# Patient Record
Sex: FEMALE | URBAN - METROPOLITAN AREA
[De-identification: names, ages, dates, MRNs, and addresses within clinical notes are randomized per-mention and may not be internally consistent; named-entity substitution may affect disease eponyms.]

---

## 2022-09-06 ENCOUNTER — APPOINTMENT (OUTPATIENT)
Dept: URBAN - METROPOLITAN AREA CLINIC 198 | Age: 27
Setting detail: DERMATOLOGY
End: 2022-09-06

## 2022-09-06 DIAGNOSIS — D22 MELANOCYTIC NEVI: ICD-10-CM

## 2022-09-06 DIAGNOSIS — L21.8 OTHER SEBORRHEIC DERMATITIS: ICD-10-CM

## 2022-09-06 DIAGNOSIS — Z11.52 ENCOUNTER FOR SCREENING FOR COVID-19: ICD-10-CM

## 2022-09-06 PROBLEM — D22.4 MELANOCYTIC NEVI OF SCALP AND NECK: Status: ACTIVE | Noted: 2022-09-06

## 2022-09-06 PROBLEM — D23.72 OTHER BENIGN NEOPLASM OF SKIN OF LEFT LOWER LIMB, INCLUDING HIP: Status: ACTIVE | Noted: 2022-09-06

## 2022-09-06 PROBLEM — D22.5 MELANOCYTIC NEVI OF TRUNK: Status: ACTIVE | Noted: 2022-09-06

## 2022-09-06 PROBLEM — D23.71 OTHER BENIGN NEOPLASM OF SKIN OF RIGHT LOWER LIMB, INCLUDING HIP: Status: ACTIVE | Noted: 2022-09-06

## 2022-09-06 PROBLEM — D22.71 MELANOCYTIC NEVI OF RIGHT LOWER LIMB, INCLUDING HIP: Status: ACTIVE | Noted: 2022-09-06

## 2022-09-06 PROBLEM — D22.39 MELANOCYTIC NEVI OF OTHER PARTS OF FACE: Status: ACTIVE | Noted: 2022-09-06

## 2022-09-06 PROBLEM — D22.62 MELANOCYTIC NEVI OF LEFT UPPER LIMB, INCLUDING SHOULDER: Status: ACTIVE | Noted: 2022-09-06

## 2022-09-06 PROBLEM — D22.61 MELANOCYTIC NEVI OF RIGHT UPPER LIMB, INCLUDING SHOULDER: Status: ACTIVE | Noted: 2022-09-06

## 2022-09-06 PROBLEM — D22.72 MELANOCYTIC NEVI OF LEFT LOWER LIMB, INCLUDING HIP: Status: ACTIVE | Noted: 2022-09-06

## 2022-09-06 PROCEDURE — OTHER COUNSELING: OTHER

## 2022-09-06 PROCEDURE — OTHER PRESCRIPTION MEDICATION MANAGEMENT: OTHER

## 2022-09-06 PROCEDURE — OTHER SCREENING FOR COVID-19: OTHER

## 2022-09-06 PROCEDURE — OTHER PRESCRIPTION: OTHER

## 2022-09-06 PROCEDURE — 99204 OFFICE O/P NEW MOD 45 MIN: CPT

## 2022-09-06 RX ORDER — KETOCONAZOLE 20 MG/G
CREAM TOPICAL BID
Qty: 30 | Refills: 2 | Status: ERX | COMMUNITY
Start: 2022-09-06

## 2022-09-06 ASSESSMENT — LOCATION ZONE DERM
LOCATION ZONE: NECK
LOCATION ZONE: ARM
LOCATION ZONE: LEG
LOCATION ZONE: FACE
LOCATION ZONE: SCALP
LOCATION ZONE: TRUNK

## 2022-09-06 ASSESSMENT — LOCATION SIMPLE DESCRIPTION DERM
LOCATION SIMPLE: RIGHT PRETIBIAL REGION
LOCATION SIMPLE: RIGHT CHEEK
LOCATION SIMPLE: LEFT POSTERIOR THIGH
LOCATION SIMPLE: RIGHT UPPER BACK
LOCATION SIMPLE: RIGHT POSTERIOR THIGH
LOCATION SIMPLE: LEFT CHEEK
LOCATION SIMPLE: RIGHT BUTTOCK
LOCATION SIMPLE: RIGHT FOREARM
LOCATION SIMPLE: LEFT BUTTOCK
LOCATION SIMPLE: LEFT FOREARM
LOCATION SIMPLE: FRONTAL SCALP
LOCATION SIMPLE: ABDOMEN
LOCATION SIMPLE: POSTERIOR NECK

## 2022-09-06 ASSESSMENT — LOCATION DETAILED DESCRIPTION DERM
LOCATION DETAILED: MEDIAL FRONTAL SCALP
LOCATION DETAILED: RIGHT INFERIOR POSTERIOR NECK
LOCATION DETAILED: LEFT DISTAL POSTERIOR THIGH
LOCATION DETAILED: LEFT PROXIMAL DORSAL FOREARM
LOCATION DETAILED: LEFT CENTRAL MALAR CHEEK
LOCATION DETAILED: RIGHT DISTAL PRETIBIAL REGION
LOCATION DETAILED: RIGHT DISTAL POSTERIOR THIGH
LOCATION DETAILED: RIGHT MEDIAL UPPER BACK
LOCATION DETAILED: RIGHT PROXIMAL RADIAL DORSAL FOREARM
LOCATION DETAILED: LEFT BUTTOCK
LOCATION DETAILED: RIGHT BUTTOCK
LOCATION DETAILED: RIGHT SUPERIOR CENTRAL MALAR CHEEK
LOCATION DETAILED: EPIGASTRIC SKIN

## 2022-09-06 NOTE — PROCEDURE: PRESCRIPTION MEDICATION MANAGEMENT
Initiate Treatment: Ketoconazole 2% cream qhs
Detail Level: Zone
Render In Strict Bullet Format?: No
91

## 2024-06-07 ENCOUNTER — TRANSCRIBE ORDERS (OUTPATIENT)
Dept: SCHEDULING | Facility: REHABILITATION | Age: 29
End: 2024-06-07

## 2024-06-07 DIAGNOSIS — Z86.69 HISTORY OF TETHERED SPINAL CORD: Primary | ICD-10-CM

## 2024-06-07 DIAGNOSIS — Z86.69 PERSONAL HISTORY OF OTHER DISEASES OF THE NERVOUS SYSTEM AND SENSE ORGANS: ICD-10-CM

## 2024-06-18 ENCOUNTER — HOSPITAL ENCOUNTER (OUTPATIENT)
Dept: PHYSICAL THERAPY | Facility: REHABILITATION | Age: 29
Setting detail: THERAPIES SERIES
Discharge: HOME | End: 2024-06-18
Attending: LEGAL MEDICINE
Payer: COMMERCIAL

## 2024-06-18 DIAGNOSIS — Z86.69 HISTORY OF TETHERED SPINAL CORD: Primary | ICD-10-CM

## 2024-06-18 PROCEDURE — 97163 PT EVAL HIGH COMPLEX 45 MIN: CPT | Mod: GP

## 2024-06-18 NOTE — LETTER
Dear DR. Whitfield,    Thank you for this referral. Please review the attached notes and plan of care for your approval.  Please contact our department with any questions.     Sincerely,     Dilan Machuca, PT  [address]  [place] [address]  Phone [phone]  Fax  [phone]    By co-signing this Plan of Care (POC) you agree to the following:  I have reviewed the the Plan of Care established by the therapist within this document and certify that the services are skilled and medically necessary. I have reviewed the plan and recommend that these services continue to meet the goals stated in this document.    PHYSICIAN SIGNATURE: __________________________________     DATE: ___________________  TIME: _____________           Physical Therapy Plan of Care 24   Effective from: 2024  Effective to: 2024    Plan ID: [id number]            Participants as of Finalize on 2024    Name Type Comments Contact Info    Remedios Whitfield MD PCP - General  [phone]    Dilan Machuca PT Physical Therapist         Last Progress Notes Note     Author: Dilan Machuca PT Status: Signed Last edited: 2024 10:00 AM       Physical Therapy Evaluation    [facility] Fax: [phone]    PT EVALUATION FOR OUTPATIENT THERAPY    Patient: Melanie Litten    MRN: [id number]  : 1995 28 y.o.     Referring Physician: Remedios Whitfield MD  Date of Visit: 2024      Certification Dates:  24 through 24         Recommended Frequency & Duration:  2 times/week for up to 3 months     Diagnosis:   1. History of tethered spinal cord        Chief Complaints:   Chief Complaint   Patient presents with   • Difficulty Walking   • Balance Deficits   • Dec ROM   • Dec Strength   • Dec Coordination   • Pain   • Decreased Endurance   • Abnormality Of Gait   •  Decreased Community Integration   • Decreased recreational/play activity   • Decreased Mobility   •  Difficulty Performing Work/school Tasks   •   Imbalance       Precautions:    Precautions additional comments: hypermobile - EDS, postural hypotension    Past Medical History: No past medical history on file.    Past Surgical History: No past surgical history on file.      LEARNING ASSESSMENT    Assessment completed:  Yes    Learner name:  Saranya    Learner: Patient    Learning Barriers:  Learning barriers: No Barriers    Preferred Language: English     Needed: No    Education Provided:   Method: Discussion  Readiness: acceptance  Response: Verbalizes understanding      Welcome letter discussed: Yes Patient provided with Welcome Letter, which includes attendance policy. Provided education regarding cancellation and no-show policy. Education regarding the importance of participation and regular attendance to maximize goal attainment.       OBJECTIVE MEASUREMENTS/DATA:    Time In Session:  Start Time: 1001  Stop Time: 1100  Time Calculation (min): 59 min   Assessment and Plan - 06/24/24 1516          Assessment    Plan of Care reviewed and patient/family in agreement Yes     System Pathology/Pathophysiology Noted musculoskeletal;neuromuscular;cardiovascular     Functional Limitations in Following Categories (PT Eval) self-care;work;community/leisure     Rehab Potential/Prognosis good, to achieve stated therapy goals     Problem List abnormal muscle tone;decreased strength;impaired balance;impaired sensation;decreased endurance;edema;hemiparesis/hemiplegia;impaired motor control;impaired coordination     Clinical Assessment Saranya is a 28-year-old female who presents to OPPT with history of tethered cord syndrome and s/p surgical intervention for laminectomy for occult tethered cord release on 9/25/23. Pt has recovered significantly since surgery and is ambulatory today at [facility] without AD as it takes her longer than prior. 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 Functional testing of 6MWT reveals decreased overall endurance with a distance of 1555 ft which is below her age/gender matched norms with increased foot slap and coordination deficits. Gait speed is also below norms at 1.0 m/s. FGA testing of 24/30 places pt above the cut off score of 22/30 for falls risk although with room for improvement especially due to her goals of returning to an active lifestyle. ABC scale, floor transfers and single leg balance TBA NV. Pt is an excellent candidate for skilled PT 2x/week for 8 weeks to address impairments stated above, maximize her functional independence, reduce falls risk and meet all pt specific goals.     Plan and Recommendations Complete functional testing, initiate core strengthening program, higher level balance and coordination     Planned Services CPT 44473 Aquatic therapy/exercises;CPT 00805 Gait training;CPT 88626 Manual therapy;CPT 56612 Neuromuscular Reeducation;CPT 64466 Orthotics training (Initial encounter);CPT 21483 Orthotics/Prosthetics management and training (Subsequent encounters);CPT 66711 Self-care/Home management training;CPT 37987 Therapeutic activities;CPT 12243 Therapeutic exercises;CPT 31521 Electrical stimulation ATTENDED;CPT 61269 Electrical stimulation UNATTENDED;CPT 10691 Hot/Cold Packs therapy     Comments/Additional Services Additional services that may be used: Vector BWSS for gait training, Lokomat body weight support training for gait function, full weight bearing for gait training in an eksoskeleton, FES , Xcite, Equestrian Therapy                    General Information - 06/18/24 1009          Session Details    Document Type initial evaluation     Mode of Treatment individual therapy        General Information    Referring Physician Remedios Whitfield MD     History of present illness/functional impairment Saranya is a 28 year old female who  presents to OPPT with history of tethered cord syndrome. Pt with PMH significant for chiari malformation, hypermobile EDS, left plantar fasciitis, anxiety and postural hypotension. Pt reports her symptoms started slowly in 2019 with L hip pain, followed up with neurosurgeon in [place] with unremarkable results for all testing. Her symptoms then progressed with L leg motor weakness and shakiness (similar to clonus) with movement, chronic constipation, L drop foot, lower back pain, and neck pain.  Pt was finally diagnosed with tethered cord syndrome and underwent a clinical trial surgery at [facility], [facility], and [facility]. Due to her symptoms, she meets criteria for exploration and sectioning of her filum for the functional tethered cord clinical trial. Now s/p laminectomy for occult tethered cord release on 9/25/23 by Dr Duckworth. Patient tolerated procedure well. No post-op complications. Transferred stable once PACU criteria met. Patient kept FLAT for 36hr. Placed on an aseptic dex taper and post-op IV abx x24hrs.  She was mobilized on 9/27 tolerated well without any symptoms. Additionally, was instructed not to exercise for 7 weeks post-surgery. Pt reports she recovered well overall with decreased drop foot, clonus and swelling, continues to have increased coordination deficits and foot drag with fatigue. Pt has completed OPPT at [facility] in [place] and intermittent massage therapy. Pt’s functional goals are to work on higher level balance, picking up things from floor, coordination and “Hippo therapy”.     Limitations/Impairments other (see comments)     Patient/Family/Caregiver Comments/Observations Pt arrives for initial evaluation with goals of maximizing her functional mobility with use of electrical stim and higher level balance program.     Precautions comments hypermobile - EDS, postural hypotension                    Pain/Vitals - 06/18/24 1009           Pain Assessment    Currently in pain No/Denies        Pre Activity Vital Signs    /74     BP Location Right upper arm     BP Method Automatic     Patient Position Sitting                    Falls/Food Screening - 06/18/24 1009          Initial Falls Assessment    One or more falls in the last year No        Food Insecurity    Within the past 12 months, you worried that your food would run out before you got the money to buy more. Never true     Within the past 12 months, the food you bought just didn't last and you didn't have money to get more. Never true                    PT - 06/18/24 1009          Physical Therapy    Physical Therapy Specialty Spinal Cord PT        PT Plan    Frequency of treatment 2 times/week     PT Duration 3 months     PT Cert From 06/18/24     PT Cert To 09/21/24     Date PT POC was sent to provider 06/18/24     Signed PT Plan of Care received?  No                     Living Environment    Living Environment - 06/18/24 1009          Living Environment    Living Arrangements house;other (see comments)     Living Environment Comment Living with parents now will be moving into ranch she purchased with 3 SUNIL, everything else on single floor                   PLOF:    Prior Level of Function - 06/18/24 1009          OTHER    Previous level of function Previously full indpendent with no gait abnormalities or functional impairments; currently working full time in [place]                   Sensory Tests    Sensory Testing - 06/18/24 1009          Sensory Assessment    Sensory Assessment sensation intact except     Sensation Impaired Location(s) left LE;right LE     Left LE Sensory Impairment general sensation;light touch awareness;light touch localization;absent   Proprioception TBA    Sensory Subjective Reports numbness;tingling   down LLE occasionally                  Skin    Skin Assessment - 06/18/24 1009          LE Skin    Skin Condition Intact        Girth Measurements    Other:  Girth Measurement/Comments Mild dependent edema of LLE at end of day per pt reports; surgical scar approx 4 inches in length down mid lumbar spine from previous laminectomy, mild hypomobility down lower 50% of scar                   ROM    Range of Motion - 06/18/24 1200          Additional ROM Measurements    Additional ROM  Hypermobile in all jointsm mild L hamstring length restrictions to approx 70 deg due to tone.                   MMT    Manual Muscle Tests - 06/18/24 1009          RIGHT: Lower Extremity Manual Muscle Test Assessment    Hip Flexion gross movement (5/5) normal     Hip Extension gross movement (4+/5) good plus     Hip Abduction gross movement (4+/5) good plus     Knee Flexion strength (5/5) normal     Knee Extension strength (5/5) normal     Ankle Dorsiflexion gross movement (5/5) normal     Ankle Plantarflexion gross movement (5/5) normal     Ankle Inversion gross movement (5/5) normal     Ankle Eversion gross movement (5/5) normal        LEFT: Lower Extremity Manual Muscle Test Assessment    Hip Flexion gross movement (3-/5) fair minus     Hip Extension gross movement (3-/5) fair minus     Hip Abduction gross movement (3-/5) fair minus     Hip Adduction gross movement (4+/5) good plus     Hip Internal Rotation gross movement (3+/5) fair plus     Hip External Rotation gross movement (3+/5) fair plus     Knee Flexion strength (4/5) good     Knee Extension strength (4-/5) good minus     Ankle Dorsiflexion gross movement (3+/5) fair plus     Ankle Plantarflexion gross movement (4+/5) good plus     Ankle Inversion gross movement (3-/5) fair minus     Ankle Eversion gross movement (4-/5) good minus                   Transfers    Transfers - 06/18/24 1009          Bed Mobility    [place] independent        Transfers    Comment Independent for all transfers, Lynne with increased time for pivot transfers when fatigued; Floor transfers TBA NV                   Gait and Mobility    Gait and Mobility -  06/18/24 1009          Gait Training    [place], Gait independent     Variable surfaces Flat surface     Assistive Device none     Pattern step-through     Comment Gait over level/unlevel indoor surfaces with no AD, decreased L stance time and hip extension through midstance, (+) trendelenberg with increased fatigue resulting in R hip adduction on initial contact, mild foot slap and poor trunk rotation and B arm swing        Stairs Assessment    [place], Stairs modified independence     Assistive Device railing     Handrail Location (Stairs) both sides     Number of Steps (Stairs) 12     Stair Height 7 inches     Ascending Stairs Technique step-over-step     Descending Stairs Technique step-over-step     Comment per pt verbal reports                   Neuromuscular/Motor    Neuromuscular/Motor - 06/24/24 1516          Motor Skills    Motor Skills coordination;muscle tone     Coordination gross motor deficit;left;lower extremity;minimal impairment;dysdiadochokinesia;heel to shin     Comment: Coordination Alt toes: decreased DONAVON on L     Muscle Tone left;lower extremity(s);mild impairment;hypertonia;spasticity     Comment: Muscle Tone MAS: L quads and gastroc: 1+                   Balance/Posture    Balance and Posture - 06/24/24 1516          Postural Deviations    Postural Deviations head and neck;shoulder;upper back;low back     Head and Neck forward head     Shoulder left shoulder forward;right shoulder forward     Upper Back kyphosis     Low Back flattened     Comment, Postural Deviations decreased lordosis when seated        Posture    Posture postural deviations                   Outcome Measures    PT Outcome Measures - 06/18/24 1009          Objective Outcome Measures    6 Minute Walk Test 1555 ft     Gait Speed (m/sec) 1 m/sec     FGA Score (out of 30 total) 24     30 Second Sit to Stand Test --   TBA NV                     Goals       •  Mutually agreed upon pain goal       Mutually agreed upon  pain goal: N/A       •  PT Neuro Goals       Short term goals   Short Term Goals Time Frame Result Comment/Progress   Assess ABC, floor transfers, SLS assessment, 30sSTS  2 weeks     Improve 6mWT by 191 feet or more to meet the MCID indicating significant improvement in endurance and activity tolerance. 4-6 weeks     Improve ABC Scale score to 60% or better suggesting improved balance confidence during functional tasks 4-6 weeks     Improve FGA score by 5 points or more to meet the MCID indicating improving dynamic balance and decreasing falls risk. 4-6 weeks     Tolerate 10 min of CV activity with VSS 4-6 weeks     Progress gait speed by 0.13 m/sec or more to meet the MCID without decline in safety or quality indicating significant improvement in gait speed and increased safety during ambulation in the home and community. 4-6 weeks     Pt and caregiver will be mod I with HEP 4 weeks     Long term goals   Long Term Goals Time Frame Result Comment/Progress   Pt will complete floor transfer without any external support Independently       Pt will navigate FF 7 inch steps unsupported with reciprocal pattern  8-12 weeks     Improve 6mWT by an additional 191 feet or more to meet the MCID indicating significant improvement in endurance and activity tolerance. 8-12 weeks     Improve 30 sec STS test reps to 15 reps or better to meet the age/gender matched norm and cut-off score indicating adequate LE muscle performance for functional activities. 8-12 weeks     Improve ABC Scale score to 81% or better suggesting improved balance confidence during functional tasks and decreased risk for falls. 8-12 weeks     Improve FGA score to > 28/30 indicating improved dynamic balance and decreased falls risk. 8-12 weeks     Tolerate 12-15 min of CV activity with VSS 8-12 weeks     Progress gait speed by an additional 0.13 m/sec or more to meet the MCID without decline in safety or quality indicating significant improvement in gait speed  and increased safety during ambulation in the home and community. 8-12 weeks     Pt and caregiver will be mod I with updated HEP 8-12 weeks            •  Pt Stated Goals (pt-stated)       Pt’s functional goals are to work on higher level balance, picking up things from floor, coordination and “Hippo therapy”.                 TREATMENT PLAN:       PT Neuro Exercises Current Session   Performed Today? (y/n)   THER ACT   CPT 16385 TOTAL TIME FOR SESSION 0-7 Minutes      Pain, vitals, subjective    Y   Patient Education Patient educated regarding current impairments per testing completed today and PT POC moving forward.     Patient provided with Welcome Letter, which includes attendance policy. Provided education regarding cancellation and no-show policy. Education regarding the importance of participation and regular attendance to maximize goal attainment. Patient verbalized understanding/agreement.   Yes   HEP       Subjective Outcomes Measures       ABC Scale  taken home to complete and return  Y   THER EX  CPT 63734 TOTAL TIME FOR SESSION  Not performed      STRETCHING       Stretching by patient     Stretching by therapist/PROM       CARDIOVASCULAR       Nu Step       Stationary Bike Active warm up - trial VR  Y   Ambulation with AD      Treadmill       Endurance Testing       6mWT Assessed Y   STRENGTH TRAINING     HEP Review     Standing Ther-Ex     Side-lying there-ex     Supine Ther-Ex     Prone Ther-Ex     Core exercises     Functional Strength Testing       30 sec STS test (60y +)  Assessed Yes   NEURO RE-ED  CPT 50095 TOTAL TIME FOR SESSION Not performed      COORDINATION       Target Tapping     Coordination ladder As appropriate for HL balance     Amb with ankle weights     Amb with proprio wrap     Pushing weighted shopping cart     POSTURAL RE-ED     Sit <> Stand      Seated & standing reaching for trunk strengthening     [place]bey-scapular strengthening     PRE-GAIT ACTIVITIES      Tap-ups     Step-ups      Standing weight shifting     Step-taps     BALANCE TRAINING     Standing balance - static/dynamic       HEP Review       Floor       Airex       Rockerboard       Hurdles       Split Stance       Dynamic gait        Side stepping     Retro walking     Tandem Walking     Cafe Walking     Walking with ball toss     Balance Testing     FGA  Assessed Y   SLS  TBA    10mWT Assessed  Y   GAIT TRAINING  CPT 86750 TOTAL TIME FOR SESSION Not performed      Ambulation with AD        Stair negotiation       Curb negotiation       Ramp negotiation       Outdoor ambulation       MANUAL  CPT 64834 TOTAL TIME FOR SESSION Not performed      Mobilization        MODALITIES  CPT 26069 TOTAL TIME FOR SESSION      Ice       Heat       ATTENDED E-STIM  CPT 48881 TOTAL TIME FOR SESSION Not performed      Attended E-Stim       Unattended E-stim            ASSESSMENT:    This 28 y.o. year old female presents to PT with above stated diagnosis. Physical Therapy evaluation reveals abnormal muscle tone, decreased strength, impaired balance, impaired sensation, decreased endurance, edema, hemiparesis/hemiplegia, impaired motor control, impaired coordination resulting in self-care, work, community/leisure limitations. Melanie Litten will benefit from skilled PT services to address limitation, work towards rehab and patient goals and maximize PLOF of chosen ADLs.     Planned Services: The patient's treatment will include CPT 25645 Aquatic therapy/exercises, CPT 51548 Gait training, CPT 69567 Manual therapy, CPT 89491 Neuromuscular Reeducation, CPT 04962 Orthotics training (Initial encounter), CPT 23618 Orthotics/Prosthetics management and training (Subsequent encounters), CPT 53231 Self-care/Home management training, CPT 77679 Therapeutic activities, CPT 78433 Therapeutic exercises, CPT 56081 Electrical stimulation ATTENDED, CPT 07270 Electrical stimulation UNATTENDED, CPT 48340 Hot/Cold Packs therapy,Additional services that may be used: Vector  BWSS for gait training, Lokomat body weight support training for gait function, full weight bearing for gait training in an eksoskeleton, FES , Xcite, Equestrian Therapy.     Dilan Machuca PT                           Current Participants as of 6/24/2024    Name Type Comments Contact Info    Remedios Whitfield MD PCP - General  [phone]    Signature pending    Dilan Machuca PT Physical Therapist      Signature pending

## 2024-06-18 NOTE — LETTER
Dear DR. Whitfield,    Thank you for this referral. Please review the attached notes and plan of care for your approval.  Please contact our department with any questions.     Sincerely,     Dilan Machuca, PT  [address]  [place] [address]  Phone [phone]  Fax  [phone]    By co-signing this Plan of Care (POC) you agree to the following:  I have reviewed the the Plan of Care established by the therapist within this document and certify that the services are skilled and medically necessary. I have reviewed the plan and recommend that these services continue to meet the goals stated in this document.    PHYSICIAN SIGNATURE: __________________________________     DATE: ___________________  TIME: _____________           Physical Therapy Plan of Care 24   Effective from: 2024  Effective to: 2024    Plan ID: [id number]            Participants as of Finalize on 2024    Name Type Comments Contact Info    Remedios Whitfield MD PCP - General  [phone]    Dilan Machuca PT Physical Therapist         Last Progress Notes Note     Author: Dilan Machuca PT Status: Signed Last edited: 2024 10:00 AM       Physical Therapy Evaluation    [facility] Fax: [phone]    PT EVALUATION FOR OUTPATIENT THERAPY    Patient: Melanie Litten    MRN: [id number]  : 1995 28 y.o.     Referring Physician: Remedios Whitfield MD  Date of Visit: 2024      Certification Dates:  24 through 24         Recommended Frequency & Duration:  2 times/week for up to 3 months     Diagnosis:   1. History of tethered spinal cord        Chief Complaints:   Chief Complaint   Patient presents with   • Difficulty Walking   • Balance Deficits   • Dec ROM   • Dec Strength   • Dec Coordination   • Pain   • Decreased Endurance   • Abnormality Of Gait   •  Decreased Community Integration   • Decreased recreational/play activity   • Decreased Mobility   •  Difficulty Performing Work/school Tasks   •   Imbalance       Precautions:    Precautions additional comments: hypermobile - EDS, postural hypotension    Past Medical History: No past medical history on file.    Past Surgical History: No past surgical history on file.      LEARNING ASSESSMENT    Assessment completed:  Yes    Learner name:  Saranya    Learner: Patient    Learning Barriers:  Learning barriers: No Barriers    Preferred Language: English     Needed: No    Education Provided:   Method: Discussion  Readiness: acceptance  Response: Verbalizes understanding      Welcome letter discussed: Yes Patient provided with Welcome Letter, which includes attendance policy. Provided education regarding cancellation and no-show policy. Education regarding the importance of participation and regular attendance to maximize goal attainment.       OBJECTIVE MEASUREMENTS/DATA:    Time In Session:  Start Time: 1001  Stop Time: 1100  Time Calculation (min): 59 min   Assessment and Plan - 06/24/24 1516          Assessment    Plan of Care reviewed and patient/family in agreement Yes     System Pathology/Pathophysiology Noted musculoskeletal;neuromuscular;cardiovascular     Functional Limitations in Following Categories (PT Eval) self-care;work;community/leisure     Rehab Potential/Prognosis good, to achieve stated therapy goals     Problem List abnormal muscle tone;decreased strength;impaired balance;impaired sensation;decreased endurance;edema;hemiparesis/hemiplegia;impaired motor control;impaired coordination     Clinical Assessment Saranya is a 28-year-old female who presents to OPPT with history of tethered cord syndrome and s/p surgical intervention for laminectomy for occult tethered cord release on 9/25/23. Pt has recovered significantly since surgery and is ambulatory today at [facility] without AD as it takes her longer than prior. 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 Functional testing of 6MWT reveals decreased overall endurance with a distance of 1555 ft which is below her age/gender matched norms with increased foot slap and coordination deficits. Gait speed is also below norms at 1.0 m/s. FGA testing of 24/30 places pt above the cut off score of 22/30 for falls risk although with room for improvement especially due to her goals of returning to an active lifestyle. ABC scale, floor transfers and single leg balance TBA NV. Pt is an excellent candidate for skilled PT 2x/week for 8 weeks to address impairments stated above, maximize her functional independence, reduce falls risk and meet all pt specific goals.     Plan and Recommendations Complete functional testing, initiate core strengthening program, higher level balance and coordination     Planned Services CPT 07277 Aquatic therapy/exercises;CPT 00587 Gait training;CPT 12726 Manual therapy;CPT 16368 Neuromuscular Reeducation;CPT 63051 Orthotics training (Initial encounter);CPT 01552 Orthotics/Prosthetics management and training (Subsequent encounters);CPT 98606 Self-care/Home management training;CPT 64158 Therapeutic activities;CPT 72041 Therapeutic exercises;CPT 37369 Electrical stimulation ATTENDED;CPT 01333 Electrical stimulation UNATTENDED;CPT 21644 Hot/Cold Packs therapy     Comments/Additional Services Additional services that may be used: Vector BWSS for gait training, Lokomat body weight support training for gait function, full weight bearing for gait training in an eksoskeleton, FES , Xcite, Equestrian Therapy                    General Information - 06/18/24 1009          Session Details    Document Type initial evaluation     Mode of Treatment individual therapy        General Information    Referring Physician Remedios Whitfield MD     History of present illness/functional impairment Saranya is a 28 year old female who  presents to OPPT with history of tethered cord syndrome. Pt with PMH significant for chiari malformation, hypermobile EDS, left plantar fasciitis, anxiety and postural hypotension. Pt reports her symptoms started slowly in 2019 with L hip pain, followed up with neurosurgeon in [place] with unremarkable results for all testing. Her symptoms then progressed with L leg motor weakness and shakiness (similar to clonus) with movement, chronic constipation, L drop foot, lower back pain, and neck pain.  Pt was finally diagnosed with tethered cord syndrome and underwent a clinical trial surgery at [facility], [facility], and [facility]. Due to her symptoms, she meets criteria for exploration and sectioning of her filum for the functional tethered cord clinical trial. Now s/p laminectomy for occult tethered cord release on 9/25/23 by Dr Duckworth. Patient tolerated procedure well. No post-op complications. Transferred stable once PACU criteria met. Patient kept FLAT for 36hr. Placed on an aseptic dex taper and post-op IV abx x24hrs.  She was mobilized on 9/27 tolerated well without any symptoms. Additionally, was instructed not to exercise for 7 weeks post-surgery. Pt reports she recovered well overall with decreased drop foot, clonus and swelling, continues to have increased coordination deficits and foot drag with fatigue. Pt has completed OPPT at [facility] in [place] and intermittent massage therapy. Pt’s functional goals are to work on higher level balance, picking up things from floor, coordination and “Hippo therapy”.     Limitations/Impairments other (see comments)     Patient/Family/Caregiver Comments/Observations Pt arrives for initial evaluation with goals of maximizing her functional mobility with use of electrical stim and higher level balance program.     Precautions comments hypermobile - EDS, postural hypotension                    Pain/Vitals - 06/18/24 1009           Pain Assessment    Currently in pain No/Denies        Pre Activity Vital Signs    /74     BP Location Right upper arm     BP Method Automatic     Patient Position Sitting                    Falls/Food Screening - 06/18/24 1009          Initial Falls Assessment    One or more falls in the last year No        Food Insecurity    Within the past 12 months, you worried that your food would run out before you got the money to buy more. Never true     Within the past 12 months, the food you bought just didn't last and you didn't have money to get more. Never true                    PT - 06/18/24 1009          Physical Therapy    Physical Therapy Specialty Spinal Cord PT        PT Plan    Frequency of treatment 2 times/week     PT Duration 3 months     PT Cert From 06/18/24     PT Cert To 09/21/24     Date PT POC was sent to provider 06/18/24     Signed PT Plan of Care received?  No                     Living Environment    Living Environment - 06/18/24 1009          Living Environment    Living Arrangements house;other (see comments)     Living Environment Comment Living with parents now will be moving into ranch she purchased with 3 SUNIL, everything else on single floor                   PLOF:    Prior Level of Function - 06/18/24 1009          OTHER    Previous level of function Previously full indpendent with no gait abnormalities or functional impairments; currently working full time in [place]                   Sensory Tests    Sensory Testing - 06/18/24 1009          Sensory Assessment    Sensory Assessment sensation intact except     Sensation Impaired Location(s) left LE;right LE     Left LE Sensory Impairment general sensation;light touch awareness;light touch localization;absent   Proprioception TBA    Sensory Subjective Reports numbness;tingling   down LLE occasionally                  Skin    Skin Assessment - 06/18/24 1009          LE Skin    Skin Condition Intact        Girth Measurements    Other:  Girth Measurement/Comments Mild dependent edema of LLE at end of day per pt reports; surgical scar approx 4 inches in length down mid lumbar spine from previous laminectomy, mild hypomobility down lower 50% of scar                   ROM    Range of Motion - 06/18/24 1200          Additional ROM Measurements    Additional ROM  Hypermobile in all jointsm mild L hamstring length restrictions to approx 70 deg due to tone.                   MMT    Manual Muscle Tests - 06/18/24 1009          RIGHT: Lower Extremity Manual Muscle Test Assessment    Hip Flexion gross movement (5/5) normal     Hip Extension gross movement (4+/5) good plus     Hip Abduction gross movement (4+/5) good plus     Knee Flexion strength (5/5) normal     Knee Extension strength (5/5) normal     Ankle Dorsiflexion gross movement (5/5) normal     Ankle Plantarflexion gross movement (5/5) normal     Ankle Inversion gross movement (5/5) normal     Ankle Eversion gross movement (5/5) normal        LEFT: Lower Extremity Manual Muscle Test Assessment    Hip Flexion gross movement (3-/5) fair minus     Hip Extension gross movement (3-/5) fair minus     Hip Abduction gross movement (3-/5) fair minus     Hip Adduction gross movement (4+/5) good plus     Hip Internal Rotation gross movement (3+/5) fair plus     Hip External Rotation gross movement (3+/5) fair plus     Knee Flexion strength (4/5) good     Knee Extension strength (4-/5) good minus     Ankle Dorsiflexion gross movement (3+/5) fair plus     Ankle Plantarflexion gross movement (4+/5) good plus     Ankle Inversion gross movement (3-/5) fair minus     Ankle Eversion gross movement (4-/5) good minus                   Transfers    Transfers - 06/18/24 1009          Bed Mobility    [place] independent        Transfers    Comment Independent for all transfers, Lynne with increased time for pivot transfers when fatigued; Floor transfers TBA NV                   Gait and Mobility    Gait and Mobility -  06/18/24 1009          Gait Training    [place], Gait independent     Variable surfaces Flat surface     Assistive Device none     Pattern step-through     Comment Gait over level/unlevel indoor surfaces with no AD, decreased L stance time and hip extension through midstance, (+) trendelenberg with increased fatigue resulting in R hip adduction on initial contact, mild foot slap and poor trunk rotation and B arm swing        Stairs Assessment    [place], Stairs modified independence     Assistive Device railing     Handrail Location (Stairs) both sides     Number of Steps (Stairs) 12     Stair Height 7 inches     Ascending Stairs Technique step-over-step     Descending Stairs Technique step-over-step     Comment per pt verbal reports                   Neuromuscular/Motor    Neuromuscular/Motor - 06/24/24 1516          Motor Skills    Motor Skills coordination;muscle tone     Coordination gross motor deficit;left;lower extremity;minimal impairment;dysdiadochokinesia;heel to shin     Comment: Coordination Alt toes: decreased DONAVON on L     Muscle Tone left;lower extremity(s);mild impairment;hypertonia;spasticity     Comment: Muscle Tone MAS: L quads and gastroc: 1+                   Balance/Posture    Balance and Posture - 06/24/24 1516          Postural Deviations    Postural Deviations head and neck;shoulder;upper back;low back     Head and Neck forward head     Shoulder left shoulder forward;right shoulder forward     Upper Back kyphosis     Low Back flattened     Comment, Postural Deviations decreased lordosis when seated        Posture    Posture postural deviations                   Outcome Measures    PT Outcome Measures - 06/18/24 1009          Objective Outcome Measures    6 Minute Walk Test 1555 ft     Gait Speed (m/sec) 1 m/sec     FGA Score (out of 30 total) 24     30 Second Sit to Stand Test --   TBA NV                     Goals       •  Mutually agreed upon pain goal       Mutually agreed upon  pain goal: N/A       •  PT Neuro Goals       Short term goals   Short Term Goals Time Frame Result Comment/Progress   Assess ABC, floor transfers, SLS assessment, 30sSTS  2 weeks     Improve 6mWT by 191 feet or more to meet the MCID indicating significant improvement in endurance and activity tolerance. 4-6 weeks     Improve ABC Scale score to 60% or better suggesting improved balance confidence during functional tasks 4-6 weeks     Improve FGA score by 5 points or more to meet the MCID indicating improving dynamic balance and decreasing falls risk. 4-6 weeks     Tolerate 10 min of CV activity with VSS 4-6 weeks     Progress gait speed by 0.13 m/sec or more to meet the MCID without decline in safety or quality indicating significant improvement in gait speed and increased safety during ambulation in the home and community. 4-6 weeks     Pt and caregiver will be mod I with HEP 4 weeks     Long term goals   Long Term Goals Time Frame Result Comment/Progress   Pt will complete floor transfer without any external support Independently       Pt will navigate FF 7 inch steps unsupported with reciprocal pattern  8-12 weeks     Improve 6mWT by an additional 191 feet or more to meet the MCID indicating significant improvement in endurance and activity tolerance. 8-12 weeks     Improve 30 sec STS test reps to 15 reps or better to meet the age/gender matched norm and cut-off score indicating adequate LE muscle performance for functional activities. 8-12 weeks     Improve ABC Scale score to 81% or better suggesting improved balance confidence during functional tasks and decreased risk for falls. 8-12 weeks     Improve FGA score to > 28/30 indicating improved dynamic balance and decreased falls risk. 8-12 weeks     Tolerate 12-15 min of CV activity with VSS 8-12 weeks     Progress gait speed by an additional 0.13 m/sec or more to meet the MCID without decline in safety or quality indicating significant improvement in gait speed  and increased safety during ambulation in the home and community. 8-12 weeks     Pt and caregiver will be mod I with updated HEP 8-12 weeks            •  Pt Stated Goals (pt-stated)       Pt’s functional goals are to work on higher level balance, picking up things from floor, coordination and “Hippo therapy”.                 TREATMENT PLAN:       PT Neuro Exercises Current Session   Performed Today? (y/n)   THER ACT   CPT 34939 TOTAL TIME FOR SESSION 0-7 Minutes      Pain, vitals, subjective    Y   Patient Education Patient educated regarding current impairments per testing completed today and PT POC moving forward.     Patient provided with Welcome Letter, which includes attendance policy. Provided education regarding cancellation and no-show policy. Education regarding the importance of participation and regular attendance to maximize goal attainment. Patient verbalized understanding/agreement.   Yes   HEP       Subjective Outcomes Measures       ABC Scale  taken home to complete and return  Y   THER EX  CPT 17010 TOTAL TIME FOR SESSION  Not performed      STRETCHING       Stretching by patient     Stretching by therapist/PROM       CARDIOVASCULAR       Nu Step       Stationary Bike Active warm up - trial VR  Y   Ambulation with AD      Treadmill       Endurance Testing       6mWT Assessed Y   STRENGTH TRAINING     HEP Review     Standing Ther-Ex     Side-lying there-ex     Supine Ther-Ex     Prone Ther-Ex     Core exercises     Functional Strength Testing       30 sec STS test (60y +)  Assessed Yes   NEURO RE-ED  CPT 86631 TOTAL TIME FOR SESSION Not performed      COORDINATION       Target Tapping     Coordination ladder As appropriate for HL balance     Amb with ankle weights     Amb with proprio wrap     Pushing weighted shopping cart     POSTURAL RE-ED     Sit <> Stand      Seated & standing reaching for trunk strengthening     Abbey-scapular strengthening     PRE-GAIT ACTIVITIES      Tap-ups     Step-ups      Standing weight shifting     Step-taps     BALANCE TRAINING     Standing balance - static/dynamic       HEP Review       Floor       Airex       Rockerboard       Hurdles       Split Stance       Dynamic gait        Side stepping     Retro walking     Tandem Walking     Cafe Walking     Walking with ball toss     Balance Testing     FGA  Assessed Y   SLS  TBA    10mWT Assessed  Y   GAIT TRAINING  CPT 45077 TOTAL TIME FOR SESSION Not performed      Ambulation with AD        Stair negotiation       Curb negotiation       Ramp negotiation       Outdoor ambulation       MANUAL  CPT 83568 TOTAL TIME FOR SESSION Not performed      Mobilization        MODALITIES  CPT 16683 TOTAL TIME FOR SESSION      Ice       Heat       ATTENDED E-STIM  CPT 58686 TOTAL TIME FOR SESSION Not performed      Attended E-Stim       Unattended E-stim            ASSESSMENT:    This 28 y.o. year old female presents to PT with above stated diagnosis. Physical Therapy evaluation reveals abnormal muscle tone, decreased strength, impaired balance, impaired sensation, decreased endurance, edema, hemiparesis/hemiplegia, impaired motor control, impaired coordination resulting in self-care, work, community/leisure limitations. Melanie Litten will benefit from skilled PT services to address limitation, work towards rehab and patient goals and maximize PLOF of chosen ADLs.     Planned Services: The patient's treatment will include CPT 35681 Aquatic therapy/exercises, CPT 11541 Gait training, CPT 21016 Manual therapy, CPT 17563 Neuromuscular Reeducation, CPT 87824 Orthotics training (Initial encounter), CPT 12547 Orthotics/Prosthetics management and training (Subsequent encounters), CPT 87666 Self-care/Home management training, CPT 05472 Therapeutic activities, CPT 49409 Therapeutic exercises, CPT 51121 Electrical stimulation ATTENDED, CPT 72087 Electrical stimulation UNATTENDED, CPT 08273 Hot/Cold Packs therapy,Additional services that may be used: Vector  BWSS for gait training, Lokomat body weight support training for gait function, full weight bearing for gait training in an eksoskeleton, FES , Xcite, Equestrian Therapy.     Dilan Machuca PT                           Current Participants as of 6/24/2024    Name Type Comments Contact Info    Remedios Whitfield MD PCP - General  [phone]    Signature pending    Dilan Machuca PT Physical Therapist      Signature pending

## 2024-06-24 ENCOUNTER — HOSPITAL ENCOUNTER (OUTPATIENT)
Dept: PHYSICAL THERAPY | Facility: REHABILITATION | Age: 29
Setting detail: THERAPIES SERIES
Discharge: HOME | End: 2024-06-24
Attending: LEGAL MEDICINE
Payer: COMMERCIAL

## 2024-06-24 DIAGNOSIS — Z86.69 HISTORY OF TETHERED SPINAL CORD: Primary | ICD-10-CM

## 2024-06-24 PROCEDURE — 97110 THERAPEUTIC EXERCISES: CPT | Mod: GP

## 2024-06-24 PROCEDURE — 97530 THERAPEUTIC ACTIVITIES: CPT | Mod: GP

## 2024-06-24 PROCEDURE — 97112 NEUROMUSCULAR REEDUCATION: CPT | Mod: GP

## 2024-06-24 NOTE — OP PT TREATMENT LOG
"   PT Neuro Exercises Current Session   Performed Today? (y/n)   THER ACT   CPT 55466 TOTAL TIME FOR SESSION 8-22 Minutes      Pain, vitals, subjective    Y   Patient Education Patient educated regarding current impairments per testing completed today and PT POC moving forward.     Patient provided with Welcome Letter, which includes attendance policy. Provided education regarding cancellation and no-show policy. Education regarding the importance of participation and regular attendance to maximize goal attainment. Patient verbalized understanding/agreement.   Yes   HEP       Floor Transfers  CloseS with increased time for LLE management, requires single UE support with review of moving from tall kneel to half kneeling position. Pt demos good understanding, will require continued practice  Yes   Subjective Outcomes Measures       ABC Scale Assessed - returned today Yes   THER EX  CPT 40883 TOTAL TIME FOR SESSION  23-37 Minutes      STRETCHING       Stretching by patient Incline board stretch: L3 - 1 min x2 Yes   Stretching by therapist/PROM Trialed modified jose stretch - not effective (pt states modified lunge position she has felt more of a stretch in hip flexors in the past)   Yes   CARDIOVASCULAR       Nu Step       Stationary Bike VR Expresso Upright Bike NV   Ambulation with AD      Treadmill       Endurance Testing       6mWT Assessed    STRENGTH TRAINING     HEP Review     Standing Ther-Ex     Side-lying there-ex Clamshells: 2x20 on R, L in supine: 5\"x10   Yes   Supine Ther-Ex Diaphragmatic breathin mins due to elevated BP  Glut bridge: x10   Bridge with ER: x10  Yes  Yes   Deadbugs with physioball  - isometric holds: 10\" x 10   - alt UE: 3 sets x5 reps  Yes   Prone Ther-Ex     Core exercises     Functional Strength Testing       30 sec STS test (60y +)  Assessed    NEURO RE-ED  CPT 21832 TOTAL TIME FOR SESSION 8-22 Minutes      COORDINATION       Target Tapping     Coordination ladder As appropriate " for HL balance     Amb with ankle weights     Amb with proprio wrap     Pushing weighted shopping cart     POSTURAL RE-ED     Sit <> Stand      Tall Kneel  Tall kneel <> short sit: x10, x10 with chest press  Yes   Seated & standing reaching for trunk strengthening     Abbey-scapular strengthening     PRE-GAIT ACTIVITIES      Tap-ups     Step-ups     Standing weight shifting     Step-taps     BALANCE TRAINING     Standing balance - static/dynamic       HEP Review       Floor       Airex       Rockerboard Medium blue rockerboard:   - AP plane: x10, with light UE support  Yes    Hurdles       Split Stance       Dynamic gait        Side stepping     Retro walking     Tandem Walking     Cafe Walking     Walking with ball toss     Balance Testing     FGA  Assessed    SLS  Assessed see note  Yes   10mWT Assessed     GAIT TRAINING  CPT 59198 TOTAL TIME FOR SESSION Not performed      Ambulation with AD        Stair negotiation       Curb negotiation       Ramp negotiation       Outdoor ambulation       MANUAL  CPT 67303 TOTAL TIME FOR SESSION Not performed      Mobilization        MODALITIES  CPT 37887 TOTAL TIME FOR SESSION      Ice       Heat       ATTENDED E-STIM  CPT 90927 TOTAL TIME FOR SESSION Not performed      Attended E-Stim       Unattended E-stim

## 2024-06-24 NOTE — OP PT TREATMENT LOG
PT Neuro Exercises Current Session   Performed Today? (y/n)   THER ACT   CPT 80806 TOTAL TIME FOR SESSION 0-7 Minutes      Pain, vitals, subjective    Y   Patient Education Patient educated regarding current impairments per testing completed today and PT POC moving forward.     Patient provided with Welcome Letter, which includes attendance policy. Provided education regarding cancellation and no-show policy. Education regarding the importance of participation and regular attendance to maximize goal attainment. Patient verbalized understanding/agreement.   Yes   HEP       Subjective Outcomes Measures       ABC Scale  taken home to complete and return  Y   THER EX  CPT 23441 TOTAL TIME FOR SESSION  Not performed      STRETCHING       Stretching by patient     Stretching by therapist/PROM       CARDIOVASCULAR       Nu Step       Stationary Bike Active warm up - trial VR  Y   Ambulation with AD      Treadmill       Endurance Testing       6mWT Assessed Y   STRENGTH TRAINING     HEP Review     Standing Ther-Ex     Side-lying there-ex     Supine Ther-Ex     Prone Ther-Ex     Core exercises     Functional Strength Testing       30 sec STS test (60y +)  Assessed Yes   NEURO RE-ED  CPT 43785 TOTAL TIME FOR SESSION Not performed      COORDINATION       Target Tapping     Coordination ladder As appropriate for HL balance     Amb with ankle weights     Amb with proprio wrap     Pushing weighted shopping cart     POSTURAL RE-ED     Sit <> Stand      Seated & standing reaching for trunk strengthening     Abbey-scapular strengthening     PRE-GAIT ACTIVITIES      Tap-ups     Step-ups     Standing weight shifting     Step-taps     BALANCE TRAINING     Standing balance - static/dynamic       HEP Review       Floor       Airex       Rockerboard       Hurdles       Split Stance       Dynamic gait        Side stepping     Retro walking     Tandem Walking     Cafe Walking     Walking with ball toss     Balance Testing     FGA   Assessed Y   SLS  TBA    10mWT Assessed  Y   GAIT TRAINING  CPT 91949 TOTAL TIME FOR SESSION Not performed      Ambulation with AD        Stair negotiation       Curb negotiation       Ramp negotiation       Outdoor ambulation       MANUAL  CPT 61112 TOTAL TIME FOR SESSION Not performed      Mobilization        MODALITIES  CPT 42689 TOTAL TIME FOR SESSION      Ice       Heat       ATTENDED E-STIM  CPT 12103 TOTAL TIME FOR SESSION Not performed      Attended E-Stim       Unattended E-stim

## 2024-06-24 NOTE — PROGRESS NOTES
Physical Therapy Evaluation    [facility] Fax: [phone]    PT EVALUATION FOR OUTPATIENT THERAPY    Patient: Melanie Litten    MRN: [id number]  : 1995 28 y.o.     Referring Physician: Remedios Whitfield MD  Date of Visit: 2024      Certification Dates:  24 through 24         Recommended Frequency & Duration:  2 times/week for up to 3 months     Diagnosis:   1. History of tethered spinal cord        Chief Complaints:   Chief Complaint   Patient presents with    Difficulty Walking    Balance Deficits    Dec ROM    Dec Strength    Dec Coordination    Pain    Decreased Endurance    Abnormality Of Gait     Decreased Community Integration    Decreased recreational/play activity    Decreased Mobility     Difficulty Performing Work/school Tasks     Imbalance       Precautions:    Precautions additional comments: hypermobile - EDS, postural hypotension    Past Medical History: No past medical history on file.    Past Surgical History: No past surgical history on file.      LEARNING ASSESSMENT    Assessment completed:  Yes    Learner name:  Saranya    Learner: Patient    Learning Barriers:  Learning barriers: No Barriers    Preferred Language: English     Needed: No    Education Provided:   Method: Discussion  Readiness: acceptance  Response: Verbalizes understanding      Welcome letter discussed: Yes Patient provided with Welcome Letter, which includes attendance policy. Provided education regarding cancellation and no-show policy. Education regarding the importance of participation and regular attendance to maximize goal attainment.       OBJECTIVE MEASUREMENTS/DATA:    Time In Session:  Start Time: 1001  Stop Time: 1100  Time Calculation (min): 59 min   Assessment and Plan - 24 1516          Assessment    Plan of Care reviewed and patient/family in agreement Yes     System Pathology/Pathophysiology Noted musculoskeletal;neuromuscular;cardiovascular     Functional  Limitations in Following Categories (PT Eval) self-care;work;community/leisure     Rehab Potential/Prognosis good, to achieve stated therapy goals     Problem List abnormal muscle tone;decreased strength;impaired balance;impaired sensation;decreased endurance;edema;hemiparesis/hemiplegia;impaired motor control;impaired coordination     Clinical Assessment Saranya is a 28-year-old female who presents to OPPT with history of tethered cord syndrome and s/p surgical intervention for laminectomy for occult tethered cord release on 9/25/23. Pt has recovered significantly since surgery and is ambulatory today at [facility] without AD as it takes her longer than prior. 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 Functional testing of 6MWT reveals decreased overall endurance with a distance of 1555 ft which is below her age/gender matched norms with increased foot slap and coordination deficits. Gait speed is also below norms at 1.0 m/s. FGA testing of 24/30 places pt above the cut off score of 22/30 for falls risk although with room for improvement especially due to her goals of returning to an active lifestyle. ABC scale, floor transfers and single leg balance TBA NV. Pt is an excellent candidate for skilled PT 2x/week for 8 weeks to address impairments stated above, maximize her functional independence, reduce falls risk and meet all pt specific goals.     Plan and Recommendations Complete functional testing, initiate core strengthening program, higher level balance and coordination     Planned Services CPT 83952 Aquatic therapy/exercises;CPT 72328 Gait training;CPT 74767 Manual therapy;CPT 22729 Neuromuscular Reeducation;CPT 81391 Orthotics training (Initial encounter);CPT 51576 Orthotics/Prosthetics management and training (Subsequent encounters);CPT  99821 Self-care/Home management training;CPT 73335 Therapeutic activities;CPT 95178 Therapeutic exercises;CPT 49593 Electrical stimulation ATTENDED;CPT 82941 Electrical stimulation UNATTENDED;CPT 54631 Hot/Cold Packs therapy     Comments/Additional Services Additional services that may be used: Vector BWSS for gait training, Lokomat body weight support training for gait function, full weight bearing for gait training in an eksoskeleton, FES , Xcite, Equestrian Therapy                    General Information - 06/18/24 1009          Session Details    Document Type initial evaluation     Mode of Treatment individual therapy        General Information    Referring Physician Remedios Whitfield MD     History of present illness/functional impairment Saranya is a 28 year old female who presents to OPPT with history of tethered cord syndrome. Pt with PMH significant for chiari malformation, hypermobile EDS, left plantar fasciitis, anxiety and postural hypotension. Pt reports her symptoms started slowly in 2019 with L hip pain, followed up with neurosurgeon in [place] with unremarkable results for all testing. Her symptoms then progressed with L leg motor weakness and shakiness (similar to clonus) with movement, chronic constipation, L drop foot, lower back pain, and neck pain.  Pt was finally diagnosed with tethered cord syndrome and underwent a clinical trial surgery at [facility], and [facility]. Due to her symptoms, she meets criteria for exploration and sectioning of her filum for the functional tethered cord clinical trial. Now s/p laminectomy for occult tethered cord release on 9/25/23 by Dr Duckworth. Patient tolerated procedure well. No post-op complications. Transferred stable once PACU criteria met. Patient kept FLAT for 36hr. Placed on an aseptic dex taper and post-op IV abx x24hrs.  She was mobilized on 9/27 tolerated well without any symptoms.  Additionally, was instructed not to exercise for 7 weeks post-surgery. Pt reports she recovered well overall with decreased drop foot, clonus and swelling, continues to have increased coordination deficits and foot drag with fatigue. Pt has completed OPPT at [facility] in [place] and intermittent massage therapy. Pt’s functional goals are to work on higher level balance, picking up things from floor, coordination and “Hippo therapy”.     Limitations/Impairments other (see comments)     Patient/Family/Caregiver Comments/Observations Pt arrives for initial evaluation with goals of maximizing her functional mobility with use of electrical stim and higher level balance program.     Precautions comments hypermobile - EDS, postural hypotension                    Pain/Vitals - 06/18/24 1009          Pain Assessment    Currently in pain No/Denies        Pre Activity Vital Signs    /74     BP Location Right upper arm     BP Method Automatic     Patient Position Sitting                    Falls/Food Screening - 06/18/24 1009          Initial Falls Assessment    One or more falls in the last year No        Food Insecurity    Within the past 12 months, you worried that your food would run out before you got the money to buy more. Never true     Within the past 12 months, the food you bought just didn't last and you didn't have money to get more. Never true                    PT - 06/18/24 1009          Physical Therapy    Physical Therapy Specialty Spinal Cord PT        PT Plan    Frequency of treatment 2 times/week     PT Duration 3 months     PT Cert From 06/18/24     PT Cert To 09/21/24     Date PT POC was sent to provider 06/18/24     Signed PT Plan of Care received?  No                     Living Environment    Living Environment - 06/18/24 1009          Living Environment    Living Arrangements house;other (see comments)     Living Environment Comment Living with parents now will be moving into [place] she purchased  with 3 SUNIL, everything else on single floor                   PLOF:    Prior Level of Function - 06/18/24 1009          OTHER    Previous level of function Previously full indpendent with no gait abnormalities or functional impairments; currently working full time in [place]                   Sensory Tests    Sensory Testing - 06/18/24 1009          Sensory Assessment    Sensory Assessment sensation intact except     Sensation Impaired Location(s) left LE;right LE     Left LE Sensory Impairment general sensation;light touch awareness;light touch localization;absent   Proprioception TBA    Sensory Subjective Reports numbness;tingling   down LLE occasionally                  Skin    Skin Assessment - 06/18/24 1009          LE Skin    Skin Condition Intact        Girth Measurements    Other: Girth Measurement/Comments Mild dependent edema of LLE at end of day per pt reports; surgical scar approx 4 inches in length down mid lumbar spine from previous laminectomy, mild hypomobility down lower 50% of scar                   ROM    Range of Motion - 06/18/24 1200          Additional ROM Measurements    Additional ROM  Hypermobile in all jointsm mild L hamstring length restrictions to approx 70 deg due to tone.                   MMT    Manual Muscle Tests - 06/18/24 1009          RIGHT: Lower Extremity Manual Muscle Test Assessment    Hip Flexion gross movement (5/5) normal     Hip Extension gross movement (4+/5) good plus     Hip Abduction gross movement (4+/5) good plus     Knee Flexion strength (5/5) normal     Knee Extension strength (5/5) normal     Ankle Dorsiflexion gross movement (5/5) normal     Ankle Plantarflexion gross movement (5/5) normal     Ankle Inversion gross movement (5/5) normal     Ankle Eversion gross movement (5/5) normal        LEFT: Lower Extremity Manual Muscle Test Assessment    Hip Flexion gross movement (3-/5) fair minus     Hip Extension gross movement (3-/5) fair minus     Hip Abduction gross  movement (3-/5) fair minus     Hip Adduction gross movement (4+/5) good plus     Hip Internal Rotation gross movement (3+/5) fair plus     Hip External Rotation gross movement (3+/5) fair plus     Knee Flexion strength (4/5) good     Knee Extension strength (4-/5) good minus     Ankle Dorsiflexion gross movement (3+/5) fair plus     Ankle Plantarflexion gross movement (4+/5) good plus     Ankle Inversion gross movement (3-/5) fair minus     Ankle Eversion gross movement (4-/5) good minus                   Transfers    Transfers - 06/18/24 1009          Bed Mobility    Emmet independent        Transfers    Comment Independent for all transfers, Lynne with increased time for pivot transfers when fatigued; Floor transfers TBA NV                   Gait and Mobility    Gait and Mobility - 06/18/24 1009          Gait Training    Emmet, Gait independent     Variable surfaces Flat surface     Assistive Device none     Pattern step-through     Comment Gait over level/unlevel indoor surfaces with no AD, decreased L stance time and hip extension through midstance, (+) trendelenberg with increased fatigue resulting in R hip adduction on initial contact, mild foot slap and poor trunk rotation and B arm swing        Stairs Assessment    Emmet, Stairs modified independence     Assistive Device railing     Handrail Location (Stairs) both sides     Number of Steps (Stairs) 12     Stair Height 7 inches     Ascending Stairs Technique step-over-step     Descending Stairs Technique step-over-step     Comment per pt verbal reports                   Neuromuscular/Motor    Neuromuscular/Motor - 06/24/24 1516          Motor Skills    Motor Skills coordination;muscle tone     Coordination gross motor deficit;left;lower extremity;minimal impairment;dysdiadochokinesia;heel to shin     Comment: Coordination Alt toes: decreased DONAVON on L     Muscle Tone left;lower extremity(s);mild impairment;hypertonia;spasticity     Comment:  Muscle Tone MAS: L quads and gastroc: 1+                   Balance/Posture    Balance and Posture - 06/24/24 1516          Postural Deviations    Postural Deviations head and neck;shoulder;upper back;low back     Head and Neck forward head     Shoulder left shoulder forward;right shoulder forward     Upper Back kyphosis     Low Back flattened     Comment, Postural Deviations decreased lordosis when seated        Posture    Posture postural deviations                   Outcome Measures    PT Outcome Measures - 06/18/24 1009          Objective Outcome Measures    6 Minute Walk Test 1555 ft     Gait Speed (m/sec) 1 m/sec     FGA Score (out of 30 total) 24     30 Second Sit to Stand Test --   TBA NV                     Goals         Mutually agreed upon pain goal       Mutually agreed upon pain goal: N/A         PT Neuro Goals       Short term goals   Short Term Goals Time Frame Result Comment/Progress   Assess ABC, floor transfers, SLS assessment, 30sSTS  2 weeks     Improve 6mWT by 191 feet or more to meet the MCID indicating significant improvement in endurance and activity tolerance. 4-6 weeks     Improve ABC Scale score to 60% or better suggesting improved balance confidence during functional tasks 4-6 weeks     Improve FGA score by 5 points or more to meet the MCID indicating improving dynamic balance and decreasing falls risk. 4-6 weeks     Tolerate 10 min of CV activity with VSS 4-6 weeks     Progress gait speed by 0.13 m/sec or more to meet the MCID without decline in safety or quality indicating significant improvement in gait speed and increased safety during ambulation in the home and community. 4-6 weeks     Pt and caregiver will be mod I with HEP 4 weeks     Long term goals   Long Term Goals Time Frame Result Comment/Progress   Pt will complete floor transfer without any external support Independently       Pt will navigate FF 7 inch steps unsupported with reciprocal pattern  8-12 weeks     Improve 6mWT by  an additional 191 feet or more to meet the MCID indicating significant improvement in endurance and activity tolerance. 8-12 weeks     Improve 30 sec STS test reps to 15 reps or better to meet the age/gender matched norm and cut-off score indicating adequate LE muscle performance for functional activities. 8-12 weeks     Improve ABC Scale score to 81% or better suggesting improved balance confidence during functional tasks and decreased risk for falls. 8-12 weeks     Improve FGA score to > 28/30 indicating improved dynamic balance and decreased falls risk. 8-12 weeks     Tolerate 12-15 min of CV activity with VSS 8-12 weeks     Progress gait speed by an additional 0.13 m/sec or more to meet the MCID without decline in safety or quality indicating significant improvement in gait speed and increased safety during ambulation in the home and community. 8-12 weeks     Pt and caregiver will be mod I with updated HEP 8-12 weeks              Pt Stated Goals (pt-stated)       Pt’s functional goals are to work on higher level balance, picking up things from floor, coordination and “Hippo therapy”.                 TREATMENT PLAN:       PT Neuro Exercises Current Session   Performed Today? (y/n)   THER ACT   CPT 68620 TOTAL TIME FOR SESSION 0-7 Minutes      Pain, vitals, subjective    Y   Patient Education Patient educated regarding current impairments per testing completed today and PT POC moving forward.     Patient provided with Welcome Letter, which includes attendance policy. Provided education regarding cancellation and no-show policy. Education regarding the importance of participation and regular attendance to maximize goal attainment. Patient verbalized understanding/agreement.   Yes   HEP       Subjective Outcomes Measures       ABC Scale  taken home to complete and return  Y   THER EX  CPT 27673 TOTAL TIME FOR SESSION  Not performed      STRETCHING       Stretching by patient     Stretching by therapist/PROM        CARDIOVASCULAR       Nu Step       Stationary Bike Active warm up - trial VR  Y   Ambulation with AD      Treadmill       Endurance Testing       6mWT Assessed Y   STRENGTH TRAINING     HEP Review     Standing Ther-Ex     Side-lying there-ex     Supine Ther-Ex     Prone Ther-Ex     Core exercises     Functional Strength Testing       30 sec STS test (60y +)  Assessed Yes   NEURO RE-ED  CPT 59348 TOTAL TIME FOR SESSION Not performed      COORDINATION       Target Tapping     Coordination ladder As appropriate for HL balance     Amb with ankle weights     Amb with proprio wrap     Pushing weighted shopping cart     POSTURAL RE-ED     Sit <> Stand      Seated & standing reaching for trunk strengthening     Abbey-scapular strengthening     PRE-GAIT ACTIVITIES      Tap-ups     Step-ups     Standing weight shifting     Step-taps     BALANCE TRAINING     Standing balance - static/dynamic       HEP Review       Floor       Airex       Rockerboard       Hurdles       Split Stance       Dynamic gait        Side stepping     Retro walking     Tandem Walking     Cafe Walking     Walking with ball toss     Balance Testing     FGA  Assessed Y   SLS  TBA    10mWT Assessed  Y   GAIT TRAINING  CPT 25382 TOTAL TIME FOR SESSION Not performed      Ambulation with AD        Stair negotiation       Curb negotiation       Ramp negotiation       Outdoor ambulation       MANUAL  CPT 48961 TOTAL TIME FOR SESSION Not performed      Mobilization        MODALITIES  CPT 02784 TOTAL TIME FOR SESSION      Ice       Heat       ATTENDED E-STIM  CPT 80929 TOTAL TIME FOR SESSION Not performed      Attended E-Stim       Unattended E-stim            ASSESSMENT:    This 28 y.o. year old female presents to PT with above stated diagnosis. Physical Therapy evaluation reveals abnormal muscle tone, decreased strength, impaired balance, impaired sensation, decreased endurance, edema, hemiparesis/hemiplegia, impaired motor control, impaired coordination  resulting in self-care, work, community/leisure limitations. Melanie Litten will benefit from skilled PT services to address limitation, work towards rehab and patient goals and maximize PLOF of chosen ADLs.     Planned Services: The patient's treatment will include CPT 21971 Aquatic therapy/exercises, CPT 85049 Gait training, CPT 57244 Manual therapy, CPT 83712 Neuromuscular Reeducation, CPT 70942 Orthotics training (Initial encounter), CPT 07827 Orthotics/Prosthetics management and training (Subsequent encounters), CPT 34665 Self-care/Home management training, CPT 61172 Therapeutic activities, CPT 56005 Therapeutic exercises, CPT 57928 Electrical stimulation ATTENDED, CPT 38347 Electrical stimulation UNATTENDED, CPT 89443 Hot/Cold Packs therapy,Additional services that may be used: Vector BWSS for gait training, Lokomat body weight support training for gait function, full weight bearing for gait training in an eksoskeleton, FES , Xcite, Equestrian Therapy.     Dilan Machuca, PT

## 2024-06-24 NOTE — PROGRESS NOTES
Physical Therapy Visit    PT DAILY NOTE FOR OUTPATIENT THERAPY    Patient: Melanie Litten MRN: [id number]  : 1995 28 y.o.  Referring Physician: Remedios Whitfield MD  Date of Visit: 2024    Certification Dates: 24 through 24    Diagnosis:   1. History of tethered spinal cord        Chief Complaints:  EDS, LLE weakness, gait/balance impairment    Precautions:   Precautions comments: hypermobile - EDS, postural hypotension      TODAY'S VISIT    Time In Session:  Start Time: 1702  Stop Time: 1800  Time Calculation (min): 58 min   History/Vitals/Pain/Encounter Info - 24 1705          Injury History/Precautions/Daily Required Info    Document Type daily treatment     Primary Therapist Dilan Machuca     Chief Complaint/Reason for Visit  EDS, LLE weakness, gait/balance impairment     Referring Physician Remedios Whitfield MD     Precautions comments hypermobile - EDS, postural hypotension     History of present illness/functional impairment Saranya is a 28 year old female who presents to OPPT with history of tethered cord syndrome. Pt with PMH significant for chiari malformation, hypermobile EDS, left plantar fasciitis, anxiety and postural hypotension. Pt reports her symptoms started slowly in 2019 with L hip pain, followed up with neurosurgeon in [place] with unremarkable results for all testing. Her symptoms then progressed with L leg motor weakness and shakiness (similar to clonus) with movement, chronic constipation, L drop foot, lower back pain, and neck pain.  Pt was finally diagnosed with tethered cord syndrome and underwent a clinical trial surgery at [facility], [facility], and [facility]. Due to her symptoms, she meets criteria for exploration and sectioning of her filum for the functional tethered cord clinical trial. Now s/p laminectomy for occult tethered cord release on 23 by Dr Duckworth. Patient tolerated procedure well.  No post-op complications. Transferred stable once PACU criteria met. Patient kept FLAT for 36hr. Placed on an aseptic dex taper and post-op IV abx x24hrs.  She was mobilized on 9/27 tolerated well without any symptoms. Additionally, was instructed not to exercise for 7 weeks post-surgery. Pt reports she recovered well overall with decreased drop foot, clonus and swelling, continues to have increased coordination deficits and foot drag with fatigue. Pt has completed OPPT at [facility] PT in [place] and intermittent massage therapy. Pt’s functional goals are to work on higher level balance, picking up things from floor, coordination and “Hippo therapy”.     Patient/Family/Caregiver Comments/Observations Pt reports she has a new bee allergy that she found out about due to recent bee sting, has epipen she carries now.     Patient reported fall since last visit No        Pain Assessment    Currently in pain No/Denies        Pre Activity Vital Signs    Pulse 76     /106     BP Location Right upper arm     BP Method Automatic     Patient Position Sitting        Post Activity Vital Signs    Post Activity Pulse 72     Post Activity /85     Post Activity BP Location Right upper arm     Post Activity BP Method Automatic     Patient Position Lying                    Daily Treatment Assessment and Plan - 06/24/24 1804          Daily Treatment Assessment and Plan    Progress toward goals Progressing     Daily Outcome Summary Functional testing of floor transfers, SLS balance assessment and ABC scale completed today - see note below. Pt presented today with increased BP which she states has been this way and fluctuating since her bee sting incident, additionally she does have some anxiety and 'white coat syndrome' - BP returned to baseline post breathing TE - pt asymptomatic throughout. Initiated basic mat program today with noted significant weakness of proximal L hip requiring modifications to TE, although she  tolerates without any pain or discomfort. Pt does demo increased LLE instability at end of session due to fatigue but safe to ambulate out of session.     Plan and Recommendations progress core and proximal hip strengthening program, higher level balance and coordination as tolerated, FES set up after POC signature                         OBJECTIVE DATA TAKEN TODAY:    Outcome Measures    PT Outcome Measures - 06/24/24 1703          Subjective Outcome Measures    ABC 1360/1600 = 85% confidence        Other Outcome Measures Used/Comments    Other outcome measure used: SLS: R: 30 seconds, L <5 seconds without pelvic drop                     Today's Treatment:       PT Neuro Exercises Current Session   Performed Today? (y/n)   THER ACT   CPT 23056 TOTAL TIME FOR SESSION 8-22 Minutes      Pain, vitals, subjective    Y   Patient Education Patient educated regarding current impairments per testing completed today and PT POC moving forward.     Patient provided with Welcome Letter, which includes attendance policy. Provided education regarding cancellation and no-show policy. Education regarding the importance of participation and regular attendance to maximize goal attainment. Patient verbalized understanding/agreement.   Yes   HEP       Floor Transfers  CloseS with increased time for LLE management, requires single UE support with review of moving from tall kneel to half kneeling position. Pt demos good understanding, will require continued practice  Yes   Subjective Outcomes Measures       ABC Scale Assessed - returned today Yes   THER EX  CPT 86440 TOTAL TIME FOR SESSION  23-37 Minutes      STRETCHING       Stretching by patient Incline board stretch: L3 - 1 min x2 Yes   Stretching by therapist/PROM Trialed modified jose stretch - not effective (pt states modified lunge position she has felt more of a stretch in hip flexors in the past)   Yes   CARDIOVASCULAR       Nu Step       Stationary Bike VR Expresso Upright  "Bike NV   Ambulation with AD      Treadmill       Endurance Testing       6mWT Assessed    STRENGTH TRAINING     HEP Review     Standing Ther-Ex     Side-lying there-ex Clamshells: 2x20 on R, L in supine: 5\"x10   Yes   Supine Ther-Ex Diaphragmatic breathin mins due to elevated BP  Glut bridge: x10   Bridge with ER: x10  Yes  Yes   Deadbugs with physioball  - isometric holds: 10\" x 10   - alt UE: 3 sets x5 reps  Yes   Prone Ther-Ex     Core exercises     Functional Strength Testing       30 sec STS test (60y +)  Assessed    NEURO RE-ED  CPT 41507 TOTAL TIME FOR SESSION 8-22 Minutes      COORDINATION       Target Tapping     Coordination ladder As appropriate for HL balance     Amb with ankle weights     Amb with proprio wrap     Pushing weighted shopping cart     POSTURAL RE-ED     Sit <> Stand      Tall Kneel  Tall kneel <> short sit: x10, x10 with chest press  Yes   Seated & standing reaching for trunk strengthening     Abbey-scapular strengthening     PRE-GAIT ACTIVITIES      Tap-ups     Step-ups     Standing weight shifting     Step-taps     BALANCE TRAINING     Standing balance - static/dynamic       HEP Review       Floor       Airex       Rockerboard Medium blue rockerboard:   - AP plane: x10, with light UE support  Yes    Hurdles       Split Stance       Dynamic gait        Side stepping     Retro walking     Tandem Walking     Cafe Walking     Walking with ball toss     Balance Testing     FGA  Assessed    SLS  Assessed see note  Yes   10mWT Assessed     GAIT TRAINING  CPT 22978 TOTAL TIME FOR SESSION Not performed      Ambulation with AD        Stair negotiation       Curb negotiation       Ramp negotiation       Outdoor ambulation       MANUAL  CPT 06652 TOTAL TIME FOR SESSION Not performed      Mobilization        MODALITIES  CPT 38226 TOTAL TIME FOR SESSION      Ice       Heat       ATTENDED E-STIM  CPT 02708 TOTAL TIME FOR SESSION Not performed      Attended E-Stim       Unattended E-stim         "

## 2024-06-27 ENCOUNTER — HOSPITAL ENCOUNTER (OUTPATIENT)
Dept: PHYSICAL THERAPY | Facility: REHABILITATION | Age: 29
Setting detail: THERAPIES SERIES
Discharge: HOME | End: 2024-06-27
Attending: LEGAL MEDICINE
Payer: COMMERCIAL

## 2024-06-27 DIAGNOSIS — Z86.69 HISTORY OF TETHERED SPINAL CORD: Primary | ICD-10-CM

## 2024-06-27 PROCEDURE — 97530 THERAPEUTIC ACTIVITIES: CPT | Mod: GP

## 2024-06-27 PROCEDURE — 97110 THERAPEUTIC EXERCISES: CPT | Mod: GP

## 2024-06-27 NOTE — OP PT TREATMENT LOG
"   PT Neuro Exercises Current Session   Performed Today? (y/n)   THER ACT   CPT 50804 TOTAL TIME FOR SESSION 8-22 Minutes      Pain, vitals, subjective    Provided rest breaks for energy conservation  Y  Yes    Patient Education Patient educated regarding current impairments per testing completed today and PT POC moving forward.     Patient provided with Welcome Letter, which includes attendance policy. Provided education regarding cancellation and no-show policy. Education regarding the importance of participation and regular attendance to maximize goal attainment. Patient verbalized understanding/agreement.      HEP       Floor Transfers  CloseS with increased time for LLE management, requires single UE support with review of moving from tall kneel to half kneeling position. Pt demos good understanding, will require continued practice     Subjective Outcomes Measures       ABC Scale Assessed - returned today    THER EX  CPT 43060 TOTAL TIME FOR SESSION  38-52 Minutes      STRETCHING       Stretching by patient Incline board stretch: L3 - 1 min x2 Yes   Stretching by therapist/PROM Trialed modified jose stretch - not effective (pt states modified lunge position she has felt more of a stretch in hip flexors in the past)      CARDIOVASCULAR       Nu Step       Stationary Bike Recumbent bike 5 min, L1  (Unable to complete revolution on Expresso upright bike) Yes    Ambulation with AD      Treadmill       Endurance Testing       6mWT Assessed    STRENGTH TRAINING     HEP Review     Standing Ther-Ex Standing hip abduction 2 x 10 ea  - L LE TKE w orange TB to inc L quad activation    Step ups 6\" block  - Added L LE TKE w orange TB    Hip hikes 2\" block, VC's to discourage R hip hike    Yes       Yes       Yes    Side-lying there-ex Clamshells: 2x20 on R, L in supine: 5\"x10      Supine Ther-Ex Diaphragmatic breathin mins due to elevated BP  Glut bridge: x10   Bridge with ER: x10   Supine hip abduction 2 x 10, LLE   " "  Yes    Yes    Deadbugs with physioball  - isometric holds: 10\" x 10   - alt UE: 3 sets x5 reps     Prone Ther-Ex     Core exercises PPT with ball squeeze 5 sec hold, 10 x  PPT with supine marches x 10  STS's from low EOM squeezing pilates ring  - As above, with squeeze and OH press Yes   Yes   Yes   Yes    Functional Strength Testing       30 sec STS test (60y +)  Assessed    NEURO RE-ED  CPT 10332 TOTAL TIME FOR SESSION 0-7 Minutes      COORDINATION       Target Tapping     Coordination ladder As appropriate for HL balance     Amb with ankle weights     Amb with proprio wrap     Pushing weighted shopping cart     POSTURAL RE-ED     Sit <> Stand      Tall Kneel  Tall kneel <> short sit: x10, x10 with chest press     Seated & standing reaching for trunk strengthening     Abbey-scapular strengthening     PRE-GAIT ACTIVITIES      Tap-ups     Step-ups     Standing weight shifting     Step-taps     BALANCE TRAINING     Standing balance - static/dynamic       HEP Review       Floor       Airex       Rockerboard Medium blue rockerboard:   - AP plane: x10, with light UE support     Hurdles       Split Stance       Dynamic gait        Side stepping     Retro walking     Tandem Walking     Cafe Walking     Walking with ball toss     Balance Testing     FGA  Assessed    SLS  Assessed see note     10mWT Assessed     GAIT TRAINING  CPT 98898 TOTAL TIME FOR SESSION Not performed      Ambulation with AD        Stair negotiation       Curb negotiation       Ramp negotiation       Outdoor ambulation       MANUAL  CPT 69767 TOTAL TIME FOR SESSION Not performed      Mobilization        MODALITIES  CPT 23862 TOTAL TIME FOR SESSION      Ice       Heat       ATTENDED E-STIM  CPT 00644 TOTAL TIME FOR SESSION Not performed      Attended E-Stim       Unattended E-stim          "

## 2024-06-27 NOTE — PROGRESS NOTES
Physical Therapy Visit    PT DAILY NOTE FOR OUTPATIENT THERAPY    Patient: Melanie Litten MRN: [id number]  : 1995 28 y.o.  Referring Physician: Remedios Whitfield MD  Date of Visit: 2024    Certification Dates: 24 through 24    Diagnosis:   1. History of tethered spinal cord        Chief Complaints:  EDS, LLE weakness, gait/balance impairment    Precautions:   Precautions comments: hypermobile - EDS, postural hypotension      TODAY'S VISIT    Time In Session:  Start Time: 1700  Stop Time: 1756  Time Calculation (min): 56 min   History/Vitals/Pain/Encounter Info - 24 1703          Injury History/Precautions/Daily Required Info    Document Type daily treatment     Primary Therapist Dilan Machuca     Chief Complaint/Reason for Visit  EDS, LLE weakness, gait/balance impairment     Referring Physician Remedios Whitfield MD     Precautions comments hypermobile - EDS, postural hypotension     History of present illness/functional impairment Saranya is a 28 year old female who presents to OPPT with history of tethered cord syndrome. Pt with PMH significant for chiari malformation, hypermobile EDS, left plantar fasciitis, anxiety and postural hypotension. Pt reports her symptoms started slowly in 2019 with L hip pain, followed up with neurosurgeon in [place] with unremarkable results for all testing. Her symptoms then progressed with L leg motor weakness and shakiness (similar to clonus) with movement, chronic constipation, L drop foot, lower back pain, and neck pain.  Pt was finally diagnosed with tethered cord syndrome and underwent a clinical trial surgery at [facility], [facility], and [facility]. Due to her symptoms, she meets criteria for exploration and sectioning of her filum for the functional tethered cord clinical trial. Now s/p laminectomy for occult tethered cord release on 23 by Dr Duckworth. Patient tolerated procedure well.  No post-op complications. Transferred stable once PACU criteria met. Patient kept FLAT for 36hr. Placed on an aseptic dex taper and post-op IV abx x24hrs.  She was mobilized on 9/27 tolerated well without any symptoms. Additionally, was instructed not to exercise for 7 weeks post-surgery. Pt reports she recovered well overall with decreased drop foot, clonus and swelling, continues to have increased coordination deficits and foot drag with fatigue. Pt has completed OPPT at [facility] PT in [place] and intermittent massage therapy. Pt’s functional goals are to work on higher level balance, picking up things from floor, coordination and “Hippo therapy”.     Patient/Family/Caregiver Comments/Observations Reported she was very tired following last PT session but feeling better today. No new changes otherwise.     Patient reported fall since last visit No        Pain Assessment    Currently in pain No/Denies        Pre Activity Vital Signs    /90     BP Location Right upper arm     BP Method Manual     Patient Position Sitting                    Daily Treatment Assessment and Plan - 06/27/24 1703          Daily Treatment Assessment and Plan    Progress toward goals Progressing     Daily Outcome Summary Tolerated session well. Pt provided with additional rest breaks this session due to fatigue following last PT session. She did well with carryover to core stabilization with STS's from EOM. She was challenged with standing TE's but was able to activate L LE better with external feedback of resistance band w TKE - worked to discourage R hip hike and activate L glute med. Patient reported feeling good following session and no L LE instability noted when ambulating out of session.     Plan and Recommendations progress core and proximal hip strengthening program, higher level balance and coordination as tolerated, FES set up after POC signature                         OBJECTIVE DATA TAKEN TODAY:    None taken    Today's  "Treatment:       PT Neuro Exercises Current Session   Performed Today? (y/n)   THER ACT   CPT 76691 TOTAL TIME FOR SESSION 8-22 Minutes      Pain, vitals, subjective    Provided rest breaks for energy conservation  Y  Yes    Patient Education Patient educated regarding current impairments per testing completed today and PT POC moving forward.     Patient provided with Welcome Letter, which includes attendance policy. Provided education regarding cancellation and no-show policy. Education regarding the importance of participation and regular attendance to maximize goal attainment. Patient verbalized understanding/agreement.      HEP       Floor Transfers  CloseS with increased time for LLE management, requires single UE support with review of moving from tall kneel to half kneeling position. Pt demos good understanding, will require continued practice     Subjective Outcomes Measures       ABC Scale Assessed - returned today    THER EX  CPT 57407 TOTAL TIME FOR SESSION  38-52 Minutes      STRETCHING       Stretching by patient Incline board stretch: L3 - 1 min x2 Yes   Stretching by therapist/PROM Trialed modified jose stretch - not effective (pt states modified lunge position she has felt more of a stretch in hip flexors in the past)      CARDIOVASCULAR       Nu Step       Stationary Bike Recumbent bike 5 min, L1  (Unable to complete revolution on Expresso upright bike) Yes    Ambulation with AD      Treadmill       Endurance Testing       6mWT Assessed    STRENGTH TRAINING     HEP Review     Standing Ther-Ex Standing hip abduction 2 x 10 ea  - L LE TKE w orange TB to inc L quad activation    Step ups 6\" block  - Added L LE TKE w orange TB    Hip hikes 2\" block, VC's to discourage R hip hike    Yes       Yes       Yes    Side-lying there-ex Clamshells: 2x20 on R, L in supine: 5\"x10      Supine Ther-Ex Diaphragmatic breathin mins due to elevated BP  Glut bridge: x10   Bridge with ER: x10   Supine hip abduction " "2 x 10, LLE     Yes    Yes    Deadbugs with physioball  - isometric holds: 10\" x 10   - alt UE: 3 sets x5 reps     Prone Ther-Ex     Core exercises PPT with ball squeeze 5 sec hold, 10 x  PPT with supine marches x 10  STS's from low EOM squeezing pilates ring  - As above, with squeeze and OH press Yes   Yes   Yes   Yes    Functional Strength Testing       30 sec STS test (60y +)  Assessed    NEURO RE-ED  CPT 07655 TOTAL TIME FOR SESSION 0-7 Minutes      COORDINATION       Target Tapping     Coordination ladder As appropriate for HL balance     Amb with ankle weights     Amb with proprio wrap     Pushing weighted shopping cart     POSTURAL RE-ED     Sit <> Stand      Tall Kneel  Tall kneel <> short sit: x10, x10 with chest press     Seated & standing reaching for trunk strengthening     Abbey-scapular strengthening     PRE-GAIT ACTIVITIES      Tap-ups     Step-ups     Standing weight shifting     Step-taps     BALANCE TRAINING     Standing balance - static/dynamic       HEP Review       Floor       Airex       Rockerboard Medium blue rockerboard:   - AP plane: x10, with light UE support     Hurdles       Split Stance       Dynamic gait        Side stepping     Retro walking     Tandem Walking     Cafe Walking     Walking with ball toss     Balance Testing     FGA  Assessed    SLS  Assessed see note     10mWT Assessed     GAIT TRAINING  CPT 59448 TOTAL TIME FOR SESSION Not performed      Ambulation with AD        Stair negotiation       Curb negotiation       Ramp negotiation       Outdoor ambulation       MANUAL  CPT 44041 TOTAL TIME FOR SESSION Not performed      Mobilization        MODALITIES  CPT 23793 TOTAL TIME FOR SESSION      Ice       Heat       ATTENDED E-STIM  CPT 90302 TOTAL TIME FOR SESSION Not performed      Attended E-Stim       Unattended E-stim                                 "

## 2024-07-01 ENCOUNTER — HOSPITAL ENCOUNTER (OUTPATIENT)
Dept: PHYSICAL THERAPY | Facility: REHABILITATION | Age: 29
Setting detail: THERAPIES SERIES
Discharge: HOME | End: 2024-07-01
Attending: LEGAL MEDICINE
Payer: COMMERCIAL

## 2024-07-01 DIAGNOSIS — Z86.69 HISTORY OF TETHERED SPINAL CORD: Primary | ICD-10-CM

## 2024-07-01 PROCEDURE — 97112 NEUROMUSCULAR REEDUCATION: CPT | Mod: GP

## 2024-07-01 PROCEDURE — 97110 THERAPEUTIC EXERCISES: CPT | Mod: GP

## 2024-07-01 NOTE — PROGRESS NOTES
Physical Therapy Visit    PT DAILY NOTE FOR OUTPATIENT THERAPY    Patient: Melanie Litten MRN: [id number]  : 1995 28 y.o.  Referring Physician: Remedios Whitfield MD  Date of Visit: 2024    Certification Dates: 24 through 24    Diagnosis:   1. History of tethered spinal cord        Chief Complaints:  EDS, LLE weakness, gait/balance impairment    Precautions:   Precautions comments: hypermobile - EDS, postural hypotension      TODAY'S VISIT    Time In Session:  Start Time: 803  Stop Time: 858  Time Calculation (min): 55 min   History/Vitals/Pain/Encounter Info - 24 0803          Injury History/Precautions/Daily Required Info    Document Type daily treatment     Primary Therapist Dilan Machuca     Chief Complaint/Reason for Visit  EDS, LLE weakness, gait/balance impairment     Referring Physician Remedios Whitfield MD     Precautions comments hypermobile - EDS, postural hypotension     History of present illness/functional impairment Saranya is a 28 year old female who presents to OPPT with history of tethered cord syndrome. Pt with PMH significant for chiari malformation, hypermobile EDS, left plantar fasciitis, anxiety and postural hypotension. Pt reports her symptoms started slowly in 2019 with L hip pain, followed up with neurosurgeon in [place] with unremarkable results for all testing. Her symptoms then progressed with L leg motor weakness and shakiness (similar to clonus) with movement, chronic constipation, L drop foot, lower back pain, and neck pain.  Pt was finally diagnosed with tethered cord syndrome and underwent a clinical trial surgery at [facility], [facility], and [facility]. Due to her symptoms, she meets criteria for exploration and sectioning of her filum for the functional tethered cord clinical trial. Now s/p laminectomy for occult tethered cord release on 23 by Dr Duckworth. Patient tolerated procedure well. No  post-op complications. Transferred stable once PACU criteria met. Patient kept FLAT for 36hr. Placed on an aseptic dex taper and post-op IV abx x24hrs.  She was mobilized on 9/27 tolerated well without any symptoms. Additionally, was instructed not to exercise for 7 weeks post-surgery. Pt reports she recovered well overall with decreased drop foot, clonus and swelling, continues to have increased coordination deficits and foot drag with fatigue. Pt has completed OPPT at [facility] in [place] and intermittent massage therapy. Pt’s functional goals are to work on higher level balance, picking up things from floor, coordination and “Hippo therapy”.     Patient/Family/Caregiver Comments/Observations No new changes since last visit, reports she felt sore following the day after last session but did not cont beyond that.     Patient reported fall since last visit No        Pain Assessment    Currently in pain No/Denies        Pre Activity Vital Signs    /88     BP Location Right upper arm     BP Method Manual     Patient Position Sitting                    Daily Treatment Assessment and Plan - 07/01/24 0803          Daily Treatment Assessment and Plan    Progress toward goals Progressing     Daily Outcome Summary Patient tolerated session well. She was challenged with coordination activities today, ute step ups but mirror beneficial for feedback throughout activity. Appeared to demo improved L knee stability with all activities with mild to no buckling noted (only seen with fatigue by end of session). Pt to reach out to PCP regarding clearance for stim bike.     Plan and Recommendations progress core and proximal hip strengthening program, higher level balance and coordination as tolerated, FES set up after POC signature                         OBJECTIVE DATA TAKEN TODAY:    None taken    Today's Treatment:       PT Neuro Exercises Current Session   Performed Today? (y/n)   THER ACT   CPT 73311 TOTAL TIME FOR  "SESSION 0-7 Minutes      Pain, vitals, subjective    Provided rest breaks for energy conservation  Y  Yes    Patient Education Patient educated regarding current impairments per testing completed today and PT POC moving forward.     Patient provided with Welcome Letter, which includes attendance policy. Provided education regarding cancellation and no-show policy. Education regarding the importance of participation and regular attendance to maximize goal attainment. Patient verbalized understanding/agreement.      HEP       Floor Transfers  CloseS with increased time for LLE management, requires single UE support with review of moving from tall kneel to half kneeling position. Pt demos good understanding, will require continued practice     Subjective Outcomes Measures       ABC Scale Assessed - returned today    THER EX  CPT 45115 TOTAL TIME FOR SESSION  23-37 Minutes      STRETCHING       Stretching by patient Incline board stretch: L3 - 1 min x2    Stretching by therapist/PROM Trialed modified jose stretch - not effective (pt states modified lunge position she has felt more of a stretch in hip flexors in the past)      CARDIOVASCULAR       Nu Step       Stationary Bike Recumbent bike 5 min, L1  (Unable to complete revolution on [employer] upright bike)    Ambulation with AD      Treadmill       Endurance Testing       6mWT Assessed    STRENGTH TRAINING     HEP Review     Standing Ther-Ex Standing hip abduction 2 x 10 ea  - standing on airex   - Second set, no UE support     Step ups 6\" block  - Added L LE TKE w orange TB    Step down, 2\" block, L LE only w orange band TKE x 10    Hip hikes 2\" block, VC's to discourage R hip hike     Heel raises from L2 on incline board x 15   Yes               Yes         Yes      Side-lying there-ex Clamshells: 2x20 on R, L in supine: 5\"x10      Supine Ther-Ex Diaphragmatic breathin mins due to elevated BP  Glut bridge: x10   Bridge with ER: x10   Supine hip abduction x 10, " "LLE         Yes    Deadbugs with physioball  - isometric holds: 10\" x 10   - alt UE: 3 sets x5 reps     Prone Ther-Ex     Core exercises PPT with ball squeeze 5 sec hold, 10 x  PPT with ball squeeze and bridge x 10   PPT with supine marches x 10  STS's from low EOM squeezing pilates ring  - As above, with squeeze and OH press  Marching, squeezing small ball 30 ft x 2 Yes   Yes       Yes   Yes    Functional Strength Testing       30 sec STS test (60y +)  Assessed    NEURO RE-ED  CPT 65860 TOTAL TIME FOR SESSION 23-37 Minutes      COORDINATION       Target Tapping     Coordination ladder As appropriate for HL balance     Amb with ankle weights     Amb with proprio wrap     Pushing weighted shopping cart     POSTURAL RE-ED     Sit <> Stand      Tall Kneel  Tall kneel <> short sit: x10, x10 with chest press     Seated & standing reaching for trunk strengthening     Abbey-scapular strengthening     PRE-GAIT ACTIVITIES      Tap-ups     Step-ups To 6\" block with contralateral heel tap to 10\" block, no UE x 10 Yes    Standing weight shifting     Step-taps     BALANCE TRAINING     Standing balance - static/dynamic       HEP Review       Floor       Airex       Rockerboard AP plane: x10, with light UE support   AP, chest press x 10, OH press x 10, 6lb   Squats 2 x 10   Yes   Yes   Yes    Hurdles  6\" hurdles, focus on dec L hip circumduction through swing and encouraging heel strike Yes    Split Stance       Dynamic gait        Side stepping     Retro walking     Tandem Walking     Cafe Walking     Walking with ball toss     Balance Testing     FGA  Assessed    SLS  Assessed see note     10mWT Assessed     GAIT TRAINING  CPT 39376 TOTAL TIME FOR SESSION Not performed      Ambulation with AD        Stair negotiation       Curb negotiation       Ramp negotiation       Outdoor ambulation       MANUAL  CPT 08121 TOTAL TIME FOR SESSION Not performed      Mobilization        MODALITIES  CPT 46384 TOTAL TIME FOR SESSION      Ice     "   Heat       ATTENDED E-STIM  CPT 99515 TOTAL TIME FOR SESSION Not performed      Attended E-Stim       Unattended E-stim

## 2024-07-01 NOTE — OP PT TREATMENT LOG
"   PT Neuro Exercises Current Session   Performed Today? (y/n)   THER ACT   CPT 58209 TOTAL TIME FOR SESSION 0-7 Minutes      Pain, vitals, subjective    Provided rest breaks for energy conservation  Y  Yes    Patient Education Patient educated regarding current impairments per testing completed today and PT POC moving forward.     Patient provided with Welcome Letter, which includes attendance policy. Provided education regarding cancellation and no-show policy. Education regarding the importance of participation and regular attendance to maximize goal attainment. Patient verbalized understanding/agreement.      HEP       Floor Transfers  CloseS with increased time for LLE management, requires single UE support with review of moving from tall kneel to half kneeling position. Pt demos good understanding, will require continued practice     Subjective Outcomes Measures       ABC Scale Assessed - returned today    THER EX  CPT 07885 TOTAL TIME FOR SESSION  23-37 Minutes      STRETCHING       Stretching by patient Incline board stretch: L3 - 1 min x2    Stretching by therapist/PROM Trialed modified jose stretch - not effective (pt states modified lunge position she has felt more of a stretch in hip flexors in the past)      CARDIOVASCULAR       Nu Step       Stationary Bike Recumbent bike 5 min, L1  (Unable to complete revolution on [employer] upright bike)    Ambulation with AD      Treadmill       Endurance Testing       6mWT Assessed    STRENGTH TRAINING     HEP Review     Standing Ther-Ex Standing hip abduction 2 x 10 ea  - standing on airex   - Second set, no UE support     Step ups 6\" block  - Added L LE TKE w orange TB    Step down, 2\" block, L LE only w orange band TKE x 10    Hip hikes 2\" block, VC's to discourage R hip hike     Heel raises from L2 on incline board x 15   Yes               Yes         Yes      Side-lying there-ex Clamshells: 2x20 on R, L in supine: 5\"x10      Supine Ther-Ex Diaphragmatic " "breathin mins due to elevated BP  Glut bridge: x10   Bridge with ER: x10   Supine hip abduction x 10, LLE         Yes    Deadbugs with physioball  - isometric holds: 10\" x 10   - alt UE: 3 sets x5 reps     Prone Ther-Ex     Core exercises PPT with ball squeeze 5 sec hold, 10 x  PPT with ball squeeze and bridge x 10   PPT with supine marches x 10  STS's from low EOM squeezing pilates ring  - As above, with squeeze and OH press  Marching, squeezing small ball 30 ft x 2 Yes   Yes       Yes   Yes    Functional Strength Testing       30 sec STS test (60y +)  Assessed    NEURO RE-ED  CPT 21769 TOTAL TIME FOR SESSION 23-37 Minutes      COORDINATION       Target Tapping     Coordination ladder As appropriate for HL balance     Amb with ankle weights     Amb with proprio wrap     Pushing weighted shopping cart     POSTURAL RE-ED     Sit <> Stand      Tall Kneel  Tall kneel <> short sit: x10, x10 with chest press     Seated & standing reaching for trunk strengthening     Abbey-scapular strengthening     PRE-GAIT ACTIVITIES      Tap-ups     Step-ups To 6\" block with contralateral heel tap to 10\" block, no UE x 10 Yes    Standing weight shifting     Step-taps     BALANCE TRAINING     Standing balance - static/dynamic       HEP Review       Floor       Airex       Rockerboard AP plane: x10, with light UE support   AP, chest press x 10, OH press x 10, 6lb   Squats 2 x 10   Yes   Yes   Yes    Hurdles  6\" hurdles, focus on dec L hip circumduction through swing and encouraging heel strike Yes    Split Stance       Dynamic gait        Side stepping     Retro walking     Tandem Walking     Cafe Walking     Walking with ball toss     Balance Testing     FGA  Assessed    SLS  Assessed see note     10mWT Assessed     GAIT TRAINING  CPT 74180 TOTAL TIME FOR SESSION Not performed      Ambulation with AD        Stair negotiation       Curb negotiation       Ramp negotiation       Outdoor ambulation       MANUAL  CPT 95818 TOTAL TIME FOR " SESSION Not performed      Mobilization        MODALITIES  CPT 31711 TOTAL TIME FOR SESSION      Ice       Heat       ATTENDED E-STIM  CPT 90706 TOTAL TIME FOR SESSION Not performed      Attended E-Stim       Unattended E-stim

## 2024-07-05 ENCOUNTER — HOSPITAL ENCOUNTER (OUTPATIENT)
Dept: PHYSICAL THERAPY | Facility: REHABILITATION | Age: 29
Setting detail: THERAPIES SERIES
Discharge: HOME | End: 2024-07-05
Attending: LEGAL MEDICINE
Payer: COMMERCIAL

## 2024-07-05 DIAGNOSIS — Z86.69 HISTORY OF TETHERED SPINAL CORD: Primary | ICD-10-CM

## 2024-07-05 PROCEDURE — 97112 NEUROMUSCULAR REEDUCATION: CPT | Mod: GP

## 2024-07-05 PROCEDURE — 97110 THERAPEUTIC EXERCISES: CPT | Mod: GP

## 2024-07-05 NOTE — OP PT TREATMENT LOG
"   PT Neuro Exercises Current Session   Performed Today? (y/n)   THER ACT   CPT 87074 TOTAL TIME FOR SESSION 0-7 Minutes      Pain, vitals, subjective    Provided rest breaks for energy conservation  Y  Yes    Patient Education Patient educated regarding current impairments per testing completed today and PT POC moving forward.     Patient provided with Welcome Letter, which includes attendance policy. Provided education regarding cancellation and no-show policy. Education regarding the importance of participation and regular attendance to maximize goal attainment. Patient verbalized understanding/agreement.     Clearance for FES received, will be completed NV, start with handheld unit to assess tolerance prior to FES bike. Pt verbalized understanding                       Yes   HEP       Floor Transfers  CloseS with increased time for LLE management, requires single UE support with review of moving from tall kneel to half kneeling position. Pt demos good understanding, will require continued practice     Subjective Outcomes Measures       ABC Scale Assessed - returned today    THER EX  CPT 70865 TOTAL TIME FOR SESSION  38-52 Minutes      STRETCHING       Stretching by patient Incline board stretch: L3 - 1 min x2 Yes   Stretching by therapist/PROM Trialed modified jose stretch - not effective (pt states modified lunge position she has felt more of a stretch in hip flexors in the past)      CARDIOVASCULAR       Nu Step  NV   Stationary Bike Recumbent bike 5 min, L1  (Unable to complete revolution on [employer] upright bike)    Ambulation with AD      Treadmill       Endurance Testing       6mWT Assessed    STRENGTH TRAINING     HEP Review     Standing Ther-Ex Standing hip abduction 2 x 10 ea  - standing on airex   - Second set, no UE support     Step ups 6\" block  - Added L LE TKE w orange TB    Step down, 2\" block, L LE only w orange band TKE x 10    Hip hikes 2\" block, VC's to discourage R hip hike     Heel raises " "from L2 on incline board x 15 (on floor today)                 Yes              Side-lying there-ex Clamshells: 2x20 on R, L in supine: 5\"x10      Supine Ther-Ex Diaphragmatic breathin mins due to elevated BP  Glut bridge: x10   Bridge with ER: x10   Supine hip abduction x 10, LLE  BKFO: x10, orange t-band only for R, active assist on L    Yes      Yes   Yes   Deadbugs with physioball  - isometric holds: 10\" x 10   - alt UE: 3 sets x5 reps     Prone Ther-Ex     Core exercises PPT with ball squeeze 5 sec hold, 10 x  PPT with ball squeeze and bridge x 10   PPT with supine marches x 10    STS's from low EOM squeezing pilates ring  - As above, with squeeze and OH press  Marching, squeezing small ball 30 ft x 2 Yes   Yes         Yes   Yes    Functional Strength Testing       30 sec STS test (60y +)  Assessed    NEURO RE-ED  CPT 60919 TOTAL TIME FOR SESSION 8-22 Minutes      COORDINATION       Target Tapping     Coordination ladder As appropriate for HL balance     Amb with ankle weights     Amb with proprio wrap     Pushing weighted shopping cart     POSTURAL RE-ED     Sit <> Stand      Tall Kneel  Tall kneel <> short sit: x10, x10 with chest press     Seated & standing reaching for trunk strengthening     Abbey-scapular strengthening     PRE-GAIT ACTIVITIES      Tap-ups     Step-ups To 6\" block with contralateral heel tap to 10\" block, no UE x 10 Yes    Standing weight shifting     Step-taps     BALANCE TRAINING     Standing balance - static/dynamic       HEP Review       Floor       Airex       Rockerboard AP plane: x10, with light UE support   AP, chest press x 10, OH press x 10, 6lb   Squats 2 x 10      Hurdles  6\" hurdles, focus on dec L hip circumduction through swing and encouraging heel strike  Repeated with side stepping pattern, requiring BUE support dud to inability for L knee extension through stance  Yes    Yes   Split Stance       Dynamic gait        Side stepping     Retro walking     Tandem Walking   "   Cafe Walking Using small 2 lb B DBs: 100'x2, noted delayed L knee extension through IC and mid stance  Yes   Walking with ball toss     Balance Testing     FGA  Assessed    SLS  Assessed see note     10mWT Assessed     GAIT TRAINING  CPT 27335 TOTAL TIME FOR SESSION Not performed      Ambulation with AD        Stair negotiation       Curb negotiation       Ramp negotiation       Outdoor ambulation       MANUAL  CPT 67527 TOTAL TIME FOR SESSION Not performed      Mobilization        MODALITIES  CPT 04499 TOTAL TIME FOR SESSION      Ice       Heat       ATTENDED E-STIM  CPT 30093 TOTAL TIME FOR SESSION Not performed      Attended E-Stim       Unattended E-stim

## 2024-07-05 NOTE — PROGRESS NOTES
Physical Therapy Visit    PT DAILY NOTE FOR OUTPATIENT THERAPY    Patient: Melanie Litten MRN: [id number]  : 1995 28 y.o.  Referring Physician: Remedios Whitfield MD  Date of Visit: 2024    Certification Dates: 24 through 24    Diagnosis:   1. History of tethered spinal cord        Chief Complaints:  EDS, LLE weakness, gait/balance impairment    Precautions:   Precautions comments: hypermobile - EDS, postural hypotension      TODAY'S VISIT    Time In Session:  Start Time: 1303  Stop Time: 1400  Time Calculation (min): 57 min   History/Vitals/Pain/Encounter Info - 24 1300          Injury History/Precautions/Daily Required Info    Document Type daily treatment     Primary Therapist Dilan Machuca     Chief Complaint/Reason for Visit  EDS, LLE weakness, gait/balance impairment     Referring Physician Remedios Whitfield MD     Precautions comments hypermobile - EDS, postural hypotension     History of present illness/functional impairment Saranya is a 28 year old female who presents to OPPT with history of tethered cord syndrome. Pt with PMH significant for chiari malformation, hypermobile EDS, left plantar fasciitis, anxiety and postural hypotension. Pt reports her symptoms started slowly in 2019 with L hip pain, followed up with neurosurgeon in [place] with unremarkable results for all testing. Her symptoms then progressed with L leg motor weakness and shakiness (similar to clonus) with movement, chronic constipation, L drop foot, lower back pain, and neck pain.  Pt was finally diagnosed with tethered cord syndrome and underwent a clinical trial surgery at [facility], [facility], and [facility]. Due to her symptoms, she meets criteria for exploration and sectioning of her filum for the functional tethered cord clinical trial. Now s/p laminectomy for occult tethered cord release on 23 by Dr Duckworth. Patient tolerated procedure well. No  post-op complications. Transferred stable once PACU criteria met. Patient kept FLAT for 36hr. Placed on an aseptic dex taper and post-op IV abx x24hrs.  She was mobilized on 9/27 tolerated well without any symptoms. Additionally, was instructed not to exercise for 7 weeks post-surgery. Pt reports she recovered well overall with decreased drop foot, clonus and swelling, continues to have increased coordination deficits and foot drag with fatigue. Pt has completed OPPT at [facility] in [place] and intermittent massage therapy. Pt’s functional goals are to work on higher level balance, picking up things from floor, coordination and “Hippo therapy”.     Patient reported fall since last visit No        Pain Assessment    Currently in pain No/Denies        Pre Activity Vital Signs    Pulse 72     /86     BP Location Right upper arm     BP Method Manual     Patient Position Sitting                    Daily Treatment Assessment and Plan - 07/05/24 1300          Daily Treatment Assessment and Plan    Progress toward goals Progressing     Daily Outcome Summary FES deferred today due to pt preferrance as she had very long work days last few days and feels her body is still recovering. Pt tolerated session well with slow progressions in supine and with standing pregait activities. Pt demonstrated difficulty with L knee extension through stance and poor motor control of L hip/knee extension timing with last gait trial, although she improved her reciprocal pattern and nancy with cues for BUE swing. E-stim to be initiated NV, start with handheld unit to assess tolerance prior to FES bike. Pt in agreement.     Plan and Recommendations progress core and proximal hip strengthening program, higher level balance and coordination as tolerated, FES set up vs trial NV                         OBJECTIVE DATA TAKEN TODAY:    None taken    Today's Treatment:       PT Neuro Exercises Current Session   Performed Today? (y/n)   THER  "ACT   CPT 31332 TOTAL TIME FOR SESSION 0-7 Minutes      Pain, vitals, subjective    Provided rest breaks for energy conservation  Y  Yes    Patient Education Patient educated regarding current impairments per testing completed today and PT POC moving forward.     Patient provided with Welcome Letter, which includes attendance policy. Provided education regarding cancellation and no-show policy. Education regarding the importance of participation and regular attendance to maximize goal attainment. Patient verbalized understanding/agreement.     Clearance for FES received, will be completed NV, start with handheld unit to assess tolerance prior to FES bike. Pt verbalized understanding                       Yes   HEP       Floor Transfers  CloseS with increased time for LLE management, requires single UE support with review of moving from tall kneel to half kneeling position. Pt demos good understanding, will require continued practice     Subjective Outcomes Measures       ABC Scale Assessed - returned today    THER EX  CPT 79950 TOTAL TIME FOR SESSION  38-52 Minutes      STRETCHING       Stretching by patient Incline board stretch: L3 - 1 min x2 Yes   Stretching by therapist/PROM Trialed modified jose stretch - not effective (pt states modified lunge position she has felt more of a stretch in hip flexors in the past)      CARDIOVASCULAR       Nu Step  NV   Stationary Bike Recumbent bike 5 min, L1  (Unable to complete revolution on [employer] upright bike)    Ambulation with AD      Treadmill       Endurance Testing       6mWT Assessed    STRENGTH TRAINING     HEP Review     Standing Ther-Ex Standing hip abduction 2 x 10 ea  - standing on airex   - Second set, no UE support     Step ups 6\" block  - Added L LE TKE w orange TB    Step down, 2\" block, L LE only w orange band TKE x 10    Hip hikes 2\" block, VC's to discourage R hip hike     Heel raises from L2 on incline board x 15 (on floor today)                 Yes " "             Side-lying there-ex Clamshells: 2x20 on R, L in supine: 5\"x10      Supine Ther-Ex Diaphragmatic breathin mins due to elevated BP  Glut bridge: x10   Bridge with ER: x10   Supine hip abduction x 10, LLE  BKFO: x10, orange t-band only for R, active assist on L    Yes      Yes   Yes   Deadbugs with physioball  - isometric holds: 10\" x 10   - alt UE: 3 sets x5 reps     Prone Ther-Ex     Core exercises PPT with ball squeeze 5 sec hold, 10 x  PPT with ball squeeze and bridge x 10   PPT with supine marches x 10    STS's from low EOM squeezing pilates ring  - As above, with squeeze and OH press  Marching, squeezing small ball 30 ft x 2 Yes   Yes         Yes   Yes    Functional Strength Testing       30 sec STS test (60y +)  Assessed    NEURO RE-ED  CPT 16762 TOTAL TIME FOR SESSION 8-22 Minutes      COORDINATION       Target Tapping     Coordination ladder As appropriate for HL balance     Amb with ankle weights     Amb with proprio wrap     Pushing weighted shopping cart     POSTURAL RE-ED     Sit <> Stand      Tall Kneel  Tall kneel <> short sit: x10, x10 with chest press     Seated & standing reaching for trunk strengthening     Abbey-scapular strengthening     PRE-GAIT ACTIVITIES      Tap-ups     Step-ups To 6\" block with contralateral heel tap to 10\" block, no UE x 10 Yes    Standing weight shifting     Step-taps     BALANCE TRAINING     Standing balance - static/dynamic       HEP Review       Floor       Airex       Rockerboard AP plane: x10, with light UE support   AP, chest press x 10, OH press x 10, 6lb   Squats 2 x 10      Hurdles  6\" hurdles, focus on dec L hip circumduction through swing and encouraging heel strike  Repeated with side stepping pattern, requiring BUE support dud to inability for L knee extension through stance  Yes    Yes   Split Stance       Dynamic gait        Side stepping     Retro walking     Tandem Walking     Cafe Walking Using small 2 lb B DBs: 100'x2, noted delayed L knee " extension through IC and mid stance  Yes   Walking with ball toss     Balance Testing     FGA  Assessed    SLS  Assessed see note     10mWT Assessed     GAIT TRAINING  CPT 69543 TOTAL TIME FOR SESSION Not performed      Ambulation with AD        Stair negotiation       Curb negotiation       Ramp negotiation       Outdoor ambulation       MANUAL  CPT 67838 TOTAL TIME FOR SESSION Not performed      Mobilization        MODALITIES  CPT 92135 TOTAL TIME FOR SESSION      Ice       Heat       ATTENDED E-STIM  CPT 08937 TOTAL TIME FOR SESSION Not performed      Attended E-Stim       Unattended E-stim

## 2024-07-08 ENCOUNTER — HOSPITAL ENCOUNTER (OUTPATIENT)
Dept: PHYSICAL THERAPY | Facility: REHABILITATION | Age: 29
Setting detail: THERAPIES SERIES
Discharge: HOME | End: 2024-07-08
Attending: LEGAL MEDICINE
Payer: COMMERCIAL

## 2024-07-08 DIAGNOSIS — Z86.69 HISTORY OF TETHERED SPINAL CORD: Primary | ICD-10-CM

## 2024-07-08 PROCEDURE — 97032 APPL MODALITY 1+ESTIM EA 15: CPT | Mod: GP

## 2024-07-08 PROCEDURE — 97110 THERAPEUTIC EXERCISES: CPT | Mod: GP

## 2024-07-08 PROCEDURE — 97112 NEUROMUSCULAR REEDUCATION: CPT | Mod: GP

## 2024-07-08 PROCEDURE — 97530 THERAPEUTIC ACTIVITIES: CPT | Mod: GP

## 2024-07-08 NOTE — OP PT TREATMENT LOG
"   PT Neuro Exercises Current Session   Performed Today? (y/n)   THER ACT   CPT 77974 TOTAL TIME FOR SESSION 8-22 Minutes      Pain, vitals, subjective    Provided rest breaks for energy conservation  Y  Yes    FES  cycle [id number] (Age 28) , PIN [id number]   Bike height: 2 holes showing  Pedal height: 3 holes showing  L LE set up only   Yes    Patient Education Patient educated regarding current impairments per testing completed today and PT POC moving forward.     Patient provided with Welcome Letter, which includes attendance policy. Provided education regarding cancellation and no-show policy. Education regarding the importance of participation and regular attendance to maximize goal attainment. Patient verbalized understanding/agreement.     Clearance for FES received, will be completed NV, start with handheld unit to assess tolerance prior to FES bike. Pt verbalized understanding                          HEP       Floor Transfers  CloseS with increased time for LLE management, requires single UE support with review of moving from tall kneel to half kneeling position. Pt demos good understanding, will require continued practice     Subjective Outcomes Measures       ABC Scale Assessed - returned today    THER EX  CPT 04097 TOTAL TIME FOR SESSION  8-22 Minutes      STRETCHING       Stretching by patient Incline board stretch: L3 - 1 min x2    Stretching by therapist/PROM Trialed modified jose stretch - not effective (pt states modified lunge position she has felt more of a stretch in hip flexors in the past)      CARDIOVASCULAR       Nu Step     Stationary Bike Recumbent bike 5 min, L1  (Unable to complete revolution on [employer] upright bike)    Ambulation with AD      Treadmill       Endurance Testing       6mWT Assessed    STRENGTH TRAINING     HEP Review     Standing Ther-Ex Standing hip abduction 2 x 10 ea  - standing on airex   - Second set, no UE support     Step ups 6\" block  - Added L LE TKE w orange " "TB    Step down, 2\" block, L LE only w orange band TKE x 10    Hip hikes 2\" block, VC's to discourage R hip hike     Heel raises from L2 on incline board x 15 (on floor today)                              Side-lying there-ex Clamshells: 2x20 on R, L in supine: 5\"x10      Supine Ther-Ex Diaphragmatic breathin mins due to elevated BP  Glut bridge: x10   Bridge with ER: x10   Supine hip abduction x 10, LLE  BKFO: x10, orange t-band only for R, active assist on L       Deadbugs with physioball  - isometric holds: 10\" x 10   - alt UE: 3 sets x5 reps     Tall kneel Moderate perturbations all directions  Mini squats x 10  Yes   Yes    Prone Ther-Ex     Core exercises PPT with ball squeeze 5 sec hold, 10 x  PPT with ball squeeze and bridge x 10   PPT with supine marches x 10  PPT with hip abduction isometric L LE x 3 sec hold     STS's from low EOM squeezing pilates ring  - As above, with squeeze and OH press  Marching, squeezing small ball 30 ft x 2     Yes   Yes            Functional Strength Testing       30 sec STS test (60y +)  Assessed    NEURO RE-ED  CPT 68192 TOTAL TIME FOR SESSION 8-22 Minutes      FES  To promote neurorecovery of gait:  Distance: 2.04  Resistance: 0.77 Nm  Power: 2.6W  Symmetry: L 4%  Total time: 15:05  Active time: 13:30 Yes    COORDINATION       Target Tapping     Coordination ladder As appropriate for HL balance     Amb with ankle weights     Amb with proprio wrap     Pushing weighted shopping cart     POSTURAL RE-ED     Sit <> Stand      Tall Kneel  Tall kneel <> short sit: x10, x10 with chest press     Seated & standing reaching for trunk strengthening     Abbey-scapular strengthening     PRE-GAIT ACTIVITIES      Tap-ups     Step-ups To 6\" block with contralateral heel tap to 10\" block, no UE x 10    Standing weight shifting     Step-taps     BALANCE TRAINING     Standing balance - static/dynamic       HEP Review       Floor       Airex       Rockerboard AP plane: x10, with light UE " "support   AP, chest press x 10, OH press x 10, 6lb   Squats 2 x 10      Hurdles  6\" hurdles, focus on dec L hip circumduction through swing and encouraging heel strike  Repeated with side stepping pattern, requiring BUE support dud to inability for L knee extension through stance     Split Stance      Dynamic gait       Side stepping     Retro walking     Tandem Walking     Cafe Walking Using small 2 lb B DBs: 100'x2, noted delayed L knee extension through IC and mid stance     Walking with ball toss     Balance Testing     FGA  Assessed    SLS  Assessed see note     10mWT Assessed     GAIT TRAINING  CPT 53745 TOTAL TIME FOR SESSION Not performed      Ambulation with AD        Stair negotiation       Curb negotiation       Ramp negotiation       Outdoor ambulation       MANUAL  CPT 63182 TOTAL TIME FOR SESSION Not performed      Mobilization        MODALITIES  CPT 71155 TOTAL TIME FOR SESSION      Ice       Heat       ATTENDED E-STIM  CPT 16048 TOTAL TIME FOR SESSION 8-22 Minutes      Attended E-Stim FES    [id number] (Age 28) , PIN [id number]   Stim to L quad, hamstring, glutes, tib ant, and gastroc. 250-350 pulse width, 40Hz frequency.   Muscle testing testing completed for initial set up (7/8) Yes to all    Unattended E-stim          "

## 2024-07-08 NOTE — PROGRESS NOTES
Physical Therapy Visit    PT DAILY NOTE FOR OUTPATIENT THERAPY    Patient: Melanie Litten MRN: [id number]  : 1995 28 y.o.  Referring Physician: Remedios Whitfield MD  Date of Visit: 2024    Certification Dates: 24 through 24    Diagnosis:   1. History of tethered spinal cord        Chief Complaints:  EDS, LLE weakness, gait/balance impairment    Precautions:   Precautions comments: hypermobile - EDS, postural hypotension      TODAY'S VISIT    Time In Session:  Start Time: 805  Stop Time: 900  Time Calculation (min): 55 min   History/Vitals/Pain/Encounter Info - 24 0804          Injury History/Precautions/Daily Required Info    Document Type daily treatment     Primary Therapist Dilan Machuca     Chief Complaint/Reason for Visit  EDS, LLE weakness, gait/balance impairment     Referring Physician Remedios Whitfield MD     Precautions comments hypermobile - EDS, postural hypotension     History of present illness/functional impairment Saranya is a 28 year old female who presents to OPPT with history of tethered cord syndrome. Pt with PMH significant for chiari malformation, hypermobile EDS, left plantar fasciitis, anxiety and postural hypotension. Pt reports her symptoms started slowly in 2019 with L hip pain, followed up with neurosurgeon in [place] with unremarkable results for all testing. Her symptoms then progressed with L leg motor weakness and shakiness (similar to clonus) with movement, chronic constipation, L drop foot, lower back pain, and neck pain.  Pt was finally diagnosed with tethered cord syndrome and underwent a clinical trial surgery at [facility], [facility], and [facility]. Due to her symptoms, she meets criteria for exploration and sectioning of her filum for the functional tethered cord clinical trial. Now s/p laminectomy for occult tethered cord release on 23 by Dr Duckworth. Patient tolerated procedure well. No  "post-op complications. Transferred stable once PACU criteria met. Patient kept FLAT for 36hr. Placed on an aseptic dex taper and post-op IV abx x24hrs.  She was mobilized on 9/27 tolerated well without any symptoms. Additionally, was instructed not to exercise for 7 weeks post-surgery. Pt reports she recovered well overall with decreased drop foot, clonus and swelling, continues to have increased coordination deficits and foot drag with fatigue. Pt has completed OPPT at [facility] in [place] and intermittent massage therapy. Pt’s functional goals are to work on higher level balance, picking up things from floor, coordination and “Hippo therapy”.     Patient/Family/Caregiver Comments/Observations No new changes since last visit, was \"normal\" sore following previous sessions.     Patient reported fall since last visit No        Pain Assessment    Currently in pain No/Denies        Pre Activity Vital Signs    /92     BP Location Right upper arm     BP Method Manual     Patient Position Sitting                    Daily Treatment Assessment and Plan - 07/08/24 0804          Daily Treatment Assessment and Plan    Progress toward goals Progressing     Daily Outcome Summary FES  cycle initial set up completed today. Patient initially hesistant to NMES due to poor previous experience however tolerated 15 min of cycling well today with no adverse reaction. Able to gradually increase resistance throughout cycle. Following, continued with strengthening of core and proximal hip. Pt appropriately fatigued end of session.     Plan and Recommendations progress core and proximal hip strengthening program, higher level balance and coordination as tolerated, FES as able dependent on fatigue levels                         OBJECTIVE DATA TAKEN TODAY:    None taken    Today's Treatment:       PT Neuro Exercises Current Session   Performed Today? (y/n)   THER ACT   CPT 91032 TOTAL TIME FOR SESSION 8-22 Minutes      Pain, " "vitals, subjective    Provided rest breaks for energy conservation  Y  Yes    FES  cycle [id number] (Age 28) , PIN [id number]   Bike height: 2 holes showing  Pedal height: 3 holes showing  L LE set up only   Yes    Patient Education Patient educated regarding current impairments per testing completed today and PT POC moving forward.     Patient provided with Welcome Letter, which includes attendance policy. Provided education regarding cancellation and no-show policy. Education regarding the importance of participation and regular attendance to maximize goal attainment. Patient verbalized understanding/agreement.     Clearance for FES received, will be completed NV, start with handheld unit to assess tolerance prior to FES bike. Pt verbalized understanding                          HEP       Floor Transfers  CloseS with increased time for LLE management, requires single UE support with review of moving from tall kneel to half kneeling position. Pt demos good understanding, will require continued practice     Subjective Outcomes Measures       ABC Scale Assessed - returned today    THER EX  CPT 14179 TOTAL TIME FOR SESSION  8-22 Minutes      STRETCHING       Stretching by patient Incline board stretch: L3 - 1 min x2    Stretching by therapist/PROM Trialed modified jose stretch - not effective (pt states modified lunge position she has felt more of a stretch in hip flexors in the past)      CARDIOVASCULAR       Nu Step     Stationary Bike Recumbent bike 5 min, L1  (Unable to complete revolution on [employer] upright bike)    Ambulation with AD      Treadmill       Endurance Testing       6mWT Assessed    STRENGTH TRAINING     HEP Review     Standing Ther-Ex Standing hip abduction 2 x 10 ea  - standing on airex   - Second set, no UE support     Step ups 6\" block  - Added L LE TKE w orange TB    Step down, 2\" block, L LE only w orange band TKE x 10    Hip hikes 2\" block, VC's to discourage R hip hike     Heel raises from L2 " "on incline board x 15 (on floor today)                              Side-lying there-ex Clamshells: 2x20 on R, L in supine: 5\"x10      Supine Ther-Ex Diaphragmatic breathin mins due to elevated BP  Glut bridge: x10   Bridge with ER: x10   Supine hip abduction x 10, LLE  BKFO: x10, orange t-band only for R, active assist on L       Deadbugs with physioball  - isometric holds: 10\" x 10   - alt UE: 3 sets x5 reps     Tall kneel Moderate perturbations all directions  Mini squats x 10  Yes   Yes    Prone Ther-Ex     Core exercises PPT with ball squeeze 5 sec hold, 10 x  PPT with ball squeeze and bridge x 10   PPT with supine marches x 10  PPT with hip abduction isometric L LE x 3 sec hold     STS's from low EOM squeezing pilates ring  - As above, with squeeze and OH press  Marching, squeezing small ball 30 ft x 2     Yes   Yes            Functional Strength Testing       30 sec STS test (60y +)  Assessed    NEURO RE-ED  CPT 74675 TOTAL TIME FOR SESSION 8-22 Minutes      FES  To promote neurorecovery of gait:  Distance: 2.04  Resistance: 0.77 Nm  Power: 2.6W  Symmetry: L 4%  Total time: 15:05  Active time: 13:30 Yes    COORDINATION       Target Tapping     Coordination ladder As appropriate for HL balance     Amb with ankle weights     Amb with proprio wrap     Pushing weighted shopping cart     POSTURAL RE-ED     Sit <> Stand      Tall Kneel  Tall kneel <> short sit: x10, x10 with chest press     Seated & standing reaching for trunk strengthening     Abbey-scapular strengthening     PRE-GAIT ACTIVITIES      Tap-ups     Step-ups To 6\" block with contralateral heel tap to 10\" block, no UE x 10    Standing weight shifting     Step-taps     BALANCE TRAINING     Standing balance - static/dynamic       HEP Review       Floor       Airex       Rockerboard AP plane: x10, with light UE support   AP, chest press x 10, OH press x 10, 6lb   Squats 2 x 10      Hurdles  6\" hurdles, focus on dec L hip circumduction through " swing and encouraging heel strike  Repeated with side stepping pattern, requiring BUE support dud to inability for L knee extension through stance     Split Stance      Dynamic gait       Side stepping     Retro walking     Tandem Walking     Cafe Walking Using small 2 lb B DBs: 100'x2, noted delayed L knee extension through IC and mid stance     Walking with ball toss     Balance Testing     FGA  Assessed    SLS  Assessed see note     10mWT Assessed     GAIT TRAINING  CPT 35718 TOTAL TIME FOR SESSION Not performed      Ambulation with AD        Stair negotiation       Curb negotiation       Ramp negotiation       Outdoor ambulation       MANUAL  CPT 15837 TOTAL TIME FOR SESSION Not performed      Mobilization        MODALITIES  CPT 18164 TOTAL TIME FOR SESSION      Ice       Heat       ATTENDED E-STIM  CPT 57735 TOTAL TIME FOR SESSION 8-22 Minutes      Attended E-Stim FES    [id number] (Age 28) , PIN [id number]   Stim to L quad, hamstring, glutes, tib ant, and gastroc. 250-350 pulse width, 40Hz frequency.   Muscle testing testing completed for initial set up (7/8) Yes to all    Unattended E-stim

## 2024-07-12 ENCOUNTER — HOSPITAL ENCOUNTER (OUTPATIENT)
Dept: PHYSICAL THERAPY | Facility: REHABILITATION | Age: 29
Setting detail: THERAPIES SERIES
Discharge: HOME | End: 2024-07-12
Attending: LEGAL MEDICINE
Payer: COMMERCIAL

## 2024-07-12 DIAGNOSIS — Z86.69 HISTORY OF TETHERED SPINAL CORD: Primary | ICD-10-CM

## 2024-07-12 PROCEDURE — 97112 NEUROMUSCULAR REEDUCATION: CPT | Mod: GP

## 2024-07-12 PROCEDURE — 97110 THERAPEUTIC EXERCISES: CPT | Mod: GP

## 2024-07-12 NOTE — OP PT TREATMENT LOG
"   PT Neuro Exercises Current Session   Performed Today? (y/n)   THER ACT   CPT 62617 TOTAL TIME FOR SESSION 0-7 Minutes      Pain, vitals, subjective    Provided rest breaks for energy conservation    FES  cycle [id number] (Age 28) , PIN [phone]   Bike height: 2 holes showing  Pedal height: 3 holes showing  L LE set up only      Patient Education Patient educated regarding current impairments per testing completed today and PT POC moving forward.     Patient provided with Welcome Letter, which includes attendance policy. Provided education regarding cancellation and no-show policy. Education regarding the importance of participation and regular attendance to maximize goal attainment. Patient verbalized understanding/agreement.     Clearance for FES received, will be completed NV, start with handheld unit to assess tolerance prior to FES bike. Pt verbalized understanding                          HEP       Floor Transfers  CloseS with increased time for LLE management, requires single UE support with review of moving from tall kneel to half kneeling position. Pt demos good understanding, will require continued practice     Subjective Outcomes Measures       ABC Scale Assessed - returned today    THER EX  CPT 68526 TOTAL TIME FOR SESSION  38-52 Minutes      STRETCHING       Stretching by patient Incline board stretch: L3 - 1 min x2    Stretching by therapist/PROM Trialed modified jose stretch - not effective (pt states modified lunge position she has felt more of a stretch in hip flexors in the past)      CARDIOVASCULAR       Nu Step 5 mins, Level: 1, BLE only  Yes   Stationary Bike Recumbent bike 5 min, L1  (Unable to complete revolution on Expresso upright bike)    Ambulation with AD      Treadmill       Endurance Testing       6mWT Assessed    STRENGTH TRAINING     HEP Review     Standing Ther-Ex Standing hip abduction 2 x 10 ea  - standing on airex   - Second set, no UE support     Step ups 6\" block  - Added L LE " "TKE w orange TB    Step down, 2\" block, L LE only w orange band TKE x 10    Hip hikes 2\" block, VC's to discourage R hip hike     Heel raises from L2 on incline board x 15 (on floor today)                              Side-lying there-ex Clamshells: 2x20 on R, L in supine: 5\"x10      Supine Ther-Ex Diaphragmatic breathin mins due to elevated BP  Glut bridge: x10   Bridge with ER: x10   Supine hip abduction x 10, LLE  BKFO: x10, orange t-band only for R, active assist on L       Deadbugs with physioball  - isometric holds: 10\" x 10   - alt UE: 3 sets x5 reps     Quadruped  hip extensions with towel under L foot: x10, B/L     Quadruped <> bear crawl: 10\" x 10 Yes    Yes   Tall kneel Moderate perturbations all directions  Mini squats x 10 holding 6\" med ball   Trunk rot: x10   D2 chops/lifts: x10 Yes   Yes   Yes  Yes   Prone Ther-Ex     Core exercises PPT with ball squeeze 5 sec hold, 10 x  PPT with ball squeeze and bridge x 10   PPT with supine marches x 10  PPT with hip abduction isometric L LE x 3 sec hold x10, orange band     Heel slides with towel: 2x10  Triple flex over physioball: x10  [place] wipers BLE over physioball, contra rot w/ 6# med ball: 2x10    STS's from low EOM squeezing pilates ring  - As above, with squeeze and OH press  Marching, squeezing small ball 30 ft x 2     Yes   Yes       Y  Y  Y           Functional Strength Testing       30 sec STS test (60y +)  Assessed    NEURO RE-ED  CPT 16047 TOTAL TIME FOR SESSION 8-22 Minutes      FES  To promote neurorecovery of gait:  Distance: 2.04  Resistance: 0.77 Nm  Power: 2.6W  Symmetry: L 4%  Total time: 15:05  Active time: 13:30    COORDINATION       Target Tapping     Coordination ladder As appropriate for HL balance     Amb with ankle weights     Amb with proprio wrap     Pushing weighted shopping cart     POSTURAL RE-ED     Sit <> Stand      Tall Kneel  Tall kneel <> short sit: x10, x10 with chest press     Seated & standing reaching for " "trunk strengthening     Abbey-scapular strengthening     PRE-GAIT ACTIVITIES      Tap-ups     Step-ups To 6\" block with contralateral heel tap to 10\" block, no UE x 10    Standing weight shifting     Step-taps     BALANCE TRAINING     Standing balance - static/dynamic       HEP Review       Floor       Airex Santiago-tandem: 30 seconds x 2, HT: 30\" x 1, EC: 30 seconds x 1  Tandem stance: 30 seconds x 1   Y    Y   Rockerboard AP plane: x10, with light UE support   AP, chest press x 10, OH press x 10, 6lb   Squats 2 x 10      Hurdles  6\" hurdles, focus on dec L hip circumduction through swing and encouraging heel strike  Repeated with side stepping pattern, requiring BUE support dud to inability for L knee extension through stance     Split Stance      Dynamic gait       Side stepping With partial squat with orange theraband around ankles: 10'x4  Yes   Retro walking     Tandem Walking     Cafe Walking Using small 2 lb B DBs: 100'x2, noted delayed L knee extension through IC and mid stance     Walking with ball toss     Balance Testing     FGA  Assessed    SLS  Assessed see note     10mWT Assessed     GAIT TRAINING  CPT 57057 TOTAL TIME FOR SESSION Not performed      Ambulation with AD        Stair negotiation       Curb negotiation       Ramp negotiation       Outdoor ambulation       MANUAL  CPT 79348 TOTAL TIME FOR SESSION Not performed      Mobilization        MODALITIES  CPT 44784 TOTAL TIME FOR SESSION      Ice       Heat       ATTENDED E-STIM  CPT 71123 TOTAL TIME FOR SESSION Not performed      Attended E-Stim FES    [id number] (Age 28) , PIN [phone]   Stim to L quad, hamstring, glutes, tib ant, and gastroc. 250-350 pulse width, 40Hz frequency.   Muscle testing testing completed for initial set up (7/8)    Unattended E-stim          "

## 2024-07-12 NOTE — PROGRESS NOTES
Physical Therapy Visit    PT DAILY NOTE FOR OUTPATIENT THERAPY    Patient: Melanie Litten MRN: [id number]  : 1995 28 y.o.  Referring Physician: Remedios Whitfield MD  Date of Visit: 2024    Certification Dates: 24 through 24    Diagnosis:   1. History of tethered spinal cord        Chief Complaints:  EDS, LLE weakness, gait/balance impairment    Precautions:   Precautions comments: hypermobile - EDS, postural hypotension      TODAY'S VISIT    Time In Session:  Start Time: 08  Stop Time: 900  Time Calculation (min): 58 min   History/Vitals/Pain/Encounter Info - 24 0804          Injury History/Precautions/Daily Required Info    Document Type daily treatment     Primary Therapist Dilan Machuca     Chief Complaint/Reason for Visit  EDS, LLE weakness, gait/balance impairment     Referring Physician Remedios Whitfield MD     Precautions comments hypermobile - EDS, postural hypotension     History of present illness/functional impairment Saranya is a 28 year old female who presents to OPPT with history of tethered cord syndrome. Pt with PMH significant for chiari malformation, hypermobile EDS, left plantar fasciitis, anxiety and postural hypotension. Pt reports her symptoms started slowly in 2019 with L hip pain, followed up with neurosurgeon in [place] with unremarkable results for all testing. Her symptoms then progressed with L leg motor weakness and shakiness (similar to clonus) with movement, chronic constipation, L drop foot, lower back pain, and neck pain.  Pt was finally diagnosed with tethered cord syndrome and underwent a clinical trial surgery at [facility], [facility], and [facility]. Due to her symptoms, she meets criteria for exploration and sectioning of her filum for the functional tethered cord clinical trial. Now s/p laminectomy for occult tethered cord release on 23 by Dr Duckworth. Patient tolerated procedure well.  No post-op complications. Transferred stable once PACU criteria met. Patient kept FLAT for 36hr. Placed on an aseptic dex taper and post-op IV abx x24hrs.  She was mobilized on 9/27 tolerated well without any symptoms. Additionally, was instructed not to exercise for 7 weeks post-surgery. Pt reports she recovered well overall with decreased drop foot, clonus and swelling, continues to have increased coordination deficits and foot drag with fatigue. Pt has completed OPPT at [facility] in [place] and intermittent massage therapy. Pt’s functional goals are to work on higher level balance, picking up things from floor, coordination and “Hippo therapy”.     Patient/Family/Caregiver Comments/Observations Pt reports no new changes since previous visits.     Patient reported fall since last visit No        Pain Assessment    Currently in pain No/Denies        Pre Activity Vital Signs    /78     BP Location Right upper arm     BP Method Manual     Patient Position Sitting                    Daily Treatment Assessment and Plan - 07/12/24 0804          Daily Treatment Assessment and Plan    Progress toward goals Progressing     Daily Outcome Summary Pt tolerates session well today with progressions in tall kneel and in quadruped positions. Pt does require multiple cues to bias L side with activity vs using compensatory mechanics. Resume NMRE via FES NV.     Plan and Recommendations progress core and proximal hip strengthening program, higher level balance and coordination as tolerated, FES as able dependent on fatigue levels                         OBJECTIVE DATA TAKEN TODAY:    None taken    Today's Treatment:       PT Neuro Exercises Current Session   Performed Today? (y/n)   THER ACT   CPT 65878 TOTAL TIME FOR SESSION 0-7 Minutes      Pain, vitals, subjective    Provided rest breaks for energy conservation    FES  cycle [id number] (Age 28) , PIN [phone]   Bike height: 2 holes showing  Pedal height: 3 holes showing  L  "LE set up only      Patient Education Patient educated regarding current impairments per testing completed today and PT POC moving forward.     Patient provided with Welcome Letter, which includes attendance policy. Provided education regarding cancellation and no-show policy. Education regarding the importance of participation and regular attendance to maximize goal attainment. Patient verbalized understanding/agreement.     Clearance for FES received, will be completed NV, start with handheld unit to assess tolerance prior to FES bike. Pt verbalized understanding                          HEP       Floor Transfers  CloseS with increased time for LLE management, requires single UE support with review of moving from tall kneel to half kneeling position. Pt demos good understanding, will require continued practice     Subjective Outcomes Measures       ABC Scale Assessed - returned today    THER EX  CPT 16745 TOTAL TIME FOR SESSION  38-52 Minutes      STRETCHING       Stretching by patient Incline board stretch: L3 - 1 min x2    Stretching by therapist/PROM Trialed modified jose stretch - not effective (pt states modified lunge position she has felt more of a stretch in hip flexors in the past)      CARDIOVASCULAR       Nu Step 5 mins, Level: 1, BLE only  Yes   Stationary Bike Recumbent bike 5 min, L1  (Unable to complete revolution on [employer] upright bike)    Ambulation with AD      Treadmill       Endurance Testing       6mWT Assessed    STRENGTH TRAINING     HEP Review     Standing Ther-Ex Standing hip abduction 2 x 10 ea  - standing on airex   - Second set, no UE support     Step ups 6\" block  - Added L LE TKE w orange TB    Step down, 2\" block, L LE only w orange band TKE x 10    Hip hikes 2\" block, VC's to discourage R hip hike     Heel raises from L2 on incline board x 15 (on floor today)                              Side-lying there-ex Clamshells: 2x20 on R, L in supine: 5\"x10      Supine Ther-Ex " "Diaphragmatic breathin mins due to elevated BP  Glut bridge: x10   Bridge with ER: x10   Supine hip abduction x 10, LLE  BKFO: x10, orange t-band only for R, active assist on L       Deadbugs with physioball  - isometric holds: 10\" x 10   - alt UE: 3 sets x5 reps     Quadruped  hip extensions with towel under L foot: x10, B/L     Quadruped <> bear crawl: 10\" x 10 Yes    Yes   Tall kneel Moderate perturbations all directions  Mini squats x 10 holding 6\" med ball   Trunk rot: x10   D2 chops/lifts: x10 Yes   Yes   Yes  Yes   Prone Ther-Ex     Core exercises PPT with ball squeeze 5 sec hold, 10 x  PPT with ball squeeze and bridge x 10   PPT with supine marches x 10  PPT with hip abduction isometric L LE x 3 sec hold x10, orange band     Heel slides with towel: 2x10  Triple flex over physioball: x10  Windshield wipers BLE over physioball, contra rot w/ 6# med ball: 2x10    STS's from low EOM squeezing pilates ring  - As above, with squeeze and OH press  Marching, squeezing small ball 30 ft x 2     Yes   Yes       Y  Y  Y           Functional Strength Testing       30 sec STS test (60y +)  Assessed    NEURO RE-ED  CPT 98760 TOTAL TIME FOR SESSION 8-22 Minutes      FES  To promote neurorecovery of gait:  Distance: 2.04  Resistance: 0.77 Nm  Power: 2.6W  Symmetry: L 4%  Total time: 15:05  Active time: 13:30    COORDINATION       Target Tapping     Coordination ladder As appropriate for HL balance     Amb with ankle weights     Amb with proprio wrap     Pushing weighted shopping cart     POSTURAL RE-ED     Sit <> Stand      Tall Kneel  Tall kneel <> short sit: x10, x10 with chest press     Seated & standing reaching for trunk strengthening     Abbey-scapular strengthening     PRE-GAIT ACTIVITIES      Tap-ups     Step-ups To 6\" block with contralateral heel tap to 10\" block, no UE x 10    Standing weight shifting     Step-taps     BALANCE TRAINING     Standing balance - static/dynamic       HEP Review       Floor     " "  Airex Santiago-tandem: 30 seconds x 2, HT: 30\" x 1, EC: 30 seconds x 1  Tandem stance: 30 seconds x 1   Y    Y   Rockerboard AP plane: x10, with light UE support   AP, chest press x 10, OH press x 10, 6lb   Squats 2 x 10      Hurdles  6\" hurdles, focus on dec L hip circumduction through swing and encouraging heel strike  Repeated with side stepping pattern, requiring BUE support dud to inability for L knee extension through stance     Split Stance      Dynamic gait       Side stepping With partial squat with orange theraband around ankles: 10'x4  Yes   Retro walking     Tandem Walking     Cafe Walking Using small 2 lb B DBs: 100'x2, noted delayed L knee extension through IC and mid stance     Walking with ball toss     Balance Testing     FGA  Assessed    SLS  Assessed see note     10mWT Assessed     GAIT TRAINING  CPT 51813 TOTAL TIME FOR SESSION Not performed      Ambulation with AD        Stair negotiation       Curb negotiation       Ramp negotiation       Outdoor ambulation       MANUAL  CPT 18455 TOTAL TIME FOR SESSION Not performed      Mobilization        MODALITIES  CPT 88014 TOTAL TIME FOR SESSION      Ice       Heat       ATTENDED E-STIM  CPT 56658 TOTAL TIME FOR SESSION Not performed      Attended E-Stim FES    [id number] (Age 28) , PIN [phone]   Stim to L quad, hamstring, glutes, tib ant, and gastroc. 250-350 pulse width, 40Hz frequency.   Muscle testing testing completed for initial set up (7/8)    Unattended E-stim                                 "

## 2024-07-15 ENCOUNTER — HOSPITAL ENCOUNTER (OUTPATIENT)
Dept: PHYSICAL THERAPY | Facility: REHABILITATION | Age: 29
Setting detail: THERAPIES SERIES
Discharge: HOME | End: 2024-07-15
Attending: LEGAL MEDICINE
Payer: COMMERCIAL

## 2024-07-15 DIAGNOSIS — Z86.69 HISTORY OF TETHERED SPINAL CORD: Primary | ICD-10-CM

## 2024-07-15 PROCEDURE — 97530 THERAPEUTIC ACTIVITIES: CPT | Mod: GP

## 2024-07-15 PROCEDURE — 97116 GAIT TRAINING THERAPY: CPT | Mod: GP

## 2024-07-15 PROCEDURE — 97112 NEUROMUSCULAR REEDUCATION: CPT | Mod: GP

## 2024-07-15 PROCEDURE — 97032 APPL MODALITY 1+ESTIM EA 15: CPT | Mod: GP

## 2024-07-15 NOTE — OP PT TREATMENT LOG
PT Neuro Exercises Current Session   Performed Today? (y/n)   THER ACT   CPT 55348 TOTAL TIME FOR SESSION 8-22 Minutes      Pain, vitals, subjective    Provided rest breaks for energy conservation    FES  cycle [id number] (Age 28) , PIN [phone]   Bike height: 2 holes showing  Pedal height: 3 holes showing  L LE set up only   Yes   Patient Education Patient educated regarding current impairments per testing completed today and PT POC moving forward.     Patient provided with Welcome Letter, which includes attendance policy. Provided education regarding cancellation and no-show policy. Education regarding the importance of participation and regular attendance to maximize goal attainment. Patient verbalized understanding/agreement.     Clearance for FES received, will be completed NV, start with handheld unit to assess tolerance prior to FES bike. Pt verbalized understanding                          HEP  Verbally reviewed practicing gait over level surfaces with decreased step width, and then incorporating BUE swing and trunk rot. Pt verbalized understanding.  Yes   Floor Transfers  CloseS with increased time for LLE management, requires single UE support with review of moving from tall kneel to half kneeling position. Pt demos good understanding, will require continued practice     Subjective Outcomes Measures       ABC Scale Assessed - returned today    THER EX  CPT 65249 TOTAL TIME FOR SESSION  Not performed      STRETCHING       Stretching by patient Incline board stretch: L3 - 1 min x2    Stretching by therapist/PROM Trialed modified jose stretch - not effective (pt states modified lunge position she has felt more of a stretch in hip flexors in the past)      CARDIOVASCULAR       Nu Step 5 mins, Level: 1, BLE only     Stationary Bike Recumbent bike 5 min, L1  (Unable to complete revolution on [employer] upright bike)    Ambulation with AD      Treadmill       Endurance Testing       6mWT Assessed    STRENGTH  "TRAINING     HEP Review     Standing Ther-Ex Standing hip abduction 2 x 10 ea  - standing on airex   - Second set, no UE support     Step ups 6\" block  - Added L LE TKE w orange TB    Step down, 2\" block, L LE only w orange band TKE x 10    Hip hikes 2\" block, VC's to discourage R hip hike     Heel raises from L2 on incline board x 15 (on floor today)                              Side-lying there-ex Clamshells: 2x20 on R, L in supine: 5\"x10      Supine Ther-Ex Diaphragmatic breathin mins due to elevated BP  Glut bridge: x10   Bridge with ER: x10   Supine hip abduction x 10, LLE  BKFO: x10, orange t-band only for R, active assist on L       Deadbugs with physioball  - isometric holds: 10\" x 10   - alt UE: 3 sets x5 reps     Quadruped  hip extensions with towel under L foot: x10, B/L     Quadruped <> bear crawl: 10\" x 10 Yes    Yes   Tall kneel Moderate perturbations all directions  Mini squats x 10 holding 6\" med ball   Trunk rot: x10   D2 chops/lifts: x10 Yes   Yes   Yes  Yes   Prone Ther-Ex     Core exercises PPT with ball squeeze 5 sec hold, 10 x  PPT with ball squeeze and bridge x 10   PPT with supine marches x 10  PPT with hip abduction isometric L LE x 3 sec hold x10, orange band     Heel slides with towel: 2x10  Triple flex over physioball: x10  [facility] wipers BLE over physioball, contra rot w/ 6# med ball: 2x10    STS's from low EOM squeezing pilates ring  - As above, with squeeze and OH press  Marching, squeezing small ball 30 ft x 2     Yes   Yes       Y  Y  Y           Functional Strength Testing       30 sec STS test (60y +)  Assessed    NEURO RE-ED  CPT 47688 TOTAL TIME FOR SESSION 8-22 Minutes      FES  To promote neurorecovery of gait:  Distance: 3.01 miles  Resistance: 0.77 Nm  Power: 2.9 W  Symmetry: L 2%  Total time: 21:00  Active time: 19:03 Yes   COORDINATION       Target Tapping     Coordination ladder As appropriate for HL balance     Amb with ankle weights     Amb with proprio wrap  " "   Pushing weighted shopping cart     POSTURAL RE-ED     Sit <> Stand      Tall Kneel  Tall kneel <> short sit: x10, x10 with chest press     Seated & standing reaching for trunk strengthening     Abbey-scapular strengthening     PRE-GAIT ACTIVITIES      Tap-ups     Step-ups To 6\" block with contralateral heel tap to 10\" block, no UE x 10    Standing weight shifting     Step-taps     BALANCE TRAINING     Standing balance - static/dynamic       HEP Review       Floor       Airex Santiago-tandem: 30 seconds x 2, HT: 30\" x 1, EC: 30 seconds x 1  Tandem stance: 30 seconds x 1     Rockerboard AP plane: x10, with light UE support   AP, chest press x 10, OH press x 10, 6lb   Squats 2 x 10      Hurdles  6\" hurdles, focus on dec L hip circumduction through swing and encouraging heel strike  Repeated with side stepping pattern, requiring BUE support dud to inability for L knee extension through stance     Split Stance      Biodex Balance Training  Weight shifting and motor control training with increased difficulty noted to L anterolateral planes: x5 mins  Yes   Dynamic gait       Side stepping With partial squat with orange theraband around ankles: 10'x4     Retro walking Fwd/retro amb 10'x6 with cues for shorter step length and for heel strike + knee extension through stance =, mirror anteriorly  Yes   Tandem Walking     Cafe Walking Using small 2 lb B DBs: 100'x2, noted delayed L knee extension through IC and mid stance     Amb with [place]  Using [place]ia to facilitate BUE swing and trunk rot  Yes   Walking with ball toss     Balance Testing     FGA  Assessed    SLS  Assessed see note     10mWT Assessed     GAIT TRAINING  CPT 80089 TOTAL TIME FOR SESSION 8-22 Minutes      Ambulation without AD  Gait with no AD over level indoor surfaces with WBOS and decreased L weight shift, VC for anterolateral weight shift, heel strike, BUE swing and trunk rot, 150'x1 Yes   Stair negotiation       Curb negotiation       Ramp negotiation     "   Outdoor ambulation       MANUAL  CPT 73540 TOTAL TIME FOR SESSION Not performed      Mobilization        MODALITIES  CPT 87968 TOTAL TIME FOR SESSION      Ice       Heat       ATTENDED E-STIM  CPT 84659 TOTAL TIME FOR SESSION 8-22 Minutes     Attended E-Stim FES    [id number] (Age 28) , PIN [phone]   Stim to L quad, hamstring, glutes, tib ant, and gastroc. 250-350 pulse width, 40Hz frequency.   Muscle testing testing completed for initial set up (7/8) Yes    Unattended E-stim

## 2024-07-15 NOTE — PROGRESS NOTES
Physical Therapy Visit    PT DAILY NOTE FOR OUTPATIENT THERAPY    Patient: Melanie Litten MRN: [id number]  : 1995 28 y.o.  Referring Physician: Remedios Whitfield MD  Date of Visit: 7/15/2024    Certification Dates: 24 through 24    Diagnosis:   1. History of tethered spinal cord        Chief Complaints:  EDS, LLE weakness, gait/balance impairment    Precautions:   Precautions comments: hypermobile - EDS, postural hypotension      TODAY'S VISIT    Time In Session:  Start Time: 1605  Stop Time: 1700  Time Calculation (min): 55 min   History/Vitals/Pain/Encounter Info - 07/15/24 1620          Injury History/Precautions/Daily Required Info    Document Type daily treatment     Primary Therapist Dilan Machuca     Chief Complaint/Reason for Visit  EDS, LLE weakness, gait/balance impairment     Referring Physician Remedios Whitfield MD     Precautions comments hypermobile - EDS, postural hypotension     History of present illness/functional impairment Saranya is a 28 year old female who presents to OPPT with history of tethered cord syndrome. Pt with PMH significant for chiari malformation, hypermobile EDS, left plantar fasciitis, anxiety and postural hypotension. Pt reports her symptoms started slowly in 2019 with L hip pain, followed up with neurosurgeon in [place] with unremarkable results for all testing. Her symptoms then progressed with L leg motor weakness and shakiness (similar to clonus) with movement, chronic constipation, L drop foot, lower back pain, and neck pain.  Pt was finally diagnosed with tethered cord syndrome and underwent a clinical trial surgery at [facility], [facility], and [facility]. Due to her symptoms, she meets criteria for exploration and sectioning of her filum for the functional tethered cord clinical trial. Now s/p laminectomy for occult tethered cord release on 23 by Dr Duckworth. Patient tolerated procedure well.  No post-op complications. Transferred stable once PACU criteria met. Patient kept FLAT for 36hr. Placed on an aseptic dex taper and post-op IV abx x24hrs.  She was mobilized on 9/27 tolerated well without any symptoms. Additionally, was instructed not to exercise for 7 weeks post-surgery. Pt reports she recovered well overall with decreased drop foot, clonus and swelling, continues to have increased coordination deficits and foot drag with fatigue. Pt has completed OPPT at [facility] in [place] and intermittent massage therapy. Pt’s functional goals are to work on higher level balance, picking up things from floor, coordination and “Hippo therapy”.     Patient/Family/Caregiver Comments/Observations Pt reports she tried to wear flip flops this weekend which didn't work out, increased foot slap and lack of control of toes to maintain  of flip flops     Patient reported fall since last visit No        Pain Assessment    Currently in pain No/Denies        Pre Activity Vital Signs    Pulse 72     /93     BP Location Right upper arm     BP Method Automatic     Patient Position Sitting                    Daily Treatment Assessment and Plan - 07/15/24 1620          Daily Treatment Assessment and Plan    Progress toward goals Progressing     Daily Outcome Summary Pt completed FES cycling once again with improve symmetry, some improvement in power output and for increased time to 21 minutes. Pt demonstrates improved L knee release through swing during gait post FES trial but continues to demo WBOS and decreased L anteroleral weight shift. Blocked practice completed with short bouts of gait fwd/retro with mirror anteriorly post Biodex training which resulted in improved mechanics.     Plan and Recommendations progress core and proximal hip strengthening program, higher level balance and coordination as tolerated, FES as able dependent on fatigue levels                         OBJECTIVE DATA TAKEN TODAY:    None  taken    Today's Treatment:       PT Neuro Exercises Current Session   Performed Today? (y/n)   THER ACT   CPT 20451 TOTAL TIME FOR SESSION 8-22 Minutes      Pain, vitals, subjective    Provided rest breaks for energy conservation    FES  cycle [id number] (Age 28) , PIN [phone]   Bike height: 2 holes showing  Pedal height: 3 holes showing  L LE set up only   Yes   Patient Education Patient educated regarding current impairments per testing completed today and PT POC moving forward.     Patient provided with Welcome Letter, which includes attendance policy. Provided education regarding cancellation and no-show policy. Education regarding the importance of participation and regular attendance to maximize goal attainment. Patient verbalized understanding/agreement.     Clearance for FES received, will be completed NV, start with handheld unit to assess tolerance prior to FES bike. Pt verbalized understanding                          HEP  Verbally reviewed practicing gait over level surfaces with decreased step width, and then incorporating BUE swing and trunk rot. Pt verbalized understanding.  Yes   Floor Transfers  CloseS with increased time for LLE management, requires single UE support with review of moving from tall kneel to half kneeling position. Pt demos good understanding, will require continued practice     Subjective Outcomes Measures       ABC Scale Assessed - returned today    THER EX  CPT 33667 TOTAL TIME FOR SESSION  Not performed      STRETCHING       Stretching by patient Incline board stretch: L3 - 1 min x2    Stretching by therapist/PROM Trialed modified jose stretch - not effective (pt states modified lunge position she has felt more of a stretch in hip flexors in the past)      CARDIOVASCULAR       Nu Step 5 mins, Level: 1, BLE only     Stationary Bike Recumbent bike 5 min, L1  (Unable to complete revolution on Expresso upright bike)    Ambulation with AD      Treadmill       Endurance Testing      "  6mWT Assessed    STRENGTH TRAINING     HEP Review     Standing Ther-Ex Standing hip abduction 2 x 10 ea  - standing on airex   - Second set, no UE support     Step ups 6\" block  - Added L LE TKE w orange TB    Step down, 2\" block, L LE only w orange band TKE x 10    Hip hikes 2\" block, VC's to discourage R hip hike     Heel raises from L2 on incline board x 15 (on floor today)                              Side-lying there-ex Clamshells: 2x20 on R, L in supine: 5\"x10      Supine Ther-Ex Diaphragmatic breathin mins due to elevated BP  Glut bridge: x10   Bridge with ER: x10   Supine hip abduction x 10, LLE  BKFO: x10, orange t-band only for R, active assist on L       Deadbugs with physioball  - isometric holds: 10\" x 10   - alt UE: 3 sets x5 reps     Quadruped  hip extensions with towel under L foot: x10, B/L     Quadruped <> bear crawl: 10\" x 10 Yes    Yes   Tall kneel Moderate perturbations all directions  Mini squats x 10 holding 6\" med ball   Trunk rot: x10   D2 chops/lifts: x10 Yes   Yes   Yes  Yes   Prone Ther-Ex     Core exercises PPT with ball squeeze 5 sec hold, 10 x  PPT with ball squeeze and bridge x 10   PPT with supine marches x 10  PPT with hip abduction isometric L LE x 3 sec hold x10, orange band     Heel slides with towel: 2x10  Triple flex over physioball: x10  [facility] wipers BLE over physioball, contra rot w/ 6# med ball: 2x10    STS's from low EOM squeezing pilates ring  - As above, with squeeze and OH press  Marching, squeezing small ball 30 ft x 2     Yes   Yes       Y  Y  Y           Functional Strength Testing       30 sec STS test (60y +)  Assessed    NEURO RE-ED  CPT 80881 TOTAL TIME FOR SESSION 8-22 Minutes      FES  To promote neurorecovery of gait:  Distance: 3.01 miles  Resistance: 0.77 Nm  Power: 2.9 W  Symmetry: L 2%  Total time: 21:00  Active time: 19:03 Yes   COORDINATION       Target Tapping     Coordination ladder As appropriate for HL balance     Amb with ankle " "weights     Amb with proprio wrap     Pushing weighted shopping cart     POSTURAL RE-ED     Sit <> Stand      Tall Kneel  Tall kneel <> short sit: x10, x10 with chest press     Seated & standing reaching for trunk strengthening     Abbey-scapular strengthening     PRE-GAIT ACTIVITIES      Tap-ups     Step-ups To 6\" block with contralateral heel tap to 10\" block, no UE x 10    Standing weight shifting     Step-taps     BALANCE TRAINING     Standing balance - static/dynamic       HEP Review       Floor       Airex Santiago-tandem: 30 seconds x 2, HT: 30\" x 1, EC: 30 seconds x 1  Tandem stance: 30 seconds x 1     Rockerboard AP plane: x10, with light UE support   AP, chest press x 10, OH press x 10, 6lb   Squats 2 x 10      Hurdles  6\" hurdles, focus on dec L hip circumduction through swing and encouraging heel strike  Repeated with side stepping pattern, requiring BUE support dud to inability for L knee extension through stance     Split Stance      Biodex Balance Training  Weight shifting and motor control training with increased difficulty noted to L anterolateral planes: x5 mins  Yes   Dynamic gait       Side stepping With partial squat with orange theraband around ankles: 10'x4     Retro walking Fwd/retro amb 10'x6 with cues for shorter step length and for heel strike + knee extension through stance =, mirror anteriorly  Yes   Tandem Walking     Cafe Walking Using small 2 lb B DBs: 100'x2, noted delayed L knee extension through IC and mid stance     Amb with [place]ia  Using [place]ia to facilitate BUE swing and trunk rot  Yes   Walking with ball toss     Balance Testing     FGA  Assessed    SLS  Assessed see note     10mWT Assessed     GAIT TRAINING  CPT 55440 TOTAL TIME FOR SESSION 8-22 Minutes      Ambulation without AD  Gait with no AD over level indoor surfaces with WBOS and decreased L weight shift, VC for anterolateral weight shift, heel strike, BUE swing and trunk rot, 150'x1 Yes   Stair negotiation       Curb " negotiation       Ramp negotiation       Outdoor ambulation       MANUAL  CPT 12401 TOTAL TIME FOR SESSION Not performed      Mobilization        MODALITIES  CPT 19991 TOTAL TIME FOR SESSION      Ice       Heat       ATTENDED E-STIM  CPT 03397 TOTAL TIME FOR SESSION 8-22 Minutes     Attended E-Stim FES    [id number] (Age 28) , PIN [phone]   Stim to L quad, hamstring, glutes, tib ant, and gastroc. 250-350 pulse width, 40Hz frequency.   Muscle testing testing completed for initial set up (7/8) Yes    Unattended E-stim

## 2024-07-24 ENCOUNTER — HOSPITAL ENCOUNTER (OUTPATIENT)
Dept: PHYSICAL THERAPY | Facility: REHABILITATION | Age: 29
Setting detail: THERAPIES SERIES
Discharge: HOME | End: 2024-07-24
Attending: LEGAL MEDICINE
Payer: COMMERCIAL

## 2024-07-24 DIAGNOSIS — Z86.69 HISTORY OF TETHERED SPINAL CORD: Primary | ICD-10-CM

## 2024-07-24 PROCEDURE — 97112 NEUROMUSCULAR REEDUCATION: CPT | Mod: GP

## 2024-07-24 PROCEDURE — 97110 THERAPEUTIC EXERCISES: CPT | Mod: GP

## 2024-07-24 NOTE — PROGRESS NOTES
Physical Therapy Visit    PT DAILY NOTE FOR OUTPATIENT THERAPY    Patient: Melanie Litten MRN: [id number]  : 1995 28 y.o.  Referring Physician: Remedios Whitfield MD  Date of Visit: 2024    Certification Dates: 24 through 24    Diagnosis:   1. History of tethered spinal cord        Chief Complaints:  EDS, LLE weakness, gait/balance impairment    Precautions:   Precautions comments: hypermobile - EDS, postural hypotension      TODAY'S VISIT    Time In Session:  Start Time: 1505  Stop Time: 1600  Time Calculation (min): 55 min   History/Vitals/Pain/Encounter Info - 24 1504          Injury History/Precautions/Daily Required Info    Primary Therapist Dilan Machuca     Chief Complaint/Reason for Visit  EDS, LLE weakness, gait/balance impairment     Patient/Family/Caregiver Comments/Observations Pt reports her LLE and low back was quite fatigued after her work conference. Pt additionally noticed increased swelling at the base of her surgical incision.                    Daily Treatment Assessment and Plan - 24 1505          Daily Treatment Assessment and Plan    Progress toward goals Progressing     Daily Outcome Summary Pt was challenged with modified SLS and dynamic balance over BOSU ball today with verbal cues to maintain midline trunk to avoid compensatory positions. Pt does demo improved motor control with compound and dynamic gait/balance when dual task is added as a distractor.     Plan and Recommendations Plan for progress note and [employer] [place]; progress core and proximal hip strengthening program, higher level balance and coordination as tolerated, FES as able dependent on fatigue levels                    Daily Falls Screen - 24 1504          Daily Falls Assessment    Patient reported fall since last visit No                       OBJECTIVE DATA TAKEN TODAY:    None taken    Today's Treatment:       PT Neuro Exercises Current Session   Performed Today? (y/n)   THER  ACT   CPT 43418 TOTAL TIME FOR SESSION 8-22 Minutes      Pain, vitals, subjective    Provided rest breaks for energy conservation    FES  cycle [id number] (Age 28) , PIN [phone]   Bike height: 2 holes showing  Pedal height: 3 holes showing  L LE set up only      Patient Education Patient educated regarding current impairments per testing completed today and PT POC moving forward.     Patient provided with Welcome Letter, which includes attendance policy. Provided education regarding cancellation and no-show policy. Education regarding the importance of participation and regular attendance to maximize goal attainment. Patient verbalized understanding/agreement.     Clearance for FES received, will be completed NV, start with handheld unit to assess tolerance prior to FES bike. Pt verbalized understanding                          HEP  Verbally reviewed practicing gait over level surfaces with decreased step width, and then incorporating BUE swing and trunk rot. Pt verbalized understanding.     Floor Transfers  CloseS with increased time for LLE management, requires single UE support with review of moving from tall kneel to half kneeling position. Pt demos good understanding, will require continued practice     Subjective Outcomes Measures       ABC Scale Assessed - returned today    THER EX  CPT 99067 TOTAL TIME FOR SESSION  23-37 Minutes      STRETCHING       Stretching by patient Incline board stretch: L3 - 1 min x2    Stretching by therapist/PROM Trialed modified jose stretch - not effective (pt states modified lunge position she has felt more of a stretch in hip flexors in the past)      CARDIOVASCULAR       Nu Step 5 mins, Level: 1, BLE only     Stationary Bike Recumbent bike 5 min, L1  (Unable to complete revolution on [employer]o upright bike)    Ambulation with AD      Treadmill       Endurance Testing       6mWT Assessed    STRENGTH TRAINING     HEP Review     Standing Ther-Ex Standing hip abduction 2 x 10  "ea  - standing on airex   - Second set, no UE support     Step ups 6\" block  - Added L LE TKE w orange TB    Step down, 2\" block, L LE only w orange band TKE x 10    Hip hikes 2\" block, VC's to discourage R hip hike     Heel raises from L2 on incline board x 15 (on floor today)                              Side-lying there-ex Clamshells: 2x20 on R, L in supine: 5\"x10      Supine Ther-Ex Diaphragmatic breathin mins due to elevated BP  Glut bridge: 3\" x10, orange band  Bridge with ER: x10   Supine hip abduction x 10, LLE, isometrics today  BKFO: x10, orange t-band only for R, active assist on L      Y  Y  Y   Deadbugs with physioball  - isometric holds: 10\" x 10, physioball under B heels   - alt LE marches with heels on physioball: x10  - alt UE: 3 sets x5 reps    Y  Y   Quadruped  hip extensions with towel under L foot: x10, B/L (modified position with BUE on EOM and LE on floor)     Quadruped <> bear crawl: 10\" x 10 Yes      Yes   Tall kneel Moderate perturbations all directions  Mini squats x 10 holding 6\" med ball   Trunk rot: x10   D2 chops/lifts: x10    Prone Ther-Ex     Core exercises PPT with ball squeeze 5 sec hold, 10 x  PPT with ball squeeze and bridge x 10   PPT with supine marches x 10  PPT with hip abduction isometric L LE x 3 sec hold x10, orange band     Heel slides with towel: 2x10  Triple flex over physioball: x10  [place] wipers BLE over physioball, contra rot w/ 6# med ball: 2x10    STS's from low EOM squeezing pilates ring  - As above, with squeeze and OH press  Marching, squeezing small ball 30 ft x 2     Y  Y      Y  Y             Functional Strength Testing       30 sec STS test (60y +)  Assessed    NEURO RE-ED  CPT 07291 TOTAL TIME FOR SESSION 23-37 Minutes      FES  To promote neurorecovery of gait:  Distance: 3.01 miles  Resistance: 0.77 Nm  Power: 2.9 W  Symmetry: L 2%  Total time: 21:00  Active time: 19:03    COORDINATION       Target Tapping     Coordination ladder As " "appropriate for HL balance     Amb with ankle weights     Amb with proprio wrap     Pushing weighted shopping cart     POSTURAL RE-ED     Sit <> Stand  From 22\" mat holding 6# med ball, cues for midline and equal WB   Y   Tall Kneel  Tall kneel <> short sit: x10, x10 with chest press     Seated & standing reaching for trunk strengthening     Abbey-scapular strengthening     PRE-GAIT ACTIVITIES      Tap-ups     Step-ups To 6\" block with contralateral knee drive: x10, repeated with 2 lb DB for UE swing  Yes   Standing weight shifting     Step-taps     BALANCE TRAINING     Standing balance - static/dynamic       HEP Review       Floor       Airex Santiago-tandem: 30 seconds x 2, HT: 30\" x 1, EC: 30 seconds x 1  Tandem stance: 30 seconds x 1     Rockerboard AP plane: x10, with light UE support   AP, chest press x 10, OH press x 10, 6lb   Squats 2 x 10      Hurdles  6\" hurdles, focus on dec L hip circumduction through swing and encouraging heel strike  Repeated with side stepping pattern, requiring BUE support dud to inability for L knee extension through stance     BOSU Ball  Fwd mini lunge onto domed side: x10, CloseS   Modified lunge onto BOSU with palloff press:   - x10, then repeated x10 with perturbations on yellow sports cord  Yes   Yes   Split Stance      Biodex Balance Training  Weight shifting and motor control training with increased difficulty noted to L anterolateral planes: x5 mins     Dynamic gait       Side stepping With partial squat with orange theraband around ankles: 10'x4     Retro walking Fwd/retro amb 50'x6 with cues for shorter step length and for heel strike + knee extension through stance, mirror anteriorly, added dual task of beach ball tosses  Yes   Tandem Walking     Cafe Walking Using small 2 lb B DBs: 100'x2, noted delayed L knee extension through IC and mid stance     Amb with hula hoop Using hula hoop to facilitate BUE swing and trunk rot     Walking with ball toss     Balance Testing     FGA  " Assessed    SLS  Assessed see note     10mWT Assessed     GAIT TRAINING  CPT 87353 TOTAL TIME FOR SESSION 0-7 Minutes      Ambulation without AD  Gait with no AD over level indoor surfaces with WBOS and decreased L weight shift, VC for anterolateral weight shift, heel strike, BUE swing and trunk rot, 150'x1 Yes (billed with NMRE    Stair negotiation       Curb negotiation       Ramp negotiation       Outdoor ambulation       MANUAL  CPT 55330 TOTAL TIME FOR SESSION Not performed      Mobilization        MODALITIES  CPT 55878 TOTAL TIME FOR SESSION      Ice       Heat       ATTENDED E-STIM  CPT 73921 TOTAL TIME FOR SESSION Not performed     Attended E-Stim FES    [id number] (Age 28) , PIN [phone]   Stim to L quad, hamstring, glutes, tib ant, and gastroc. 250-350 pulse width, 40Hz frequency.   Muscle testing testing completed for initial set up (7/8)    Unattended E-stim

## 2024-07-24 NOTE — OP PT TREATMENT LOG
PT Neuro Exercises Current Session   Performed Today? (y/n)   THER ACT   CPT 22990 TOTAL TIME FOR SESSION 8-22 Minutes      Pain, vitals, subjective    Provided rest breaks for energy conservation    FES  cycle [id number] (Age 28) , PIN [phone]   Bike height: 2 holes showing  Pedal height: 3 holes showing  L LE set up only      Patient Education Patient educated regarding current impairments per testing completed today and PT POC moving forward.     Patient provided with Welcome Letter, which includes attendance policy. Provided education regarding cancellation and no-show policy. Education regarding the importance of participation and regular attendance to maximize goal attainment. Patient verbalized understanding/agreement.     Clearance for FES received, will be completed NV, start with handheld unit to assess tolerance prior to FES bike. Pt verbalized understanding                          HEP  Verbally reviewed practicing gait over level surfaces with decreased step width, and then incorporating BUE swing and trunk rot. Pt verbalized understanding.     Floor Transfers  CloseS with increased time for LLE management, requires single UE support with review of moving from tall kneel to half kneeling position. Pt demos good understanding, will require continued practice     Subjective Outcomes Measures       ABC Scale Assessed - returned today    THER EX  CPT 92684 TOTAL TIME FOR SESSION  23-37 Minutes      STRETCHING       Stretching by patient Incline board stretch: L3 - 1 min x2    Stretching by therapist/PROM Trialed modified jose stretch - not effective (pt states modified lunge position she has felt more of a stretch in hip flexors in the past)      CARDIOVASCULAR       Nu Step 5 mins, Level: 1, BLE only     Stationary Bike Recumbent bike 5 min, L1  (Unable to complete revolution on [employer] upright bike)    Ambulation with AD      Treadmill       Endurance Testing       6mWT Assessed    STRENGTH TRAINING   "   HEP Review     Standing Ther-Ex Standing hip abduction 2 x 10 ea  - standing on airex   - Second set, no UE support     Step ups 6\" block  - Added L LE TKE w orange TB    Step down, 2\" block, L LE only w orange band TKE x 10    Hip hikes 2\" block, VC's to discourage R hip hike     Heel raises from L2 on incline board x 15 (on floor today)                              Side-lying there-ex Clamshells: 2x20 on R, L in supine: 5\"x10      Supine Ther-Ex Diaphragmatic breathin mins due to elevated BP  Glut bridge: 3\" x10, orange band  Bridge with ER: x10   Supine hip abduction x 10, LLE, isometrics today  BKFO: x10, orange t-band only for R, active assist on L      Y  Y  Y   Deadbugs with physioball  - isometric holds: 10\" x 10, physioball under B heels   - alt LE marches with heels on physioball: x10  - alt UE: 3 sets x5 reps    Y  Y   Quadruped  hip extensions with towel under L foot: x10, B/L (modified position with BUE on EOM and LE on floor)     Quadruped <> bear crawl: 10\" x 10 Yes      Yes   Tall kneel Moderate perturbations all directions  Mini squats x 10 holding 6\" med ball   Trunk rot: x10   D2 chops/lifts: x10    Prone Ther-Ex     Core exercises PPT with ball squeeze 5 sec hold, 10 x  PPT with ball squeeze and bridge x 10   PPT with supine marches x 10  PPT with hip abduction isometric L LE x 3 sec hold x10, orange band     Heel slides with towel: 2x10  Triple flex over physioball: x10  [facility] wipers BLE over physioball, contra rot w/ 6# med ball: 2x10    STS's from low EOM squeezing pilates ring  - As above, with squeeze and OH press  Marching, squeezing small ball 30 ft x 2     Y  Y      Y  Y             Functional Strength Testing       30 sec STS test (60y +)  Assessed    NEURO RE-ED  CPT 37651 TOTAL TIME FOR SESSION 23-37 Minutes      FES  To promote neurorecovery of gait:  Distance: 3.01 miles  Resistance: 0.77 Nm  Power: 2.9 W  Symmetry: L 2%  Total time: 21:00  Active time: 19:03  " "  COORDINATION       Target Tapping     Coordination ladder As appropriate for HL balance     Amb with ankle weights     Amb with proprio wrap     Pushing weighted shopping cart     POSTURAL RE-ED     Sit <> Stand  From 22\" mat holding 6# med ball, cues for midline and equal WB   Y   Tall Kneel  Tall kneel <> short sit: x10, x10 with chest press     Seated & standing reaching for trunk strengthening     Abbey-scapular strengthening     PRE-GAIT ACTIVITIES      Tap-ups     Step-ups To 6\" block with contralateral knee drive: x10, repeated with 2 lb DB for UE swing  Yes   Standing weight shifting     Step-taps     BALANCE TRAINING     Standing balance - static/dynamic       HEP Review       Floor       Airex [place]-tandem: 30 seconds x 2, HT: 30\" x 1, EC: 30 seconds x 1  Tandem stance: 30 seconds x 1     Rockerboard AP plane: x10, with light UE support   AP, chest press x 10, OH press x 10, 6lb   Squats 2 x 10      Hurdles  6\" hurdles, focus on dec L hip circumduction through swing and encouraging heel strike  Repeated with side stepping pattern, requiring BUE support dud to inability for L knee extension through stance     BOSU Ball  Fwd mini lunge onto domed side: x10, CloseS   Modified lunge onto BOSU with palloff press:   - x10, then repeated x10 with perturbations on yellow sports cord  Yes   Yes   Split Stance      Biodex Balance Training  Weight shifting and motor control training with increased difficulty noted to L anterolateral planes: x5 mins     Dynamic gait       Side stepping With partial squat with orange theraband around ankles: 10'x4     Retro walking Fwd/retro amb 50'x6 with cues for shorter step length and for heel strike + knee extension through stance, mirror anteriorly, added dual task of beach ball tosses  Yes   Tandem Walking     Cafe Walking Using small 2 lb B DBs: 100'x2, noted delayed L knee extension through IC and mid stance     Amb with hula hoop Using hula hoop to facilitate BUE swing and " trunk rot     Walking with ball toss     Balance Testing     FGA  Assessed    SLS  Assessed see note     10mWT Assessed     GAIT TRAINING  CPT 68687 TOTAL TIME FOR SESSION 0-7 Minutes      Ambulation without AD  Gait with no AD over level indoor surfaces with WBOS and decreased L weight shift, VC for anterolateral weight shift, heel strike, BUE swing and trunk rot, 150'x1 Yes (billed with NMRE    Stair negotiation       Curb negotiation       Ramp negotiation       Outdoor ambulation       MANUAL  CPT 77850 TOTAL TIME FOR SESSION Not performed      Mobilization        MODALITIES  CPT 41594 TOTAL TIME FOR SESSION      Ice       Heat       ATTENDED E-STIM  CPT 28164 TOTAL TIME FOR SESSION Not performed     Attended E-Stim FES    [id number] (Age 28) , PIN [phone]   Stim to L quad, hamstring, glutes, tib ant, and gastroc. 250-350 pulse width, 40Hz frequency.   Muscle testing testing completed for initial set up (7/8)    Unattended E-stim

## 2024-07-26 ENCOUNTER — HOSPITAL ENCOUNTER (OUTPATIENT)
Dept: PHYSICAL THERAPY | Facility: REHABILITATION | Age: 29
Setting detail: THERAPIES SERIES
Discharge: HOME | End: 2024-07-26
Attending: LEGAL MEDICINE
Payer: COMMERCIAL

## 2024-07-26 DIAGNOSIS — Z86.69 HISTORY OF TETHERED SPINAL CORD: Primary | ICD-10-CM

## 2024-07-26 PROCEDURE — 97032 APPL MODALITY 1+ESTIM EA 15: CPT | Mod: GP

## 2024-07-26 PROCEDURE — 97112 NEUROMUSCULAR REEDUCATION: CPT | Mod: GP

## 2024-07-26 PROCEDURE — 97530 THERAPEUTIC ACTIVITIES: CPT | Mod: GP

## 2024-07-26 NOTE — PROGRESS NOTES
Physical Therapy Progress Note    PT PROGRESS NOTE FOR OUTPATIENT THERAPY    Patient: Melanie Litten   MRN: [id number]  : 1995 28 y.o.    Referring Physician: Remedios Whitfield MD  Date of Visit: 2024    Certification Dates: 24 through 24    Recommended Frequency & Duration:  2 times/week for up to 3 months        Diagnosis:   1. History of tethered spinal cord        Chief Complaints:  No chief complaint on file.      Precautions:   Precautions comments: hypermobile - EDS, postural hypotension    TODAY'S VISIT:    Time In Session:  Start Time: 905  Stop Time:   Time Calculation (min): 55 min   General Information - 24 0858          Session Details    Document Type progress note     Mode of Treatment individual therapy        General Information    Referring Physician Remediso Whitfield MD     History of present illness/functional impairment Saranya is a 28 year old female who presents to OPPT with history of tethered cord syndrome. Pt with PMH significant for chiari malformation, hypermobile EDS, left plantar fasciitis, anxiety and postural hypotension. Pt reports her symptoms started slowly in 2019 with L hip pain, followed up with neurosurgeon in [place] with unremarkable results for all testing. Her symptoms then progressed with L leg motor weakness and shakiness (similar to clonus) with movement, chronic constipation, L drop foot, lower back pain, and neck pain.  Pt was finally diagnosed with tethered cord syndrome and underwent a clinical trial surgery at [facility], [facility], and [facility]. Due to her symptoms, she meets criteria for exploration and sectioning of her filum for the functional tethered cord clinical trial. Now s/p laminectomy for occult tethered cord release on 23 by Dr Duckworth. Patient tolerated procedure well. No post-op complications. Transferred stable once PACU criteria met. Patient kept FLAT for  36hr. Placed on an aseptic dex taper and post-op IV abx x24hrs.  She was mobilized on 9/27 tolerated well without any symptoms. Additionally, was instructed not to exercise for 7 weeks post-surgery. Pt reports she recovered well overall with decreased drop foot, clonus and swelling, continues to have increased coordination deficits and foot drag with fatigue. Pt has completed OPPT at [facility] in [place] and intermittent massage therapy. Pt’s functional goals are to work on higher level balance, picking up things from floor, coordination and “Hippo therapy”.     Limitations/Impairments other (see comments)     Precautions comments hypermobile - EDS, postural hypotension                    Daily Falls Screen - 07/26/24 0858          Daily Falls Assessment    Patient reported fall since last visit No                    Pain/Vitals - 07/26/24 0858          Pain Assessment    Currently in pain No/Denies        Pre Activity Vital Signs    Pulse 91     /88     BP Location Right upper arm     BP Method Automatic     Patient Position Sitting        Post Activity Vital Signs    Post Activity BP Location Right upper arm     Post Activity BP Method Automatic     Patient Position Sitting                    PT - 07/26/24 0858          Physical Therapy    Physical Therapy Specialty Spinal Cord PT        PT Plan    Frequency of treatment 2 times/week     PT Duration 3 months     PT Cert From 06/18/24     PT Cert To 09/21/24     Date PT POC was sent to provider 06/18/24     Signed PT Plan of Care received?  Yes                    Assessment and Plan - 07/26/24 1223          Assessment    Plan of Care reviewed and patient/family in agreement Yes     Clinical Assessment Saranya is a 28-year-old female who presents to OPPT with history of tethered cord syndrome and s/p surgical intervention for laminectomy for occult tethered cord release on 9/25/23. Pt has recovered significantly since surgery and is ambulatory today at [facility]  without AD as it takes her longer than prior. Progress note completed today with pt demonstrating improving symmetry and improved gait mechanics although pt continues to demonstrate increased step lengths, WBOS and dec L stance time due to her impairments. Functionally, pt has progressed well with improved gait speed to 1.22 m/s and increased 6MWT from 1555 ft to 1612 ft. FGA demos some improvement from 24/30 to 25/30 although does demo overall quality in movement primarily with tandem walks and retro ambulation. Additionally, pt’s SLS time on L improves from < 5 seconds to 23 seconds today without compensatory movements. Pt has been using FES  cycling and used Xcite with functional transfers and activities for neuromotor recovery. Pt will continue to benefit from skilled PT intervention to address impairments stated above, maximize her functional independence, reduce falls risk and meet all pt specific goals.     Plan and Recommendations Progress core and proximal hip strengthening program, higher level balance and coordination as tolerated, Bioness L300 Go with treadmill training                     OBJECTIVE MEASUREMENTS/DATA:      Transfers    Transfers - 07/26/24 1223          Transfers    Comment Sit to stand transfers with improved symmetry and increased weight bearing through LLE                   Gait and Mobility    Gait and Mobility - 07/26/24 1223          Gait Training    [place], Gait independent     Variable surfaces Flat surface     Comment Pt with improved gait speed but decreased nancy, wider GABRIELLE and decreased L heel strike, improved with verbal cues but with more trunk compensatory patterns.        Stairs Assessment    Assistive Device railing     Comment poor eccentric control on descent                   Outcome Measures    PT Outcome Measures - 07/26/24 0858          Objective Outcome Measures    6 Minute Walk Test 1612 ft     Gait Speed (m/sec) 1.22 m/sec   SSV    FGA Score (out of  30 total) 25     30 Second Sit to Stand Test 10 repetitions        Other Outcome Measures Used/Comments    Other outcome measure used: SLS: R: 30 seconds, L: 23 seconds                     ROM and MMT          6/18/2024   PT Cervical/Lumbar/Other ROM Measurements   Other: Girth Measurement/Comments Mild dependent edema of LLE at end of day per pt reports; surgical scar approx 4 inches in length down mid lumbar spine from previous laminectomy, mild hypomobility down lower 50% of scar   Additional ROM  Hypermobile in all jointsm mild L hamstring length restrictions to approx 70 deg due to tone.   PT LE MMT   Right Hip Flexion (5/5) normal   Left Hip Flexion (3-/5) fair minus   Right Hip Extension (4+/5) good plus   Left Hip Extension (3-/5) fair minus   Right Hip ABD (4+/5) good plus   Left Hip ABD (3-/5) fair minus   Left Hip ADD (4+/5) good plus   Left Hip IR (3+/5) fair plus   Left Hip ER (3+/5) fair plus   Right Knee Flexion (5/5) normal   Left Knee Flexion (4/5) good   Right Knee Extension (5/5) normal   Left Knee Extension (4-/5) good minus   Right Ankle DF (5/5) normal   Left Ankle DF (3+/5) fair plus   Right Ankle PF (5/5) normal   Left Ankle PF (4+/5) good plus   Right Ankle Inversion (5/5) normal   Left Ankle Inversion (3-/5) fair minus   Right Ankle Eversion (5/5) normal   Left Ankle Eversion (4-/5) good minus     Outcome Measures          6/18/2024    10:09 6/24/2024    17:03 7/26/2024    08:58   PT OBJECTIVE Outcome Measures   6 Minute Walk Test 1555 ft  1612 ft   Gait Speed (m/sec) 1 m/sec  1.22 m/sec       SSV   30 Second Sit to Stand --        TBA NV  10 repetitions   FGA 24  25   PT SUBJECTIVE Outcome Measures   ABC  1360/1600 = 85% confidence    Other  SLS: R: 30 seconds, L <5 seconds without pelvic drop SLS: R: 30 seconds, L: 23 seconds       Today's Treatment::    Education provided:  Yes: See treatment log for details of education provided       PT Neuro Exercises Current Session   Performed  Today? (y/n)   THER ACT   CPT 33384 TOTAL TIME FOR SESSION 8-22 Minutes      Pain, vitals, subjective    Provided rest breaks for energy conservation    FES  cycle [id number] (Age 28) , PIN [id number]   Bike height: 2 holes showing  Pedal height: 3 holes showing  L LE set up only    FES Xcite Set Up with muscle testing              Yes   Patient Education Patient educated regarding current impairments per testing completed today and PT POC moving forward.     Patient provided with Welcome Letter, which includes attendance policy. Provided education regarding cancellation and no-show policy. Education regarding the importance of participation and regular attendance to maximize goal attainment. Patient verbalized understanding/agreement.     Clearance for FES received, will be completed NV, start with handheld unit to assess tolerance prior to FES bike. Pt verbalized understanding.    Patient educated regarding progress made thus far, current impairments per re-assessments completed today and PT POC moving forward. Patient verbalized understanding/agreement.                               Yes             HEP  Verbally reviewed practicing gait over level surfaces with decreased step width, and then incorporating BUE swing and trunk rot. Pt verbalized understanding.     Floor Transfers  CloseS with increased time for LLE management, requires single UE support with review of moving from tall kneel to half kneeling position. Pt demos good understanding, will require continued practice     Subjective Outcomes Measures       ABC Scale Assessed - returned today    THER EX  CPT 52863 TOTAL TIME FOR SESSION  Not performed      STRETCHING       Stretching by patient Incline board stretch: L3 - 1 min x2    Stretching by therapist/PROM Trialed modified jose stretch - not effective (pt states modified lunge position she has felt more of a stretch in hip flexors in the past)      CARDIOVASCULAR       Nu Step 5 mins, Level: 1, BLE only  "    Stationary Bike Recumbent bike 5 min, L1  (Unable to complete revolution on [employer]o upright bike)    Ambulation with AD      Treadmill       Endurance Testing       6mWT Assessed Y   STRENGTH TRAINING     HEP Review     Standing Ther-Ex Standing hip abduction 2 x 10 ea  - standing on airex   - Second set, no UE support     Step ups 6\" block  - Added L LE TKE w orange TB    Step down, 2\" block, L LE only w orange band TKE x 10    Hip hikes 2\" block, VC's to discourage R hip hike     Heel raises from L2 on incline board x 15 (on floor today)                              Side-lying there-ex Clamshells: 2x20 on R, L in supine: 5\"x10      Supine Ther-Ex Diaphragmatic breathin mins due to elevated BP  Glut bridge: 3\" x10, orange band  Bridge with ER: x10   Supine hip abduction x 10, LLE, isometrics today  BKFO: x10, orange t-band only for R, active assist on L      Y  Y  Y   Deadbugs with physioball  - isometric holds: 10\" x 10, physioball under B heels   - alt LE marches with heels on physioball: x10  - alt UE: 3 sets x5 reps    Y  Y   Quadruped  hip extensions with towel under L foot: x10, B/L (modified position with BUE on EOM and LE on floor)     Quadruped <> bear crawl: 10\" x 10 Yes      Yes   Tall kneel Moderate perturbations all directions  Mini squats x 10 holding 6\" med ball   Trunk rot: x10   D2 chops/lifts: x10    Prone Ther-Ex     Core exercises PPT with ball squeeze 5 sec hold, 10 x  PPT with ball squeeze and bridge x 10   PPT with supine marches x 10  PPT with hip abduction isometric L LE x 3 sec hold x10, orange band     Heel slides with towel: 2x10  Triple flex over physioball: x10  Windshield wipers BLE over physioball, contra rot w/ 6# med ball: 2x10    STS's from low EOM squeezing pilates ring  - As above, with squeeze and OH press  Marching, squeezing small ball 30 ft x 2     Y  Y      Y  Y             Functional Strength Testing       30 sec STS test (60y +)  Assessed Y   NEURO RE-ED  CPT " "92649 TOTAL TIME FOR SESSION 23-37 Minutes      FES  To promote neurorecovery of gait:  Distance: 3.01 miles  Resistance: 0.77 Nm  Power: 2.9 W  Symmetry: L 2%  Total time: 21:00  Active time: 19:03    Xcite  NMRE of LLE in function:     - Sit <> stand from elevated EOM using 6 lb med ball: x10, then progressing to lowest height: x10, then x10 in staggered stance  - Modified reverse lunging with slider under R foot in parallel bars with BUE support > RUE support only: x10 reps each Yes to all below   COORDINATION       Target Tapping     Coordination ladder As appropriate for HL balance     Amb with ankle weights     Amb with proprio wrap     Pushing weighted shopping cart     POSTURAL RE-ED     Sit <> Stand  From 22\" mat holding 6# med ball, cues for midline and equal WB      Tall Kneel  Tall kneel <> short sit: x10, x10 with chest press     Seated & standing reaching for trunk strengthening     [place]-scapular strengthening     PRE-GAIT ACTIVITIES      Tap-ups     Step-ups To 6\" block with contralateral knee drive: x10, repeated with 2 lb DB for UE swing  Yes   Standing weight shifting     Step-taps     BALANCE TRAINING     Standing balance - static/dynamic       HEP Review       Floor       Airex [place]-tandem: 30 seconds x 2, HT: 30\" x 1, EC: 30 seconds x 1  Tandem stance: 30 seconds x 1     Rockerboard AP plane: x10, with light UE support   AP, chest press x 10, OH press x 10, 6lb   Squats 2 x 10      Hurdles  6\" hurdles, focus on dec L hip circumduction through swing and encouraging heel strike  Repeated with side stepping pattern, requiring BUE support dud to inability for L knee extension through stance     BOSU Ball  Fwd mini lunge onto domed side: x10, CloseS   Modified lunge onto BOSU with palloff press:   - x10, then repeated x10 with perturbations on yellow sports cord     Split Stance      Biodex Balance Training  Weight shifting and motor control training with increased difficulty noted to L " anterolateral planes: x5 mins     Dynamic gait       Side stepping With partial squat with orange theraband around ankles: 10'x4     Retro walking Fwd/retro amb 50'x6 with cues for shorter step length and for heel strike + knee extension through stance, mirror anteriorly, added dual task of beach ball tosses     Tandem Walking     Cafe Walking Using small 2 lb B DBs: 100'x2, noted delayed L knee extension through IC and mid stance     Amb with hula hoop Using hula hoop to facilitate BUE swing and trunk rot     Walking with ball toss     Balance Testing     FGA  Assessed Yes   SLS  Assessed see note  Y   10mWT Assessed  Y   GAIT TRAINING  CPT 62983 TOTAL TIME FOR SESSION 0-7 Minutes      Ambulation without AD  Gait with no AD over level indoor surfaces with WBOS and decreased L weight shift, VC for anterolateral weight shift, heel strike, BUE swing and trunk rot, 150'x1    Dynamic Gait  20'x6 with focus on shorter step lengths, heel strike and decreasing step width with use of 1' floor tiles, repeated with bimanual ball toss with CloseS and noted decreased gait speed and increase in step lengths  Yes (billed with NMRE)   Stair negotiation       Curb negotiation       Ramp negotiation       Outdoor ambulation       MANUAL  CPT 28594 TOTAL TIME FOR SESSION Not performed      Mobilization        MODALITIES  CPT 45395 TOTAL TIME FOR SESSION      Ice       Heat       ATTENDED E-STIM  CPT 44063 TOTAL TIME FOR SESSION 8-22 Minutes     Attended E-Stim FES    [id number] (Age 28) , PIN [id number]   Stim to L quad, hamstring, glutes, tib ant, and gastroc. 250-350 pulse width, 40Hz frequency.   Muscle testing testing completed for initial set up (7/8)    Xcite use for LLE:     Stim to L quad, hamstring, glutes, tib ant, and gastroc. 250-350 pulse width, 40Hz frequency.   Muscle testing testing completed for initial set up 7/26)                 Yes   Unattended E-stim             Goals Addressed                   This Visit's  Progress     PT Neuro Goals        Short term goals   Short Term Goals Time Frame Result Comment/Progress   Assess ABC, floor transfers, SLS assessment, 30sSTS  2 weeks Goal met     Improve 6mWT by 191 feet or more to meet the MCID indicating significant improvement in endurance and activity tolerance. 4-6 weeks Ongoing goal     Improve ABC Scale score to 60% or better suggesting improved balance confidence during functional tasks 4-6 weeks Goal met  85%    Improve FGA score by 5 points or more to meet the MCID indicating improving dynamic balance and decreasing falls risk. 4-6 weeks Ongoing goal  Improved by 1 point   Tolerate 10 min of CV activity with VSS 4-6 weeks Goal MET     Progress gait speed by 0.13 m/sec or more to meet the MCID without decline in safety or quality indicating significant improvement in gait speed and increased safety during ambulation in the home and community. 4-6 weeks Goal MET     Pt and caregiver will be mod I with HEP 4 weeks Goal MET     Long term goals   Long Term Goals Time Frame Result Comment/Progress   Pt will complete floor transfer without any external support Independently  8-12 weeks      Pt will navigate FF 7 inch steps unsupported with reciprocal pattern  8-12 weeks     Improve 6mWT by an additional 191 feet or more to meet the MCID indicating significant improvement in endurance and activity tolerance. 8-12 weeks     Improve 30 sec STS test reps to 15 reps or better to meet the age/gender matched norm and cut-off score indicating adequate LE muscle performance for functional activities. 8-12 weeks     Improve ABC Scale score to 81% or better suggesting improved balance confidence during functional tasks and decreased risk for falls. 8-12 weeks     Improve FGA score to > 28/30 indicating improved dynamic balance and decreased falls risk. 8-12 weeks     Tolerate 12-15 min of CV activity with VSS 8-12 weeks     Progress gait speed by an additional 0.13 m/sec or more to meet  the MCID without decline in safety or quality indicating significant improvement in gait speed and increased safety during ambulation in the home and community. 8-12 weeks     Pt and caregiver will be mod I with updated HEP 8-12 weeks                    Dilan Machuca, PT

## 2024-07-26 NOTE — OP PT TREATMENT LOG
PT Neuro Exercises Current Session   Performed Today? (y/n)   THER ACT   CPT 28093 TOTAL TIME FOR SESSION 8-22 Minutes      Pain, vitals, subjective    Provided rest breaks for energy conservation    FES  cycle [id number] (Age 28) , PIN [phone]   Bike height: 2 holes showing  Pedal height: 3 holes showing  L LE set up only    FES Xcite Set Up with muscle testing              Yes   Patient Education Patient educated regarding current impairments per testing completed today and PT POC moving forward.     Patient provided with Welcome Letter, which includes attendance policy. Provided education regarding cancellation and no-show policy. Education regarding the importance of participation and regular attendance to maximize goal attainment. Patient verbalized understanding/agreement.     Clearance for FES received, will be completed NV, start with handheld unit to assess tolerance prior to FES bike. Pt verbalized understanding.    Patient educated regarding progress made thus far, current impairments per re-assessments completed today and PT POC moving forward. Patient verbalized understanding/agreement.                               Yes             HEP  Verbally reviewed practicing gait over level surfaces with decreased step width, and then incorporating BUE swing and trunk rot. Pt verbalized understanding.     Floor Transfers  CloseS with increased time for LLE management, requires single UE support with review of moving from tall kneel to half kneeling position. Pt demos good understanding, will require continued practice     Subjective Outcomes Measures       ABC Scale Assessed - returned today    THER EX  CPT 28284 TOTAL TIME FOR SESSION  Not performed      STRETCHING       Stretching by patient Incline board stretch: L3 - 1 min x2    Stretching by therapist/PROM Trialed modified jose stretch - not effective (pt states modified lunge position she has felt more of a stretch in hip flexors in the past)     "  CARDIOVASCULAR       Nu Step 5 mins, Level: 1, BLE only     Stationary Bike Recumbent bike 5 min, L1  (Unable to complete revolution on [employer]o upright bike)    Ambulation with AD      Treadmill       Endurance Testing       6mWT Assessed Y   STRENGTH TRAINING     HEP Review     Standing Ther-Ex Standing hip abduction 2 x 10 ea  - standing on airex   - Second set, no UE support     Step ups 6\" block  - Added L LE TKE w orange TB    Step down, 2\" block, L LE only w orange band TKE x 10    Hip hikes 2\" block, VC's to discourage R hip hike     Heel raises from L2 on incline board x 15 (on floor today)                              Side-lying there-ex Clamshells: 2x20 on R, L in supine: 5\"x10      Supine Ther-Ex Diaphragmatic breathin mins due to elevated BP  Glut bridge: 3\" x10, orange band  Bridge with ER: x10   Supine hip abduction x 10, LLE, isometrics today  BKFO: x10, orange t-band only for R, active assist on L      Y  Y  Y   Deadbugs with physioball  - isometric holds: 10\" x 10, physioball under B heels   - alt LE marches with heels on physioball: x10  - alt UE: 3 sets x5 reps    Y  Y   Quadruped  hip extensions with towel under L foot: x10, B/L (modified position with BUE on EOM and LE on floor)     Quadruped <> bear crawl: 10\" x 10 Yes      Yes   Tall kneel Moderate perturbations all directions  Mini squats x 10 holding 6\" med ball   Trunk rot: x10   D2 chops/lifts: x10    Prone Ther-Ex     Core exercises PPT with ball squeeze 5 sec hold, 10 x  PPT with ball squeeze and bridge x 10   PPT with supine marches x 10  PPT with hip abduction isometric L LE x 3 sec hold x10, orange band     Heel slides with towel: 2x10  Triple flex over physioball: x10  Windshield wipers BLE over physioball, contra rot w/ 6# med ball: 2x10    STS's from low EOM squeezing pilates ring  - As above, with squeeze and OH press  Marching, squeezing small ball 30 ft x 2     Y  Y      Y  Y             Functional Strength Testing     " "  30 sec STS test (60y +)  Assessed Y   NEURO RE-ED  CPT 06684 TOTAL TIME FOR SESSION 23-37 Minutes      FES  To promote neurorecovery of gait:  Distance: 3.01 miles  Resistance: 0.77 Nm  Power: 2.9 W  Symmetry: L 2%  Total time: 21:00  Active time: 19:03    Xcite  NMRE of LLE in function:     - Sit <> stand from elevated EOM using 6 lb med ball: x10, then progressing to lowest height: x10, then x10 in staggered stance  - Modified reverse lunging with slider under R foot in parallel bars with BUE support > RUE support only: x10 reps each Yes to all below   COORDINATION       Target Tapping     Coordination ladder As appropriate for HL balance     Amb with ankle weights     Amb with proprio wrap     Pushing weighted shopping cart     POSTURAL RE-ED     Sit <> Stand  From 22\" mat holding 6# med ball, cues for midline and equal WB      Tall Kneel  Tall kneel <> short sit: x10, x10 with chest press     Seated & standing reaching for trunk strengthening     Abbey-scapular strengthening     PRE-GAIT ACTIVITIES      Tap-ups     Step-ups To 6\" block with contralateral knee drive: x10, repeated with 2 lb DB for UE swing  Yes   Standing weight shifting     Step-taps     BALANCE TRAINING     Standing balance - static/dynamic       HEP Review       Floor       Airex Santiago-tandem: 30 seconds x 2, HT: 30\" x 1, EC: 30 seconds x 1  Tandem stance: 30 seconds x 1     Rockerboard AP plane: x10, with light UE support   AP, chest press x 10, OH press x 10, 6lb   Squats 2 x 10      Hurdles  6\" hurdles, focus on dec L hip circumduction through swing and encouraging heel strike  Repeated with side stepping pattern, requiring BUE support dud to inability for L knee extension through stance     BOSU Ball  Fwd mini lunge onto domed side: x10, CloseS   Modified lunge onto BOSU with palloff press:   - x10, then repeated x10 with perturbations on yellow sports cord     Split Stance      Biodex Balance Training  Weight shifting and motor " control training with increased difficulty noted to L anterolateral planes: x5 mins     Dynamic gait       Side stepping With partial squat with orange theraband around ankles: 10'x4     Retro walking Fwd/retro amb 50'x6 with cues for shorter step length and for heel strike + knee extension through stance, mirror anteriorly, added dual task of beach ball tosses     Tandem Walking     Cafe Walking Using small 2 lb B DBs: 100'x2, noted delayed L knee extension through IC and mid stance     Amb with hula hoop Using hula hoop to facilitate BUE swing and trunk rot     Walking with ball toss     Balance Testing     FGA  Assessed Yes   SLS  Assessed see note  Y   10mWT Assessed  Y   GAIT TRAINING  CPT 69547 TOTAL TIME FOR SESSION 0-7 Minutes      Ambulation without AD  Gait with no AD over level indoor surfaces with WBOS and decreased L weight shift, VC for anterolateral weight shift, heel strike, BUE swing and trunk rot, 150'x1    Dynamic Gait  20'x6 with focus on shorter step lengths, heel strike and decreasing step width with use of 1' floor tiles, repeated with bimanual ball toss with CloseS and noted decreased gait speed and increase in step lengths  Yes (billed with NMRE)   Stair negotiation       Curb negotiation       Ramp negotiation       Outdoor ambulation       MANUAL  CPT 70636 TOTAL TIME FOR SESSION Not performed      Mobilization        MODALITIES  CPT 35769 TOTAL TIME FOR SESSION      Ice       Heat       ATTENDED E-STIM  CPT 55868 TOTAL TIME FOR SESSION 8-22 Minutes     Attended E-Stim FES    [id number] (Age 28) , PIN [phone]   Stim to L quad, hamstring, glutes, tib ant, and gastroc. 250-350 pulse width, 40Hz frequency.   Muscle testing testing completed for initial set up (7/8)    Xcite use for LLE:     Stim to L quad, hamstring, glutes, tib ant, and gastroc. 250-350 pulse width, 40Hz frequency.   Muscle testing testing completed for initial set up 7/26)                 Yes   Unattended E-stim

## 2024-07-29 ENCOUNTER — HOSPITAL ENCOUNTER (OUTPATIENT)
Dept: PHYSICAL THERAPY | Facility: REHABILITATION | Age: 29
Setting detail: THERAPIES SERIES
Discharge: HOME | End: 2024-07-29
Attending: LEGAL MEDICINE
Payer: COMMERCIAL

## 2024-07-29 DIAGNOSIS — Z86.69 HISTORY OF TETHERED SPINAL CORD: Primary | ICD-10-CM

## 2024-07-29 PROCEDURE — 97110 THERAPEUTIC EXERCISES: CPT | Mod: GP

## 2024-07-29 PROCEDURE — 97116 GAIT TRAINING THERAPY: CPT | Mod: GP

## 2024-07-29 PROCEDURE — 97530 THERAPEUTIC ACTIVITIES: CPT | Mod: GP

## 2024-07-29 PROCEDURE — 97032 APPL MODALITY 1+ESTIM EA 15: CPT | Mod: GP

## 2024-07-29 NOTE — OP PT TREATMENT LOG
PT Neuro Exercises Current Session   Performed Today? (y/n)   THER ACT   CPT 06799 TOTAL TIME FOR SESSION 8-22 Minutes      Pain, vitals, subjective    Provided rest breaks for energy conservation    Skin inspection Base of incision, no swelling noted Yes    FES  cycle [id number] (Age 28) , PIN [phone]   Bike height: 2 holes showing  Pedal height: 3 holes showing  L LE set up only    FES Xcite Set Up with muscle testing                 Bioness Additional time for set up due to connection issues   Yes    Patient Education Patient educated regarding current impairments per testing completed today and PT POC moving forward.     Patient provided with Welcome Letter, which includes attendance policy. Provided education regarding cancellation and no-show policy. Education regarding the importance of participation and regular attendance to maximize goal attainment. Patient verbalized understanding/agreement.     Clearance for FES received, will be completed NV, start with handheld unit to assess tolerance prior to FES bike. Pt verbalized understanding.    Patient educated regarding progress made thus far, current impairments per re-assessments completed today and PT POC moving forward. Patient verbalized understanding/agreement.                                            HEP  Verbally reviewed practicing gait over level surfaces with decreased step width, and then incorporating BUE swing and trunk rot. Pt verbalized understanding.     Floor Transfers  CloseS with increased time for LLE management, requires single UE support with review of moving from tall kneel to half kneeling position. Pt demos good understanding, will require continued practice     Subjective Outcomes Measures       ABC Scale Assessed - returned today    THER EX  CPT 43260 TOTAL TIME FOR SESSION  8-22 Minutes      STRETCHING       Stretching by patient Incline board stretch: L3 - 1 min x2    Stretching by therapist/PROM Trialed modified jose stretch  "- not effective (pt states modified lunge position she has felt more of a stretch in hip flexors in the past)      CARDIOVASCULAR       Nu Step 5 mins, Level: 1, BLE only     Stationary Bike Recumbent bike 5 min, L1  (Unable to complete revolution on [employer] upright bike)    Ambulation with AD      Treadmill       Endurance Testing       6mWT Assessed    STRENGTH TRAINING     HEP Review     Standing Ther-Ex Standing hip abduction 2 x 10 ea  - standing on airex   - Second set, no UE support     Step ups 6\" block  - Added L LE TKE w orange TB    Step down, 2\" block, L LE only w orange band TKE x 10    Hip hikes 2\" block, VC's to discourage R hip hike     Heel raises from L2 on incline board x 15 (on floor today)                              Side-lying there-ex Clamshells: 2x20 on R, L in supine: 5\"x10      Supine Ther-Ex Diaphragmatic breathin mins due to elevated BP  Glut bridge: 3\" x10, orange band  Bridge with ER: x10   Supine hip abduction x 10, LLE, isometrics today  BKFO: x10, orange t-band only for R, active assist on L       Deadbugs with physioball  - isometric holds: 10\" x 10, physioball under B heels   - alt LE marches with heels on physioball: x10  - alt UE: 3 sets x5 reps       Quadruped  hip extensions with towel under L foot: x10, B/L (modified position with BUE on EOM and LE on floor)     Quadruped <> bear crawl: 10\" x 10    Tall kneel Moderate perturbations all directions  Mini squats x 10 holding 6\" med ball   Trunk rot: x10   D2 chops/lifts: x10    Prone Ther-Ex     Core exercises PPT x 10   PPT with ball squeeze 5 sec hold, 10 x  PPT with ball squeeze and bridge x 10   PPT with supine marches x 10  PPT with hip abduction isometric L LE x 3 sec hold x10, orange band     Heel slides with towel: 2x10  Triple flex over physioball: x10  [facility] BLE over physioball 2 x 10     STS's from low EOM squeezing pilates ring  - As above, with squeeze and OH press  Marching, squeezing small ball 30 " "ft x 2 Yes                   Yes                Functional Strength Testing       30 sec STS test (60y +)  Assessed    NEURO RE-ED  CPT 36329 TOTAL TIME FOR SESSION 0-7 Minutes      FES  To promote neurorecovery of gait:  Distance: 3.01 miles  Resistance: 0.77 Nm  Power: 2.9 W  Symmetry: L 2%  Total time: 21:00  Active time: 19:03    Xcite  NMRE of LLE in function:     - Sit <> stand from elevated EOM using 6 lb med ball: x10, then progressing to lowest height: x10, then x10 in staggered stance  - Modified reverse lunging with slider under R foot in parallel bars with BUE support > RUE support only: x10 reps each    COORDINATION       Target Tapping     Coordination ladder As appropriate for HL balance     Amb with ankle weights     Amb with proprio wrap     Pushing weighted shopping cart     POSTURAL RE-ED     Sit <> Stand  From 22\" mat holding 6# med ball, cues for midline and equal WB      Tall Kneel  Tall kneel <> short sit: x10, x10 with chest press     Seated & standing reaching for trunk strengthening     Abbey-scapular strengthening     PRE-GAIT ACTIVITIES      Tap-ups     Step-ups To 6\" block with contralateral knee drive: x10, repeated with 2 lb DB for UE swing     Standing weight shifting     Step-taps     BALANCE TRAINING     Standing balance - static/dynamic       HEP Review       Floor       Airex Santiago-tandem: 30 seconds x 2, HT: 30\" x 1, EC: 30 seconds x 1  Tandem stance: 30 seconds x 1     Rockerboard AP plane: x10, with light UE support   AP, chest press x 10, OH press x 10, 6lb   Squats 2 x 10      Hurdles  6\" hurdles, focus on dec L hip circumduction through swing and encouraging heel strike  Repeated with side stepping pattern, requiring BUE support dud to inability for L knee extension through stance     BOSU Ball  Fwd mini lunge onto domed side: x10, CloseS   Modified lunge onto BOSU with palloff press:   - x10, then repeated x10 with perturbations on yellow sports cord     Split Stance    "   Biodex Balance Training  Weight shifting and motor control training with increased difficulty noted to L anterolateral planes: x5 mins     Dynamic gait       Side stepping With partial squat with orange theraband around ankles: 10'x4     Retro walking Fwd/retro amb 50'x6 with cues for shorter step length and for heel strike + knee extension through stance, mirror anteriorly, added dual task of beach ball tosses     Tandem Walking     Cafe Walking Using small 2 lb B DBs: 100'x2, noted delayed L knee extension through IC and mid stance     Amb with hula hoop Using hula hoop to facilitate BUE swing and trunk rot     Walking with ball toss     Balance Testing     FGA  Assessed    SLS  Assessed see note     10mWT Assessed     GAIT TRAINING  CPT 60169 TOTAL TIME FOR SESSION 8-22 Minutes      Ambulation without AD  Gait with no AD over level indoor surfaces with WBOS and decreased L weight shift, VC for anterolateral weight shift, heel strike, BUE swing and trunk rot, 150'x1    Dynamic Gait  20'x6 with focus on shorter step lengths, heel strike and decreasing step width with use of 1' floor tiles, repeated with bimanual ball toss with CloseS and noted decreased gait speed and increase in step lengths     With Bioness donned x 250 ft   - Good clearance of L LE through swing  - Pt subjectively reports feeling a smoother gait pattern with decreased L foot slap    Treadmill, BUE support to 1UE support, with Bioness donned 1.6 mph x 10 min  - Improved GABRIELLE   - Following TM, without stim on, mild L foot slap and WBOS seen.            Yes           Yes    Stair negotiation       Curb negotiation       Ramp negotiation       Outdoor ambulation       MANUAL  CPT 06293 TOTAL TIME FOR SESSION Not performed      Mobilization        MODALITIES  CPT 64839 TOTAL TIME FOR SESSION      Ice       Heat       ATTENDED E-STIM  CPT 69946 TOTAL TIME FOR SESSION 8-22 Minutes     Attended E-Stim FES    [id number] (Age 28) , PIN [phone]   Stim to L  quad, hamstring, glutes, tib ant, and gastroc. 250-350 pulse width, 40Hz frequency.   Muscle testing testing completed for initial set up (7/8)    Xcite use for LLE:     Stim to L quad, hamstring, glutes, tib ant, and gastroc. 250-350 pulse width, 40Hz frequency.   Muscle testing testing completed for initial set up 7/26)    Bioness:   L lower leg only, quick fit pads   30 Hz with 0.2 ramp for loading response   Used tablet without strap                              Yes   Unattended E-stim

## 2024-07-29 NOTE — PROGRESS NOTES
Physical Therapy Visit    PT DAILY NOTE FOR OUTPATIENT THERAPY    Patient: Melanie Litten MRN: [id number]  : 1995 28 y.o.  Referring Physician: Remedois Whitfield MD  Date of Visit: 2024    Certification Dates: 24 through 24    Diagnosis:   1. History of tethered spinal cord        Chief Complaints:  EDS, LLE weakness, gait/balance impairment    Precautions:   Precautions comments: hypermobile - EDS, postural hypotension      TODAY'S VISIT    Time In Session:  Start Time: 08  Stop Time: 0856  Time Calculation (min): 54 min   History/Vitals/Pain/Encounter Info - 24 0802          Injury History/Precautions/Daily Required Info    Document Type daily treatment     Primary Therapist Dilan Machuca     Chief Complaint/Reason for Visit  EDS, LLE weakness, gait/balance impairment     Referring Physician Remedios Whitfield MD     Precautions comments hypermobile - EDS, postural hypotension     History of present illness/functional impairment Saranya is a 28 year old female who presents to OPPT with history of tethered cord syndrome. Pt with PMH significant for chiari malformation, hypermobile EDS, left plantar fasciitis, anxiety and postural hypotension. Pt reports her symptoms started slowly in 2019 with L hip pain, followed up with neurosurgeon in [place] with unremarkable results for all testing. Her symptoms then progressed with L leg motor weakness and shakiness (similar to clonus) with movement, chronic constipation, L drop foot, lower back pain, and neck pain.  Pt was finally diagnosed with tethered cord syndrome and underwent a clinical trial surgery at [facility], [facility], and [facility]. Due to her symptoms, she meets criteria for exploration and sectioning of her filum for the functional tethered cord clinical trial. Now s/p laminectomy for occult tethered cord release on 23 by Dr Duckworth. Patient tolerated procedure well.  No post-op complications. Transferred stable once PACU criteria met. Patient kept FLAT for 36hr. Placed on an aseptic dex taper and post-op IV abx x24hrs.  She was mobilized on 9/27 tolerated well without any symptoms. Additionally, was instructed not to exercise for 7 weeks post-surgery. Pt reports she recovered well overall with decreased drop foot, clonus and swelling, continues to have increased coordination deficits and foot drag with fatigue. Pt has completed OPPT at [facility] in [place] and intermittent massage therapy. Pt’s functional goals are to work on higher level balance, picking up things from floor, coordination and “Hippo therapy”.     Patient/Family/Caregiver Comments/Observations Patient followed up with surgeon about swelling over incision. Surgeon believe is it a muscle spasm and PCP then prescribed pt muscle relaxers. Tried taking it one night while sleeping but no significant difference the next morning. She has been icing incision and using seat warmer in car which has helped some.     Patient reported fall since last visit No        Pain Assessment    Currently in pain No/Denies        Pre Activity Vital Signs    /92     BP Location Left upper arm     BP Method Manual     Patient Position Sitting                    Daily Treatment Assessment and Plan - 07/29/24 0802          Daily Treatment Assessment and Plan    Progress toward goals Progressing     Daily Outcome Summary Bioness utilized this session on L lower leg to improve clearance through swing. Added ramp to improve eccentric control in loading response. Pt reported smoother gait pattern with Bioness donned vs without and reported good assist for DF with improved control in loading reponse. With stim removed, very mild L foot slap and WBOS noted.     Plan and Recommendations Progress core and proximal hip strengthening program, higher level balance and coordination as tolerated, Bioness L300 Go with treadmill training                          OBJECTIVE DATA TAKEN TODAY:    None taken    Today's Treatment:       PT Neuro Exercises Current Session   Performed Today? (y/n)   THER ACT   CPT 37497 TOTAL TIME FOR SESSION 8-22 Minutes      Pain, vitals, subjective    Provided rest breaks for energy conservation    Skin inspection Base of incision, no swelling noted Yes    FES  cycle [id number] (Age 28) , PIN [id number]   Bike height: 2 holes showing  Pedal height: 3 holes showing  L LE set up only    FES Xcite Set Up with muscle testing                 Bioness Additional time for set up due to connection issues   Yes    Patient Education Patient educated regarding current impairments per testing completed today and PT POC moving forward.     Patient provided with Welcome Letter, which includes attendance policy. Provided education regarding cancellation and no-show policy. Education regarding the importance of participation and regular attendance to maximize goal attainment. Patient verbalized understanding/agreement.     Clearance for FES received, will be completed NV, start with handheld unit to assess tolerance prior to FES bike. Pt verbalized understanding.    Patient educated regarding progress made thus far, current impairments per re-assessments completed today and PT POC moving forward. Patient verbalized understanding/agreement.                                            HEP  Verbally reviewed practicing gait over level surfaces with decreased step width, and then incorporating BUE swing and trunk rot. Pt verbalized understanding.     Floor Transfers  CloseS with increased time for LLE management, requires single UE support with review of moving from tall kneel to half kneeling position. Pt demos good understanding, will require continued practice     Subjective Outcomes Measures       ABC Scale Assessed - returned today    THER EX  CPT 35530 TOTAL TIME FOR SESSION  8-22 Minutes      STRETCHING       Stretching by patient Incline board  "stretch: L3 - 1 min x2    Stretching by therapist/PROM Trialed modified jose stretch - not effective (pt states modified lunge position she has felt more of a stretch in hip flexors in the past)      CARDIOVASCULAR       Nu Step 5 mins, Level: 1, BLE only     Stationary Bike Recumbent bike 5 min, L1  (Unable to complete revolution on Expresso upright bike)    Ambulation with AD      Treadmill       Endurance Testing       6mWT Assessed    STRENGTH TRAINING     HEP Review     Standing Ther-Ex Standing hip abduction 2 x 10 ea  - standing on airex   - Second set, no UE support     Step ups 6\" block  - Added L LE TKE w orange TB    Step down, 2\" block, L LE only w orange band TKE x 10    Hip hikes 2\" block, VC's to discourage R hip hike     Heel raises from L2 on incline board x 15 (on floor today)                              Side-lying there-ex Clamshells: 2x20 on R, L in supine: 5\"x10      Supine Ther-Ex Diaphragmatic breathin mins due to elevated BP  Glut bridge: 3\" x10, orange band  Bridge with ER: x10   Supine hip abduction x 10, LLE, isometrics today  BKFO: x10, orange t-band only for R, active assist on L       Deadbugs with physioball  - isometric holds: 10\" x 10, physioball under B heels   - alt LE marches with heels on physioball: x10  - alt UE: 3 sets x5 reps       Quadruped  hip extensions with towel under L foot: x10, B/L (modified position with BUE on EOM and LE on floor)     Quadruped <> bear crawl: 10\" x 10    Tall kneel Moderate perturbations all directions  Mini squats x 10 holding 6\" med ball   Trunk rot: x10   D2 chops/lifts: x10    Prone Ther-Ex     Core exercises PPT x 10   PPT with ball squeeze 5 sec hold, 10 x  PPT with ball squeeze and bridge x 10   PPT with supine marches x 10  PPT with hip abduction isometric L LE x 3 sec hold x10, orange band     Heel slides with towel: 2x10  Triple flex over physioball: x10  [place] wipers BLE over physioball 2 x 10     STS's from low EOM squeezing " "pilates ring  - As above, with squeeze and OH press  Marching, squeezing small ball 30 ft x 2 Yes                   Yes                Functional Strength Testing       30 sec STS test (60y +)  Assessed    NEURO RE-ED  CPT 34666 TOTAL TIME FOR SESSION 0-7 Minutes      FES  To promote neurorecovery of gait:  Distance: 3.01 miles  Resistance: 0.77 Nm  Power: 2.9 W  Symmetry: L 2%  Total time: 21:00  Active time: 19:03    Xcite  NMRE of LLE in function:     - Sit <> stand from elevated EOM using 6 lb med ball: x10, then progressing to lowest height: x10, then x10 in staggered stance  - Modified reverse lunging with slider under R foot in parallel bars with BUE support > RUE support only: x10 reps each    COORDINATION       Target Tapping     Coordination ladder As appropriate for HL balance     Amb with ankle weights     Amb with proprio wrap     Pushing weighted shopping cart     POSTURAL RE-ED     Sit <> Stand  From 22\" mat holding 6# med ball, cues for midline and equal WB      Tall Kneel  Tall kneel <> short sit: x10, x10 with chest press     Seated & standing reaching for trunk strengthening     Abbey-scapular strengthening     PRE-GAIT ACTIVITIES      Tap-ups     Step-ups To 6\" block with contralateral knee drive: x10, repeated with 2 lb DB for UE swing     Standing weight shifting     Step-taps     BALANCE TRAINING     Standing balance - static/dynamic       HEP Review       Floor       Airex [place]-tandem: 30 seconds x 2, HT: 30\" x 1, EC: 30 seconds x 1  Tandem stance: 30 seconds x 1     Rockerboard AP plane: x10, with light UE support   AP, chest press x 10, OH press x 10, 6lb   Squats 2 x 10      Hurdles  6\" hurdles, focus on dec L hip circumduction through swing and encouraging heel strike  Repeated with side stepping pattern, requiring BUE support dud to inability for L knee extension through stance     BOSU Ball  Fwd mini lunge onto domed side: x10, CloseS   Modified lunge onto BOSU with palloff press: "   - x10, then repeated x10 with perturbations on yellow sports cord     Split Stance      Biodex Balance Training  Weight shifting and motor control training with increased difficulty noted to L anterolateral planes: x5 mins     Dynamic gait       Side stepping With partial squat with orange theraband around ankles: 10'x4     Retro walking Fwd/retro amb 50'x6 with cues for shorter step length and for heel strike + knee extension through stance, mirror anteriorly, added dual task of beach ball tosses     Tandem Walking     Cafe Walking Using small 2 lb B DBs: 100'x2, noted delayed L knee extension through IC and mid stance     Amb with hula hoop Using hula hoop to facilitate BUE swing and trunk rot     Walking with ball toss     Balance Testing     FGA  Assessed    SLS  Assessed see note     10mWT Assessed     GAIT TRAINING  CPT 08308 TOTAL TIME FOR SESSION 8-22 Minutes      Ambulation without AD  Gait with no AD over level indoor surfaces with WBOS and decreased L weight shift, VC for anterolateral weight shift, heel strike, BUE swing and trunk rot, 150'x1    Dynamic Gait  20'x6 with focus on shorter step lengths, heel strike and decreasing step width with use of 1' floor tiles, repeated with bimanual ball toss with CloseS and noted decreased gait speed and increase in step lengths     With Bioness donned x 250 ft   - Good clearance of L LE through swing  - Pt subjectively reports feeling a smoother gait pattern with decreased L foot slap    Treadmill, BUE support to 1UE support, with Bioness donned 1.6 mph x 10 min  - Improved GABRIELLE   - Following TM, without stim on, mild L foot slap and WBOS seen.            Yes           Yes    Stair negotiation       Curb negotiation       Ramp negotiation       Outdoor ambulation       MANUAL  CPT 77154 TOTAL TIME FOR SESSION Not performed      Mobilization        MODALITIES  CPT 09881 TOTAL TIME FOR SESSION      Ice       Heat       ATTENDED E-STIM  CPT 91677 TOTAL TIME FOR  SESSION 8-22 Minutes     Attended E-Stim FES    [id number] (Age 28) , PIN [phone]   Stim to L quad, hamstring, glutes, tib ant, and gastroc. 250-350 pulse width, 40Hz frequency.   Muscle testing testing completed for initial set up (7/8)    Xcite use for LLE:     Stim to L quad, hamstring, glutes, tib ant, and gastroc. 250-350 pulse width, 40Hz frequency.   Muscle testing testing completed for initial set up 7/26)    Bioness:   L lower leg only, quick fit pads   30 Hz with 0.2 ramp for loading response   Used tablet without strap                              Yes   Unattended E-stim

## 2024-08-02 ENCOUNTER — HOSPITAL ENCOUNTER (OUTPATIENT)
Dept: PHYSICAL THERAPY | Facility: REHABILITATION | Age: 29
Setting detail: THERAPIES SERIES
Discharge: HOME | End: 2024-08-02
Attending: LEGAL MEDICINE
Payer: COMMERCIAL

## 2024-08-02 DIAGNOSIS — Z86.69 HISTORY OF TETHERED SPINAL CORD: Primary | ICD-10-CM

## 2024-08-02 PROCEDURE — 97032 APPL MODALITY 1+ESTIM EA 15: CPT | Mod: GP

## 2024-08-02 PROCEDURE — 97116 GAIT TRAINING THERAPY: CPT | Mod: GP

## 2024-08-02 PROCEDURE — 97112 NEUROMUSCULAR REEDUCATION: CPT | Mod: GP

## 2024-08-02 NOTE — OP PT TREATMENT LOG
PT Neuro Exercises Current Session   Performed Today? (y/n)   THER ACT   CPT 12375 TOTAL TIME FOR SESSION 0-7 Minutes      Pain, vitals, subjective    Provided rest breaks for energy conservation    Skin inspection Base of incision, no swelling noted    FES  cycle [id number] (Age 28) , PIN [id number]   Bike height: 2 holes showing  Pedal height: 3 holes showing  L LE set up only    FES Xcite Set Up with muscle testing                 Bioness Additional time for set up due to connection issues   Yes    Patient Education Patient educated regarding current impairments per testing completed today and PT POC moving forward.     Patient provided with Welcome Letter, which includes attendance policy. Provided education regarding cancellation and no-show policy. Education regarding the importance of participation and regular attendance to maximize goal attainment. Patient verbalized understanding/agreement.     Clearance for FES received, will be completed NV, start with handheld unit to assess tolerance prior to FES bike. Pt verbalized understanding.    Patient educated regarding progress made thus far, current impairments per re-assessments completed today and PT POC moving forward. Patient verbalized understanding/agreement.                                            HEP  Verbally reviewed practicing gait over level surfaces with decreased step width, and then incorporating BUE swing and trunk rot. Pt verbalized understanding.     Floor Transfers  CloseS with increased time for LLE management, requires single UE support with review of moving from tall kneel to half kneeling position. Pt demos good understanding, will require continued practice     Subjective Outcomes Measures       ABC Scale Assessed - returned today    THER EX  CPT 43490 TOTAL TIME FOR SESSION  0-7 Minutes      STRETCHING       Stretching by patient Incline board stretch: L3 - 1 min x2 Yes   Stretching by therapist/PROM Trialed modified jose stretch -  "not effective (pt states modified lunge position she has felt more of a stretch in hip flexors in the past)      CARDIOVASCULAR       Nu Step 5 mins, Level: 1, BLE only     Stationary Bike Recumbent bike 5 min, L1  (Unable to complete revolution on Expresso upright bike)    Ambulation with AD      Treadmill       Endurance Testing       6mWT Assessed    STRENGTH TRAINING     HEP Review     Standing Ther-Ex Standing hip abduction 2 x 10 ea  - standing on airex   - Second set, no UE support     Step ups 6\" block  - Added L LE TKE w orange TB    Step down, 2\" block, L LE only w orange band TKE x 10    Hip hikes 2\" block, VC's to discourage R hip hike     Heel raises from L2 on incline board x 15 (on floor today)                              Side-lying there-ex Clamshells: 2x20 on R, L in supine: 5\"x10      Supine Ther-Ex Diaphragmatic breathin mins due to elevated BP  Glut bridge: 3\" x10, orange band  Bridge with ER: x10   Supine hip abduction x 10, LLE, isometrics today  BKFO: x10, orange t-band only for R, active assist on L       Deadbugs with physioball  - isometric holds: 10\" x 10, physioball under B heels   - alt LE marches with heels on physioball: x10  - alt UE: 3 sets x5 reps       Quadruped  hip extensions with towel under L foot: x10, B/L (modified position with BUE on EOM and LE on floor)     Quadruped <> bear crawl: 10\" x 10    Tall kneel Moderate perturbations all directions  Mini squats x 10 holding 6\" med ball   Trunk rot: x10   D2 chops/lifts: x10    Prone Ther-Ex     Core exercises PPT x 10   PPT with ball squeeze 5 sec hold, 10 x  PPT with ball squeeze and bridge x 10   PPT with supine marches x 10  PPT with hip abduction isometric L LE x 3 sec hold x10, orange band     Heel slides with towel: 2x10  Triple flex over physioball: x10  [facility] wipers BLE over physioball 2 x 10     STS's from low EOM squeezing pilates ring  - As above, with squeeze and OH press  Marching, squeezing small ball 30 " "ft x 2            Functional Strength Testing       30 sec STS test (60y +)  Assessed    NEURO RE-ED  CPT 89559 TOTAL TIME FOR SESSION 23-37 Minutes      FES  To promote neurorecovery of gait:  Distance: 3.01 miles  Resistance: 0.77 Nm  Power: 2.9 W  Symmetry: L 2%  Total time: 21:00  Active time: 19:03    Xcite  NMRE of LLE in function:     - Sit <> stand from elevated EOM using 6 lb med ball: x10, then progressing to lowest height: x10, then x10 in staggered stance  - Modified reverse lunging with slider under R foot in parallel bars with BUE support > RUE support only: x10 reps each    Bioness L300 Go  - Fwd/rev step over 6\" [place]: x20, light RUE on backwards stepping due to poor hamstring strength  - Fwd step up onto 6\" block with contra LE march: 2x10, B         - RLE step up, LLE march with 6lb db overhead press   - Fwd step down 4\" block: 2x10, orange theraband around knees for manual cues   - stance on airex with lateral L toe taps to 12\" [place]: x10  - Tandem walk with suitcase carry: 20'x2, 6#  Yes to all    COORDINATION       Target Tapping     Coordination ladder As appropriate for HL balance     Amb with ankle weights     Amb with proprio wrap     Pushing weighted shopping cart     POSTURAL RE-ED     Sit <> Stand  From 22\" mat holding 6# med ball, cues for midline and equal WB      Tall Kneel  Tall kneel <> short sit: x10, x10 with chest press     Seated & standing reaching for trunk strengthening     Abbey-scapular strengthening     PRE-GAIT ACTIVITIES      Tap-ups     Step-ups To 6\" block with contralateral knee drive: x10, repeated with 2 lb DB for UE swing     Standing weight shifting     Step-taps     BALANCE TRAINING     Standing balance - static/dynamic       HEP Review       Floor       Airex Santiago-tandem: 30 seconds x 2, HT: 30\" x 1, EC: 30 seconds x 1  Tandem stance: 30 seconds x 1     Rockerboard AP plane: x10, with light UE support   AP, chest press x 10, OH press x 10, 6lb   Squats 2 x " "10      Hurdles  6\" hurdles, focus on dec L hip circumduction through swing and encouraging heel strike  Repeated with side stepping pattern, requiring BUE support dud to inability for L knee extension through stance     BOSU Ball  Fwd mini lunge onto domed side: x10, CloseS   Modified lunge onto BOSU with palloff press:   - x10, then repeated x10 with perturbations on yellow sports cord     Split Stance      Biodex Balance Training  Weight shifting and motor control training with increased difficulty noted to L anterolateral planes: x5 mins     Dynamic gait       Side stepping With partial squat with orange theraband around ankles: 10'x4     Retro walking Fwd/retro amb 50'x6 with cues for shorter step length and for heel strike + knee extension through stance, mirror anteriorly, added dual task of beach ball tosses     Tandem Walking     Cafe Walking Using small 2 lb B DBs: 100'x2, noted delayed L knee extension through IC and mid stance     Amb with hula hoop Using hula hoop to facilitate BUE swing and trunk rot     Walking with ball toss     Balance Testing     FGA  Assessed    SLS  Assessed see note     10mWT Assessed     GAIT TRAINING  CPT 14896 TOTAL TIME FOR SESSION 8-22 Minutes      Ambulation without AD  Gait with no AD over level indoor surfaces with WBOS and decreased L weight shift, VC for anterolateral weight shift, heel strike, BUE swing and trunk rot, 150'x1    Dynamic Gait  20'x6 with focus on shorter step lengths, heel strike and decreasing step width with use of 1' floor tiles, repeated with bimanual ball toss with CloseS and noted decreased gait speed and increase in step lengths     With Bioness donned x 250 ftx3   - Good clearance of L LE through swing with improved knee flexion through swing   - Pt subjectively reports feeling a smoother gait pattern with decreased L foot slap    Treadmill, BUE support to 1UE support, with Bioness donned 2.1 mph x 10 min - \"[place] VR course\"  - VC for increased " nancy - metranome set to 100 bpm  - Improved GABRIELLE   - Following TM, without stim on, mild L foot slap and WBOS seen.            Yes             Yes    Stair negotiation       Curb negotiation       Ramp negotiation       Outdoor ambulation       MANUAL  CPT 55933 TOTAL TIME FOR SESSION Not performed      Mobilization        MODALITIES  CPT 45633 TOTAL TIME FOR SESSION      Ice       Heat       ATTENDED E-STIM  CPT 84496 TOTAL TIME FOR SESSION 8-22 Minutes     Attended E-Stim FES    [id number] (Age 28) , PIN [phone]   Stim to L quad, hamstring, glutes, tib ant, and gastroc. 250-350 pulse width, 40Hz frequency.   Muscle testing testing completed for initial set up (7/8)    Xcite use for LLE:     Stim to L quad, hamstring, glutes, tib ant, and gastroc. 250-350 pulse width, 40Hz frequency.   Muscle testing testing completed for initial set up 7/26)    Bioness:   L lower leg quick fit pads, L hamstring added 8/2  30 Hz with 0.2 ramp for loading response   Used tablet without strap                              Yes   Unattended E-stim

## 2024-08-02 NOTE — PROGRESS NOTES
Physical Therapy Visit    PT DAILY NOTE FOR OUTPATIENT THERAPY    Patient: Melanie Litten MRN: [id number]  : 1995 28 y.o.  Referring Physician: Remedios Whitfield MD  Date of Visit: 2024    Certification Dates: 24 through 24    Diagnosis:   1. History of tethered spinal cord        Chief Complaints:  EDS, LLE weakness, gait/balance impairment    Precautions:   Precautions comments: hypermobile - EDS, postural hypotension      TODAY'S VISIT    Time In Session:  Start Time: 08  Stop Time: 09  Time Calculation (min): 58 min   History/Vitals/Pain/Encounter Info - 24 0800          Injury History/Precautions/Daily Required Info    Document Type daily treatment     Primary Therapist Dilan Machuca     Chief Complaint/Reason for Visit  EDS, LLE weakness, gait/balance impairment     Referring Physician Remedios Whitfield MD     Precautions comments hypermobile - EDS, postural hypotension     History of present illness/functional impairment Saranya is a 28 year old female who presents to OPPT with history of tethered cord syndrome. Pt with PMH significant for chiari malformation, hypermobile EDS, left plantar fasciitis, anxiety and postural hypotension. Pt reports her symptoms started slowly in 2019 with L hip pain, followed up with neurosurgeon in [place] with unremarkable results for all testing. Her symptoms then progressed with L leg motor weakness and shakiness (similar to clonus) with movement, chronic constipation, L drop foot, lower back pain, and neck pain.  Pt was finally diagnosed with tethered cord syndrome and underwent a clinical trial surgery at [facility], [facility], and [facility]. Due to her symptoms, she meets criteria for exploration and sectioning of her filum for the functional tethered cord clinical trial. Now s/p laminectomy for occult tethered cord release on 23 by Dr Duckworth. Patient tolerated procedure well. No  post-op complications. Transferred stable once PACU criteria met. Patient kept FLAT for 36hr. Placed on an aseptic dex taper and post-op IV abx x24hrs.  She was mobilized on 9/27 tolerated well without any symptoms. Additionally, was instructed not to exercise for 7 weeks post-surgery. Pt reports she recovered well overall with decreased drop foot, clonus and swelling, continues to have increased coordination deficits and foot drag with fatigue. Pt has completed OPPT at [facility] in [place] and intermittent massage therapy. Pt’s functional goals are to work on higher level balance, picking up things from floor, coordination and “Hippo therapy”.     Patient reported fall since last visit No        Pain Assessment    Currently in pain No/Denies        Pre Activity Vital Signs    Pulse 92     /88     BP Location Left upper arm     BP Method Manual     Patient Position Sitting                    Daily Treatment Assessment and Plan - 08/02/24 0800          Daily Treatment Assessment and Plan    Progress toward goals Progressing     Daily Outcome Summary Added thigh cuff to L hamstrings with improved knee flexion through swing and stance control on IC resulting in decreased hyperextension thrust when fatigued. Pt was able to maintain set nancy with use of metranome on treadmill today for short periods prior to fatigue.     Plan and Recommendations Progress core and proximal hip strengthening program, higher level balance and coordination as tolerated, Bioness L300 Go with treadmill training                         OBJECTIVE DATA TAKEN TODAY:    None taken    Today's Treatment:       PT Neuro Exercises Current Session   Performed Today? (y/n)   THER ACT   CPT 36908 TOTAL TIME FOR SESSION 0-7 Minutes      Pain, vitals, subjective    Provided rest breaks for energy conservation    Skin inspection Base of incision, no swelling noted    FES  cycle [id number] (Age 28) , PIN [id number]   Bike height: 2 holes  showing  Pedal height: 3 holes showing  L LE set up only    FES Xcite Set Up with muscle testing                 Bioness Additional time for set up due to connection issues   Yes    Patient Education Patient educated regarding current impairments per testing completed today and PT POC moving forward.     Patient provided with Welcome Letter, which includes attendance policy. Provided education regarding cancellation and no-show policy. Education regarding the importance of participation and regular attendance to maximize goal attainment. Patient verbalized understanding/agreement.     Clearance for FES received, will be completed NV, start with handheld unit to assess tolerance prior to FES bike. Pt verbalized understanding.    Patient educated regarding progress made thus far, current impairments per re-assessments completed today and PT POC moving forward. Patient verbalized understanding/agreement.                                            HEP  Verbally reviewed practicing gait over level surfaces with decreased step width, and then incorporating BUE swing and trunk rot. Pt verbalized understanding.     Floor Transfers  CloseS with increased time for LLE management, requires single UE support with review of moving from tall kneel to half kneeling position. Pt demos good understanding, will require continued practice     Subjective Outcomes Measures       ABC Scale Assessed - returned today    THER EX  CPT 72591 TOTAL TIME FOR SESSION  0-7 Minutes      STRETCHING       Stretching by patient Incline board stretch: L3 - 1 min x2 Yes   Stretching by therapist/PROM Trialed modified jose stretch - not effective (pt states modified lunge position she has felt more of a stretch in hip flexors in the past)      CARDIOVASCULAR       Nu Step 5 mins, Level: 1, BLE only     Stationary Bike Recumbent bike 5 min, L1  (Unable to complete revolution on Expresso upright bike)    Ambulation with AD      Treadmill       Endurance  "Testing       6mWT Assessed    STRENGTH TRAINING     HEP Review     Standing Ther-Ex Standing hip abduction 2 x 10 ea  - standing on airex   - Second set, no UE support     Step ups 6\" block  - Added L LE TKE w orange TB    Step down, 2\" block, L LE only w orange band TKE x 10    Hip hikes 2\" block, VC's to discourage R hip hike     Heel raises from L2 on incline board x 15 (on floor today)                              Side-lying there-ex Clamshells: 2x20 on R, L in supine: 5\"x10      Supine Ther-Ex Diaphragmatic breathin mins due to elevated BP  Glut bridge: 3\" x10, orange band  Bridge with ER: x10   Supine hip abduction x 10, LLE, isometrics today  BKFO: x10, orange t-band only for R, active assist on L       Deadbugs with physioball  - isometric holds: 10\" x 10, physioball under B heels   - alt LE marches with heels on physioball: x10  - alt UE: 3 sets x5 reps       Quadruped  hip extensions with towel under L foot: x10, B/L (modified position with BUE on EOM and LE on floor)     Quadruped <> bear crawl: 10\" x 10    Tall kneel Moderate perturbations all directions  Mini squats x 10 holding 6\" med ball   Trunk rot: x10   D2 chops/lifts: x10    Prone Ther-Ex     Core exercises PPT x 10   PPT with ball squeeze 5 sec hold, 10 x  PPT with ball squeeze and bridge x 10   PPT with supine marches x 10  PPT with hip abduction isometric L LE x 3 sec hold x10, orange band     Heel slides with towel: 2x10  Triple flex over physioball: x10  [facility] wipers BLE over physioball 2 x 10     STS's from low EOM squeezing pilates ring  - As above, with squeeze and OH press  Marching, squeezing small ball 30 ft x 2            Functional Strength Testing       30 sec STS test (60y +)  Assessed    NEURO RE-ED  CPT 57302 TOTAL TIME FOR SESSION 23-37 Minutes      FES  To promote neurorecovery of gait:  Distance: 3.01 miles  Resistance: 0.77 Nm  Power: 2.9 W  Symmetry: L 2%  Total time: 21:00  Active time: 19:03    Xcite  NMRE " "of LLE in function:     - Sit <> stand from elevated EOM using 6 lb med ball: x10, then progressing to lowest height: x10, then x10 in staggered stance  - Modified reverse lunging with slider under R foot in parallel bars with BUE support > RUE support only: x10 reps each    Bioness L300 Go  - Fwd/rev step over 6\" [place]: x20, light RUE on backwards stepping due to poor hamstring strength  - Fwd step up onto 6\" block with contra LE march: 2x10, B         - RLE step up, LLE march with 6lb db overhead press   - Fwd step down 4\" block: 2x10, orange theraband around knees for manual cues   - stance on airex with lateral L toe taps to 12\" [place]: x10  - Tandem walk with suitcase carry: 20'x2, 6#  Yes to all    COORDINATION       Target Tapping     Coordination ladder As appropriate for HL balance     Amb with ankle weights     Amb with proprio wrap     Pushing weighted shopping cart     POSTURAL RE-ED     Sit <> Stand  From 22\" mat holding 6# med ball, cues for midline and equal WB      Tall Kneel  Tall kneel <> short sit: x10, x10 with chest press     Seated & standing reaching for trunk strengthening     Abbey-scapular strengthening     PRE-GAIT ACTIVITIES      Tap-ups     Step-ups To 6\" block with contralateral knee drive: x10, repeated with 2 lb DB for UE swing     Standing weight shifting     Step-taps     BALANCE TRAINING     Standing balance - static/dynamic       HEP Review       Floor       Airex Santiago-tandem: 30 seconds x 2, HT: 30\" x 1, EC: 30 seconds x 1  Tandem stance: 30 seconds x 1     Rockerboard AP plane: x10, with light UE support   AP, chest press x 10, OH press x 10, 6lb   Squats 2 x 10      Hurdles  6\" hurdles, focus on dec L hip circumduction through swing and encouraging heel strike  Repeated with side stepping pattern, requiring BUE support dud to inability for L knee extension through stance     BOSU Ball  Fwd mini lunge onto domed side: x10, CloseS   Modified lunge onto BOSU with palloff press:   - " "x10, then repeated x10 with perturbations on yellow sports cord     Split Stance      Biodex Balance Training  Weight shifting and motor control training with increased difficulty noted to L anterolateral planes: x5 mins     Dynamic gait       Side stepping With partial squat with orange theraband around ankles: 10'x4     Retro walking Fwd/retro amb 50'x6 with cues for shorter step length and for heel strike + knee extension through stance, mirror anteriorly, added dual task of beach ball tosses     Tandem Walking     Cafe Walking Using small 2 lb B DBs: 100'x2, noted delayed L knee extension through IC and mid stance     Amb with hula hoop Using hula hoop to facilitate BUE swing and trunk rot     Walking with ball toss     Balance Testing     FGA  Assessed    SLS  Assessed see note     10mWT Assessed     GAIT TRAINING  CPT 53040 TOTAL TIME FOR SESSION 8-22 Minutes      Ambulation without AD  Gait with no AD over level indoor surfaces with WBOS and decreased L weight shift, VC for anterolateral weight shift, heel strike, BUE swing and trunk rot, 150'x1    Dynamic Gait  20'x6 with focus on shorter step lengths, heel strike and decreasing step width with use of 1' floor tiles, repeated with bimanual ball toss with CloseS and noted decreased gait speed and increase in step lengths     With Bioness donned x 250 ftx3   - Good clearance of L LE through swing with improved knee flexion through swing   - Pt subjectively reports feeling a smoother gait pattern with decreased L foot slap    Treadmill, BUE support to 1UE support, with Bioness donned 2.1 mph x 10 min - \"[place] VR course\"  - VC for increased nancy - metranome set to 100 bpm  - Improved GABRIELLE   - Following TM, without stim on, mild L foot slap and WBOS seen.            Yes             Yes    Stair negotiation       Curb negotiation       Ramp negotiation       Outdoor ambulation       MANUAL  CPT 14099 TOTAL TIME FOR SESSION Not performed      Mobilization      "   MODALITIES  CPT 96746 TOTAL TIME FOR SESSION      Ice       Heat       ATTENDED E-STIM  CPT 22520 TOTAL TIME FOR SESSION 8-22 Minutes     Attended E-Stim FES    [id number] (Age 28) , PIN [id number]   Stim to L quad, hamstring, glutes, tib ant, and gastroc. 250-350 pulse width, 40Hz frequency.   Muscle testing testing completed for initial set up (7/8)    Xcite use for LLE:     Stim to L quad, hamstring, glutes, tib ant, and gastroc. 250-350 pulse width, 40Hz frequency.   Muscle testing testing completed for initial set up 7/26)    Bioness:   L lower leg quick fit pads, L hamstring added 8/2  30 Hz with 0.2 ramp for loading response   Used tablet without strap                              Yes   Unattended E-stim

## 2024-08-05 ENCOUNTER — HOSPITAL ENCOUNTER (OUTPATIENT)
Dept: PHYSICAL THERAPY | Facility: REHABILITATION | Age: 29
Setting detail: THERAPIES SERIES
Discharge: HOME | End: 2024-08-05
Attending: LEGAL MEDICINE
Payer: COMMERCIAL

## 2024-08-05 DIAGNOSIS — Z86.69 HISTORY OF TETHERED SPINAL CORD: Primary | ICD-10-CM

## 2024-08-05 PROCEDURE — 97112 NEUROMUSCULAR REEDUCATION: CPT | Mod: GP

## 2024-08-05 PROCEDURE — 97110 THERAPEUTIC EXERCISES: CPT | Mod: GP

## 2024-08-05 NOTE — OP PT TREATMENT LOG
PT Neuro Exercises Current Session   Performed Today? (y/n)   THER ACT   CPT 50412 TOTAL TIME FOR SESSION 0-7 Minutes      Pain, vitals, subjective    Provided rest breaks for energy conservation    Skin inspection Base of incision, no swelling noted    FES  cycle [id number] (Age 28) , PIN [id number]   Bike height: 2 holes showing  Pedal height: 3 holes showing  L LE set up only    FES Xcite Set Up with muscle testing                 Bioness Additional time for set up due to connection issues      Patient Education Patient educated regarding current impairments per testing completed today and PT POC moving forward.     Patient provided with Welcome Letter, which includes attendance policy. Provided education regarding cancellation and no-show policy. Education regarding the importance of participation and regular attendance to maximize goal attainment. Patient verbalized understanding/agreement.     Clearance for FES received, will be completed NV, start with handheld unit to assess tolerance prior to FES bike. Pt verbalized understanding.    Patient educated regarding progress made thus far, current impairments per re-assessments completed today and PT POC moving forward. Patient verbalized understanding/agreement.                                            HEP  Verbally reviewed practicing gait over level surfaces with decreased step width, and then incorporating BUE swing and trunk rot. Pt verbalized understanding.     Floor Transfers  CloseS with increased time for LLE management, requires single UE support with review of moving from tall kneel to half kneeling position. Pt demos good understanding, will require continued practice     Subjective Outcomes Measures       ABC Scale Assessed - returned today    THER EX  CPT 25241 TOTAL TIME FOR SESSION  23-37 Minutes      STRETCHING       Stretching by patient Incline board stretch: L3 - 1 min x2    Stretching by therapist/PROM Trialed modified jose stretch - not  "effective (pt states modified lunge position she has felt more of a stretch in hip flexors in the past)      CARDIOVASCULAR       Nu Step 5 mins, Level: 1, BLE only     Stationary Bike Recumbent bike 5 min, L1  (Unable to complete revolution on Expresso upright bike)    Ambulation with AD      Treadmill       Endurance Testing       6mWT Assessed    STRENGTH TRAINING     HEP Review     Standing Ther-Ex Standing hip abduction 2 x 10 ea  - standing on airex   - Second set, no UE support     Step ups 6\" block x 20   - Added L LE TKE w orange TB    Step down, 2\" block, L LE only w orange band TKE x 10    Hip hikes 2\" block, VC's to discourage R hip hike     Heel raises from L2 on incline board x 15 (on floor today)           Yes   Yes                    Side-lying there-ex Clamshells: 2x20 on R, L in supine: 5\"x10      Supine Ther-Ex Diaphragmatic breathin mins due to elevated BP  Glut bridge: 3\" x10, orange band  Bridge: x10   Supine hip abduction x 10, LLE, isometrics today  BKFO: x10, orange t-band only for R, active assist on L   Clamshells, x 10 ea LE       Yes       Yes    Deadbugs with physioball  - isometric holds: 10\" x 10, physioball under B heels   - alt LE marches with heels on physioball: x10  - alt UE: 3 sets x5 reps       Quadruped  hip extensions with towel under L foot: x10, B/L (modified position with BUE on EOM and LE on floor)     Quadruped <> bear crawl: 10\" x 10    Tall kneel Moderate perturbations all directions  Mini squats 2 x 10 holding 6\" med ball   Trunk rot: x10   D2 chops/lifts: x10 Yes   Yes    Prone Ther-Ex     Core exercises PPT x 10   PPT with ball squeeze 5 sec hold, 10 x  PPT with ball squeeze and bridge x 10   PPT with supine marches x 10  PPT with hip abduction isometric L LE x 3 sec hold x10, orange band     Heel slides with towel: 2x10  Triple flex over physioball: x10  Windshield wipers BLE over physioball 2 x 10     STS's from low EOM squeezing pilates ring  - As above, with " "squeeze and OH press  Marching, squeezing small ball 30 ft x 2    Modified dead bugs, alt UE 2 x 10  Yes   Yes                             Yes    Functional Strength Testing       30 sec STS test (60y +)  Assessed    NEURO RE-ED  CPT 34171 TOTAL TIME FOR SESSION 23-37 Minutes      FES  To promote neurorecovery of gait:  Distance: 3.01 miles  Resistance: 0.77 Nm  Power: 2.9 W  Symmetry: L 2%  Total time: 21:00  Active time: 19:03    Xcite  NMRE of LLE in function:     - Sit <> stand from elevated EOM using 6 lb med ball: x10, then progressing to lowest height: x10, then x10 in staggered stance  - Modified reverse lunging with slider under R foot in parallel bars with BUE support > RUE support only: x10 reps each    Bioness L300 Go  - Fwd/rev step over 6\" [place]: x20, light RUE on backwards stepping due to poor hamstring strength  - Fwd step up onto 6\" block with contra LE march: 2x10, B         - RLE step up, LLE march with 6lb db overhead press   - Fwd step down 4\" block: 2x10, orange theraband around knees for manual cues   - stance on airex with lateral L toe taps to 12\" [place]: x10  - Tandem walk with suitcase carry: 20'x2, 6#     COORDINATION       Target Tapping     Coordination ladder As appropriate for HL balance     Amb with ankle weights     Amb with proprio wrap     Pushing weighted shopping cart     POSTURAL RE-ED     Sit <> Stand  From 22\" mat holding 6# med ball, cues for midline and equal WB      Tall Kneel  Tall kneel <> short sit: x10, x10 with chest press     Seated & standing reaching for trunk strengthening     Abbey-scapular strengthening     PRE-GAIT ACTIVITIES      Tap-ups     Step-ups To 6\" block with contralateral knee drive: x10, repeated with 2 lb DB for UE swing     Standing weight shifting     Step-taps     BALANCE TRAINING     Standing balance - static/dynamic       HEP Review       Floor       Airex Santiago-tandem: 30 seconds x 2, HT: 30\" x 1, EC: 30 seconds x 1  Tandem stance: 30 " "seconds x 1     Rockerboard AP plane: x10, with light UE support   AP, chest press x 10, OH press x 10, 6lb   Squats 2 x 10      Hurdles  6\" hurdles, focus on dec L hip circumduction through swing and encouraging heel strike. Step forward/back    Repeated with side stepping pattern, requiring BUE support dud to inability for L knee extension through stance  Yes    BOSU Ball  Fwd mini lunge onto domed side: x10, CloseS   Modified lunge onto BOSU with palloff press:   - x10, then repeated x10 with perturbations on yellow sports cord     Split Stance      Biodex Balance Training  Weight shifting and motor control training with increased difficulty noted to L anterolateral planes: x5 mins     Dynamic gait       Side stepping With partial squat with orange theraband around ankles: 10'x4     Retro walking Fwd/retro amb 50'x6 with cues for shorter step length and for heel strike + knee extension through stance, mirror anteriorly, added dual task of beach ball tosses     Tandem Walking     Cafe Walking Using small 2 lb B DBs: 100'x2, noted delayed L knee extension through IC and mid stance     Amb with hula hoop Using hula hoop to facilitate BUE swing and trunk rot     Walking with ball toss     Proprioceptive wrap Blue TB around L LE  - Amb along purple line x 3 with cues to keep feet within tiles  - x 250ft with same cues as above  - Retro amb 3 x 20 ft  - Mini squats with heels elevated x 10, focus on minimizing hyperextension Yes to all    Balance Testing     FGA  Assessed    SLS  Assessed see note     10mWT Assessed     GAIT TRAINING  CPT 62216 TOTAL TIME FOR SESSION 0-7 Minutes      Ambulation without AD  Gait with no AD over level indoor surfaces with WBOS and decreased L weight shift, VC for anterolateral weight shift, heel strike, BUE swing and trunk rot, 150'x1    Dynamic Gait  20'x6 with focus on shorter step lengths, heel strike and decreasing step width with use of 1' floor tiles, repeated with bimanual ball " "toss with CloseS and noted decreased gait speed and increase in step lengths     With Bioness donned x 250 ftx3   - Good clearance of L LE through swing with improved knee flexion through swing   - Pt subjectively reports feeling a smoother gait pattern with decreased L foot slap    Treadmill, BUE support to 1UE support, with Bioness donned 2.1 mph x 10 min - \"[place] VR course\"  - VC for increased nancy - metranome set to 100 bpm  - Improved GABRIELLE   - Following TM, without stim on, mild L foot slap and WBOS seen.     Following removal of proprioceptive wrap x 250ft, no instances of L knee hyperextension thrust noted.                                      Yes (Billed with NMRE)   Stair negotiation       Curb negotiation       Ramp negotiation       Outdoor ambulation       MANUAL  CPT 10044 TOTAL TIME FOR SESSION Not performed      Mobilization        MODALITIES  CPT 67403 TOTAL TIME FOR SESSION      Ice       Heat       ATTENDED E-STIM  CPT 73140 TOTAL TIME FOR SESSION 0-7 Minutes     Attended E-Stim FES    [id number] (Age 28) , PIN [id number]   Stim to L quad, hamstring, glutes, tib ant, and gastroc. 250-350 pulse width, 40Hz frequency.   Muscle testing testing completed for initial set up (7/8)    Xcite use for LLE:     Stim to L quad, hamstring, glutes, tib ant, and gastroc. 250-350 pulse width, 40Hz frequency.   Muscle testing testing completed for initial set up 7/26)    Bioness:   L lower leg quick fit pads, L hamstring added 8/2  30 Hz with 0.2 ramp for loading response   Used tablet without strap                                 Unattended E-stim          "

## 2024-08-05 NOTE — PROGRESS NOTES
Physical Therapy Visit    PT DAILY NOTE FOR OUTPATIENT THERAPY    Patient: Melanie Litten MRN: [id number]  : 1995 28 y.o.  Referring Physician: Remedios Whitfield MD  Date of Visit: 2024    Certification Dates: 24 through 24    Diagnosis:   1. History of tethered spinal cord        Chief Complaints:  EDS, LLE weakness, gait/balance impairment    Precautions:   Precautions comments: hypermobile - EDS, postural hypotension      TODAY'S VISIT    Time In Session:  Start Time: 08  Stop Time: 08  Time Calculation (min): 55 min   History/Vitals/Pain/Encounter Info - 24 0803          Injury History/Precautions/Daily Required Info    Document Type daily treatment     Primary Therapist Dilan Machuca     Chief Complaint/Reason for Visit  EDS, LLE weakness, gait/balance impairment     Referring Physician Remedios Whitfield MD     Precautions comments hypermobile - EDS, postural hypotension     History of present illness/functional impairment Saranya is a 28 year old female who presents to OPPT with history of tethered cord syndrome. Pt with PMH significant for chiari malformation, hypermobile EDS, left plantar fasciitis, anxiety and postural hypotension. Pt reports her symptoms started slowly in 2019 with L hip pain, followed up with neurosurgeon in [place] with unremarkable results for all testing. Her symptoms then progressed with L leg motor weakness and shakiness (similar to clonus) with movement, chronic constipation, L drop foot, lower back pain, and neck pain.  Pt was finally diagnosed with tethered cord syndrome and underwent a clinical trial surgery at [facility], [facility], and [facility]. Due to her symptoms, she meets criteria for exploration and sectioning of her filum for the functional tethered cord clinical trial. Now s/p laminectomy for occult tethered cord release on 23 by Dr Duckworth. Patient tolerated procedure well. No  post-op complications. Transferred stable once PACU criteria met. Patient kept FLAT for 36hr. Placed on an aseptic dex taper and post-op IV abx x24hrs.  She was mobilized on 9/27 tolerated well without any symptoms. Additionally, was instructed not to exercise for 7 weeks post-surgery. Pt reports she recovered well overall with decreased drop foot, clonus and swelling, continues to have increased coordination deficits and foot drag with fatigue. Pt has completed OPPT at [facility] in [place] and intermittent massage therapy. Pt’s functional goals are to work on higher level balance, picking up things from floor, coordination and “Hippo therapy”.     Patient/Family/Caregiver Comments/Observations Patient reports she tried swimming over the weekend but did not like it and found it fatiguing.     Patient reported fall since last visit No        Pain Assessment    Currently in pain No/Denies                    Daily Treatment Assessment and Plan - 08/05/24 0803          Daily Treatment Assessment and Plan    Progress toward goals Progressing     Daily Outcome Summary Bioness not available today so utilized proprioceptive wrap on L LE to provide external feedback and decrease instances of L knee hyperextension in mid stance. She also responded well to VC's to walk within tiles on the floor to dec GABRIELLE. Good carryover noted to L stance control and GABRIELLE following removal of wrap. PT advised patient to cont to use this visual cue when ambulating outside of PT.     Plan and Recommendations Progress core and proximal hip strengthening program, higher level balance and coordination as tolerated, Bioness L300 Go with treadmill training                         OBJECTIVE DATA TAKEN TODAY:    None taken    Today's Treatment:       PT Neuro Exercises Current Session   Performed Today? (y/n)   THER ACT   CPT 55781 TOTAL TIME FOR SESSION 0-7 Minutes      Pain, vitals, subjective    Provided rest breaks for energy conservation    Skin  inspection Base of incision, no swelling noted    FES  cycle [id number] (Age 28) , PIN [id number]   Bike height: 2 holes showing  Pedal height: 3 holes showing  L LE set up only    FES Xcite Set Up with muscle testing                 Bioness Additional time for set up due to connection issues      Patient Education Patient educated regarding current impairments per testing completed today and PT POC moving forward.     Patient provided with Welcome Letter, which includes attendance policy. Provided education regarding cancellation and no-show policy. Education regarding the importance of participation and regular attendance to maximize goal attainment. Patient verbalized understanding/agreement.     Clearance for FES received, will be completed NV, start with handheld unit to assess tolerance prior to FES bike. Pt verbalized understanding.    Patient educated regarding progress made thus far, current impairments per re-assessments completed today and PT POC moving forward. Patient verbalized understanding/agreement.                                            HEP  Verbally reviewed practicing gait over level surfaces with decreased step width, and then incorporating BUE swing and trunk rot. Pt verbalized understanding.     Floor Transfers  CloseS with increased time for LLE management, requires single UE support with review of moving from tall kneel to half kneeling position. Pt demos good understanding, will require continued practice     Subjective Outcomes Measures       ABC Scale Assessed - returned today    THER EX  CPT 87965 TOTAL TIME FOR SESSION  23-37 Minutes      STRETCHING       Stretching by patient Incline board stretch: L3 - 1 min x2    Stretching by therapist/PROM Trialed modified jose stretch - not effective (pt states modified lunge position she has felt more of a stretch in hip flexors in the past)      CARDIOVASCULAR       Nu Step 5 mins, Level: 1, BLE only     Stationary Bike Recumbent bike 5 min,  "L1  (Unable to complete revolution on Expresso upright bike)    Ambulation with AD      Treadmill       Endurance Testing       6mWT Assessed    STRENGTH TRAINING     HEP Review     Standing Ther-Ex Standing hip abduction 2 x 10 ea  - standing on airex   - Second set, no UE support     Step ups 6\" block x 20   - Added L LE TKE w orange TB    Step down, 2\" block, L LE only w orange band TKE x 10    Hip hikes 2\" block, VC's to discourage R hip hike     Heel raises from L2 on incline board x 15 (on floor today)           Yes   Yes                    Side-lying there-ex Clamshells: 2x20 on R, L in supine: 5\"x10      Supine Ther-Ex Diaphragmatic breathin mins due to elevated BP  Glut bridge: 3\" x10, orange band  Bridge: x10   Supine hip abduction x 10, LLE, isometrics today  BKFO: x10, orange t-band only for R, active assist on L   Clamshells, x 10 ea LE       Yes       Yes    Deadbugs with physioball  - isometric holds: 10\" x 10, physioball under B heels   - alt LE marches with heels on physioball: x10  - alt UE: 3 sets x5 reps       Quadruped  hip extensions with towel under L foot: x10, B/L (modified position with BUE on EOM and LE on floor)     Quadruped <> bear crawl: 10\" x 10    Tall kneel Moderate perturbations all directions  Mini squats 2 x 10 holding 6\" med ball   Trunk rot: x10   D2 chops/lifts: x10 Yes   Yes    Prone Ther-Ex     Core exercises PPT x 10   PPT with ball squeeze 5 sec hold, 10 x  PPT with ball squeeze and bridge x 10   PPT with supine marches x 10  PPT with hip abduction isometric L LE x 3 sec hold x10, orange band     Heel slides with towel: 2x10  Triple flex over physioball: x10  Windield wipers BLE over physioball 2 x 10     STS's from low EOM squeezing pilates ring  - As above, with squeeze and OH press  Marching, squeezing small ball 30 ft x 2    Modified dead bugs, alt UE 2 x 10  Yes   Yes                             Yes    Functional Strength Testing       30 sec STS test (60y +)  " "Assessed    NEURO RE-ED  CPT 03904 TOTAL TIME FOR SESSION 23-37 Minutes      FES  To promote neurorecovery of gait:  Distance: 3.01 miles  Resistance: 0.77 Nm  Power: 2.9 W  Symmetry: L 2%  Total time: 21:00  Active time: 19:03    Xcite  NMRE of LLE in function:     - Sit <> stand from elevated EOM using 6 lb med ball: x10, then progressing to lowest height: x10, then x10 in staggered stance  - Modified reverse lunging with slider under R foot in parallel bars with BUE support > RUE support only: x10 reps each    Bioness L300 Go  - Fwd/rev step over 6\" [place]: x20, light RUE on backwards stepping due to poor hamstring strength  - Fwd step up onto 6\" block with contra LE march: 2x10, B         - RLE step up, LLE march with 6lb db overhead press   - Fwd step down 4\" block: 2x10, orange theraband around knees for manual cues   - stance on airex with lateral L toe taps to 12\" [place]: x10  - Tandem walk with suitcase carry: 20'x2, 6#     COORDINATION       Target Tapping     Coordination ladder As appropriate for HL balance     Amb with ankle weights     Amb with proprio wrap     Pushing weighted shopping cart     POSTURAL RE-ED     Sit <> Stand  From 22\" mat holding 6# med ball, cues for midline and equal WB      Tall Kneel  Tall kneel <> short sit: x10, x10 with chest press     Seated & standing reaching for trunk strengthening     Abbey-scapular strengthening     PRE-GAIT ACTIVITIES      Tap-ups     Step-ups To 6\" block with contralateral knee drive: x10, repeated with 2 lb DB for UE swing     Standing weight shifting     Step-taps     BALANCE TRAINING     Standing balance - static/dynamic       HEP Review       Floor       Airex [place]-tandem: 30 seconds x 2, HT: 30\" x 1, EC: 30 seconds x 1  Tandem stance: 30 seconds x 1     Rockerboard AP plane: x10, with light UE support   AP, chest press x 10, OH press x 10, 6lb   Squats 2 x 10      Hurdles  6\" hurdles, focus on dec L hip circumduction through swing and " encouraging heel strike. Step forward/back    Repeated with side stepping pattern, requiring BUE support dud to inability for L knee extension through stance  Yes    BOSU Ball  Fwd mini lunge onto domed side: x10, CloseS   Modified lunge onto BOSU with palloff press:   - x10, then repeated x10 with perturbations on yellow sports cord     Split Stance      Biodex Balance Training  Weight shifting and motor control training with increased difficulty noted to L anterolateral planes: x5 mins     Dynamic gait       Side stepping With partial squat with orange theraband around ankles: 10'x4     Retro walking Fwd/retro amb 50'x6 with cues for shorter step length and for heel strike + knee extension through stance, mirror anteriorly, added dual task of beach ball tosses     Tandem Walking     Cafe Walking Using small 2 lb B DBs: 100'x2, noted delayed L knee extension through IC and mid stance     Amb with hula hoop Using hula hoop to facilitate BUE swing and trunk rot     Walking with ball toss     Proprioceptive wrap Blue TB around L LE  - Amb along purple line x 3 with cues to keep feet within tiles  - x 250ft with same cues as above  - Retro amb 3 x 20 ft  - Mini squats with heels elevated x 10, focus on minimizing hyperextension Yes to all    Balance Testing     FGA  Assessed    SLS  Assessed see note     10mWT Assessed     GAIT TRAINING  CPT 64995 TOTAL TIME FOR SESSION 0-7 Minutes      Ambulation without AD  Gait with no AD over level indoor surfaces with WBOS and decreased L weight shift, VC for anterolateral weight shift, heel strike, BUE swing and trunk rot, 150'x1    Dynamic Gait  20'x6 with focus on shorter step lengths, heel strike and decreasing step width with use of 1' floor tiles, repeated with bimanual ball toss with CloseS and noted decreased gait speed and increase in step lengths     With Bioness donned x 250 ftx3   - Good clearance of L LE through swing with improved knee flexion through swing   - Pt  "subjectively reports feeling a smoother gait pattern with decreased L foot slap    Treadmill, BUE support to 1UE support, with Bioness donned 2.1 mph x 10 min - \"[place] VR course\"  - VC for increased nancy - metranome set to 100 bpm  - Improved GABRIELLE   - Following TM, without stim on, mild L foot slap and WBOS seen.     Following removal of proprioceptive wrap x 250ft, no instances of L knee hyperextension thrust noted.                                      Yes (Billed with NMRE)   Stair negotiation       Curb negotiation       Ramp negotiation       Outdoor ambulation       MANUAL  CPT 39360 TOTAL TIME FOR SESSION Not performed      Mobilization        MODALITIES  CPT 71762 TOTAL TIME FOR SESSION      Ice       Heat       ATTENDED E-STIM  CPT 33074 TOTAL TIME FOR SESSION 0-7 Minutes     Attended E-Stim FES    [id number] (Age 28) , PIN [phone]   Stim to L quad, hamstring, glutes, tib ant, and gastroc. 250-350 pulse width, 40Hz frequency.   Muscle testing testing completed for initial set up (7/8)    Xcite use for LLE:     Stim to L quad, hamstring, glutes, tib ant, and gastroc. 250-350 pulse width, 40Hz frequency.   Muscle testing testing completed for initial set up 7/26)    Bioness:   L lower leg quick fit pads, L hamstring added 8/2  30 Hz with 0.2 ramp for loading response   Used tablet without strap                                 Unattended E-stim                                 "

## 2024-08-09 ENCOUNTER — HOSPITAL ENCOUNTER (OUTPATIENT)
Dept: PHYSICAL THERAPY | Facility: REHABILITATION | Age: 29
Setting detail: THERAPIES SERIES
Discharge: HOME | End: 2024-08-09
Attending: LEGAL MEDICINE
Payer: COMMERCIAL

## 2024-08-09 DIAGNOSIS — Z86.69 HISTORY OF TETHERED SPINAL CORD: Primary | ICD-10-CM

## 2024-08-09 PROCEDURE — 97112 NEUROMUSCULAR REEDUCATION: CPT | Mod: GP

## 2024-08-09 PROCEDURE — 97530 THERAPEUTIC ACTIVITIES: CPT | Mod: GP

## 2024-08-09 PROCEDURE — 97032 APPL MODALITY 1+ESTIM EA 15: CPT | Mod: GP

## 2024-08-09 NOTE — PROGRESS NOTES
Physical Therapy Visit    PT DAILY NOTE FOR OUTPATIENT THERAPY    Patient: Melanie Litten MRN: [id number]  : 1995 28 y.o.  Referring Physician: Remedios Whitfield MD  Date of Visit: 2024    Certification Dates: 24 through 24    Diagnosis:   1. History of tethered spinal cord        Chief Complaints:  EDS, LLE weakness, gait/balance impairment    Precautions:   Precautions comments: hypermobile - EDS, postural hypotension      TODAY'S VISIT    Time In Session:  Start Time: 803  Stop Time: 900  Time Calculation (min): 57 min   History/Vitals/Pain/Encounter Info - 24 0801          Injury History/Precautions/Daily Required Info    Document Type daily treatment     Primary Therapist Dilan Machuca     Chief Complaint/Reason for Visit  EDS, LLE weakness, gait/balance impairment     Referring Physician Remedios Whitfield MD     Precautions comments hypermobile - EDS, postural hypotension     History of present illness/functional impairment Saranya is a 28 year old female who presents to OPPT with history of tethered cord syndrome. Pt with PMH significant for chiari malformation, hypermobile EDS, left plantar fasciitis, anxiety and postural hypotension. Pt reports her symptoms started slowly in 2019 with L hip pain, followed up with neurosurgeon in [place] with unremarkable results for all testing. Her symptoms then progressed with L leg motor weakness and shakiness (similar to clonus) with movement, chronic constipation, L drop foot, lower back pain, and neck pain.  Pt was finally diagnosed with tethered cord syndrome and underwent a clinical trial surgery at [facility], [facility], and [facility]. Due to her symptoms, she meets criteria for exploration and sectioning of her filum for the functional tethered cord clinical trial. Now s/p laminectomy for occult tethered cord release on 23 by Dr Duckworth. Patient tolerated procedure well. No  post-op complications. Transferred stable once PACU criteria met. Patient kept FLAT for 36hr. Placed on an aseptic dex taper and post-op IV abx x24hrs.  She was mobilized on 9/27 tolerated well without any symptoms. Additionally, was instructed not to exercise for 7 weeks post-surgery. Pt reports she recovered well overall with decreased drop foot, clonus and swelling, continues to have increased coordination deficits and foot drag with fatigue. Pt has completed OPPT at [facility] in [place] and intermittent massage therapy. Pt’s functional goals are to work on higher level balance, picking up things from floor, coordination and “Hippo therapy”.     Patient/Family/Caregiver Comments/Observations Pt reports she was unable to schedule equestrian therapy yesterday due to work, reports no new concerns     Patient reported fall since last visit No        Pain Assessment    Currently in pain No/Denies        Pre Activity Vital Signs    BP Location Right upper arm     BP Method Manual     Patient Position Sitting                    Daily Treatment Assessment and Plan - 08/09/24 0801          Daily Treatment Assessment and Plan    Progress toward goals Progressing     Daily Outcome Summary Bioness tablets not charged and not responsive thus deferred for this session. Utilized Xcite today with sit to stand progressions, trial of use in tall kneel and with functional step up progressions with pt demonstrating excellent L knee/hip extension through stance, although pt does continue to demonstrate difficulty with lateral trunk lean due to hip abductor weakness.     Plan and Recommendations Jin PRO in preparation for equestrian therapy; Progress core and proximal hip strengthening program, higher level balance and coordination as tolerated, Bioness L300 Go with treadmill training                         OBJECTIVE DATA TAKEN TODAY:    None taken    Today's Treatment:       PT Neuro Exercises Current Session    Performed Today? (y/n)   THER ACT   CPT 93262 TOTAL TIME FOR SESSION 8-22 Minutes      Pain, vitals, subjective    Provided rest breaks for energy conservation    Skin inspection Base of incision, no swelling noted    FES  cycle [id number] (Age 28) , PIN [id number]   Bike height: 2 holes showing  Pedal height: 3 holes showing  L LE set up only    FES Xcite Set Up             Yes   Bioness Additional time for set up due to connection issues      Patient Education Patient educated regarding current impairments per testing completed today and PT POC moving forward.     Patient provided with Welcome Letter, which includes attendance policy. Provided education regarding cancellation and no-show policy. Education regarding the importance of participation and regular attendance to maximize goal attainment. Patient verbalized understanding/agreement.     Clearance for FES received, will be completed NV, start with handheld unit to assess tolerance prior to FES bike. Pt verbalized understanding.    Patient educated regarding progress made thus far, current impairments per re-assessments completed today and PT POC moving forward. Patient verbalized understanding/agreement.                                            HEP  Verbally reviewed practicing gait over level surfaces with decreased step width, and then incorporating BUE swing and trunk rot. Pt verbalized understanding.     Floor Transfers  CloseS with increased time for LLE management, requires single UE support with review of moving from tall kneel to half kneeling position. Pt demos good understanding, will require continued practice     Subjective Outcomes Measures       ABC Scale Assessed - returned today    THER EX  CPT 62340 TOTAL TIME FOR SESSION  0-7 Minutes (billed with TA)      STRETCHING      Stretching by patient Incline board stretch: L3 - 1 min x2    Stretching by therapist/PROM Trialed modified jose stretch - not effective (pt states modified lunge position  "she has felt more of a stretch in hip flexors in the past)      CARDIOVASCULAR       Nu Step 6 mins, Level: 1, BLE only  Yes   Stationary Bike Recumbent bike 5 min, L1  (Unable to complete revolution on Expresso upright bike)    Ambulation with AD      Treadmill       Endurance Testing       6mWT Assessed    STRENGTH TRAINING     HEP Review     Standing Ther-Ex Standing hip abduction 2 x 10 ea  - standing on airex   - Second set, no UE support     Step ups 6\" block x 20   - Added L LE TKE w orange TB    Step down, 2\" block, L LE only w orange band TKE x 10    Hip hikes 2\" block, VC's to discourage R hip hike     Heel raises from L2 on incline board x 15 (on floor today)      Side-lying there-ex Clamshells: 2x20 on R, L in supine: 5\"x10      Supine Ther-Ex Diaphragmatic breathin mins due to elevated BP  Glut bridge: 3\" x10, orange band  Bridge: x10   Supine hip abduction x 10, LLE, isometrics today  BKFO: x10, orange t-band only for R, active assist on L   Clamshells, x 10 ea LE      Deadbugs with physioball  - isometric holds: 10\" x 10, physioball under B heels   - alt LE marches with heels on physioball: x10  - alt UE: 3 sets x5 reps       Quadruped  hip extensions with towel under L foot: x10, B/L (modified position with BUE on EOM and LE on floor)     Quadruped <> bear crawl: 10\" x 10    Tall kneel Moderate perturbations all directions  Mini squats 2 x 10 holding 6\" med ball   Trunk rot: x10   D2 chops/lifts: x10    Prone Ther-Ex     Core exercises PPT x 10   PPT with ball squeeze 5 sec hold, 10 x  PPT with ball squeeze and bridge x 10   PPT with supine marches x 10  PPT with hip abduction isometric L LE x 3 sec hold x10, orange band     Heel slides with towel: 2x10  Triple flex over physioball: x10  [place] wipers BLE over physioball 2 x 10     STS's from low EOM squeezing pilates ring  - As above, with squeeze and OH press  Marching, squeezing small ball 30 ft x 2    Modified dead bugs, alt UE 2 x 10   " "  Functional Strength Testing       30 sec STS test (60y +)  Assessed    NEURO RE-ED  CPT 94494 TOTAL TIME FOR SESSION 23-37 Minutes      FES  To promote neurorecovery of gait:  Distance: 3.01 miles  Resistance: 0.77 Nm  Power: 2.9 W  Symmetry: L 2%  Total time: 21:00  Active time: 19:03    Xcite  NMRE of LLE in function:     - Sit <> stand from elevated EOM using 6 lb med ball: x10, then progressing to lowest height: x10, then x10 in staggered stance  - Modified reverse lunging with slider under R foot in parallel bars with BUE support > RUE support only: x10 reps each  - Tall kneel <> heel sit: 2x10, 2nd set with chest press   - fwd step up with LLE onto 6\" block: x10, repeated with R knee drive, x10 repeated with 3# on RUE      Y      Y      Y  Y   Bioness L300 Go  - Fwd/rev step over 6\" [place]: x20, light RUE on backwards stepping due to poor hamstring strength  - Fwd step up onto 6\" block with contra LE march: 2x10, B         - RLE step up, LLE march with 6lb db overhead press   - Fwd step down 4\" block: 2x10, orange theraband around knees for manual cues   - stance on airex with lateral L toe taps to 12\" [place]: x10  - Tandem walk with suitcase carry: 20'x2, 6#     COORDINATION       Target Tapping     Coordination ladder As appropriate for HL balance     Amb with ankle weights     Amb with proprio wrap     Pushing weighted shopping cart     POSTURAL RE-ED     Sit <> Stand  From 22\" mat holding 6# med ball, cues for midline and equal WB      Tall Kneel  Tall kneel <> short sit: x10, x10 with chest press     Seated & standing reaching for trunk strengthening     Abbey-scapular strengthening     PRE-GAIT ACTIVITIES      Tap-ups     Step-ups To 6\" block with contralateral knee drive: x10, repeated with 2 lb DB for UE swing     Standing weight shifting     Step-taps     BALANCE TRAINING     Standing balance - static/dynamic       HEP Review       Floor       Airex Santiago-tandem: 30 seconds x 2, HT: 30\" x 1, EC: " "30 seconds x 1  Tandem stance: 30 seconds x 1     Rockerboard AP plane: x10, with light UE support   AP, chest press x 10, OH press x 10, 6lb   Squats 2 x 10      Hurdles 6\" hurdles, focus on dec L hip circumduction through swing and encouraging heel strike. Step forward/back    Repeated with side stepping pattern, requiring BUE support dud to inability for L knee extension through stance     BOSU Ball  Fwd mini lunge onto domed side: x10, CloseS   Modified lunge onto BOSU with palloff press:   - x10, then repeated x10 with perturbations on yellow sports cord     Split Stance      Biodex Balance Training  Weight shifting and motor control training with increased difficulty noted to L anterolateral planes: x5 mins     Dynamic gait       Side stepping With partial squat with orange theraband around ankles: 10'x4     Retro walking Fwd/retro amb 50'x6 with cues for shorter step length and for heel strike + knee extension through stance, mirror anteriorly, added dual task of beach ball tosses  Yes    Tandem Walking     Suitcase carry Using small 3 lb B DB on R: 100'x2, noted delayed L knee extension through IC and mid stance  Yes   Amb with hula hoop Using hula hoop to facilitate BUE swing and trunk rot     Walking with ball toss     Proprioceptive wrap Blue TB around L LE  - Amb along purple line x 3 with cues to keep feet within tiles  - x 250ft with same cues as above  - Retro amb 3 x 20 ft  - Mini squats with heels elevated x 10, focus on minimizing hyperextension    Balance Testing     FGA  Assessed    SLS  Assessed see note     10mWT Assessed     GAIT TRAINING  CPT 06748 TOTAL TIME FOR SESSION 0-7 Minutes      Ambulation without AD  Gait with no AD over level indoor surfaces with WBOS and decreased L weight shift, VC for anterolateral weight shift, heel strike, BUE swing and trunk rot, 150'x1    Dynamic Gait  20'x6 with focus on shorter step lengths, heel strike and decreasing step width with use of 1' floor tiles, " "repeated with bimanual ball toss with CloseS and noted decreased gait speed and increase in step lengths     With Bioness donned x 250 ftx3   - Good clearance of L LE through swing with improved knee flexion through swing   - Pt subjectively reports feeling a smoother gait pattern with decreased L foot slap    Treadmill, BUE support to 1UE support, with Bioness donned 2.1 mph x 10 min - \"[place] VR course\"  - VC for increased nancy - metranome set to 100 bpm  - Improved GABRIELLE   - Following TM, without stim on, mild L foot slap and WBOS seen.     Following removal of proprioceptive wrap x 250ft, no instances of L knee hyperextension thrust noted.                                 Stair negotiation       Curb negotiation       Ramp negotiation       Outdoor ambulation       MANUAL  CPT 00732 TOTAL TIME FOR SESSION Not performed      Mobilization        MODALITIES  CPT 22690 TOTAL TIME FOR SESSION      Ice       Heat       ATTENDED E-STIM  CPT 30094 TOTAL TIME FOR SESSION 8-22 Minutes     Attended E-Stim FES    [id number] (Age 28) , PIN [id number]   Stim to L quad, hamstring, glutes, tib ant, and gastroc. 250-350 pulse width, 40Hz frequency.   Muscle testing testing completed for initial set up (7/8)    Xcite use for LLE:     Stim to L quad, hamstring, glutes, tib ant, and gastroc. 250-350 pulse width, 40Hz frequency.   Muscle testing testing completed for initial set up 7/26)    Bioness:   L lower leg quick fit pads, L hamstring added 8/2  30 Hz with 0.2 ramp for loading response   Used tablet without strap                                 Unattended E-stim                                 "

## 2024-08-09 NOTE — OP PT TREATMENT LOG
PT Neuro Exercises Current Session   Performed Today? (y/n)   THER ACT   CPT 70917 TOTAL TIME FOR SESSION 8-22 Minutes      Pain, vitals, subjective    Provided rest breaks for energy conservation    Skin inspection Base of incision, no swelling noted    FES  cycle [id number] (Age 28) , PIN [id number]   Bike height: 2 holes showing  Pedal height: 3 holes showing  L LE set up only    FES Xcite Set Up             Yes   Bioness Additional time for set up due to connection issues      Patient Education Patient educated regarding current impairments per testing completed today and PT POC moving forward.     Patient provided with Welcome Letter, which includes attendance policy. Provided education regarding cancellation and no-show policy. Education regarding the importance of participation and regular attendance to maximize goal attainment. Patient verbalized understanding/agreement.     Clearance for FES received, will be completed NV, start with handheld unit to assess tolerance prior to FES bike. Pt verbalized understanding.    Patient educated regarding progress made thus far, current impairments per re-assessments completed today and PT POC moving forward. Patient verbalized understanding/agreement.                                            HEP  Verbally reviewed practicing gait over level surfaces with decreased step width, and then incorporating BUE swing and trunk rot. Pt verbalized understanding.     Floor Transfers  CloseS with increased time for LLE management, requires single UE support with review of moving from tall kneel to half kneeling position. Pt demos good understanding, will require continued practice     Subjective Outcomes Measures       ABC Scale Assessed - returned today    THER EX  CPT 49008 TOTAL TIME FOR SESSION  0-7 Minutes (billed with TA)      STRETCHING      Stretching by patient Incline board stretch: L3 - 1 min x2    Stretching by therapist/PROM Trialed modified jose stretch - not  "effective (pt states modified lunge position she has felt more of a stretch in hip flexors in the past)      CARDIOVASCULAR       Nu Step 6 mins, Level: 1, BLE only  Yes   Stationary Bike Recumbent bike 5 min, L1  (Unable to complete revolution on [employer] upright bike)    Ambulation with AD      Treadmill       Endurance Testing       6mWT Assessed    STRENGTH TRAINING     HEP Review     Standing Ther-Ex Standing hip abduction 2 x 10 ea  - standing on airex   - Second set, no UE support     Step ups 6\" block x 20   - Added L LE TKE w orange TB    Step down, 2\" block, L LE only w orange band TKE x 10    Hip hikes 2\" block, VC's to discourage R hip hike     Heel raises from L2 on incline board x 15 (on floor today)      Side-lying there-ex Clamshells: 2x20 on R, L in supine: 5\"x10      Supine Ther-Ex Diaphragmatic breathin mins due to elevated BP  Glut bridge: 3\" x10, orange band  Bridge: x10   Supine hip abduction x 10, LLE, isometrics today  BKFO: x10, orange t-band only for R, active assist on L   Clamshells, x 10 ea LE      Deadbugs with physioball  - isometric holds: 10\" x 10, physioball under B heels   - alt LE marches with heels on physioball: x10  - alt UE: 3 sets x5 reps       Quadruped  hip extensions with towel under L foot: x10, B/L (modified position with BUE on EOM and LE on floor)     Quadruped <> bear crawl: 10\" x 10    Tall kneel Moderate perturbations all directions  Mini squats 2 x 10 holding 6\" med ball   Trunk rot: x10   D2 chops/lifts: x10    Prone Ther-Ex     Core exercises PPT x 10   PPT with ball squeeze 5 sec hold, 10 x  PPT with ball squeeze and bridge x 10   PPT with supine marches x 10  PPT with hip abduction isometric L LE x 3 sec hold x10, orange band     Heel slides with towel: 2x10  Triple flex over physioball: x10  [employer] wipers BLE over physioball 2 x 10     STS's from low EOM squeezing pilates ring  - As above, with squeeze and OH press  Marching, squeezing small ball 30 ft " "x 2    Modified dead bugs, alt UE 2 x 10     Functional Strength Testing       30 sec STS test (60y +)  Assessed    NEURO RE-ED  CPT 81501 TOTAL TIME FOR SESSION 23-37 Minutes      FES  To promote neurorecovery of gait:  Distance: 3.01 miles  Resistance: 0.77 Nm  Power: 2.9 W  Symmetry: L 2%  Total time: 21:00  Active time: 19:03    Xcite  NMRE of LLE in function:     - Sit <> stand from elevated EOM using 6 lb med ball: x10, then progressing to lowest height: x10, then x10 in staggered stance  - Modified reverse lunging with slider under R foot in parallel bars with BUE support > RUE support only: x10 reps each  - Tall kneel <> heel sit: 2x10, 2nd set with chest press   - fwd step up with LLE onto 6\" block: x10, repeated with R knee drive, x10 repeated with 3# on RUE      Y      Y      Y  Y   Bioness L300 Go  - Fwd/rev step over 6\" [place]: x20, light RUE on backwards stepping due to poor hamstring strength  - Fwd step up onto 6\" block with contra LE march: 2x10, B         - RLE step up, LLE march with 6lb db overhead press   - Fwd step down 4\" block: 2x10, orange theraband around knees for manual cues   - stance on airex with lateral L toe taps to 12\" [place]: x10  - Tandem walk with suitcase carry: 20'x2, 6#     COORDINATION       Target Tapping     Coordination ladder As appropriate for HL balance     Amb with ankle weights     Amb with proprio wrap     Pushing weighted shopping cart     POSTURAL RE-ED     Sit <> Stand  From 22\" mat holding 6# med ball, cues for midline and equal WB      Tall Kneel  Tall kneel <> short sit: x10, x10 with chest press     Seated & standing reaching for trunk strengthening     Abbey-scapular strengthening     PRE-GAIT ACTIVITIES      Tap-ups     Step-ups To 6\" block with contralateral knee drive: x10, repeated with 2 lb DB for UE swing     Standing weight shifting     Step-taps     BALANCE TRAINING     Standing balance - static/dynamic       HEP Review       Floor       Airex " "[place]antiago-tandem: 30 seconds x 2, HT: 30\" x 1, EC: 30 seconds x 1  Tandem stance: 30 seconds x 1     Rockerboard AP plane: x10, with light UE support   AP, chest press x 10, OH press x 10, 6lb   Squats 2 x 10      Hurdles 6\" hurdles, focus on dec L hip circumduction through swing and encouraging heel strike. Step forward/back    Repeated with side stepping pattern, requiring BUE support dud to inability for L knee extension through stance     BOSU Ball  Fwd mini lunge onto domed side: x10, CloseS   Modified lunge onto BOSU with palloff press:   - x10, then repeated x10 with perturbations on yellow sports cord     Split Stance      Biodex Balance Training  Weight shifting and motor control training with increased difficulty noted to L anterolateral planes: x5 mins     Dynamic gait       Side stepping With partial squat with orange theraband around ankles: 10'x4     Retro walking Fwd/retro amb 50'x6 with cues for shorter step length and for heel strike + knee extension through stance, mirror anteriorly, added dual task of beach ball tosses  Yes    Tandem Walking     Suitcase carry Using small 3 lb B DB on R: 100'x2, noted delayed L knee extension through IC and mid stance  Yes   Amb with hula hoop Using hula hoop to facilitate BUE swing and trunk rot     Walking with ball toss     Proprioceptive wrap Blue TB around L LE  - Amb along purple line x 3 with cues to keep feet within tiles  - x 250ft with same cues as above  - Retro amb 3 x 20 ft  - Mini squats with heels elevated x 10, focus on minimizing hyperextension    Balance Testing     FGA  Assessed    SLS  Assessed see note     10mWT Assessed     GAIT TRAINING  CPT 79088 TOTAL TIME FOR SESSION 0-7 Minutes      Ambulation without AD  Gait with no AD over level indoor surfaces with WBOS and decreased L weight shift, VC for anterolateral weight shift, heel strike, BUE swing and trunk rot, 150'x1    Dynamic Gait  20'x6 with focus on shorter step lengths, heel strike and " "decreasing step width with use of 1' floor tiles, repeated with bimanual ball toss with CloseS and noted decreased gait speed and increase in step lengths     With Bioness donned x 250 ftx3   - Good clearance of L LE through swing with improved knee flexion through swing   - Pt subjectively reports feeling a smoother gait pattern with decreased L foot slap    Treadmill, BUE support to 1UE support, with Bioness donned 2.1 mph x 10 min - \"[place] VR course\"  - VC for increased nancy - metranome set to 100 bpm  - Improved GABRIELLE   - Following TM, without stim on, mild L foot slap and WBOS seen.     Following removal of proprioceptive wrap x 250ft, no instances of L knee hyperextension thrust noted.                                 Stair negotiation       Curb negotiation       Ramp negotiation       Outdoor ambulation       MANUAL  CPT 92334 TOTAL TIME FOR SESSION Not performed      Mobilization        MODALITIES  CPT 22275 TOTAL TIME FOR SESSION      Ice       Heat       ATTENDED E-STIM  CPT 12066 TOTAL TIME FOR SESSION 8-22 Minutes     Attended E-Stim FES    [id number] (Age 28) , PIN [id number]   Stim to L quad, hamstring, glutes, tib ant, and gastroc. 250-350 pulse width, 40Hz frequency.   Muscle testing testing completed for initial set up (7/8)    Xcite use for LLE:     Stim to L quad, hamstring, glutes, tib ant, and gastroc. 250-350 pulse width, 40Hz frequency.   Muscle testing testing completed for initial set up 7/26)    Bioness:   L lower leg quick fit pads, L hamstring added 8/2  30 Hz with 0.2 ramp for loading response   Used tablet without strap                                 Unattended E-stim          "

## 2024-08-12 ENCOUNTER — HOSPITAL ENCOUNTER (OUTPATIENT)
Dept: PHYSICAL THERAPY | Facility: REHABILITATION | Age: 29
Setting detail: THERAPIES SERIES
Discharge: HOME | End: 2024-08-12
Attending: LEGAL MEDICINE
Payer: COMMERCIAL

## 2024-08-12 DIAGNOSIS — Z86.69 HISTORY OF TETHERED SPINAL CORD: Primary | ICD-10-CM

## 2024-08-12 PROCEDURE — 97116 GAIT TRAINING THERAPY: CPT | Mod: GP,CQ

## 2024-08-12 PROCEDURE — 97530 THERAPEUTIC ACTIVITIES: CPT | Mod: GP,CQ

## 2024-08-12 PROCEDURE — 97112 NEUROMUSCULAR REEDUCATION: CPT | Mod: GP,CQ

## 2024-08-12 NOTE — OP PT TREATMENT LOG
PT Neuro Exercises Current Session   Performed Today? (y/n)   THER ACT   CPT 98019 TOTAL TIME FOR SESSION 8-22 Minutes      Pain, vitals, subjective    Provided rest breaks for energy conservation yes   Skin inspection Base of incision, no swelling noted    FES  cycle [id number] (Age 28) , PIN [id number]   Bike height: 2 holes showing  Pedal height: 3 holes showing  L LE set up only    FES Xcite Set Up              Bioness Additional time for set up due to connection issues   yes   Patient Education Patient educated regarding current impairments per testing completed today and PT POC moving forward.     Patient provided with Welcome Letter, which includes attendance policy. Provided education regarding cancellation and no-show policy. Education regarding the importance of participation and regular attendance to maximize goal attainment. Patient verbalized understanding/agreement.     Clearance for FES received, will be completed NV, start with handheld unit to assess tolerance prior to FES bike. Pt verbalized understanding.    Patient educated regarding progress made thus far, current impairments per re-assessments completed today and PT POC moving forward. Patient verbalized understanding/agreement.                                            HEP  Verbally reviewed practicing gait over level surfaces with decreased step width, and then incorporating BUE swing and trunk rot. Pt verbalized understanding.     Floor Transfers  CloseS with increased time for LLE management, requires single UE support with review of moving from tall kneel to half kneeling position. Pt demos good understanding, will require continued practice     Subjective Outcomes Measures       ABC Scale Assessed - returned today    THER EX  CPT 06245 TOTAL TIME FOR SESSION  0-7 Minutes (billed with TA)      STRETCHING      Stretching by patient Incline board stretch: L3 - 1 min x2    Stretching by therapist/PROM Trialed modified jose stretch - not  "effective (pt states modified lunge position she has felt more of a stretch in hip flexors in the past)      CARDIOVASCULAR       Nu Step 6 mins, Level: 1, BLE only     Stationary Bike Recumbent bike 5 min, L1  (Unable to complete revolution on Expresso upright bike)    Ambulation with AD      Treadmill       Endurance Testing       6mWT Assessed    STRENGTH TRAINING     HEP Review     Standing Ther-Ex Standing hip abduction 2 x 10 ea  - standing on airex   - Second set, no UE support     Step ups 6\" block x 20   - Added L LE TKE w orange TB    Step down, 2\" block, L LE only w orange band TKE x 10    Hip hikes 2\" block, VC's to discourage R hip hike     Heel raises from L2 on incline board x 15 (on floor today)      Side-lying there-ex Clamshells: 2x20 on R, L in supine: 5\"x10      Supine Ther-Ex Diaphragmatic breathin mins due to elevated BP  Glut bridge: 3\" x10, orange band  Bridge: x10   Supine hip abduction x 10, LLE, isometrics today  BKFO: x10, orange t-band only for R, active assist on L   Clamshells, x 10 ea LE      Deadbugs with physioball  - isometric holds: 10\" x 10, physioball under B heels   - alt LE marches with heels on physioball: x10  - alt UE: 3 sets x5 reps       Quadruped  hip extensions with towel under L foot: x10, B/L (modified position with BUE on EOM and LE on floor)     Quadruped <> bear crawl: 10\" x 10    Tall kneel Moderate perturbations all directions  Mini squats 2 x 10 holding 6\" med ball   Trunk rot: x10   D2 chops/lifts: x10    Prone Ther-Ex     Core exercises PPT x 10   PPT with ball squeeze 5 sec hold, 10 x  PPT with ball squeeze and bridge x 10   PPT with supine marches x 10  PPT with hip abduction isometric L LE x 3 sec hold x10, orange band     Heel slides with towel: 2x10  Triple flex over physioball: x10  [facility] wipers BLE over physioball 2 x 10     STS's from low EOM squeezing pilates ring  - As above, with squeeze and OH press  Marching, squeezing small ball 30 ft x " "2    Modified dead bugs, alt UE 2 x 10     Functional Strength Testing       30 sec STS test (60y +)  Assessed    NEURO RE-ED  CPT 44452 TOTAL TIME FOR SESSION 23-37 Minutes      FES  To promote neurorecovery of gait:  Distance: 3.01 miles  Resistance: 0.77 Nm  Power: 2.9 W  Symmetry: L 2%  Total time: 21:00  Active time: 19:03    Xcite  NMRE of LLE in function:     - Sit <> stand from elevated EOM using 6 lb med ball: x10, then progressing to lowest height: x10, then x10 in staggered stance  - Modified reverse lunging with slider under R foot in parallel bars with BUE support > RUE support only: x10 reps each  - Tall kneel <> heel sit: 2x10, 2nd set with chest press   - fwd step up with LLE onto 6\" block: x10, repeated with R knee drive, x10 repeated with 3# on RUE     Bioness L300 Go  - Fwd/rev step over 6\" virginia then progressed to 12\" virginia: x20, light RUE on backwards stepping due to poor hamstring strength  - Fwd step up onto 6\" block with contra LE march: 2x10, B         - RLE step up, LLE march with 6lb db overhead press   - Fwd step down 4\" block: 2x10, orange theraband around knees for manual cues   - stance on airex with lateral L toe taps to 12\" virginia: x10  - Tandem walk 20'x2, 6#  Yes      Yes    No    No  yes   COORDINATION       Target Tapping     Coordination ladder As appropriate for HL balance     Amb with ankle weights     Amb with proprio wrap     Pushing weighted shopping cart     POSTURAL RE-ED     Sit <> Stand  From 22\" mat holding 6# med ball, cues for midline and equal WB      Tall Kneel  Tall kneel <> short sit: x10, x10 with chest press     Seated & standing reaching for trunk strengthening     Abbey-scapular strengthening     PRE-GAIT ACTIVITIES      Tap-ups     Step-ups To 6\" block with contralateral knee drive: x10, repeated with 2 lb DB for UE swing     Standing weight shifting     Step-taps     BALANCE TRAINING     Standing balance - static/dynamic       HEP Review       Floor   " "    Airex Santiago-tandem: 30 seconds x 2, HT: 30\" x 1, EC: 30 seconds x 1  Tandem stance: 30 seconds x 1     Rockerboard AP plane: x10, with light UE support   AP, chest press x 10, OH press x 10, 6lb   Squats 2 x 10      Hurdles 6\" hurdles, focus on dec L hip circumduction through swing and encouraging heel strike. Step forward/back    Repeated with side stepping pattern, requiring BUE support dud to inability for L knee extension through stance     BOSU Ball  Fwd mini lunge onto domed side: x10, CloseS   Modified lunge onto BOSU with palloff press:   - x10, then repeated x10 with perturbations on yellow sports cord     Split Stance      Biodex Balance Training  Weight shifting and motor control training with increased difficulty noted to L anterolateral planes: x5 mins     Dynamic gait       Side stepping With partial squat with orange theraband around ankles: 10'x4     Retro walking Fwd/retro amb 50'x6 with cues for shorter step length and for heel strike + knee extension through stance, mirror anteriorly, added dual task of beach bal Yes    Tandem Walking     Suitcase carry Using small 3 lb B DB on R: 100'x2, noted delayed L knee extension through IC and mid stance     Amb with hula hoop Using hula hoop to facilitate BUE swing and trunk rot  yes   Walking with ball toss     Proprioceptive wrap Blue TB around L LE  - Amb along purple line x 3 with cues to keep feet within tiles  - x 250ft with same cues as above  - Retro amb 3 x 20 ft  - Mini squats with heels elevated x 10, focus on minimizing hyperextension    Balance Testing     FGA  Assessed    SLS  Assessed see note     10mWT Assessed     GAIT TRAINING  CPT 44606 TOTAL TIME FOR SESSION 0-7 Minutes      Ambulation without AD  Gait with no AD over level indoor surfaces with WBOS and decreased L weight shift, VC for anterolateral weight shift, heel strike, BUE swing and trunk rot, 150'x1    Dynamic Gait  20'x6 with focus on shorter step lengths, heel strike and " "decreasing step width with use of 1' floor tiles, repeated with bimanual ball toss with CloseS and noted decreased gait speed and increase in step lengths     With Bioness donned x 250 ftx3   - Good clearance of L LE through swing with improved knee flexion through swing   - Pt subjectively reports feeling a smoother gait pattern with decreased L foot slap    Treadmill, BUE support to 1UE support, with Bioness donned 2.1 mph x 10 min - \"[place] VR course\"  - VC for increased nancy - metranome set to 100 bpm  - Improved GABRIELLE   - Following TM, without stim on, mild L foot slap and WBOS seen.     Following removal of proprioceptive wrap x 250ft, no instances of L knee hyperextension thrust noted.                                 Stair negotiation       Curb negotiation       Ramp negotiation       Outdoor ambulation       MANUAL  CPT 68135 TOTAL TIME FOR SESSION Not performed      Mobilization        MODALITIES  CPT 71302 TOTAL TIME FOR SESSION      Ice       Heat       ATTENDED E-STIM  CPT 92443 TOTAL TIME FOR SESSION 0-7 Minutes     Attended E-Stim FES    [id number] (Age 28) , PIN [phone]   Stim to L quad, hamstring, glutes, tib ant, and gastroc. 250-350 pulse width, 40Hz frequency.   Muscle testing testing completed for initial set up (7/8)    Xcite use for LLE:     Stim to L quad, hamstring, glutes, tib ant, and gastroc. 250-350 pulse width, 40Hz frequency.   Muscle testing testing completed for initial set up 7/26)    Bioness:   L lower leg quick fit pads, L hamstring added 8/2  30 Hz with 0.2 ramp for loading response   Used tablet without strap                                 Unattended E-stim          "

## 2024-08-14 NOTE — PROGRESS NOTES
Physical Therapy Visit    PT DAILY NOTE FOR OUTPATIENT THERAPY    Patient: Melanie Litten MRN: [id number]  : 1995 28 y.o.  Referring Physician: Remedios Whitfield MD  Date of Visit: 2024    Certification Dates: 24 through 24    Diagnosis:   1. History of tethered spinal cord        Chief Complaints:  EDS, LLE weakness, gait/balance impairment    Precautions:   Precautions comments: hypermobile - EDS, postural hypotension      TODAY'S VISIT    Time In Session:  Start Time: 1605  Stop Time: 1700  Time Calculation (min): 55 min   History/Vitals/Pain/Encounter Info - 24 1600          Injury History/Precautions/Daily Required Info    Document Type daily treatment     Primary Therapist Dilan Machuca     Chief Complaint/Reason for Visit  EDS, LLE weakness, gait/balance impairment     Referring Physician Remedios Whitfield MD     Precautions comments hypermobile - EDS, postural hypotension     History of present illness/functional impairment Saranya is a 28 year old female who presents to OPPT with history of tethered cord syndrome. Pt with PMH significant for chiari malformation, hypermobile EDS, left plantar fasciitis, anxiety and postural hypotension. Pt reports her symptoms started slowly in 2019 with L hip pain, followed up with neurosurgeon in [place] with unremarkable results for all testing. Her symptoms then progressed with L leg motor weakness and shakiness (similar to clonus) with movement, chronic constipation, L drop foot, lower back pain, and neck pain.  Pt was finally diagnosed with tethered cord syndrome and underwent a clinical trial surgery at [facility], and [facility]. Due to her symptoms, she meets criteria for exploration and sectioning of her filum for the functional tethered cord clinical trial. Now s/p laminectomy for occult tethered cord release on 23 by Dr Duckworth. Patient tolerated procedure well.  "No post-op complications. Transferred stable once PACU criteria met. Patient kept FLAT for 36hr. Placed on an aseptic dex taper and post-op IV abx x24hrs.  She was mobilized on 9/27 tolerated well without any symptoms. Additionally, was instructed not to exercise for 7 weeks post-surgery. Pt reports she recovered well overall with decreased drop foot, clonus and swelling, continues to have increased coordination deficits and foot drag with fatigue. Pt has completed OPPT at [facility] in [place] and intermittent massage therapy. Pt’s functional goals are to work on higher level balance, picking up things from floor, coordination and “Hippo therapy”.     Patient/Family/Caregiver Comments/Observations Patient reported no issues after last session.     Patient reported fall since last visit No        Pain Assessment    Currently in pain No/Denies                    Daily Treatment Assessment and Plan - 08/12/24 1600          Daily Treatment Assessment and Plan    Progress toward goals Progressing     Daily Outcome Summary Performed Bioness training of L LE with assist from PT for setup of upper and lower cuff.  Improvement with decrease GABRIELLE ambulating along purple tile - also performed tandem walking on same tile with support from therapist.  Demonstrated improvement with left stance with right LE stepping over/back 12\" virginia.  Also drilled left knee flexion with 12\" virginia and hurdles on side to limit circumduction for compensation.  Patient to start equestrian therapy next PT session; patient reports she has done equestrian therapy before; as a result, guillaume avila not performed this session.     Plan and Recommendations Jin PRO in preparation for equestrian therapy; Progress core and proximal hip strengthening program, higher level balance and coordination as tolerated, Bioness L300 Go with treadmill training                         OBJECTIVE DATA TAKEN TODAY:    None taken    Today's " Treatment:       PT Neuro Exercises Current Session   Performed Today? (y/n)   THER ACT   CPT 04991 TOTAL TIME FOR SESSION 8-22 Minutes      Pain, vitals, subjective    Provided rest breaks for energy conservation yes   Skin inspection Base of incision, no swelling noted    FES  cycle [id number] (Age 28) , PIN [phone]   Bike height: 2 holes showing  Pedal height: 3 holes showing  L LE set up only    FES Xcite Set Up              Bioness Additional time for set up due to connection issues   yes   Patient Education Patient educated regarding current impairments per testing completed today and PT POC moving forward.     Patient provided with Welcome Letter, which includes attendance policy. Provided education regarding cancellation and no-show policy. Education regarding the importance of participation and regular attendance to maximize goal attainment. Patient verbalized understanding/agreement.     Clearance for FES received, will be completed NV, start with handheld unit to assess tolerance prior to FES bike. Pt verbalized understanding.    Patient educated regarding progress made thus far, current impairments per re-assessments completed today and PT POC moving forward. Patient verbalized understanding/agreement.                                            HEP  Verbally reviewed practicing gait over level surfaces with decreased step width, and then incorporating BUE swing and trunk rot. Pt verbalized understanding.     Floor Transfers  CloseS with increased time for LLE management, requires single UE support with review of moving from tall kneel to half kneeling position. Pt demos good understanding, will require continued practice     Subjective Outcomes Measures       ABC Scale Assessed - returned today    THER EX  CPT 18650 TOTAL TIME FOR SESSION  0-7 Minutes (billed with TA)      STRETCHING      Stretching by patient Incline board stretch: L3 - 1 min x2    Stretching by therapist/PROM Trialed modified jose  "stretch - not effective (pt states modified lunge position she has felt more of a stretch in hip flexors in the past)      CARDIOVASCULAR       Nu Step 6 mins, Level: 1, BLE only     Stationary Bike Recumbent bike 5 min, L1  (Unable to complete revolution on [employer]o upright bike)    Ambulation with AD      Treadmill       Endurance Testing       6mWT Assessed    STRENGTH TRAINING     HEP Review     Standing Ther-Ex Standing hip abduction 2 x 10 ea  - standing on airex   - Second set, no UE support     Step ups 6\" block x 20   - Added L LE TKE w orange TB    Step down, 2\" block, L LE only w orange band TKE x 10    Hip hikes 2\" block, VC's to discourage R hip hike     Heel raises from L2 on incline board x 15 (on floor today)      Side-lying there-ex Clamshells: 2x20 on R, L in supine: 5\"x10      Supine Ther-Ex Diaphragmatic breathin mins due to elevated BP  Glut bridge: 3\" x10, orange band  Bridge: x10   Supine hip abduction x 10, LLE, isometrics today  BKFO: x10, orange t-band only for R, active assist on L   Clamshells, x 10 ea LE      Deadbugs with physioball  - isometric holds: 10\" x 10, physioball under B heels   - alt LE marches with heels on physioball: x10  - alt UE: 3 sets x5 reps       Quadruped  hip extensions with towel under L foot: x10, B/L (modified position with BUE on EOM and LE on floor)     Quadruped <> bear crawl: 10\" x 10    Tall kneel Moderate perturbations all directions  Mini squats 2 x 10 holding 6\" med ball   Trunk rot: x10   D2 chops/lifts: x10    Prone Ther-Ex     Core exercises PPT x 10   PPT with ball squeeze 5 sec hold, 10 x  PPT with ball squeeze and bridge x 10   PPT with supine marches x 10  PPT with hip abduction isometric L LE x 3 sec hold x10, orange band     Heel slides with towel: 2x10  Triple flex over physioball: x10  Windshield wipers BLE over physioball 2 x 10     STS's from low EOM squeezing pilates ring  - As above, with squeeze and OH press  Marching, squeezing small " "ball 30 ft x 2    Modified dead bugs, alt UE 2 x 10     Functional Strength Testing       30 sec STS test (60y +)  Assessed    NEURO RE-ED  CPT 60581 TOTAL TIME FOR SESSION 23-37 Minutes      FES  To promote neurorecovery of gait:  Distance: 3.01 miles  Resistance: 0.77 Nm  Power: 2.9 W  Symmetry: L 2%  Total time: 21:00  Active time: 19:03    Xcite  NMRE of LLE in function:     - Sit <> stand from elevated EOM using 6 lb med ball: x10, then progressing to lowest height: x10, then x10 in staggered stance  - Modified reverse lunging with slider under R foot in parallel bars with BUE support > RUE support only: x10 reps each  - Tall kneel <> heel sit: 2x10, 2nd set with chest press   - fwd step up with LLE onto 6\" block: x10, repeated with R knee drive, x10 repeated with 3# on RUE     Bioness L300 Go  - Fwd/rev step over 6\" virginia then progressed to 12\" virginia: x20, light RUE on backwards stepping due to poor hamstring strength  - Fwd step up onto 6\" block with contra LE march: 2x10, B         - RLE step up, LLE march with 6lb db overhead press   - Fwd step down 4\" block: 2x10, orange theraband around knees for manual cues   - stance on airex with lateral L toe taps to 12\" virginia: x10  - Tandem walk 20'x2, 6#  Yes      Yes    No    No  yes   COORDINATION       Target Tapping     Coordination ladder As appropriate for HL balance     Amb with ankle weights     Amb with proprio wrap     Pushing weighted shopping cart     POSTURAL RE-ED     Sit <> Stand  From 22\" mat holding 6# med ball, cues for midline and equal WB      Tall Kneel  Tall kneel <> short sit: x10, x10 with chest press     Seated & standing reaching for trunk strengthening     Abbey-scapular strengthening     PRE-GAIT ACTIVITIES      Tap-ups     Step-ups To 6\" block with contralateral knee drive: x10, repeated with 2 lb DB for UE swing     Standing weight shifting     Step-taps     BALANCE TRAINING     Standing balance - static/dynamic       HEP Review  " "     Floor       Airex Santiago-tandem: 30 seconds x 2, HT: 30\" x 1, EC: 30 seconds x 1  Tandem stance: 30 seconds x 1     Rockerboard AP plane: x10, with light UE support   AP, chest press x 10, OH press x 10, 6lb   Squats 2 x 10      Hurdles 6\" hurdles, focus on dec L hip circumduction through swing and encouraging heel strike. Step forward/back    Repeated with side stepping pattern, requiring BUE support dud to inability for L knee extension through stance     BOSU Ball  Fwd mini lunge onto domed side: x10, CloseS   Modified lunge onto BOSU with palloff press:   - x10, then repeated x10 with perturbations on yellow sports cord     Split Stance      Biodex Balance Training  Weight shifting and motor control training with increased difficulty noted to L anterolateral planes: x5 mins     Dynamic gait       Side stepping With partial squat with orange theraband around ankles: 10'x4     Retro walking Fwd/retro amb 50'x6 with cues for shorter step length and for heel strike + knee extension through stance, mirror anteriorly, added dual task of beach bal Yes    Tandem Walking     Suitcase carry Using small 3 lb B DB on R: 100'x2, noted delayed L knee extension through IC and mid stance     Amb with hula hoop Using hula hoop to facilitate BUE swing and trunk rot  yes   Walking with ball toss     Proprioceptive wrap Blue TB around L LE  - Amb along purple line x 3 with cues to keep feet within tiles  - x 250ft with same cues as above  - Retro amb 3 x 20 ft  - Mini squats with heels elevated x 10, focus on minimizing hyperextension    Balance Testing     FGA  Assessed    SLS  Assessed see note     10mWT Assessed     GAIT TRAINING  CPT 84957 TOTAL TIME FOR SESSION 0-7 Minutes      Ambulation without AD  Gait with no AD over level indoor surfaces with WBOS and decreased L weight shift, VC for anterolateral weight shift, heel strike, BUE swing and trunk rot, 150'x1    Dynamic Gait  20'x6 with focus on shorter step lengths, heel " "strike and decreasing step width with use of 1' floor tiles, repeated with bimanual ball toss with CloseS and noted decreased gait speed and increase in step lengths     With Bioness donned x 250 ftx3   - Good clearance of L LE through swing with improved knee flexion through swing   - Pt subjectively reports feeling a smoother gait pattern with decreased L foot slap    Treadmill, BUE support to 1UE support, with Bioness donned 2.1 mph x 10 min - \"[place] VR course\"  - VC for increased nancy - metranome set to 100 bpm  - Improved GABRIELLE   - Following TM, without stim on, mild L foot slap and WBOS seen.     Following removal of proprioceptive wrap x 250ft, no instances of L knee hyperextension thrust noted.                                 Stair negotiation       Curb negotiation       Ramp negotiation       Outdoor ambulation       MANUAL  CPT 61482 TOTAL TIME FOR SESSION Not performed      Mobilization        MODALITIES  CPT 98244 TOTAL TIME FOR SESSION      Ice       Heat       ATTENDED E-STIM  CPT 08488 TOTAL TIME FOR SESSION 0-7 Minutes     Attended E-Stim FES    [id number] (Age 28) , PIN [phone]   Stim to L quad, hamstring, glutes, tib ant, and gastroc. 250-350 pulse width, 40Hz frequency.   Muscle testing testing completed for initial set up (7/8)    Xcite use for LLE:     Stim to L quad, hamstring, glutes, tib ant, and gastroc. 250-350 pulse width, 40Hz frequency.   Muscle testing testing completed for initial set up 7/26)    Bioness:   L lower leg quick fit pads, L hamstring added 8/2  30 Hz with 0.2 ramp for loading response   Used tablet without strap                                 Unattended E-stim                                 "

## 2024-08-15 ENCOUNTER — HOSPITAL ENCOUNTER (OUTPATIENT)
Dept: PHYSICAL THERAPY | Facility: REHABILITATION | Age: 29
Setting detail: THERAPIES SERIES
Discharge: HOME | End: 2024-08-15
Attending: LEGAL MEDICINE
Payer: COMMERCIAL

## 2024-08-15 DIAGNOSIS — Z86.69 HISTORY OF TETHERED SPINAL CORD: Primary | ICD-10-CM

## 2024-08-15 PROCEDURE — 97112 NEUROMUSCULAR REEDUCATION: CPT | Mod: GP,CQ

## 2024-08-15 PROCEDURE — 97530 THERAPEUTIC ACTIVITIES: CPT | Mod: GP,CQ

## 2024-08-15 NOTE — OP PT TREATMENT LOG
PT Neuro Exercises Current Session   Performed Today? (y/n)   THER ACT   CPT 56836 TOTAL TIME FOR SESSION 8-22 Minutes      Pain, vitals, subjective    Provided rest breaks for energy conservation    Skin inspection Base of incision, no swelling noted    FES  cycle [id number] (Age 28) , PIN [id number]   Bike height: 2 holes showing  Pedal height: 3 holes showing  L LE set up only    FES Xcite Set Up              Bioness Additional time for set up due to connection issues      Equestrian Horse: Demario  Mount/Dismount: Patient able to mount horse without assistance swinging leg over rump of horse.      Equipment:  Fleece, pad, surcingle.  Other equipment used during session: 2-posture straps, 1 godwin ball under right IT  Patient has previous equestrian riding at another facility earlier this summer -      -Reviewed today's treatment plan and expectations.  Monitored level of fatigue and pain t/o session. Yes to all   Patient Education Patient educated regarding current impairments per testing completed today and PT POC moving forward.     Patient provided with Welcome Letter, which includes attendance policy. Provided education regarding cancellation and no-show policy. Education regarding the importance of participation and regular attendance to maximize goal attainment. Patient verbalized understanding/agreement.     Clearance for FES received, will be completed NV, start with handheld unit to assess tolerance prior to FES bike. Pt verbalized understanding.    Patient educated regarding progress made thus far, current impairments per re-assessments completed today and PT POC moving forward. Patient verbalized understanding/agreement.                                            HEP  Verbally reviewed practicing gait over level surfaces with decreased step width, and then incorporating BUE swing and trunk rot. Pt verbalized understanding.     Floor Transfers  CloseS with increased time for LLE management, requires  "single UE support with review of moving from tall kneel to half kneeling position. Pt demos good understanding, will require continued practice     Subjective Outcomes Measures       ABC Scale Assessed - returned today    THER EX  CPT 15186 TOTAL TIME FOR SESSION  0-7 Minutes (billed with TA)      STRETCHING      Stretching by patient Incline board stretch: L3 - 1 min x2    Stretching by therapist/PROM Trialed modified jose stretch - not effective (pt states modified lunge position she has felt more of a stretch in hip flexors in the past)      CARDIOVASCULAR       Nu Step 6 mins, Level: 1, BLE only     Stationary Bike Recumbent bike 5 min, L1  (Unable to complete revolution on Expresso upright bike)    Ambulation with AD      Treadmill       Endurance Testing       6mWT Assessed    STRENGTH TRAINING     HEP Review     Standing Ther-Ex Standing hip abduction 2 x 10 ea  - standing on airex   - Second set, no UE support     Step ups 6\" block x 20   - Added L LE TKE w orange TB    Step down, 2\" block, L LE only w orange band TKE x 10    Hip hikes 2\" block, VC's to discourage R hip hike     Heel raises from L2 on incline board x 15 (on floor today)      Side-lying there-ex Clamshells: 2x20 on R, L in supine: 5\"x10      Supine Ther-Ex Diaphragmatic breathin mins due to elevated BP  Glut bridge: 3\" x10, orange band  Bridge: x10   Supine hip abduction x 10, LLE, isometrics today  BKFO: x10, orange t-band only for R, active assist on L   Clamshells, x 10 ea LE      Deadbugs with physioball  - isometric holds: 10\" x 10, physioball under B heels   - alt LE marches with heels on physioball: x10  - alt UE: 3 sets x5 reps       Quadruped  hip extensions with towel under L foot: x10, B/L (modified position with BUE on EOM and LE on floor)     Quadruped <> bear crawl: 10\" x 10    Tall kneel Moderate perturbations all directions  Mini squats 2 x 10 holding 6\" med ball   Trunk rot: x10   D2 chops/lifts: x10    Prone Ther-Ex   " "  Core exercises PPT x 10   PPT with ball squeeze 5 sec hold, 10 x  PPT with ball squeeze and bridge x 10   PPT with supine marches x 10  PPT with hip abduction isometric L LE x 3 sec hold x10, orange band     Heel slides with towel: 2x10  Triple flex over physioball: x10  Windshield wipers BLE over physioball 2 x 10     STS's from low EOM squeezing pilates ring  - As above, with squeeze and OH press  Marching, squeezing small ball 30 ft x 2    Modified dead bugs, alt UE 2 x 10     Functional Strength Testing       30 sec STS test (60y +)  Assessed    NEURO RE-ED  CPT 02066 TOTAL TIME FOR SESSION 23-37 Minutes      FES  To promote neurorecovery of gait:  Distance: 3.01 miles  Resistance: 0.77 Nm  Power: 2.9 W  Symmetry: L 2%  Total time: 21:00  Active time: 19:03    Xcite  NMRE of LLE in function:     - Sit <> stand from elevated EOM using 6 lb med ball: x10, then progressing to lowest height: x10, then x10 in staggered stance  - Modified reverse lunging with slider under R foot in parallel bars with BUE support > RUE support only: x10 reps each  - Tall kneel <> heel sit: 2x10, 2nd set with chest press   - fwd step up with LLE onto 6\" block: x10, repeated with R knee drive, x10 repeated with 3# on RUE     Bioness L300 Go  - Fwd/rev step over 6\" virginia then progressed to 12\" virginia: x20, light RUE on backwards stepping due to poor hamstring strength  - Fwd step up onto 6\" block with contra LE march: 2x10, B         - RLE step up, LLE march with 6lb db overhead press   - Fwd step down 4\" block: 2x10, orange theraband around knees for manual cues   - stance on airex with lateral L toe taps to 12\" [place]ia: x10  - Tandem walk 20'x2, 6#     COORDINATION       Target Tapping     Coordination ladder As appropriate for HL balance     Amb with ankle weights     Amb with proprio wrap     Pushing weighted shopping cart     POSTURAL RE-ED     Sit <> Stand  From 22\" mat holding 6# med ball, cues for midline and equal WB    " "  Tall Kneel  Tall kneel <> short sit: x10, x10 with chest press     Seated & standing reaching for trunk strengthening     Abbey-scapular strengthening     PRE-GAIT ACTIVITIES      Tap-ups     Step-ups To 6\" block with contralateral knee drive: x10, repeated with 2 lb DB for UE swing     Standing weight shifting     Step-taps     BALANCE TRAINING     Standing balance - static/dynamic       HEP Review       Floor       Airex Santiago-tandem: 30 seconds x 2, HT: 30\" x 1, EC: 30 seconds x 1  Tandem stance: 30 seconds x 1     Rockerboard AP plane: x10, with light UE support   AP, chest press x 10, OH press x 10, 6lb   Squats 2 x 10      Hurdles 6\" hurdles, focus on dec L hip circumduction through swing and encouraging heel strike. Step forward/back    Repeated with side stepping pattern, requiring BUE support dud to inability for L knee extension through stance     BOSU Ball  Fwd mini lunge onto domed side: x10, CloseS   Modified lunge onto BOSU with palloff press:   - x10, then repeated x10 with perturbations on yellow sports cord     Split Stance      Biodex Balance Training  Weight shifting and motor control training with increased difficulty noted to L anterolateral planes: x5 mins     Dynamic gait       Side stepping With partial squat with orange theraband around ankles: 10'x4     Retro walking Fwd/retro amb 50'x6 with cues for shorter step length and for heel strike + knee extension through stance, mirror anteriorly, added dual task of beach bal    Tandem Walking     Suitcase carry Using small 3 lb B DB on R: 100'x2, noted delayed L knee extension through IC and mid stance     Amb with hula hoop Using hula hoop to facilitate BUE swing and trunk rot     Walking with ball toss     Proprioceptive wrap             Blue TB around L LE  - Amb along purple line x 3 with cues to keep feet within tiles  - x 250ft with same cues as above  - Retro amb 3 x 20 ft  - Mini squats with heels elevated x 10, focus on minimizing " "hyperextension    EQUESTRIAN 1st - Equestrian session: 8/15  Horse:  Demario (17.2 hands)  Time on Horse:  35 mins    Following 1-2 full ring circles with UE support performed b/l hip flexor and trunk stretching with OH and lateral rotation - improvement with neutral hips following stretching.  Left LE is weaker and left hip tends to retract.  Demonstrated shortening of right lateral trunk and decrease right IT Wbing.  Trunk alignment and core activation improved with the following:  -b/l UE movement pattern of horizontal abd to forward flexion to OH reach and repeat.  Also perform horizontal abduction and rotation both directions during CW/CCW full ring circles.  -Utilized postural straps (crisscrossed) and 1 godwin ball under right IT to improve hip alignment and core activation with good results.  Occasional retraction of left shoulder -corrected with cueing  -CW/CCW circles, serpentines and walk/halts   Yes to all   Balance Testing     FGA  Assessed    SLS  Assessed see note     10mWT Assessed     GAIT TRAINING  CPT 70291 TOTAL TIME FOR SESSION 0-7 Minutes      Ambulation without AD  Gait with no AD over level indoor surfaces with WBOS and decreased L weight shift, VC for anterolateral weight shift, heel strike, BUE swing and trunk rot, 150'x1    Dynamic Gait  20'x6 with focus on shorter step lengths, heel strike and decreasing step width with use of 1' floor tiles, repeated with bimanual ball toss with CloseS and noted decreased gait speed and increase in step lengths     With Bioness donned x 250 ftx3   - Good clearance of L LE through swing with improved knee flexion through swing   - Pt subjectively reports feeling a smoother gait pattern with decreased L foot slap    Treadmill, BUE support to 1UE support, with Bioness donned 2.1 mph x 10 min - \"[place] VR course\"  - VC for increased nancy - metranome set to 100 bpm  - Improved GABRIELLE   - Following TM, without stim on, mild L foot slap and WBOS seen. "     Following removal of proprioceptive wrap x 250ft, no instances of L knee hyperextension thrust noted.                                 Stair negotiation       Curb negotiation       Ramp negotiation       Outdoor ambulation       MANUAL  CPT 72144 TOTAL TIME FOR SESSION Not performed      Mobilization        MODALITIES  CPT 31530 TOTAL TIME FOR SESSION      Ice       Heat       ATTENDED E-STIM  CPT 81732 TOTAL TIME FOR SESSION 0-7 Minutes     Attended E-Stim FES    [id number] (Age 28) , PIN [phone]   Stim to L quad, hamstring, glutes, tib ant, and gastroc. 250-350 pulse width, 40Hz frequency.   Muscle testing testing completed for initial set up (7/8)    Xcite use for LLE:     Stim to L quad, hamstring, glutes, tib ant, and gastroc. 250-350 pulse width, 40Hz frequency.   Muscle testing testing completed for initial set up 7/26)    Bioness:   L lower leg quick fit pads, L hamstring added 8/2  30 Hz with 0.2 ramp for loading response   Used tablet without strap                                 Unattended E-stim

## 2024-08-16 NOTE — PROGRESS NOTES
Physical Therapy Visit    PT DAILY NOTE FOR OUTPATIENT THERAPY    Patient: Melanie Litten MRN: [id number]  : 1995 28 y.o.  Referring Physician: Remedios Whitfield MD  Date of Visit: 8/15/2024    Certification Dates: 24 through 24    Diagnosis:   1. History of tethered spinal cord        Chief Complaints:  EDS, LLE weakness, gait/balance impairment    Precautions:   Precautions comments: hypermobile - EDS, postural hypotension      TODAY'S VISIT    Time In Session:  Start Time: 930  Stop Time: 5  Time Calculation (min): 45 min   History/Vitals/Pain/Encounter Info - 08/15/24 0930          Injury History/Precautions/Daily Required Info    Document Type daily treatment     Primary Therapist Dilan Machuca     Chief Complaint/Reason for Visit  EDS, LLE weakness, gait/balance impairment     Referring Physician Remedios Whitfield MD     Precautions comments hypermobile - EDS, postural hypotension     History of present illness/functional impairment Saranya is a 28 year old female who presents to OPPT with history of tethered cord syndrome. Pt with PMH significant for chiari malformation, hypermobile EDS, left plantar fasciitis, anxiety and postural hypotension. Pt reports her symptoms started slowly in 2019 with L hip pain, followed up with neurosurgeon in [place] with unremarkable results for all testing. Her symptoms then progressed with L leg motor weakness and shakiness (similar to clonus) with movement, chronic constipation, L drop foot, lower back pain, and neck pain.  Pt was finally diagnosed with tethered cord syndrome and underwent a clinical trial surgery at [facility], [facility], and [facility]. Due to her symptoms, she meets criteria for exploration and sectioning of her filum for the functional tethered cord clinical trial. Now s/p laminectomy for occult tethered cord release on 23 by Dr Duckworth. Patient tolerated procedure well.  No post-op complications. Transferred stable once PACU criteria met. Patient kept FLAT for 36hr. Placed on an aseptic dex taper and post-op IV abx x24hrs.  She was mobilized on 9/27 tolerated well without any symptoms. Additionally, was instructed not to exercise for 7 weeks post-surgery. Pt reports she recovered well overall with decreased drop foot, clonus and swelling, continues to have increased coordination deficits and foot drag with fatigue. Pt has completed OPPT at [facility] in [place] and intermittent massage therapy. Pt’s functional goals are to work on higher level balance, picking up things from floor, coordination and “Hippo therapy”.     Patient/Family/Caregiver Comments/Observations Patient received at [facility] for 1st equestrian session.  Patient reports she was fatigued after last session but did not have significant musle soreness     Patient reported fall since last visit No        Pain Assessment    Currently in pain No/Denies                    Daily Treatment Assessment and Plan - 08/15/24 1142          Daily Treatment Assessment and Plan    Progress toward goals Progressing     Daily Outcome Summary Patient tolerated 1st equestrian session very well.  Patient reports her pervious equestrian session at another facility couple months ago utilized stirrups and patient did use hands for support.  Today's session focused on postural education with core activation - saddle and stirrups not used this session.  Hip alignment improved following b/l hip flexor stretch along with lateral trunk stretch with UE extended and trunk rotation.  Patient unable to maintain neutral hips and shoulders with dynamic activities coming off to 1 side at times; addition of postural straps setup by [facility]'s instructor and godwin ball under right IT improvement posture with more neutral pelvic tilt vs APT.  Immediately following dismount from horse, patient's left side showed visible fatigue with spasticity  which patient reports happens when she gets fatigued - patient did not loose her balance or require assist as a result.  Patient reported an appropriate level of fatigue and had no reports of pain.  Patient reported feeling challenged and enjoyed session.     Plan and Recommendations Continue with 2 more equestrian sessions for postural education, core activation and strengthening.                         OBJECTIVE DATA TAKEN TODAY:    None taken    Today's Treatment:       PT Neuro Exercises Current Session   Performed Today? (y/n)   THER ACT   CPT 82577 TOTAL TIME FOR SESSION 8-22 Minutes      Pain, vitals, subjective    Provided rest breaks for energy conservation    Skin inspection Base of incision, no swelling noted    FES  cycle [id number] (Age 28) , PIN [id number]   Bike height: 2 holes showing  Pedal height: 3 holes showing  L LE set up only    FES Xcite Set Up              Bioness Additional time for set up due to connection issues      Equestrian Horse: Demario  Mount/Dismount: Patient able to mount horse without assistance swinging leg over rump of horse.      Equipment:  Fleece, pad, surcingle.  Other equipment used during session: 2-posture straps, 1 godwin ball under right IT  Patient has previous equestrian riding at another facility earlier this summer -      -Reviewed today's treatment plan and expectations.  Monitored level of fatigue and pain t/o session. Yes to all   Patient Education Patient educated regarding current impairments per testing completed today and PT POC moving forward.     Patient provided with Welcome Letter, which includes attendance policy. Provided education regarding cancellation and no-show policy. Education regarding the importance of participation and regular attendance to maximize goal attainment. Patient verbalized understanding/agreement.     Clearance for FES received, will be completed NV, start with handheld unit to assess tolerance prior to FES bike. Pt verbalized  "understanding.    Patient educated regarding progress made thus far, current impairments per re-assessments completed today and PT POC moving forward. Patient verbalized understanding/agreement.                                            HEP  Verbally reviewed practicing gait over level surfaces with decreased step width, and then incorporating BUE swing and trunk rot. Pt verbalized understanding.     Floor Transfers  CloseS with increased time for LLE management, requires single UE support with review of moving from tall kneel to half kneeling position. Pt demos good understanding, will require continued practice     Subjective Outcomes Measures       ABC Scale Assessed - returned today    THER EX  CPT 48245 TOTAL TIME FOR SESSION  0-7 Minutes (billed with TA)      STRETCHING      Stretching by patient Incline board stretch: L3 - 1 min x2    Stretching by therapist/PROM Trialed modified jose stretch - not effective (pt states modified lunge position she has felt more of a stretch in hip flexors in the past)      CARDIOVASCULAR       Nu Step 6 mins, Level: 1, BLE only     Stationary Bike Recumbent bike 5 min, L1  (Unable to complete revolution on Expresso upright bike)    Ambulation with AD      Treadmill       Endurance Testing       6mWT Assessed    STRENGTH TRAINING     HEP Review     Standing Ther-Ex Standing hip abduction 2 x 10 ea  - standing on airex   - Second set, no UE support     Step ups 6\" block x 20   - Added L LE TKE w orange TB    Step down, 2\" block, L LE only w orange band TKE x 10    Hip hikes 2\" block, VC's to discourage R hip hike     Heel raises from L2 on incline board x 15 (on floor today)      Side-lying there-ex Clamshells: 2x20 on R, L in supine: 5\"x10      Supine Ther-Ex Diaphragmatic breathin mins due to elevated BP  Glut bridge: 3\" x10, orange band  Bridge: x10   Supine hip abduction x 10, LLE, isometrics today  BKFO: x10, orange t-band only for R, active assist on L   Clamshells, " "x 10 ea LE      Deadbugs with physioball  - isometric holds: 10\" x 10, physioball under B heels   - alt LE marches with heels on physioball: x10  - alt UE: 3 sets x5 reps       Quadruped  hip extensions with towel under L foot: x10, B/L (modified position with BUE on EOM and LE on floor)     Quadruped <> bear crawl: 10\" x 10    Tall kneel Moderate perturbations all directions  Mini squats 2 x 10 holding 6\" med ball   Trunk rot: x10   D2 chops/lifts: x10    Prone Ther-Ex     Core exercises PPT x 10   PPT with ball squeeze 5 sec hold, 10 x  PPT with ball squeeze and bridge x 10   PPT with supine marches x 10  PPT with hip abduction isometric L LE x 3 sec hold x10, orange band     Heel slides with towel: 2x10  Triple flex over physioball: x10  Windshield wipers BLE over physioball 2 x 10     STS's from low EOM squeezing pilates ring  - As above, with squeeze and OH press  Marching, squeezing small ball 30 ft x 2    Modified dead bugs, alt UE 2 x 10     Functional Strength Testing       30 sec STS test (60y +)  Assessed    NEURO RE-ED  CPT 49342 TOTAL TIME FOR SESSION 23-37 Minutes      FES  To promote neurorecovery of gait:  Distance: 3.01 miles  Resistance: 0.77 Nm  Power: 2.9 W  Symmetry: L 2%  Total time: 21:00  Active time: 19:03    Xcite  NMRE of LLE in function:     - Sit <> stand from elevated EOM using 6 lb med ball: x10, then progressing to lowest height: x10, then x10 in staggered stance  - Modified reverse lunging with slider under R foot in parallel bars with BUE support > RUE support only: x10 reps each  - Tall kneel <> heel sit: 2x10, 2nd set with chest press   - fwd step up with LLE onto 6\" block: x10, repeated with R knee drive, x10 repeated with 3# on RUE     Bioness L300 Go  - Fwd/rev step over 6\" virginia then progressed to 12\" virginia: x20, light RUE on backwards stepping due to poor hamstring strength  - Fwd step up onto 6\" block with contra LE march: 2x10, B         - RLE step up, LLE march with " "6lb db overhead press   - Fwd step down 4\" block: 2x10, orange theraband around knees for manual cues   - stance on airex with lateral L toe taps to 12\" [place]ia: x10  - Tandem walk 20'x2, 6#     COORDINATION       Target Tapping     Coordination ladder As appropriate for HL balance     Amb with ankle weights     Amb with proprio wrap     Pushing weighted shopping cart     POSTURAL RE-ED     Sit <> Stand  From 22\" mat holding 6# med ball, cues for midline and equal WB      Tall Kneel  Tall kneel <> short sit: x10, x10 with chest press     Seated & standing reaching for trunk strengthening     Abbey-scapular strengthening     PRE-GAIT ACTIVITIES      Tap-ups     Step-ups To 6\" block with contralateral knee drive: x10, repeated with 2 lb DB for UE swing     Standing weight shifting     Step-taps     BALANCE TRAINING     Standing balance - static/dynamic       HEP Review       Floor       Airex Santiago-tandem: 30 seconds x 2, HT: 30\" x 1, EC: 30 seconds x 1  Tandem stance: 30 seconds x 1     Rockerboard AP plane: x10, with light UE support   AP, chest press x 10, OH press x 10, 6lb   Squats 2 x 10      Hurdles 6\" hurdles, focus on dec L hip circumduction through swing and encouraging heel strike. Step forward/back    Repeated with side stepping pattern, requiring BUE support dud to inability for L knee extension through stance     BOSU Ball  Fwd mini lunge onto domed side: x10, CloseS   Modified lunge onto BOSU with palloff press:   - x10, then repeated x10 with perturbations on yellow sports cord     Split Stance      Biodex Balance Training  Weight shifting and motor control training with increased difficulty noted to L anterolateral planes: x5 mins     Dynamic gait       Side stepping With partial squat with orange theraband around ankles: 10'x4     Retro walking Fwd/retro amb 50'x6 with cues for shorter step length and for heel strike + knee extension through stance, mirror anteriorly, added dual task of beach bal  "   Tandem Walking     Suitcase carry Using small 3 lb B DB on R: 100'x2, noted delayed L knee extension through IC and mid stance     Amb with hula hoop Using hula hoop to facilitate BUE swing and trunk rot     Walking with ball toss     Proprioceptive wrap             Blue TB around L LE  - Amb along purple line x 3 with cues to keep feet within tiles  - x 250ft with same cues as above  - Retro amb 3 x 20 ft  - Mini squats with heels elevated x 10, focus on minimizing hyperextension    EQUESTRIAN 1st - Equestrian session: 8/15  Horse:  Demario (17.2 hands)  Time on Horse:  35 mins    Following 1-2 full ring circles with UE support performed b/l hip flexor and trunk stretching with OH and lateral rotation - improvement with neutral hips following stretching.  Left LE is weaker and left hip tends to retract.  Demonstrated shortening of right lateral trunk and decrease right IT Wbing.  Trunk alignment and core activation improved with the following:  -b/l UE movement pattern of horizontal abd to forward flexion to OH reach and repeat.  Also perform horizontal abduction and rotation both directions during CW/CCW full ring circles.  -Utilized postural straps (crisscrossed) and 1 godwin ball under right IT to improve hip alignment and core activation with good results.  Occasional retraction of left shoulder -corrected with cueing  -CW/CCW circles, serpentines and walk/halts   Yes to all   Balance Testing     FGA  Assessed    SLS  Assessed see note     10mWT Assessed     GAIT TRAINING  CPT 86003 TOTAL TIME FOR SESSION 0-7 Minutes      Ambulation without AD  Gait with no AD over level indoor surfaces with WBOS and decreased L weight shift, VC for anterolateral weight shift, heel strike, BUE swing and trunk rot, 150'x1    Dynamic Gait  20'x6 with focus on shorter step lengths, heel strike and decreasing step width with use of 1' floor tiles, repeated with bimanual ball toss with CloseS and noted decreased gait speed and  "increase in step lengths     With Bioness donned x 250 ftx3   - Good clearance of L LE through swing with improved knee flexion through swing   - Pt subjectively reports feeling a smoother gait pattern with decreased L foot slap    Treadmill, BUE support to 1UE support, with Bioness donned 2.1 mph x 10 min - \"[place] VR course\"  - VC for increased nancy - metranome set to 100 bpm  - Improved GABRIELLE   - Following TM, without stim on, mild L foot slap and WBOS seen.     Following removal of proprioceptive wrap x 250ft, no instances of L knee hyperextension thrust noted.                                 Stair negotiation       Curb negotiation       Ramp negotiation       Outdoor ambulation       MANUAL  CPT 70205 TOTAL TIME FOR SESSION Not performed      Mobilization        MODALITIES  CPT 41982 TOTAL TIME FOR SESSION      Ice       Heat       ATTENDED E-STIM  CPT 76257 TOTAL TIME FOR SESSION 0-7 Minutes     Attended E-Stim FES    [id number] (Age 28) , PIN [phone]   Stim to L quad, hamstring, glutes, tib ant, and gastroc. 250-350 pulse width, 40Hz frequency.   Muscle testing testing completed for initial set up (7/8)    Xcite use for LLE:     Stim to L quad, hamstring, glutes, tib ant, and gastroc. 250-350 pulse width, 40Hz frequency.   Muscle testing testing completed for initial set up 7/26)    Bioness:   L lower leg quick fit pads, L hamstring added 8/2  30 Hz with 0.2 ramp for loading response   Used tablet without strap                                 Unattended E-stim                                 "

## 2024-08-19 ENCOUNTER — HOSPITAL ENCOUNTER (OUTPATIENT)
Dept: PHYSICAL THERAPY | Facility: REHABILITATION | Age: 29
Setting detail: THERAPIES SERIES
Discharge: HOME | End: 2024-08-19
Attending: LEGAL MEDICINE
Payer: COMMERCIAL

## 2024-08-19 DIAGNOSIS — Z86.69 HISTORY OF TETHERED SPINAL CORD: Primary | ICD-10-CM

## 2024-08-19 PROCEDURE — 97530 THERAPEUTIC ACTIVITIES: CPT | Mod: GP

## 2024-08-19 PROCEDURE — 97112 NEUROMUSCULAR REEDUCATION: CPT | Mod: GP

## 2024-08-19 PROCEDURE — 97116 GAIT TRAINING THERAPY: CPT | Mod: GP

## 2024-08-19 PROCEDURE — 97110 THERAPEUTIC EXERCISES: CPT | Mod: GP

## 2024-08-19 NOTE — OP PT TREATMENT LOG
PT Neuro Exercises Current Session   Performed Today? (y/n)   THER ACT   CPT 43239 TOTAL TIME FOR SESSION 8-22 Minutes      Pain, vitals, subjective    Provided rest breaks for energy conservation yes   Skin inspection Base of incision, no swelling noted    FES  cycle [id number] (Age 28) , PIN [id number]   Bike height: 2 holes showing  Pedal height: 3 holes showing  L LE set up only    FES Xcite Set Up              Bioness  yes   Patient Education Patient educated regarding current impairments per testing completed today and PT POC moving forward.     Patient provided with Welcome Letter, which includes attendance policy. Provided education regarding cancellation and no-show policy. Education regarding the importance of participation and regular attendance to maximize goal attainment. Patient verbalized understanding/agreement.     Clearance for FES received, will be completed NV, start with handheld unit to assess tolerance prior to FES bike. Pt verbalized understanding.    Patient educated regarding progress made thus far, current impairments per re-assessments completed today and PT POC moving forward. Patient verbalized understanding/agreement.     Discussion regarding Equestrian scheduling and scheduling with [facility]. PT to find out next time [facility] rep is at [facility] to adjust pt schedule. Will provide with forms as well.                                           Yes      HEP  Verbally reviewed practicing gait over level surfaces with decreased step width, and then incorporating BUE swing and trunk rot. Pt verbalized understanding.     Floor Transfers  CloseS with increased time for LLE management, requires single UE support with review of moving from tall kneel to half kneeling position. Pt demos good understanding, will require continued practice     Subjective Outcomes Measures       ABC Scale Assessed - returned today    THER EX  CPT 29526 TOTAL TIME FOR SESSION  8-22 Minutes      STRETCHING     "  Stretching by patient Incline board stretch: L3 - 1 min x2    Stretching by therapist/PROM Trialed modified jose stretch - not effective (pt states modified lunge position she has felt more of a stretch in hip flexors in the past)      CARDIOVASCULAR       Nu Step 6 mins, Level: 1, BLE only  Yes    Stationary Bike Recumbent bike 5 min, L1  (Unable to complete revolution on Expresso upright bike)    Ambulation with AD      Treadmill       Endurance Testing       6mWT Assessed    STRENGTH TRAINING     HEP Review     Standing Ther-Ex Standing hip abduction 2 x 10 ea  - standing on airex   - Second set, no UE support     Step ups 6\" block x 20   - Added L LE TKE w orange TB    Step down, 2\" block, L LE only w orange band TKE x 10    Hip hikes 2\" block, VC's to discourage R hip hike     Heel raises from L2 on incline board x 15 (on floor today)      Side-lying there-ex Clamshells: 2x20 on R, L in supine: 5\"x10      Supine Ther-Ex Diaphragmatic breathin mins due to elevated BP  Glut bridge: 3\" x10, orange band  Bridge: x10   Supine hip abduction x 10, LLE, isometrics today  BKFO: x10, orange t-band only for R, active assist on L   Clamshells, x 10 ea LE  Bridge with ball squeeze x 10             Yes   Yes    Deadbugs with physioball  - isometric holds: 10\" x 10, physioball under B heels   - alt LE marches with heels on physioball: x10  - alt UE: 3 sets x5 reps       Quadruped  hip extensions with towel under L foot: x10, B/L (modified position with BUE on EOM and LE on floor)     Quadruped <> bear crawl: 10\" x 10    Tall kneel Moderate perturbations all directions  Mini squats 2 x 10 holding 6\" med ball   Trunk rot: x10   D2 chops/lifts: x10    Prone Ther-Ex     Core exercises PPT x 10   PPT with ball squeeze 5 sec hold, 10 x  PPT with ball squeeze and bridge x 10   PPT with supine marches x 10  PPT with hip abduction isometric L LE x 3 sec hold x10, orange band     Heel slides with towel: 2x10  Triple flex over " "physioball: x10  [facility] wipers BLE over physioball 2 x 10     STS's from low EOM squeezing pilates ring  - As above, with squeeze and OH press  Marching, squeezing small ball 30 ft x 2    Modified dead bugs, alt UE 2 x 10     Functional Strength Testing       30 sec STS test (60y +)  Assessed    NEURO RE-ED  CPT 16473 TOTAL TIME FOR SESSION 8-22 Minutes      FES  To promote neurorecovery of gait:  Distance: 3.01 miles  Resistance: 0.77 Nm  Power: 2.9 W  Symmetry: L 2%  Total time: 21:00  Active time: 19:03    Xcite  NMRE of LLE in function:     - Sit <> stand from elevated EOM using 6 lb med ball: x10, then progressing to lowest height: x10, then x10 in staggered stance  - Modified reverse lunging with slider under R foot in parallel bars with BUE support > RUE support only: x10 reps each  - Tall kneel <> heel sit: 2x10, 2nd set with chest press   - fwd step up with LLE onto 6\" block: x10, repeated with R knee drive, x10 repeated with 3# on RUE     Bioness L300 Go  - Fwd/rev step over 12\" virginia virginia: x20,   - Fwd step up onto 6\" block with contra LE march: 2x10, B         - RLE step up, LLE march with 6lb db overhead press   - Fwd step down 4\" block: 2x10, orange theraband around knees for manual cues   - stance on airex with lateral L toe taps to 12\" virginia: x10  - Tandem walk 20'x2, 6#   - step up on 4\" block, contralateral heel tap to 10\" block   - 12\" and 6\" hurdles, navigating reciprocally  Yes               Yes   Yes    COORDINATION       Target Tapping     Coordination ladder As appropriate for HL balance     Amb with ankle weights     Amb with proprio wrap     Pushing weighted shopping cart     POSTURAL RE-ED     Sit <> Stand  From 22\" mat holding 6# med ball, cues for midline and equal WB      Tall Kneel  Tall kneel <> short sit: x10, x10 with chest press     Seated & standing reaching for trunk strengthening     Abbey-scapular strengthening     PRE-GAIT ACTIVITIES      Tap-ups     Step-ups To 6\" " "block with contralateral knee drive: x10, repeated with 2 lb DB for UE swing     Standing weight shifting     Step-taps     BALANCE TRAINING     Standing balance - static/dynamic       HEP Review       Floor       Airex Santiago-tandem: 30 seconds x 2, HT: 30\" x 1, EC: 30 seconds x 1  Tandem stance: 30 seconds x 1     Rockerboard AP plane: x10, with light UE support   AP, chest press x 10, OH press x 10, 6lb   Squats 2 x 10      Hurdles 6\" hurdles, focus on dec L hip circumduction through swing and encouraging heel strike. Step forward/back    Repeated with side stepping pattern, requiring BUE support dud to inability for L knee extension through stance     BOSU Ball  Fwd mini lunge onto domed side: x10, CloseS   Modified lunge onto BOSU with palloff press:   - x10, then repeated x10 with perturbations on yellow sports cord     Split Stance      Biodex Balance Training  Weight shifting and motor control training with increased difficulty noted to L anterolateral planes: x5 mins     Dynamic gait       Side stepping With partial squat with orange theraband around ankles: 10'x4     Retro walking Fwd/retro amb 50'x6 with cues for shorter step length and for heel strike + knee extension through stance, mirror anteriorly, added dual task of beach bal    Tandem Walking     Suitcase carry Using small 3 lb B DB on R: 100'x2, noted delayed L knee extension through IC and mid stance     Amb with hula hoop Using hula hoop to facilitate BUE swing and trunk rot     Walking with ball toss     Proprioceptive wrap Blue TB around L LE  - Amb along purple line x 3 with cues to keep feet within tiles  - x 250ft with same cues as above  - Retro amb 3 x 20 ft  - Mini squats with heels elevated x 10, focus on minimizing hyperextension    Balance Testing     FGA  Assessed    SLS  Assessed see note     10mWT Assessed     GAIT TRAINING  CPT 55372 TOTAL TIME FOR SESSION 8-22 Minutes      Ambulation without AD  Gait with no AD over level indoor " "surfaces with WBOS and decreased L weight shift, VC for anterolateral weight shift, heel strike, BUE swing and trunk rot, 150'x1    Dynamic Gait  20'x6 with focus on shorter step lengths, heel strike and decreasing step width with use of 1' floor tiles, repeated with bimanual ball toss with CloseS and noted decreased gait speed and increase in step lengths     With Bioness donned x 250 ftx3   - Good clearance of L LE through swing with improved knee flexion through swing   - Pt subjectively reports feeling a smoother gait pattern with decreased L foot slap    Treadmill, BUE support to 1UE support, with Bioness donned 2.1 mph x 10 min - \"[place] VR course\"  - VC for increased nancy - metranome set to 100 bpm  - Improved GABRIELLE   - Following TM, without stim on, mild L foot slap and WBOS seen.     Following removal of proprioceptive wrap x 250ft, no instances of L knee hyperextension thrust noted.                                 Stair negotiation       Curb negotiation       Ramp negotiation       Outdoor ambulation  With Bioness:  - x500 ft approx, inclines/declines  - Retro amb up incline 3 x 15 ft   yes to all    MANUAL  CPT 33184 TOTAL TIME FOR SESSION Not performed      Mobilization        MODALITIES  CPT 90662 TOTAL TIME FOR SESSION      Ice       Heat       ATTENDED E-STIM  CPT 87494 TOTAL TIME FOR SESSION 0-7 Minutes     Attended E-Stim FES    [id number] (Age 28) , PIN [phone]   Stim to L quad, hamstring, glutes, tib ant, and gastroc. 250-350 pulse width, 40Hz frequency.   Muscle testing testing completed for initial set up (7/8)    Xcite use for LLE:     Stim to L quad, hamstring, glutes, tib ant, and gastroc. 250-350 pulse width, 40Hz frequency.   Muscle testing testing completed for initial set up 7/26)    Bioness:   L lower leg quick fit pads, L hamstring added 8/2  30 Hz with 0.2 ramp for loading response   Used tablet without strap                                 Unattended E-stim          "

## 2024-08-19 NOTE — PROGRESS NOTES
Physical Therapy Visit    PT DAILY NOTE FOR OUTPATIENT THERAPY    Patient: Melanie Litten MRN: [id number]  : 1995 28 y.o.  Referring Physician: Remedios Whitfield MD  Date of Visit: 2024    Certification Dates: 24 through 24    Diagnosis:   1. History of tethered spinal cord        Chief Complaints:  EDS, LLE weakness, gait/balance impairment    Precautions:   Precautions comments: hypermobile - EDS, postural hypotension      TODAY'S VISIT    Time In Session:  Start Time: 0800  Stop Time: 0856  Time Calculation (min): 56 min   History/Vitals/Pain/Encounter Info - 24 0804          Injury History/Precautions/Daily Required Info    Document Type daily treatment     Primary Therapist Dilan Machuca     Chief Complaint/Reason for Visit  EDS, LLE weakness, gait/balance impairment     Referring Physician Remedios Whitfield MD     Precautions comments hypermobile - EDS, postural hypotension     History of present illness/functional impairment Saranya is a 28 year old female who presents to OPPT with history of tethered cord syndrome. Pt with PMH significant for chiari malformation, hypermobile EDS, left plantar fasciitis, anxiety and postural hypotension. Pt reports her symptoms started slowly in 2019 with L hip pain, followed up with neurosurgeon in [place] with unremarkable results for all testing. Her symptoms then progressed with L leg motor weakness and shakiness (similar to clonus) with movement, chronic constipation, L drop foot, lower back pain, and neck pain.  Pt was finally diagnosed with tethered cord syndrome and underwent a clinical trial surgery at [facility], [facility], and [facility]. Due to her symptoms, she meets criteria for exploration and sectioning of her filum for the functional tethered cord clinical trial. Now s/p laminectomy for occult tethered cord release on 23 by Dr Duckworth. Patient tolerated procedure well.  No post-op complications. Transferred stable once PACU criteria met. Patient kept FLAT for 36hr. Placed on an aseptic dex taper and post-op IV abx x24hrs.  She was mobilized on 9/27 tolerated well without any symptoms. Additionally, was instructed not to exercise for 7 weeks post-surgery. Pt reports she recovered well overall with decreased drop foot, clonus and swelling, continues to have increased coordination deficits and foot drag with fatigue. Pt has completed OPPT at [facility] in [place] and intermittent massage therapy. Pt’s functional goals are to work on higher level balance, picking up things from floor, coordination and “Hippo therapy”.     Patient/Family/Caregiver Comments/Observations Reports she really liked Equestrian therapy last week but was very tired following. Residual soreness until Saturday. No new changes     Patient reported fall since last visit No        Pain Assessment    Currently in pain No/Denies        Pre Activity Vital Signs    /92     BP Location Right upper arm     BP Method Manual     Patient Position Sitting                    Daily Treatment Assessment and Plan - 08/19/24 0804          Daily Treatment Assessment and Plan    Progress toward goals Progressing     Daily Outcome Summary Bioness utilized this session - did well with neuro re-education activities, focusing on minimizing L hip circumduction through swing and promoting L heel strike. She initially demonstrated increased R lateral trunk lean for L LE clearance due to L proximal hip weakness but this improved with awareness and use of mirror for feedback. She was challenged with retro ambulation on an incline outdoors today, but improved techinque and glute activation with additional reps. Demo'ed carryover to gait mechanics (NBOS, knee/hip drive through swing, no instances of L foot slap) following removal of Bioness.     Plan and Recommendations Continue with 2 more equestrian sessions for postural education,  core activation and strengthening.                         OBJECTIVE DATA TAKEN TODAY:    None taken    Today's Treatment:       PT Neuro Exercises Current Session   Performed Today? (y/n)   THER ACT   CPT 66659 TOTAL TIME FOR SESSION 8-22 Minutes      Pain, vitals, subjective    Provided rest breaks for energy conservation yes   Skin inspection Base of incision, no swelling noted    FES  cycle [id number] (Age 28) , PIN [phone]   Bike height: 2 holes showing  Pedal height: 3 holes showing  L LE set up only    FES Xcite Set Up              [facility]  yes   Patient Education Patient educated regarding current impairments per testing completed today and PT POC moving forward.     Patient provided with Welcome Letter, which includes attendance policy. Provided education regarding cancellation and no-show policy. Education regarding the importance of participation and regular attendance to maximize goal attainment. Patient verbalized understanding/agreement.     Clearance for FES received, will be completed NV, start with handheld unit to assess tolerance prior to FES bike. Pt verbalized understanding.    Patient educated regarding progress made thus far, current impairments per re-assessments completed today and PT POC moving forward. Patient verbalized understanding/agreement.     [facility]questrian [facility] PT to find out next time [facility] rep is at [facility] to adjust pt schedule. Will provide with forms as well.                                           Yes      HEP  Verbally reviewed practicing gait over level surfaces with decreased step width, and then incorporating BUE swing and trunk rot. Pt verbalized understanding.     Floor Transfers  CloseS with increased time for LLE management, requires single UE support with review of moving from tall kneel to half kneeling position. Pt demos good understanding, will require continued practice     Subjective Outcomes Measures      "  ABC Scale Assessed - returned today    THER EX  CPT 95447 TOTAL TIME FOR SESSION  8-22 Minutes      STRETCHING      Stretching by patient Incline board stretch: L3 - 1 min x2    Stretching by therapist/PROM Trialed modified jose stretch - not effective (pt states modified lunge position she has felt more of a stretch in hip flexors in the past)      CARDIOVASCULAR       Nu Step 6 mins, Level: 1, BLE only  Yes    Stationary Bike Recumbent bike 5 min, L1  (Unable to complete revolution on [employer]o upright bike)    Ambulation with AD      Treadmill       Endurance Testing       6mWT Assessed    STRENGTH TRAINING     HEP Review     Standing Ther-Ex Standing hip abduction 2 x 10 ea  - standing on airex   - Second set, no UE support     Step ups 6\" block x 20   - Added L LE TKE w orange TB    Step down, 2\" block, L LE only w orange band TKE x 10    Hip hikes 2\" block, VC's to discourage R hip hike     Heel raises from L2 on incline board x 15 (on floor today)      Side-lying there-ex Clamshells: 2x20 on R, L in supine: 5\"x10      Supine Ther-Ex Diaphragmatic breathin mins due to elevated BP  Glut bridge: 3\" x10, orange band  Bridge: x10   Supine hip abduction x 10, LLE, isometrics today  BKFO: x10, orange t-band only for R, active assist on L   Clamshells, x 10 ea LE  Bridge with ball squeeze x 10             Yes   Yes    Deadbugs with physioball  - isometric holds: 10\" x 10, physioball under B heels   - alt LE marches with heels on physioball: x10  - alt UE: 3 sets x5 reps       Quadruped  hip extensions with towel under L foot: x10, B/L (modified position with BUE on EOM and LE on floor)     Quadruped <> bear crawl: 10\" x 10    Tall kneel Moderate perturbations all directions  Mini squats 2 x 10 holding 6\" med ball   Trunk rot: x10   D2 chops/lifts: x10    Prone Ther-Ex     Core exercises PPT x 10   PPT with ball squeeze 5 sec hold, 10 x  PPT with ball squeeze and bridge x 10   PPT with supine marches x 10  PPT " "with hip abduction isometric L LE x 3 sec hold x10, orange band     Heel slides with towel: 2x10  Triple flex over physioball: x10  Windshield wipers BLE over physioball 2 x 10     STS's from low EOM squeezing pilates ring  - As above, with squeeze and OH press  Marching, squeezing small ball 30 ft x 2    Modified dead bugs, alt UE 2 x 10     Functional Strength Testing       30 sec STS test (60y +)  Assessed    NEURO RE-ED  CPT 30217 TOTAL TIME FOR SESSION 8-22 Minutes      FES  To promote neurorecovery of gait:  Distance: 3.01 miles  Resistance: 0.77 Nm  Power: 2.9 W  Symmetry: L 2%  Total time: 21:00  Active time: 19:03    Xcite  NMRE of LLE in function:     - Sit <> stand from elevated EOM using 6 lb med ball: x10, then progressing to lowest height: x10, then x10 in staggered stance  - Modified reverse lunging with slider under R foot in parallel bars with BUE support > RUE support only: x10 reps each  - Tall kneel <> heel sit: 2x10, 2nd set with chest press   - fwd step up with LLE onto 6\" block: x10, repeated with R knee drive, x10 repeated with 3# on RUE     Bioness L300 Go  - Fwd/rev step over 12\" virginia virgiina: x20,   - Fwd step up onto 6\" block with contra LE march: 2x10, B         - RLE step up, LLE march with 6lb db overhead press   - Fwd step down 4\" block: 2x10, orange theraband around knees for manual cues   - stance on airex with lateral L toe taps to 12\" virginia: x10  - Tandem walk 20'x2, 6#   - step up on 4\" block, contralateral heel tap to 10\" block   - 12\" and 6\" hurdles, navigating reciprocally  Yes               Yes   Yes    COORDINATION       Target Tapping     Coordination ladder As appropriate for HL balance     Amb with ankle weights     Amb with proprio wrap     Pushing weighted shopping cart     POSTURAL RE-ED     Sit <> Stand  From 22\" mat holding 6# med ball, cues for midline and equal WB      Tall Kneel  Tall kneel <> short sit: x10, x10 with chest press     Seated & standing " "reaching for trunk strengthening     Abbey-scapular strengthening     PRE-GAIT ACTIVITIES      Tap-ups     Step-ups To 6\" block with contralateral knee drive: x10, repeated with 2 lb DB for UE swing     Standing weight shifting     Step-taps     BALANCE TRAINING     Standing balance - static/dynamic       HEP Review       Floor       Airex Santiago-tandem: 30 seconds x 2, HT: 30\" x 1, EC: 30 seconds x 1  Tandem stance: 30 seconds x 1     Rockerboard AP plane: x10, with light UE support   AP, chest press x 10, OH press x 10, 6lb   Squats 2 x 10      Hurdles 6\" hurdles, focus on dec L hip circumduction through swing and encouraging heel strike. Step forward/back    Repeated with side stepping pattern, requiring BUE support dud to inability for L knee extension through stance     BOSU Ball  Fwd mini lunge onto domed side: x10, CloseS   Modified lunge onto BOSU with palloff press:   - x10, then repeated x10 with perturbations on yellow sports cord     Split Stance      Biodex Balance Training  Weight shifting and motor control training with increased difficulty noted to L anterolateral planes: x5 mins     Dynamic gait       Side stepping With partial squat with orange theraband around ankles: 10'x4     Retro walking Fwd/retro amb 50'x6 with cues for shorter step length and for heel strike + knee extension through stance, mirror anteriorly, added dual task of beach bal    Tandem Walking     Suitcase carry Using small 3 lb B DB on R: 100'x2, noted delayed L knee extension through IC and mid stance     Amb with hula hoop Using hula hoop to facilitate BUE swing and trunk rot     Walking with ball toss     Proprioceptive wrap Blue TB around L LE  - Amb along purple line x 3 with cues to keep feet within tiles  - x 250ft with same cues as above  - Retro amb 3 x 20 ft  - Mini squats with heels elevated x 10, focus on minimizing hyperextension    Balance Testing     FGA  Assessed    SLS  Assessed see note     10mWT Assessed     GAIT " "TRAINING  CPT 74211 TOTAL TIME FOR SESSION 8-22 Minutes      Ambulation without AD  Gait with no AD over level indoor surfaces with WBOS and decreased L weight shift, VC for anterolateral weight shift, heel strike, BUE swing and trunk rot, 150'x1    Dynamic Gait  20'x6 with focus on shorter step lengths, heel strike and decreasing step width with use of 1' floor tiles, repeated with bimanual ball toss with CloseS and noted decreased gait speed and increase in step lengths     With Bioness donned x 250 ftx3   - Good clearance of L LE through swing with improved knee flexion through swing   - Pt subjectively reports feeling a smoother gait pattern with decreased L foot slap    Treadmill, BUE support to 1UE support, with Bioness donned 2.1 mph x 10 min - \"[place] VR course\"  - VC for increased nancy - metranome set to 100 bpm  - Improved GABRIELLE   - Following TM, without stim on, mild L foot slap and WBOS seen.     Following removal of proprioceptive wrap x 250ft, no instances of L knee hyperextension thrust noted.                                 Stair negotiation       Curb negotiation       Ramp negotiation       Outdoor ambulation  With Bioness:  - x500 ft approx, inclines/declines  - Retro amb up incline 3 x 15 ft   yes to all    MANUAL  CPT 22538 TOTAL TIME FOR SESSION Not performed      Mobilization        MODALITIES  CPT 19284 TOTAL TIME FOR SESSION      Ice       Heat       ATTENDED E-STIM  CPT 28231 TOTAL TIME FOR SESSION 0-7 Minutes     Attended E-Stim FES    [id number] (Age 28) , PIN [phone]   Stim to L quad, hamstring, glutes, tib ant, and gastroc. 250-350 pulse width, 40Hz frequency.   Muscle testing testing completed for initial set up (7/8)    Xcite use for LLE:     Stim to L quad, hamstring, glutes, tib ant, and gastroc. 250-350 pulse width, 40Hz frequency.   Muscle testing testing completed for initial set up 7/26)    Bioness:   L lower leg quick fit pads, L hamstring added 8/2  30 Hz with 0.2 ramp for " loading response   Used tablet without strap                                 Unattended E-stim

## 2024-08-23 ENCOUNTER — HOSPITAL ENCOUNTER (OUTPATIENT)
Dept: PHYSICAL THERAPY | Facility: REHABILITATION | Age: 29
Setting detail: THERAPIES SERIES
Discharge: HOME | End: 2024-08-23
Attending: LEGAL MEDICINE
Payer: COMMERCIAL

## 2024-08-23 DIAGNOSIS — Z86.69 HISTORY OF TETHERED SPINAL CORD: Primary | ICD-10-CM

## 2024-08-23 PROCEDURE — 97112 NEUROMUSCULAR REEDUCATION: CPT | Mod: GP

## 2024-08-23 PROCEDURE — 97032 APPL MODALITY 1+ESTIM EA 15: CPT | Mod: GP

## 2024-08-23 PROCEDURE — 97116 GAIT TRAINING THERAPY: CPT | Mod: GP

## 2024-08-23 PROCEDURE — 97110 THERAPEUTIC EXERCISES: CPT | Mod: GP

## 2024-08-23 NOTE — OP PT TREATMENT LOG
PT Neuro Exercises Current Session   Performed Today? (y/n)   THER ACT   CPT 29790 TOTAL TIME FOR SESSION 0-7 Minutes      Pain, vitals, subjective    Provided rest breaks for energy conservation yes   Skin inspection Base of incision, no swelling noted    FES  cycle [id number] (Age 28) , PIN [id number]   Bike height: 2 holes showing  Pedal height: 3 holes showing  L LE set up only    FES Xcite Set Up              Bioness  yes   Patient Education Patient educated regarding current impairments per testing completed today and PT POC moving forward.     Patient provided with Welcome Letter, which includes attendance policy. Provided education regarding cancellation and no-show policy. Education regarding the importance of participation and regular attendance to maximize goal attainment. Patient verbalized understanding/agreement.     Clearance for FES received, will be completed NV, start with handheld unit to assess tolerance prior to FES bike. Pt verbalized understanding.    Patient educated regarding progress made thus far, current impairments per re-assessments completed today and PT POC moving forward. Patient verbalized understanding/agreement.     Discussion regarding Equestrian scheduling and scheduling with [facility] [employer] rep. PT to find out next time [facility] rep is at [facility] to adjust pt schedule. Will provide with forms as well.                                           Yes      HEP  Verbally reviewed practicing gait over level surfaces with decreased step width, and then incorporating BUE swing and trunk rot. Pt verbalized understanding.     Floor Transfers  CloseS with increased time for LLE management, requires single UE support with review of moving from tall kneel to half kneeling position. Pt demos good understanding, will require continued practice     Subjective Outcomes Measures       ABC Scale Assessed - returned today    THER EX  CPT 74158 TOTAL TIME FOR SESSION  8-22 Minutes      STRETCHING      Stretching  "by patient Incline board stretch: L3 - 1 min x2    Stretching by therapist/PROM Trialed modified jose stretch - not effective (pt states modified lunge position she has felt more of a stretch in hip flexors in the past)      CARDIOVASCULAR       Nu Step 6 mins, Level: 1, BLE only     Stationary Bike Recumbent bike 5 min, L1  (Unable to complete revolution on Expresso upright bike)    Ambulation with AD      Treadmill       Endurance Testing       6mWT Assessed    STRENGTH TRAINING     HEP Review     Standing Ther-Ex Standing hip abduction 2 x 10 ea  - standing on airex   - Second set, no UE support     Step ups 6\" block x 20   - Added L LE TKE w orange TB    Step down, 2\" block, L LE only w orange band TKE x 10    Hip hikes 2\" block, VC's to discourage R hip hike     Heel raises from L2 on incline board x 15 (on floor today)      Side-lying there-ex Clamshells: 2x20 on R, L in supine: 5\"x10      Supine Ther-Ex Diaphragmatic breathin mins due to elevated BP  Glut bridge: 3\" x10, orange band  Bridge: x10   Supine hip abduction x 10, LLE, isometrics today  BKFO: x10, orange t-band only for R, active assist on L   Clamshells, x 10 ea LE  Bridge with ball squeeze x 10                Deadbugs with physioball  - isometric holds: 10\" x 10, physioball under B heels   - alt LE marches with heels on physioball: x10  - alt UE: 3 sets x5 reps       Quadruped  hip extensions with towel under L foot: x10, B/L (modified position with BUE on EOM and LE on floor)     Quadruped <> bear crawl: 10\" x 10    Tall kneel Moderate perturbations all directions  Mini squats 2 x 10 w/ 6lb ball  Trunk rot: x10   D2 chops/lifts: x10 Yes  Yes   Prone Ther-Ex     Core exercises PPT x 10   PPT with ball squeeze 5 sec hold, 10 x  PPT with ball squeeze and bridge x 10   PPT with supine marches x 10  PPT with hip abduction isometric L LE x 3 sec hold x10, orange band     Heel slides with towel: 2x10  Triple flex over physioball: x10  [facility] " "wipers BLE over physioball 2 x 10     STS's from low EOM squeezing pilates ring  - As above, with squeeze and OH press  Marching, squeezing small ball 30 ft x 2    Modified dead bugs, alt UE 2 x 10    Yes   Functional Strength Testing       30 sec STS test (60y +)  Assessed    NEURO RE-ED  CPT 42320 TOTAL TIME FOR SESSION 8-22 Minutes      FES  To promote neurorecovery of gait:  Distance: 3.01 miles  Resistance: 0.77 Nm  Power: 2.9 W  Symmetry: L 2%  Total time: 21:00  Active time: 19:03    Xcite  NMRE of LLE in function:     - Sit <> stand from elevated EOM using 6 lb med ball: x10, then progressing to lowest height: x10, then x10 in staggered stance  - Modified reverse lunging with slider under R foot in parallel bars with BUE support > RUE support only: x10 reps each  - Tall kneel <> heel sit: 2x10, 2nd set with chest press   - fwd step up with LLE onto 6\" block: x10, repeated with R knee drive, x10 repeated with 3# on RUE     Bioness L300 Go  - Fwd/rev step over 12\" virginia virginia: x20,   - Fwd step up onto 6\" block with contra LE march: 2x10, B         - RLE step up, LLE march with 8lb db overhead press        - LLE step up, RLE march while holding 8lb db x 10, overhead press with 8lb db x 5  - Fwd step down 4\" block: 2x10, orange theraband around knees for manual cues   - stance on airex with lateral L toe taps to 12\" virginia: x10  - Tandem walk 20'x2, 6#   - step up on 4\" block, contralateral heel tap to 10\" block   - 12\" and 6\" hurdles, navigating reciprocally  Yes     Yes  Yes           COORDINATION       Target Tapping     Coordination ladder As appropriate for HL balance     Amb with ankle weights     Amb with proprio wrap     Pushing weighted shopping cart     POSTURAL RE-ED     Sit <> Stand  From 22\" mat holding 6# med ball, cues for midline and equal WB      Tall Kneel  Tall kneel <> short sit: x10, x10 with chest press     Seated & standing reaching for trunk strengthening     Abbey-scapular " "strengthening     PRE-GAIT ACTIVITIES      Tap-ups     Step-ups To 6\" block with contralateral knee drive: x10, repeated with 2 lb DB for UE swing     Standing weight shifting     Step-taps     BALANCE TRAINING     Standing balance - static/dynamic       HEP Review       Floor       Airex Santiago-tandem: 30 seconds x 2, HT: 30\" x 1, EC: 30 seconds x 1  Tandem stance: 30 seconds x 1     Rockerboard AP plane: x10, with light UE support   AP, chest press x 10, OH press x 10, 6lb   Squats 2 x 10      Hurdles 6\" hurdles, focus on dec L hip circumduction through swing and encouraging heel strike. Step forward/back    Repeated with side stepping pattern, requiring BUE support dud to inability for L knee extension through stance     BOSU Ball  Fwd mini lunge onto domed side: x10, CloseS   Modified lunge onto BOSU with palloff press:   - x10, then repeated x10 with perturbations on yellow sports cord     Split Stance      Biodex Balance Training  Weight shifting and motor control training with increased difficulty noted to L anterolateral planes: x5 mins     Dynamic gait       Side stepping With partial squat with orange theraband around ankles: 10'x4     Retro walking Fwd/retro amb 50'x6 with cues for shorter step length and for heel strike + knee extension through stance, mirror anteriorly, added dual task of beach bal    Tandem Walking     Suitcase carry Using small 3 lb B DB on R: 100'x2, noted delayed L knee extension through IC and mid stance     Amb with hula hoop Using hula hoop to facilitate BUE swing and trunk rot     Walking with ball toss     Proprioceptive wrap Blue TB around L LE  - Amb along purple line x 3 with cues to keep feet within tiles  - x 250ft with same cues as above  - Retro amb 3 x 20 ft  - Mini squats with heels elevated x 10, focus on minimizing hyperextension    Balance Testing     FGA  Assessed    SLS  Assessed see note     10mWT Assessed     GAIT TRAINING  CPT 59949 TOTAL TIME FOR SESSION 8-22 " Minutes      Ambulation without AD  Gait with no AD over level indoor surfaces with WBOS and decreased L weight shift, VC for anterolateral weight shift, heel strike, BUE swing and trunk rot, 150'x1    Dynamic Gait  20'x6 with focus on shorter step lengths, heel strike and decreasing step width with use of 1' floor tiles, repeated with bimanual ball toss with CloseS and noted decreased gait speed and increase in step lengths     With Bioness donned x 250 ft   - Good clearance of L LE through swing with improved knee flexion through swing       Treadmill, BUE support to 1UE support, with Bioness donned 2.1 mph x 10 min   - VC for increased nancy  - Improved GABRIELLE   - Following TM, without stim on, mild L foot slap and WBOS seen.     Following removal of proprioceptive wrap x 250ft, no instances of L knee hyperextension thrust noted.            Yes            Yes        Stair negotiation       Curb negotiation       Ramp negotiation       Outdoor ambulation  With Bioness:  - x500 ft approx, inclines/declines  - Retro amb up incline 3 x 15 ft     MANUAL  CPT 40496 TOTAL TIME FOR SESSION Not performed      Mobilization        MODALITIES  CPT 11717 TOTAL TIME FOR SESSION      Ice       Heat       ATTENDED E-STIM  CPT 19503 TOTAL TIME FOR SESSION 8-22 Minutes     Attended E-Stim FES    [id number] (Age 28) , PIN [phone]   Stim to L quad, hamstring, glutes, tib ant, and gastroc. 250-350 pulse width, 40Hz frequency.   Muscle testing testing completed for initial set up (7/8)    Xcite use for LLE:     Stim to L quad, hamstring, glutes, tib ant, and gastroc. 250-350 pulse width, 40Hz frequency.   Muscle testing testing completed for initial set up 7/26)    Bioness:   L lower leg quick fit pads, L hamstring added 8/2  30 Hz with 0.2 ramp for loading response   Used tablet without strap                              Yes   Unattended E-stim

## 2024-08-23 NOTE — PROGRESS NOTES
Physical Therapy Visit    PT DAILY NOTE FOR OUTPATIENT THERAPY    Patient: Melanie Litten MRN: [id number]  : 1995 28 y.o.  Referring Physician: Remedios Whitfield MD  Date of Visit: 2024    Certification Dates: 24 through 24    Diagnosis:   1. History of tethered spinal cord        Chief Complaints:  EDS, LLE weakness, gait/balance impairment    Precautions:   Precautions comments: hypermobile - EDS, postural hypotension      TODAY'S VISIT    Time In Session:  Start Time: 0800  Stop Time: 0855  Time Calculation (min): 55 min   History/Vitals/Pain/Encounter Info - 24 0801          Injury History/Precautions/Daily Required Info    Document Type daily treatment     Primary Therapist Dilan Machuca     Chief Complaint/Reason for Visit  EDS, LLE weakness, gait/balance impairment     Referring Physician Remedios Whitfield MD     Precautions comments hypermobile - EDS, postural hypotension     History of present illness/functional impairment Saranya is a 28 year old female who presents to OPPT with history of tethered cord syndrome. Pt with PMH significant for chiari malformation, hypermobile EDS, left plantar fasciitis, anxiety and postural hypotension. Pt reports her symptoms started slowly in 2019 with L hip pain, followed up with neurosurgeon in [place] with unremarkable results for all testing. Her symptoms then progressed with L leg motor weakness and shakiness (similar to clonus) with movement, chronic constipation, L drop foot, lower back pain, and neck pain.  Pt was finally diagnosed with tethered cord syndrome and underwent a clinical trial surgery at [facility], and [facility]. Due to her symptoms, she meets criteria for exploration and sectioning of her filum for the functional tethered cord clinical trial. Now s/p laminectomy for occult tethered cord release on 23 by Dr Duckworth. Patient tolerated procedure well.  No post-op complications. Transferred stable once PACU criteria met. Patient kept FLAT for 36hr. Placed on an aseptic dex taper and post-op IV abx x24hrs.  She was mobilized on 9/27 tolerated well without any symptoms. Additionally, was instructed not to exercise for 7 weeks post-surgery. Pt reports she recovered well overall with decreased drop foot, clonus and swelling, continues to have increased coordination deficits and foot drag with fatigue. Pt has completed OPPT at [facility] in [place] and intermittent massage therapy. Pt’s functional goals are to work on higher level balance, picking up things from floor, coordination and “Hippo therapy”.     Patient/Family/Caregiver Comments/Observations Patient reports no new changes since last visit     Patient reported fall since last visit No        Pain Assessment    Currently in pain No/Denies        Pre Activity Vital Signs    /90     BP Location Left upper arm     BP Method Manual     Patient Position Sitting                    Daily Treatment Assessment and Plan - 08/23/24 0801          Daily Treatment Assessment and Plan    Progress toward goals Progressing     Daily Outcome Summary Continued with Bioness trials. Progressed neuro re-education activities with less UE support, improved control through range with tall kneeling activities. Challenged with dynamic stepping activities, required skilled facilitation to prevent L hip hike and cues for trunk control, able to utilize visual and tactile cues for improved trunk midline, core stability and control. Discussed scheduling bioness rep with patient over next few weeks     Plan and Recommendations Continue with 2 more equestrian sessions for postural education, core activation and strengthening.                         OBJECTIVE DATA TAKEN TODAY:    None taken    Today's Treatment:       PT Neuro Exercises Current Session   Performed Today? (y/n)   THER ACT   CPT 94778 TOTAL TIME FOR SESSION 0-7 Minutes       Pain, vitals, subjective    Provided rest breaks for energy conservation yes   Skin inspection Base of incision, no swelling noted    FES  cycle [id number] (Age 28) , PIN [phone]   Bike height: 2 holes showing  Pedal height: 3 holes showing  L LE set up only    FES Xcite Set Up              Bioness  yes   Patient Education Patient educated regarding current impairments per testing completed today and PT POC moving forward.     Patient provided with Welcome Letter, which includes attendance policy. Provided education regarding cancellation and no-show policy. Education regarding the importance of participation and regular attendance to maximize goal attainment. Patient verbalized understanding/agreement.     Clearance for FES received, will be completed NV, start with handheld unit to assess tolerance prior to FES bike. Pt verbalized understanding.    Patient educated regarding progress made thus far, current impairments per re-assessments completed today and PT POC moving forward. Patient verbalized understanding/agreement.     Discussion regarding Equestrian scheduling and scheduling with "[employer] rep. PT to find out next time "[employer] rep is at [facility] to adjust pt schedule. Will provide with forms as well.                                           Yes      HEP  Verbally reviewed practicing gait over level surfaces with decreased step width, and then incorporating BUE swing and trunk rot. Pt verbalized understanding.     Floor Transfers  CloseS with increased time for LLE management, requires single UE support with review of moving from tall kneel to half kneeling position. Pt demos good understanding, will require continued practice     Subjective Outcomes Measures       ABC Scale Assessed - returned today    THER EX  CPT 09783 TOTAL TIME FOR SESSION  8-22 Minutes      STRETCHING      Stretching by patient Incline board stretch: L3 - 1 min x2    Stretching by therapist/PROM Trialed modified jose stretch - not  "effective (pt states modified lunge position she has felt more of a stretch in hip flexors in the past)      CARDIOVASCULAR       Nu Step 6 mins, Level: 1, BLE only     Stationary Bike Recumbent bike 5 min, L1  (Unable to complete revolution on Expresso upright bike)    Ambulation with AD      Treadmill       Endurance Testing       6mWT Assessed    STRENGTH TRAINING     HEP Review     Standing Ther-Ex Standing hip abduction 2 x 10 ea  - standing on airex   - Second set, no UE support     Step ups 6\" block x 20   - Added L LE TKE w orange TB    Step down, 2\" block, L LE only w orange band TKE x 10    Hip hikes 2\" block, VC's to discourage R hip hike     Heel raises from L2 on incline board x 15 (on floor today)      Side-lying there-ex Clamshells: 2x20 on R, L in supine: 5\"x10      Supine Ther-Ex Diaphragmatic breathin mins due to elevated BP  Glut bridge: 3\" x10, orange band  Bridge: x10   Supine hip abduction x 10, LLE, isometrics today  BKFO: x10, orange t-band only for R, active assist on L   Clamshells, x 10 ea LE  Bridge with ball squeeze x 10                Deadbugs with physioball  - isometric holds: 10\" x 10, physioball under B heels   - alt LE marches with heels on physioball: x10  - alt UE: 3 sets x5 reps       Quadruped  hip extensions with towel under L foot: x10, B/L (modified position with BUE on EOM and LE on floor)     Quadruped <> bear crawl: 10\" x 10    Tall kneel Moderate perturbations all directions  Mini squats 2 x 10 w/ 6lb ball  Trunk rot: x10   D2 chops/lifts: x10 Yes  Yes   Prone Ther-Ex     Core exercises PPT x 10   PPT with ball squeeze 5 sec hold, 10 x  PPT with ball squeeze and bridge x 10   PPT with supine marches x 10  PPT with hip abduction isometric L LE x 3 sec hold x10, orange band     Heel slides with towel: 2x10  Triple flex over physioball: x10  Windshield wipers BLE over physioball 2 x 10     STS's from low EOM squeezing pilates ring  - As above, with squeeze and OH " "press  Marching, squeezing small ball 30 ft x 2    Modified dead bugs, alt UE 2 x 10    Yes   Functional Strength Testing       30 sec STS test (60y +)  Assessed    NEURO RE-ED  CPT 80620 TOTAL TIME FOR SESSION 8-22 Minutes      FES  To promote neurorecovery of gait:  Distance: 3.01 miles  Resistance: 0.77 Nm  Power: 2.9 W  Symmetry: L 2%  Total time: 21:00  Active time: 19:03    Xcite  NMRE of LLE in function:     - Sit <> stand from elevated EOM using 6 lb med ball: x10, then progressing to lowest height: x10, then x10 in staggered stance  - Modified reverse lunging with slider under R foot in parallel bars with BUE support > RUE support only: x10 reps each  - Tall kneel <> heel sit: 2x10, 2nd set with chest press   - fwd step up with LLE onto 6\" block: x10, repeated with R knee drive, x10 repeated with 3# on RUE     Bioness L300 Go  - Fwd/rev step over 12\" virginia virginia: x20,   - Fwd step up onto 6\" block with contra LE march: 2x10, B         - RLE step up, LLE march with 8lb db overhead press        - LLE step up, RLE march while holding 8lb db x 10, overhead press with 8lb db x 5  - Fwd step down 4\" block: 2x10, orange theraband around knees for manual cues   - stance on airex with lateral L toe taps to 12\" virginia: x10  - Tandem walk 20'x2, 6#   - step up on 4\" block, contralateral heel tap to 10\" block   - 12\" and 6\" hurdles, navigating reciprocally  Yes     Yes  Yes           COORDINATION       Target Tapping     Coordination ladder As appropriate for HL balance     Amb with ankle weights     Amb with proprio wrap     Pushing weighted shopping cart     POSTURAL RE-ED     Sit <> Stand  From 22\" mat holding 6# med ball, cues for midline and equal WB      Tall Kneel  Tall kneel <> short sit: x10, x10 with chest press     Seated & standing reaching for trunk strengthening     Abbey-scapular strengthening     PRE-GAIT ACTIVITIES      Tap-ups     Step-ups To 6\" block with contralateral knee drive: x10, repeated " "with 2 lb DB for UE swing     Standing weight shifting     Step-taps     BALANCE TRAINING     Standing balance - static/dynamic       HEP Review       Floor       Airex Santiago-tandem: 30 seconds x 2, HT: 30\" x 1, EC: 30 seconds x 1  Tandem stance: 30 seconds x 1     Rockerboard AP plane: x10, with light UE support   AP, chest press x 10, OH press x 10, 6lb   Squats 2 x 10      Hurdles 6\" hurdles, focus on dec L hip circumduction through swing and encouraging heel strike. Step forward/back    Repeated with side stepping pattern, requiring BUE support dud to inability for L knee extension through stance     BOSU Ball  Fwd mini lunge onto domed side: x10, CloseS   Modified lunge onto BOSU with palloff press:   - x10, then repeated x10 with perturbations on yellow sports cord     Split Stance      Biodex Balance Training  Weight shifting and motor control training with increased difficulty noted to L anterolateral planes: x5 mins     Dynamic gait       Side stepping With partial squat with orange theraband around ankles: 10'x4     Retro walking Fwd/retro amb 50'x6 with cues for shorter step length and for heel strike + knee extension through stance, mirror anteriorly, added dual task of beach bal    Tandem Walking     Suitcase carry Using small 3 lb B DB on R: 100'x2, noted delayed L knee extension through IC and mid stance     Amb with hula hoop Using hula hoop to facilitate BUE swing and trunk rot     Walking with ball toss     Proprioceptive wrap Blue TB around L LE  - Amb along purple line x 3 with cues to keep feet within tiles  - x 250ft with same cues as above  - Retro amb 3 x 20 ft  - Mini squats with heels elevated x 10, focus on minimizing hyperextension    Balance Testing     FGA  Assessed    SLS  Assessed see note     10mWT Assessed     GAIT TRAINING  CPT 98665 TOTAL TIME FOR SESSION 8-22 Minutes      Ambulation without AD  Gait with no AD over level indoor surfaces with WBOS and decreased L weight shift, VC " for anterolateral weight shift, heel strike, BUE swing and trunk rot, 150'x1    Dynamic Gait  20'x6 with focus on shorter step lengths, heel strike and decreasing step width with use of 1' floor tiles, repeated with bimanual ball toss with CloseS and noted decreased gait speed and increase in step lengths     With Bioness donned x 250 ft   - Good clearance of L LE through swing with improved knee flexion through swing       Treadmill, BUE support to 1UE support, with Bioness donned 2.1 mph x 10 min   - VC for increased nancy  - Improved GABRIELLE   - Following TM, without stim on, mild L foot slap and WBOS seen.     Following removal of proprioceptive wrap x 250ft, no instances of L knee hyperextension thrust noted.            Yes            Yes        Stair negotiation       Curb negotiation       Ramp negotiation       Outdoor ambulation  With Bioness:  - x500 ft approx, inclines/declines  - Retro amb up incline 3 x 15 ft     MANUAL  CPT 65309 TOTAL TIME FOR SESSION Not performed      Mobilization        MODALITIES  CPT 56118 TOTAL TIME FOR SESSION      Ice       Heat       ATTENDED E-STIM  CPT 94400 TOTAL TIME FOR SESSION 8-22 Minutes     Attended E-Stim FES    [id number] (Age 28) , PIN [phone]   Stim to L quad, hamstring, glutes, tib ant, and gastroc. 250-350 pulse width, 40Hz frequency.   Muscle testing testing completed for initial set up (7/8)    Xcite use for LLE:     Stim to L quad, hamstring, glutes, tib ant, and gastroc. 250-350 pulse width, 40Hz frequency.   Muscle testing testing completed for initial set up 7/26)    Bioness:   L lower leg quick fit pads, L hamstring added 8/2  30 Hz with 0.2 ramp for loading response   Used tablet without strap                              Yes   Unattended E-stim

## 2024-08-26 ENCOUNTER — HOSPITAL ENCOUNTER (OUTPATIENT)
Dept: PHYSICAL THERAPY | Facility: REHABILITATION | Age: 29
Setting detail: THERAPIES SERIES
Discharge: HOME | End: 2024-08-26
Attending: LEGAL MEDICINE
Payer: COMMERCIAL

## 2024-08-26 DIAGNOSIS — Z86.69 HISTORY OF TETHERED SPINAL CORD: Primary | ICD-10-CM

## 2024-08-26 PROCEDURE — 97530 THERAPEUTIC ACTIVITIES: CPT | Mod: GP

## 2024-08-26 PROCEDURE — 97112 NEUROMUSCULAR REEDUCATION: CPT | Mod: GP

## 2024-08-26 PROCEDURE — 97110 THERAPEUTIC EXERCISES: CPT | Mod: GP

## 2024-08-26 NOTE — OP PT TREATMENT LOG
PT Neuro Exercises Current Session   Performed Today? (y/n)   THER ACT   CPT 97561 TOTAL TIME FOR SESSION 8-22 Minutes      Pain, vitals, subjective    Provided rest breaks for energy conservation yes   Skin inspection Base of incision, no swelling noted    FES  cycle [id number] (Age 28) , PIN [id number]   Bike height: 2 holes showing  Pedal height: 3 holes showing  L LE set up only    FES Xcite Set Up              [facility]     Patient Education Patient educated regarding current impairments per testing completed today and PT POC moving forward.     Patient provided with Welcome Letter, which includes attendance policy. Provided education regarding cancellation and no-show policy. Education regarding the importance of participation and regular attendance to maximize goal attainment. Patient verbalized understanding/agreement.     Clearance for FES received, will be completed NV, start with handheld unit to assess tolerance prior to FES bike. Pt verbalized understanding.    Patient educated regarding progress made thus far, current impairments per re-assessments completed today and PT POC moving forward. Patient verbalized understanding/agreement.     Discussion regarding [employer] scheduling and scheduling with [facility]. PT to find out next time [facility] rep is at [facility] to adjust pt schedule. Will provide with forms as well.                                Yes                HEP  Verbally reviewed practicing gait over level surfaces with decreased step width, and then incorporating BUE swing and trunk rot. Pt verbalized understanding.     Floor Transfers  CloseS with increased time for LLE management, requires single UE support with review of moving from tall kneel to half kneeling position. Pt demos good understanding, will require continued practice     Subjective Outcomes Measures       ABC Scale Assessed  yes   THER EX  CPT 04292 TOTAL TIME FOR SESSION  23-37 Minutes      STRETCHING      Stretching by patient  "Incline board stretch: L3 - 1 min x2    Stretching by therapist/PROM Trialed modified jose stretch - not effective (pt states modified lunge position she has felt more of a stretch in hip flexors in the past)      CARDIOVASCULAR       Nu Step 6 mins, Level: 1, BLE only     Stationary Bike Recumbent bike 5 min, L1  (Unable to complete revolution on [employer] upright bike)    Ambulation with AD      Treadmill       Endurance Testing       6mWT Assessed yes   STRENGTH TRAINING     HEP Review     Standing Ther-Ex Standing hip abduction 2 x 10 ea  - standing on airex   - Second set, no UE support     Step ups 6\" block x 20   - Added L LE TKE w orange TB    Step down, 2\" block, L LE only w orange band TKE x 10    Hip hikes 2\" block, VC's to discourage R hip hike     Heel raises from L2 on incline board x 15 (on floor today)      Side-lying there-ex Clamshells: 2x20 on R, L in supine: 5\"x10      Supine Ther-Ex Diaphragmatic breathin mins due to elevated BP  Glut bridge: 3\" x10, orange band  Bridge: x10   Supine hip abduction x 10, LLE, isometrics today  BKFO: x10, orange t-band only for R, active assist on L   Clamshells, x 10 ea LE  Bridge with ball squeeze x 10                  Deadbugs with physioball  - isometric holds: 10\" x 10, physioball under B heels   - alt LE marches with heels on physioball: x10  - alt UE: 3 sets x5 reps       Quadruped  hip extensions with towel under L foot: x10, B/L (modified position with BUE on EOM and LE on floor)     Quadruped <> bear crawl: 10\" x 10    Alternating UE flexion x 10  Alternating LE extension x 10; use of slider L LE           Yes  Yes   Tall kneel Moderate perturbations all directions  Mini squats 2 x 10 w/ 6lb ball  Trunk rot: x10   D2 chops/lifts: x10    Prone Ther-Ex     Core exercises PPT x 10   PPT with ball squeeze 5 sec hold, 10 x  PPT with ball squeeze and bridge x 10   PPT with supine marches x 10  PPT with hip abduction isometric L LE x 3 sec hold x10, orange " "band     Heel slides with towel: 2x10  Triple flex over physioball: x10  Windshield wipers BLE over physioball 2 x 10     STS's from low EOM squeezing pilates ring  - As above, with squeeze, in staggered stance  Marching, squeezing small ball 30 ft x 2    Modified dead bugs, alt UE 2 x 10     Supine B shoulder ext with GTB, emphasis on core activation; 2 x 15                       Yes            Yes   Functional Strength Testing       30 sec STS test (60y +)  Assessed yes   NEURO RE-ED  CPT 34979 TOTAL TIME FOR SESSION 8-22 Minutes      FES  To promote neurorecovery of gait:  Distance: 3.01 miles  Resistance: 0.77 Nm  Power: 2.9 W  Symmetry: L 2%  Total time: 21:00  Active time: 19:03    Xcite  NMRE of LLE in function:     - Sit <> stand from elevated EOM using 6 lb med ball: x10, then progressing to lowest height: x10, then x10 in staggered stance  - Modified reverse lunging with slider under R foot in parallel bars with BUE support > RUE support only: x10 reps each  - Tall kneel <> heel sit: 2x10, 2nd set with chest press   - fwd step up with LLE onto 6\" block: x10, repeated with R knee drive, x10 repeated with 3# on RUE     Bioness L300 Go  - Fwd/rev step over 12\" [place]: x20,   - Fwd step up onto 6\" block with contra LE march: 2x10, B         - RLE step up, LLE march with 8lb db overhead press        - LLE step up, RLE march while holding 8lb db x 10, overhead press with 8lb db x 5  - Fwd step down 4\" block: 2x10, orange theraband around knees for manual cues   - stance on airex with lateral L toe taps to 12\" [place]: x10  - Tandem walk 20'x2, 6#   - step up on 4\" block, contralateral heel tap to 10\" block   - 12\" and 6\" hurdles, navigating reciprocally                    COORDINATION       Target Tapping     Coordination ladder As appropriate for HL balance     Amb with ankle weights     Amb with proprio wrap     Pushing weighted shopping cart     POSTURAL RE-ED     Sit <> Stand  From 22\" mat holding " "6# med ball, cues for midline and equal WB      Tall Kneel  Tall kneel <> short sit: x10, x10 with chest press     Seated & standing reaching for trunk strengthening     Abbey-scapular strengthening     PRE-GAIT ACTIVITIES      Tap-ups     Step-ups To 6\" block with contralateral knee drive: x10, repeated with 2 lb DB for UE swing     Standing weight shifting     Step-taps     BALANCE TRAINING     Standing balance - static/dynamic       HEP Review       Floor       Airex Santiago-tandem: 30 seconds x 2, HT: 30\" x 1, EC: 30 seconds x 1  Tandem stance: 30 seconds x 1     Rockerboard AP plane: x10, with light UE support   AP, chest press x 10, OH press x 10, 6lb   Squats 2 x 10      Hurdles 6\" hurdles, focus on dec L hip circumduction through swing and encouraging heel strike. Step forward/back    Repeated with side stepping pattern, requiring BUE support dud to inability for L knee extension through stance     BOSU Ball  Fwd mini lunge onto domed side: x10, CloseS   Modified lunge onto BOSU with palloff press:   - x10, then repeated x10 with perturbations on yellow sports cord     Split Stance      Bi[employer] Balance Training  Weight shifting and motor control training with increased difficulty noted to L anterolateral planes: x5 mins     Dynamic gait       Side stepping With partial squat with orange theraband around ankles: 10'x4     Retro walking Fwd/retro amb 50'x6 with cues for shorter step length and for heel strike + knee extension through stance, mirror anteriorly, added dual task of beach bal    Tandem Walking     Suitcase carry Using small 3 lb B DB on R: 100'x2, noted delayed L knee extension through IC and mid stance     Amb with hula hoop Using hula hoop to facilitate BUE swing and trunk rot     Walking with ball toss     Proprioceptive wrap Blue TB around L LE  - Amb along purple line x 3 with cues to keep feet within tiles  - x 250ft with same cues as above  - Retro amb 3 x 20 ft  - Mini squats with heels elevated " x 10, focus on minimizing hyperextension    Balance Testing     FGA  Assessed yes   SLS  Assessed see note     10mWT Assessed  - (6mWT on 8/26) yes   GAIT TRAINING  CPT 70991 TOTAL TIME FOR SESSION Not performed      Ambulation without AD  Gait with no AD over level indoor surfaces with WBOS and decreased L weight shift, VC for anterolateral weight shift, heel strike, BUE swing and trunk rot, 150'x1    Dynamic Gait  20'x6 with focus on shorter step lengths, heel strike and decreasing step width with use of 1' floor tiles, repeated with bimanual ball toss with CloseS and noted decreased gait speed and increase in step lengths     With Bioness donned x 250 ft   - Good clearance of L LE through swing with improved knee flexion through swing       Treadmill, BUE support to 1UE support, with Bioness donned 2.1 mph x 10 min   - VC for increased nancy  - Improved GABRIELLE   - Following TM, without stim on, mild L foot slap and WBOS seen.     Following removal of proprioceptive wrap x 250ft, no instances of L knee hyperextension thrust noted.                               Stair negotiation  Full flight stairs 1 trial with 1 rail support; 1 trial without UE support. Tactile cues for trunk midline, verbal and visual cues for eccentric control  yes (billed with neuro re-ed)   Curb negotiation       Ramp negotiation       Outdoor ambulation  With Bioness:  - x500 ft approx, inclines/declines  - Retro amb up incline 3 x 15 ft     MANUAL  CPT 09517 TOTAL TIME FOR SESSION Not performed      Mobilization        MODALITIES  CPT 11009 TOTAL TIME FOR SESSION      Ice       Heat       ATTENDED E-STIM  CPT 75866 TOTAL TIME FOR SESSION Not performed     Attended E-Stim FES    [id number] (Age 28) , PIN [phone]   Stim to L quad, hamstring, glutes, tib ant, and gastroc. 250-350 pulse width, 40Hz frequency.   Muscle testing testing completed for initial set up (7/8)    Xcite use for LLE:     Stim to L quad, hamstring, glutes, tib ant, and  gastroc. 250-350 pulse width, 40Hz frequency.   Muscle testing testing completed for initial set up 7/26)    Bioness:   L lower leg quick fit pads, L hamstring added 8/2  30 Hz with 0.2 ramp for loading response   Used tablet without strap                                 Unattended E-stim

## 2024-08-26 NOTE — Clinical Note
Dear DR. Whitfield,    Thank you for this referral. Please review the attached notes and plan of care for your approval.  Please contact our department with any questions.     Sincerely,     Ann Marie Manzano, PT  [address]  [place] [address]  Phone [phone]  Fax  [phone]    By co-signing this Plan of Care (POC) you agree to the following:  I have reviewed the the Plan of Care established by the therapist within this document and certify that the services are skilled and medically necessary. I have reviewed the plan and recommend that these services continue to meet the goals stated in this document.    PHYSICIAN SIGNATURE: __________________________________     DATE: ___________________  TIME: _____________           Physical Therapy Plan of Care 24   Effective from: 2024  Effective to: 2024    Plan ID: [id number]            Participants as of Finalize on 9/3/2024    Name Type Comments Contact Info    Remedios Whitfield MD PCP - General  [phone]    Ann Marie Manzano, PT Physical Therapist         Last Progress Notes Note     Author: Ann Marie Manzano, PT Status: Signed Last edited: 9/3/2024  8:00 AM       Duplicate note created to re-send POC    Physical Therapy Progress Note    PT RE-EVALUATION FOR OUTPATIENT THERAPY    Patient: Melanie Litten   MRN: [id number]  : 1995 28 y.o.    Referring Physician: Remedios Whitfield MD  Date of Visit: 2024    Certification Dates: 24 through 24    Recommended Frequency & Duration:  2 times/week for up to 3 months        Diagnosis:   1. History of tethered spinal cord        Chief Complaints:  No chief complaint on file.      Precautions:   Precautions comments: hypermobile - EDS, postural hypotension    TODAY'S VISIT:    Time In Session:  Start Time: 802  Stop Time: 857  Time Calculation (min): 55 min   General Information - 24 0805          Session Details    Document Type re-evaluation        General Information    Referring  Physician Remedios Whitfield MD     History of present illness/functional impairment Saranya is a 28 year old female who presents to OPPT with history of tethered cord syndrome. Pt with PMH significant for chiari malformation, hypermobile EDS, left plantar fasciitis, anxiety and postural hypotension. Pt reports her symptoms started slowly in 2019 with L hip pain, followed up with neurosurgeon in [place] with unremarkable results for all testing. Her symptoms then progressed with L leg motor weakness and shakiness (similar to clonus) with movement, chronic constipation, L drop foot, lower back pain, and neck pain.  Pt was finally diagnosed with tethered cord syndrome and underwent a clinical trial surgery at [facility], [facility], and [facility]. Due to her symptoms, she meets criteria for exploration and sectioning of her filum for the functional tethered cord clinical trial. Now s/p laminectomy for occult tethered cord release on 9/25/23 by Dr Duckworth. Patient tolerated procedure well. No post-op complications. Transferred stable once PACU criteria met. Patient kept FLAT for 36hr. Placed on an aseptic dex taper and post-op IV abx x24hrs.  She was mobilized on 9/27 tolerated well without any symptoms. Additionally, was instructed not to exercise for 7 weeks post-surgery. Pt reports she recovered well overall with decreased drop foot, clonus and swelling, continues to have increased coordination deficits and foot drag with fatigue. Pt has completed OPPT at [facility] in [place] and intermittent massage therapy. Pt’s functional goals are to work on higher level balance, picking up things from floor, coordination and “Hippo therapy”.     Patient/Family/Caregiver Comments/Observations Patient with no new changes since last visit. Notes some spasms around thoracic spine while sitting on floor this weekend     Precautions comments hypermobile - EDS, postural hypotension                     Daily Falls Screen - 08/26/24 0805          Daily Falls Assessment    Patient reported fall since last visit No                    Pain/Vitals - 08/26/24 0805          Pain Assessment    Currently in pain No/Denies        Pre Activity Vital Signs    /74     BP Location Left upper arm     BP Method Manual     Patient Position Sitting                    PT - 08/26/24 0805          Physical Therapy    Physical Therapy Specialty Spinal Cord PT        PT Plan    Frequency of treatment 2 times/week     PT Duration 3 months     PT Cert From 08/26/24     PT Cert To 11/22/24     Date PT POC was sent to provider 08/26/24     Signed PT Plan of Care received?  No   Original POC sent                   Assessment and Plan - 08/26/24 0805          Assessment    Plan of Care reviewed and patient/family in agreement Yes     System Pathology/Pathophysiology Noted musculoskeletal;neuromuscular;cardiovascular     Functional Limitations in Following Categories (PT Eval) self-care;work;community/leisure     Rehab Potential/Prognosis good, to achieve stated therapy goals     Problem List abnormal muscle tone;decreased strength;impaired balance;impaired sensation;decreased endurance;edema;hemiparesis/hemiplegia;impaired motor control;impaired coordination     Clinical Assessment Patient arrives to PT for re-eval where she was objectively assessed on 6MWT, gait speed, FGA, and 30s STS. Her 6MWT slightly regressed from 1612 ft to 1551 ft however her gait speed improved significantly from 1.22 m/sec to 1.39 m/sec as the difference is greater 0.13 m/sec. FGA improved by 2 points from a 25/30 to a 27/30, which meets the score among community dwelling adults and lastly, 30s STS remained the same at 10 reps. ABC scale and navigation of a FF steps also assessed this visit where pt self scored a 89.5% on the ABC scale and was able to navigate a FF steps in a reciprocal pattern without UE support, however did report difficulty  with descending due to decreased L eccentric strength. Patient was educated on performance with all assessments. Pt has been utilizing [employer] L300 upper and lower cuffs to improve gait mechanics on L LE, which has improved L swing, heel strike, and eccentric control in load response. She responds well to verbal and external cues to decrease GABRIELLE, however does have a tendency to revert back to compensatory patterns (increased step lengths, WBOS, and decrease L weight shift) ute once fatigued. Patient will cont to benefit from skilled OP PT 2x/week for an additional 8-12 weeks to address remaining impairments, maximize her functional mobility, and return to PLOF.     Plan and Recommendations Continue with 2 more equestrian sessions for postural education, core activation and strengthening.     Planned Services CPT 89785 Aquatic therapy/exercises;CPT 90998 Manual therapy;CPT 63481 Therapeutic activities;CPT 66211 Wheelchair management;CPT 61360 Electrical stimulation UNATTENDED;CPT 55276 Canalith repositioning procedure/maneuvers;CPT 58917 Neuromuscular Reeducation;CPT 88377 Orthotics/Prosthetics management and training (Subsequent encounters);CPT 11060 Therapeutic exercises;CPT 68843 Hot/Cold Packs therapy;CPT 07449 Work conditioning;CPT 33471 Gait training;CPT 28521 Orthotics training (Initial encounter);CPT 95637 Self-care/Home management training;CPT 17108 Electrical stimulation ATTENDED     Comments/Additional Services Additional services that may be used: Vector BWSS for gait training, Lokomat body weight support training for gait function, full weight bearing for gait training in an eksoskeleton, FES , Xcite, Equestrian Therapy                     OBJECTIVE MEASUREMENTS/DATA:    Gait and Mobility    Gait and Mobility - 08/26/24 0805          Gait Training    Comment Decreased compensatory strategies vs previous progress note however with fatigue, pt cont to demo WBOS, L foot slap, and increased step  lengths. Significant improvement noted in the above hwen Bioness L300 is donned.        Stairs Assessment    Stanley, Stairs independent     Assistive Device none     Number of Steps (Stairs) 12     Stair Height 7 inches     Ascending Stairs Technique step-over-step     Descending Stairs Technique step-over-step     Comment Decreased L eccentric control                   Outcome Measures    PT Outcome Measures - 08/26/24 0805          Objective Outcome Measures    6 Minute Walk Test 1551ft     Gait Speed (m/sec) 1.39 m/sec     FGA Score (out of 30 total) 27     30 Second Sit to Stand Test 10 repetitions        Subjective Outcome Measures    ABC 89.5%                     ROM and MMT          6/18/2024   PT Cervical/Lumbar/Other ROM Measurements   Other: Girth Measurement/Comments Mild dependent edema of LLE at end of day per pt reports; surgical scar approx 4 inches in length down mid lumbar spine from previous laminectomy, mild hypomobility down lower 50% of scar   Additional ROM  Hypermobile in all jointsm mild L hamstring length restrictions to approx 70 deg due to tone.   PT LE MMT   Right Hip Flexion (5/5) normal   Left Hip Flexion (3-/5) fair minus   Right Hip Extension (4+/5) good plus   Left Hip Extension (3-/5) fair minus   Right Hip ABD (4+/5) good plus   Left Hip ABD (3-/5) fair minus   Left Hip ADD (4+/5) good plus   Left Hip IR (3+/5) fair plus   Left Hip ER (3+/5) fair plus   Right Knee Flexion (5/5) normal   Left Knee Flexion (4/5) good   Right Knee Extension (5/5) normal   Left Knee Extension (4-/5) good minus   Right Ankle DF (5/5) normal   Left Ankle DF (3+/5) fair plus   Right Ankle PF (5/5) normal   Left Ankle PF (4+/5) good plus   Right Ankle Inversion (5/5) normal   Left Ankle Inversion (3-/5) fair minus   Right Ankle Eversion (5/5) normal   Left Ankle Eversion (4-/5) good minus     Outcome Measures          6/18/2024    10:09 6/24/2024    17:03 7/26/2024    08:58 8/26/2024    08:05   PT  OBJECTIVE Outcome Measures   6 Minute Walk Test 1555 ft  1612 ft 1551ft   Gait Speed (m/sec) 1 m/sec  1.22 m/sec       SSV 1.39 m/sec   30 Second Sit to Stand --        TBA NV  10 repetitions 10 repetitions   FGA 24  25 27   PT SUBJECTIVE Outcome Measures   ABC  1360/1600 = 85% confidence  89.5%   Other  SLS: R: 30 seconds, L <5 seconds without pelvic drop SLS: R: 30 seconds, L: 23 seconds        Today's Treatment::    Education provided:  Yes: See treatment log for details of education provided       PT Neuro Exercises Current Session   Performed Today? (y/n)   THER ACT   CPT 52429 TOTAL TIME FOR SESSION 8-22 Minutes      Pain, vitals, subjective    Provided rest breaks for energy conservation yes   Skin inspection Base of incision, no swelling noted    FES  cycle [id number] (Age 28) , PIN [id number]   Bike height: 2 holes showing  Pedal height: 3 holes showing  L LE set up only    FES Xcite Set Up              [facility]     Patient Education Patient educated regarding current impairments per testing completed today and PT POC moving forward.     Patient provided with Welcome Letter, which includes attendance policy. Provided education regarding cancellation and no-show policy. Education regarding the importance of participation and regular attendance to maximize goal attainment. Patient verbalized understanding/agreement.     Clearance for FES received, will be completed NV, start with handheld unit to assess tolerance prior to FES bike. Pt verbalized understanding.    Patient educated regarding progress made thus far, current impairments per re-assessments completed today and PT POC moving forward. Patient verbalized understanding/agreement.     Discussion regarding Equestrian scheduling and scheduling with [facility]. PT to find out next time [facility] rep is at [facility] to adjust pt schedule. Will provide with forms as well.                                Yes                HEP  Verbally reviewed practicing gait over  "level surfaces with decreased step width, and then incorporating BUE swing and trunk rot. Pt verbalized understanding.     Floor Transfers  CloseS with increased time for LLE management, requires single UE support with review of moving from tall kneel to half kneeling position. Pt demos good understanding, will require continued practice     Subjective Outcomes Measures       ABC Scale Assessed  yes   THER EX  CPT 62711 TOTAL TIME FOR SESSION  23-37 Minutes      STRETCHING      Stretching by patient Incline board stretch: L3 - 1 min x2    Stretching by therapist/PROM Trialed modified jose stretch - not effective (pt states modified lunge position she has felt more of a stretch in hip flexors in the past)      CARDIOVASCULAR       Nu Step 6 mins, Level: 1, BLE only     Stationary Bike Recumbent bike 5 min, L1  (Unable to complete revolution on Expresso upright bike)    Ambulation with AD      Treadmill       Endurance Testing       6mWT Assessed yes   STRENGTH TRAINING     HEP Review     Standing Ther-Ex Standing hip abduction 2 x 10 ea  - standing on airex   - Second set, no UE support     Step ups 6\" block x 20   - Added L LE TKE w orange TB    Step down, 2\" block, L LE only w orange band TKE x 10    Hip hikes 2\" block, VC's to discourage R hip hike     Heel raises from L2 on incline board x 15 (on floor today)      Side-lying there-ex Clamshells: 2x20 on R, L in supine: 5\"x10      Supine Ther-Ex Diaphragmatic breathin mins due to elevated BP  Glut bridge: 3\" x10, orange band  Bridge: x10   Supine hip abduction x 10, LLE, isometrics today  BKFO: x10, orange t-band only for R, active assist on L   Clamshells, x 10 ea LE  Bridge with ball squeeze x 10                  Deadbugs with physioball  - isometric holds: 10\" x 10, physioball under B heels   - alt LE marches with heels on physioball: x10  - alt UE: 3 sets x5 reps       Quadruped  hip extensions with towel under L foot: x10, B/L (modified position with " "BUE on EOM and LE on floor)     Quadruped <> bear crawl: 10\" x 10    Alternating UE flexion x 10  Alternating LE extension x 10; use of slider L LE           Yes  Yes   Tall kneel Moderate perturbations all directions  Mini squats 2 x 10 w/ 6lb ball  Trunk rot: x10   D2 chops/lifts: x10    Prone Ther-Ex     Core exercises PPT x 10   PPT with ball squeeze 5 sec hold, 10 x  PPT with ball squeeze and bridge x 10   PPT with supine marches x 10  PPT with hip abduction isometric L LE x 3 sec hold x10, orange band     Heel slides with towel: 2x10  Triple flex over physioball: x10  Windshield wipers BLE over physioball 2 x 10     STS's from low EOM squeezing pilates ring  - As above, with squeeze, in staggered stance  Marching, squeezing small ball 30 ft x 2    Modified dead bugs, alt UE 2 x 10     Supine B shoulder ext with GTB, emphasis on core activation; 2 x 15                       Yes            Yes   Functional Strength Testing       30 sec STS test (60y +)  Assessed yes   NEURO RE-ED  CPT 81923 TOTAL TIME FOR SESSION 8-22 Minutes      FES  To promote neurorecovery of gait:  Distance: 3.01 miles  Resistance: 0.77 Nm  Power: 2.9 W  Symmetry: L 2%  Total time: 21:00  Active time: 19:03    Xcite  NMRE of LLE in function:     - Sit <> stand from elevated EOM using 6 lb med ball: x10, then progressing to lowest height: x10, then x10 in staggered stance  - Modified reverse lunging with slider under R foot in parallel bars with BUE support > RUE support only: x10 reps each  - Tall kneel <> heel sit: 2x10, 2nd set with chest press   - fwd step up with LLE onto 6\" block: x10, repeated with R knee drive, x10 repeated with 3# on RUE     Bioness L300 Go  - Fwd/rev step over 12\" virginia virginia: x20,   - Fwd step up onto 6\" block with contra LE march: 2x10, B         - RLE step up, LLE march with 8lb db overhead press        - LLE step up, RLE march while holding 8lb db x 10, overhead press with 8lb db x 5  - Fwd step down 4\" " "block: 2x10, orange theraband around knees for manual cues   - stance on airex with lateral L toe taps to 12\" virginia: x10  - Tandem walk 20'x2, 6#   - step up on 4\" block, contralateral heel tap to 10\" block   - 12\" and 6\" hurdles, navigating reciprocally                    COORDINATION       Target Tapping     Coordination ladder As appropriate for HL balance     Amb with ankle weights     Amb with proprio wrap     Pushing weighted shopping cart     POSTURAL RE-ED     Sit <> Stand  From 22\" mat holding 6# med ball, cues for midline and equal WB      Tall Kneel  Tall kneel <> short sit: x10, x10 with chest press     Seated & standing reaching for trunk strengthening     Abbey-scapular strengthening     PRE-GAIT ACTIVITIES      Tap-ups     Step-ups To 6\" block with contralateral knee drive: x10, repeated with 2 lb DB for UE swing     Standing weight shifting     Step-taps     BALANCE TRAINING     Standing balance - static/dynamic       HEP Review       Floor       Airex Santiago-tandem: 30 seconds x 2, HT: 30\" x 1, EC: 30 seconds x 1  Tandem stance: 30 seconds x 1     Rockerboard AP plane: x10, with light UE support   AP, chest press x 10, OH press x 10, 6lb   Squats 2 x 10      Hurdles 6\" hurdles, focus on dec L hip circumduction through swing and encouraging heel strike. Step forward/back    Repeated with side stepping pattern, requiring BUE support dud to inability for L knee extension through stance     BOSU Ball  Fwd mini lunge onto domed side: x10, CloseS   Modified lunge onto BOSU with palloff press:   - x10, then repeated x10 with perturbations on yellow sports cord     Split Stance      Biodex Balance Training  Weight shifting and motor control training with increased difficulty noted to L anterolateral planes: x5 mins     Dynamic gait       Side stepping With partial squat with orange theraband around ankles: 10'x4     Retro walking Fwd/retro amb 50'x6 with cues for shorter step length and for heel strike + knee " extension through stance, mirror anteriorly, added dual task of beach bal    Tandem Walking     Suitcase carry Using small 3 lb B DB on R: 100'x2, noted delayed L knee extension through IC and mid stance     Amb with hula hoop Using hula hoop to facilitate BUE swing and trunk rot     Walking with ball toss     Proprioceptive wrap Blue TB around L LE  - Amb along purple line x 3 with cues to keep feet within tiles  - x 250ft with same cues as above  - Retro amb 3 x 20 ft  - Mini squats with heels elevated x 10, focus on minimizing hyperextension    Balance Testing     FGA  Assessed yes   SLS  Assessed see note     10mWT Assessed  - (6mWT on 8/26) yes   GAIT TRAINING  CPT 51162 TOTAL TIME FOR SESSION Not performed      Ambulation without AD  Gait with no AD over level indoor surfaces with WBOS and decreased L weight shift, VC for anterolateral weight shift, heel strike, BUE swing and trunk rot, 150'x1    Dynamic Gait  20'x6 with focus on shorter step lengths, heel strike and decreasing step width with use of 1' floor tiles, repeated with bimanual ball toss with CloseS and noted decreased gait speed and increase in step lengths     With Bioness donned x 250 ft   - Good clearance of L LE through swing with improved knee flexion through swing       Treadmill, BUE support to 1UE support, with Bioness donned 2.1 mph x 10 min   - VC for increased nancy  - Improved GABRIELLE   - Following TM, without stim on, mild L foot slap and WBOS seen.     Following removal of proprioceptive wrap x 250ft, no instances of L knee hyperextension thrust noted.                               Stair negotiation  Full flight stairs 1 trial with 1 rail support; 1 trial without UE support. Tactile cues for trunk midline, verbal and visual cues for eccentric control  yes (billed with neuro re-ed)   Curb negotiation       Ramp negotiation       Outdoor ambulation  With Bioness:  - x500 ft approx, inclines/declines  - Retro amb up incline 3 x 15 ft      MANUAL  CPT 73041 TOTAL TIME FOR SESSION Not performed      Mobilization        MODALITIES  CPT 06447 TOTAL TIME FOR SESSION      Ice       Heat       ATTENDED E-STIM  CPT 29183 TOTAL TIME FOR SESSION Not performed     Attended E-Stim FES    [id number] (Age 28) , PIN [id number]   Stim to L quad, hamstring, glutes, tib ant, and gastroc. 250-350 pulse width, 40Hz frequency.   Muscle testing testing completed for initial set up (7/8)    Xcite use for LLE:     Stim to L quad, hamstring, glutes, tib ant, and gastroc. 250-350 pulse width, 40Hz frequency.   Muscle testing testing completed for initial set up 7/26)    Bioness:   L lower leg quick fit pads, L hamstring added 8/2  30 Hz with 0.2 ramp for loading response   Used tablet without strap                                 Unattended E-stim             Goals Addressed                   This Visit's Progress     PT Neuro Goals        Short term goals   Short Term Goals Time Frame Result Comment/Progress   Assess ABC, floor transfers, SLS assessment, 30sSTS  2 weeks Goal met     Improve 6mWT by 191 feet or more to meet the MCID indicating significant improvement in endurance and activity tolerance. 4-6 weeks Ongoing goal     Improve ABC Scale score to 60% or better suggesting improved balance confidence during functional tasks 4-6 weeks Goal met  85%    Improve FGA score by 5 points or more to meet the MCID indicating improving dynamic balance and decreasing falls risk. 4-6 weeks Ongoing goal  Improved by 1 point   Tolerate 10 min of CV activity with VSS 4-6 weeks Goal MET     Progress gait speed by 0.13 m/sec or more to meet the MCID without decline in safety or quality indicating significant improvement in gait speed and increased safety during ambulation in the home and community. 4-6 weeks Goal MET     Pt and caregiver will be mod I with HEP 4 weeks Goal MET     Long term goals   Long Term Goals Time Frame Result Comment/Progress   Pt will complete floor transfer  without any external support Independently  8-12 weeks  ongoing    Pt will navigate FF 7 inch steps unsupported with reciprocal pattern  8-12 weeks Met     Improve 6mWT by an additional 191 feet or more to meet the MCID indicating significant improvement in endurance and activity tolerance. 8-12 weeks ongoing 8/26 - 1551ft   Improve 30 sec STS test reps to 15 reps or better to meet the age/gender matched norm and cut-off score indicating adequate LE muscle performance for functional activities. 8-12 weeks ongoing    Improve ABC Scale score to 81% or better suggesting improved balance confidence during functional tasks and decreased risk for falls. 8-12 weeks Met    Improve FGA score to > 28/30 indicating improved dynamic balance and decreased falls risk. 8-12 weeks ongoing 8/26 - 27/30   Tolerate 12-15 min of CV activity with VSS 8-12 weeks Met    Progress gait speed by an additional 0.13 m/sec or more to meet the MCID without decline in safety or quality indicating significant improvement in gait speed and increased safety during ambulation in the home and community. 8-12 weeks Met 8/26 - 1.39m/s   Pt and caregiver will be mod I with updated HEP 8-12 weeks ongoing      LTG's following re-eval:    Long Term Goals Time Frame Result Comment/Progress   Patient will complete assessment with Bioness rep to determine fit/next steps if appropriate    8-12 weeks  Ongoing     Pt will complete floor transfer without any external support independently    8-12 weeks  ongoing    Improve 6mWT by an additional 191 feet or more to meet the MCID indicating significant improvement in endurance and activity tolerance.   8-12 weeks ongoing 8/26 - 1551ft   Improve 30 sec STS test reps to 15 reps or better to meet the age/gender matched norm and cut-off score indicating adequate LE muscle performance for functional activities.   8-12 weeks ongoing    Improve FGA score to >/= 28/30 indicating improved dynamic balance and decreased falls  risk.   8-12 weeks ongoing 8/26 - 27/30   Progress gait speed by an additional 0.13 m/sec or more to meet the MCID without decline in safety or quality indicating significant improvement in gait speed and increased safety during ambulation in the home and community.   8-12 weeks Ongoing 8/26 - 1.39m/s                  Ann Marie Manzano PT                           Current Participants as of 9/3/2024    Name Type Comments Contact Info    Remedios Whitfield MD PCP - General  [phone]    Signature pending    Ann Marie Manzano PT Physical Therapist      Signature pending

## 2024-08-26 NOTE — PROGRESS NOTES
Physical Therapy Progress Note    PT RE-EVALUATION FOR OUTPATIENT THERAPY    Patient: Melanie Litten   MRN: [id number]  : 1995 28 y.o.    Referring Physician: Remedios Whitfield MD  Date of Visit: 2024    Certification Dates: 24 through 24    Recommended Frequency & Duration:  2 times/week for up to 3 months        Diagnosis:   1. History of tethered spinal cord        Chief Complaints:  No chief complaint on file.      Precautions:   Precautions comments: hypermobile - EDS, postural hypotension    TODAY'S VISIT:    Time In Session:  Start Time: 802  Stop Time: 857  Time Calculation (min): 55 min   General Information - 24 08          Session Details    Document Type re-evaluation        General Information    Referring Physician Remedios Whitfield MD     History of present illness/functional impairment Saranya is a 28 year old female who presents to OPPT with history of tethered cord syndrome. Pt with PMH significant for chiari malformation, hypermobile EDS, left plantar fasciitis, anxiety and postural hypotension. Pt reports her symptoms started slowly in 2019 with L hip pain, followed up with neurosurgeon in [place] with unremarkable results for all testing. Her symptoms then progressed with L leg motor weakness and shakiness (similar to clonus) with movement, chronic constipation, L drop foot, lower back pain, and neck pain.  Pt was finally diagnosed with tethered cord syndrome and underwent a clinical trial surgery at [facility], and [facility]. Due to her symptoms, she meets criteria for exploration and sectioning of her filum for the functional tethered cord clinical trial. Now s/p laminectomy for occult tethered cord release on 23 by Dr Duckworth. Patient tolerated procedure well. No post-op complications. Transferred stable once PACU criteria met. Patient kept FLAT for 36hr. Placed on an aseptic dex taper and  post-op IV abx x24hrs.  She was mobilized on 9/27 tolerated well without any symptoms. Additionally, was instructed not to exercise for 7 weeks post-surgery. Pt reports she recovered well overall with decreased drop foot, clonus and swelling, continues to have increased coordination deficits and foot drag with fatigue. Pt has completed OPPT at [facility] in [place] and intermittent massage therapy. Pt’s functional goals are to work on higher level balance, picking up things from floor, coordination and “Hippo therapy”.     Patient/Family/Caregiver Comments/Observations Patient with no new changes since last visit. Notes some spasms around thoracic spine while sitting on floor this weekend     Precautions comments hypermobile - EDS, postural hypotension                    Daily Falls Screen - 08/26/24 0805          Daily Falls Assessment    Patient reported fall since last visit No                    Pain/Vitals - 08/26/24 0805          Pain Assessment    Currently in pain No/Denies        Pre Activity Vital Signs    /74     BP Location Left upper arm     BP Method Manual     Patient Position Sitting                    PT - 08/26/24 0805          Physical Therapy    Physical Therapy Specialty Spinal Cord PT        PT Plan    Frequency of treatment 2 times/week     PT Duration 3 months     PT Cert From 08/26/24     PT Cert To 11/22/24     Date PT POC was sent to provider 08/26/24     Signed PT Plan of Care received?  No   Original POC sent                   Assessment and Plan - 08/26/24 0805          Assessment    Plan of Care reviewed and patient/family in agreement Yes     System Pathology/Pathophysiology Noted musculoskeletal;neuromuscular;cardiovascular     Functional Limitations in Following Categories (PT Eval) self-care;work;community/leisure     Rehab Potential/Prognosis good, to achieve stated therapy goals     Problem List abnormal muscle tone;decreased strength;impaired balance;impaired  sensation;decreased endurance;edema;hemiparesis/hemiplegia;impaired motor control;impaired coordination     Clinical Assessment Patient arrives to PT for re-eval where she was objectively assessed on 6MWT, gait speed, FGA, and 30s STS. Her 6MWT slightly regressed from 1612 ft to 1551 ft however her gait speed improved significantly from 1.22 m/sec to 1.39 m/sec as the difference is greater 0.13 m/sec. FGA improved by 2 points from a 25/30 to a 27/30, which meets the score among community dwelling adults and lastly, 30s STS remained the same at 10 reps. ABC scale and navigation of a FF steps also assessed this visit where pt self scored a 89.5% on the ABC scale and was able to navigate a FF steps in a reciprocal pattern without UE support, however did report difficulty with descending due to decreased L eccentric strength. Patient was educated on performance with all assessments. Pt has been utilizing [employer] L300 upper and lower cuffs to improve gait mechanics on L LE, which has improved L swing, heel strike, and eccentric control in load response. She responds well to verbal and external cues to decrease GABRIELLE, however does have a tendency to revert back to compensatory patterns (increased step lengths, WBOS, and decrease L weight shift) ute once fatigued. Patient will cont to benefit from skilled OP PT 2x/week for an additional 8-12 weeks to address remaining impairments, maximize her functional mobility, and return to PLOF.     Plan and Recommendations Continue with 2 more equestrian sessions for postural education, core activation and strengthening.     Planned Services CPT 77900 Aquatic therapy/exercises;CPT 94727 Manual therapy;CPT 76149 Therapeutic activities;CPT 89581 Wheelchair management;CPT 39310 Electrical stimulation UNATTENDED;CPT 74298 Canalith repositioning procedure/maneuvers;CPT 19919 Neuromuscular Reeducation;CPT 59217 Orthotics/Prosthetics management and training (Subsequent encounters);CPT 44806  Therapeutic exercises;CPT 96058 Hot/Cold Packs therapy;CPT 36954 Work conditioning;CPT 20800 Gait training;CPT 38596 Orthotics training (Initial encounter);CPT 97071 Self-care/Home management training;CPT 65833 Electrical stimulation ATTENDED     Comments/Additional Services Additional services that may be used: Vector BWSS for gait training, Lokomat body weight support training for gait function, full weight bearing for gait training in an eksoskeleton, FES , Xcite, Equestrian Therapy                     OBJECTIVE MEASUREMENTS/DATA:    Gait and Mobility    Gait and Mobility - 08/26/24 0805          Gait Training    Comment Decreased compensatory strategies vs previous progress note however with fatigue, pt cont to demo WBOS, L foot slap, and increased step lengths. Significant improvement noted in the above [facility] SL L300 is donned.        Stairs Assessment    [place], Stairs independent     Assistive Device none     Number of Steps (Stairs) 12     Stair Height 7 inches     Ascending Stairs Technique step-over-step     Descending Stairs Technique step-over-step     Comment Decreased L eccentric control                   Outcome Measures    PT Outcome Measures - 08/26/24 0805          Objective Outcome Measures    6 Minute Walk Test 1551ft     Gait Speed (m/sec) 1.39 m/sec     FGA Score (out of 30 total) 27     30 Second Sit to Stand Test 10 repetitions        Subjective Outcome Measures    ABC 89.5%                     ROM and MMT          6/18/2024   PT Cervical/Lumbar/Other ROM Measurements   Other: Girth Measurement/Comments Mild dependent edema of LLE at end of day per pt reports; surgical scar approx 4 inches in length down mid lumbar spine from previous laminectomy, mild hypomobility down lower 50% of scar   Additional ROM  Hypermobile in all jointsm mild L hamstring length restrictions to approx 70 deg due to tone.   PT LE MMT   Right Hip Flexion (5/5) normal   Left Hip Flexion (3-/5) fair minus    Right Hip Extension (4+/5) good plus   Left Hip Extension (3-/5) fair minus   Right Hip ABD (4+/5) good plus   Left Hip ABD (3-/5) fair minus   Left Hip ADD (4+/5) good plus   Left Hip IR (3+/5) fair plus   Left Hip ER (3+/5) fair plus   Right Knee Flexion (5/5) normal   Left Knee Flexion (4/5) good   Right Knee Extension (5/5) normal   Left Knee Extension (4-/5) good minus   Right Ankle DF (5/5) normal   Left Ankle DF (3+/5) fair plus   Right Ankle PF (5/5) normal   Left Ankle PF (4+/5) good plus   Right Ankle Inversion (5/5) normal   Left Ankle Inversion (3-/5) fair minus   Right Ankle Eversion (5/5) normal   Left Ankle Eversion (4-/5) good minus     Outcome Measures          6/18/2024    10:09 6/24/2024    17:03 7/26/2024    08:58 8/26/2024    08:05   PT OBJECTIVE Outcome Measures   6 Minute Walk Test 1555 ft  1612 ft 1551ft   Gait Speed (m/sec) 1 m/sec  1.22 m/sec       SSV 1.39 m/sec   30 Second Sit to Stand --        TBA NV  10 repetitions 10 repetitions   FGA 24  25 27   PT SUBJECTIVE Outcome Measures   ABC  1360/1600 = 85% confidence  89.5%   Other  SLS: R: 30 seconds, L <5 seconds without pelvic drop SLS: R: 30 seconds, L: 23 seconds        Today's Treatment::    Education provided:  Yes: See treatment log for details of education provided       PT Neuro Exercises Current Session   Performed Today? (y/n)   THER ACT   CPT 54980 TOTAL TIME FOR SESSION 8-22 Minutes      Pain, vitals, subjective    Provided rest breaks for energy conservation yes   Skin inspection Base of incision, no swelling noted    FES  cycle [id number] (Age 28) , PIN [id number]   Bike height: 2 holes showing  Pedal height: 3 holes showing  L LE set up only    FES Xcite Set Up              Bioness     Patient Education Patient educated regarding current impairments per testing completed today and PT POC moving forward.     Patient provided with Welcome Letter, which includes attendance policy. Provided education regarding cancellation and  "no-show policy. Education regarding the importance of participation and regular attendance to maximize goal attainment. Patient verbalized understanding/agreement.     Clearance for FES received, will be completed NV, start with handheld unit to assess tolerance prior to FES bike. Pt verbalized understanding.    Patient educated regarding progress made thus far, current impairments per re-assessments completed today and PT POC moving forward. Patient verbalized understanding/agreement.     Discussion regarding Equestrian scheduling and scheduling with [facility] rep. PT to find out next time [facility] rep is at [facility] to adjust pt schedule. Will provide with forms as well.                                Yes                HEP  Verbally reviewed practicing gait over level surfaces with decreased step width, and then incorporating BUE swing and trunk rot. Pt verbalized understanding.     Floor Transfers  CloseS with increased time for LLE management, requires single UE support with review of moving from tall kneel to half kneeling position. Pt demos good understanding, will require continued practice     Subjective Outcomes Measures       ABC Scale Assessed  yes   THER EX  CPT 04361 TOTAL TIME FOR SESSION  23-37 Minutes      STRETCHING      Stretching by patient Incline board stretch: L3 - 1 min x2    Stretching by therapist/PROM Trialed modified jose stretch - not effective (pt states modified lunge position she has felt more of a stretch in hip flexors in the past)      CARDIOVASCULAR       Nu Step 6 mins, Level: 1, BLE only     Stationary Bike Recumbent bike 5 min, L1  (Unable to complete revolution on Expresso upright bike)    Ambulation with AD      Treadmill       Endurance Testing       6mWT Assessed yes   STRENGTH TRAINING     HEP Review     Standing Ther-Ex Standing hip abduction 2 x 10 ea  - standing on airex   - Second set, no UE support     Step ups 6\" block x 20   - Added L LE TKE w orange TB    Step down, 2\" " "block, L LE only w orange band TKE x 10    Hip hikes 2\" block, VC's to discourage R hip hike     Heel raises from L2 on incline board x 15 (on floor today)      Side-lying there-ex Clamshells: 2x20 on R, L in supine: 5\"x10      Supine Ther-Ex Diaphragmatic breathin mins due to elevated BP  Glut bridge: 3\" x10, orange band  Bridge: x10   Supine hip abduction x 10, LLE, isometrics today  BKFO: x10, orange t-band only for R, active assist on L   Clamshells, x 10 ea LE  Bridge with ball squeeze x 10                  Deadbugs with physioball  - isometric holds: 10\" x 10, physioball under B heels   - alt LE marches with heels on physioball: x10  - alt UE: 3 sets x5 reps       Quadruped  hip extensions with towel under L foot: x10, B/L (modified position with BUE on EOM and LE on floor)     Quadruped <> bear crawl: 10\" x 10    Alternating UE flexion x 10  Alternating LE extension x 10; use of slider L LE           Yes  Yes   Tall kneel Moderate perturbations all directions  Mini squats 2 x 10 w/ 6lb ball  Trunk rot: x10   D2 chops/lifts: x10    Prone Ther-Ex     Core exercises PPT x 10   PPT with ball squeeze 5 sec hold, 10 x  PPT with ball squeeze and bridge x 10   PPT with supine marches x 10  PPT with hip abduction isometric L LE x 3 sec hold x10, orange band     Heel slides with towel: 2x10  Triple flex over physioball: x10  Windshield wipers BLE over physioball 2 x 10     STS's from low EOM squeezing pilates ring  - As above, with squeeze, in staggered stance  Marching, squeezing small ball 30 ft x 2    Modified dead bugs, alt UE 2 x 10     Supine B shoulder ext with GTB, emphasis on core activation; 2 x 15                       Yes            Yes   Functional Strength Testing       30 sec STS test (60y +)  Assessed yes   NEURO RE-ED  CPT 33287 TOTAL TIME FOR SESSION 8-22 Minutes      FES  To promote neurorecovery of gait:  Distance: 3.01 miles  Resistance: 0.77 Nm  Power: 2.9 W  Symmetry: L 2%  Total time: " "21:00  Active time: 19:03    Xcite  NMRE of LLE in function:     - Sit <> stand from elevated EOM using 6 lb med ball: x10, then progressing to lowest height: x10, then x10 in staggered stance  - Modified reverse lunging with slider under R foot in parallel bars with BUE support > RUE support only: x10 reps each  - Tall kneel <> heel sit: 2x10, 2nd set with chest press   - fwd step up with LLE onto 6\" block: x10, repeated with R knee drive, x10 repeated with 3# on RUE     Bioness L300 Go  - Fwd/rev step over 12\" virginia virginia: x20,   - Fwd step up onto 6\" block with contra LE march: 2x10, B         - RLE step up, LLE march with 8lb db overhead press        - LLE step up, RLE march while holding 8lb db x 10, overhead press with 8lb db x 5  - Fwd step down 4\" block: 2x10, orange theraband around knees for manual cues   - stance on airex with lateral L toe taps to 12\" virginia: x10  - Tandem walk 20'x2, 6#   - step up on 4\" block, contralateral heel tap to 10\" block   - 12\" and 6\" hurdles, navigating reciprocally                    COORDINATION       Target Tapping     Coordination ladder As appropriate for HL balance     Amb with ankle weights     Amb with proprio wrap     Pushing weighted shopping cart     POSTURAL RE-ED     Sit <> Stand  From 22\" mat holding 6# med ball, cues for midline and equal WB      Tall Kneel  Tall kneel <> short sit: x10, x10 with chest press     Seated & standing reaching for trunk strengthening     Abbey-scapular strengthening     PRE-GAIT ACTIVITIES      Tap-ups     Step-ups To 6\" block with contralateral knee drive: x10, repeated with 2 lb DB for UE swing     Standing weight shifting     Step-taps     BALANCE TRAINING     Standing balance - static/dynamic       HEP Review       Floor       Airex Santiago-tandem: 30 seconds x 2, HT: 30\" x 1, EC: 30 seconds x 1  Tandem stance: 30 seconds x 1     Rockerboard AP plane: x10, with light UE support   AP, chest press x 10, OH press x 10, 6lb   Squats 2 " "x 10      Hurdles 6\" hurdles, focus on dec L hip circumduction through swing and encouraging heel strike. Step forward/back    Repeated with side stepping pattern, requiring BUE support dud to inability for L knee extension through stance     BOSU Ball  Fwd mini lunge onto domed side: x10, CloseS   Modified lunge onto BOSU with palloff press:   - x10, then repeated x10 with perturbations on yellow sports cord     Split Stance      Bi[employer] Balance Training  Weight shifting and motor control training with increased difficulty noted to L anterolateral planes: x5 mins     Dynamic gait       Side stepping With partial squat with orange theraband around ankles: 10'x4     Retro walking Fwd/retro amb 50'x6 with cues for shorter step length and for heel strike + knee extension through stance, mirror anteriorly, added dual task of beach bal    Tandem Walking     Suitcase carry Using small 3 lb B DB on R: 100'x2, noted delayed L knee extension through IC and mid stance     Amb with hula hoop Using hula hoop to facilitate BUE swing and trunk rot     Walking with ball toss     Proprioceptive wrap Blue TB around L LE  - Amb along purple line x 3 with cues to keep feet within tiles  - x 250ft with same cues as above  - Retro amb 3 x 20 ft  - Mini squats with heels elevated x 10, focus on minimizing hyperextension    Balance Testing     FGA  Assessed yes   SLS  Assessed see note     10mWT Assessed  - (6mWT on 8/26) yes   GAIT TRAINING  CPT 95423 TOTAL TIME FOR SESSION Not performed      Ambulation without AD  Gait with no AD over level indoor surfaces with WBOS and decreased L weight shift, VC for anterolateral weight shift, heel strike, BUE swing and trunk rot, 150'x1    Dynamic Gait  20'x6 with focus on shorter step lengths, heel strike and decreasing step width with use of 1' floor tiles, repeated with bimanual ball toss with CloseS and noted decreased gait speed and increase in step lengths     With Bioness donned x 250 ft   - " Good clearance of L LE through swing with improved knee flexion through swing       Treadmill, BUE support to 1UE support, with Bioness donned 2.1 mph x 10 min   - VC for increased nancy  - Improved GABRIELLE   - Following TM, without stim on, mild L foot slap and WBOS seen.     Following removal of proprioceptive wrap x 250ft, no instances of L knee hyperextension thrust noted.                               Stair negotiation  Full flight stairs 1 trial with 1 rail support; 1 trial without UE support. Tactile cues for trunk midline, verbal and visual cues for eccentric control  yes (billed with neuro re-ed)   Curb negotiation       Ramp negotiation       Outdoor ambulation  With Bioness:  - x500 ft approx, inclines/declines  - Retro amb up incline 3 x 15 ft     MANUAL  CPT 37182 TOTAL TIME FOR SESSION Not performed      Mobilization        MODALITIES  CPT 77075 TOTAL TIME FOR SESSION      Ice       Heat       ATTENDED E-STIM  CPT 97400 TOTAL TIME FOR SESSION Not performed     Attended E-Stim FES    [id number] (Age 28) , PIN [id number]   Stim to L quad, hamstring, glutes, tib ant, and gastroc. 250-350 pulse width, 40Hz frequency.   Muscle testing testing completed for initial set up (7/8)    Xcite use for LLE:     Stim to L quad, hamstring, glutes, tib ant, and gastroc. 250-350 pulse width, 40Hz frequency.   Muscle testing testing completed for initial set up 7/26)    Bioness:   L lower leg quick fit pads, L hamstring added 8/2  30 Hz with 0.2 ramp for loading response   Used tablet without strap                                 Unattended E-stim             Goals Addressed                   This Visit's Progress     PT Neuro Goals        Short term goals   Short Term Goals Time Frame Result Comment/Progress   Assess ABC, floor transfers, SLS assessment, 30sSTS  2 weeks Goal met     Improve 6mWT by 191 feet or more to meet the MCID indicating significant improvement in endurance and activity tolerance. 4-6 weeks Ongoing goal      Improve ABC Scale score to 60% or better suggesting improved balance confidence during functional tasks 4-6 weeks Goal met  85%    Improve FGA score by 5 points or more to meet the MCID indicating improving dynamic balance and decreasing falls risk. 4-6 weeks Ongoing goal  Improved by 1 point   Tolerate 10 min of CV activity with VSS 4-6 weeks Goal MET     Progress gait speed by 0.13 m/sec or more to meet the MCID without decline in safety or quality indicating significant improvement in gait speed and increased safety during ambulation in the home and community. 4-6 weeks Goal MET     Pt and caregiver will be mod I with HEP 4 weeks Goal MET     Long term goals   Long Term Goals Time Frame Result Comment/Progress   Pt will complete floor transfer without any external support Independently  8-12 weeks  ongoing    Pt will navigate FF 7 inch steps unsupported with reciprocal pattern  8-12 weeks Met     Improve 6mWT by an additional 191 feet or more to meet the MCID indicating significant improvement in endurance and activity tolerance. 8-12 weeks ongoing 8/26 - 1551ft   Improve 30 sec STS test reps to 15 reps or better to meet the age/gender matched norm and cut-off score indicating adequate LE muscle performance for functional activities. 8-12 weeks ongoing    Improve ABC Scale score to 81% or better suggesting improved balance confidence during functional tasks and decreased risk for falls. 8-12 weeks Met    Improve FGA score to > 28/30 indicating improved dynamic balance and decreased falls risk. 8-12 weeks ongoing 8/26 - 27/30   Tolerate 12-15 min of CV activity with VSS 8-12 weeks Met    Progress gait speed by an additional 0.13 m/sec or more to meet the MCID without decline in safety or quality indicating significant improvement in gait speed and increased safety during ambulation in the home and community. 8-12 weeks Met 8/26 - 1.39m/s   Pt and caregiver will be mod I with updated HEP 8-12 weeks ongoing       LTG's following re-eval:    Long Term Goals Time Frame Result Comment/Progress   Patient will complete assessment with Bioness rep to determine fit/next steps if appropriate    8-12 weeks  Ongoing     Pt will complete floor transfer without any external support independently    8-12 weeks  ongoing    Improve 6mWT by an additional 191 feet or more to meet the MCID indicating significant improvement in endurance and activity tolerance.   8-12 weeks ongoing 8/26 - 1551ft   Improve 30 sec STS test reps to 15 reps or better to meet the age/gender matched norm and cut-off score indicating adequate LE muscle performance for functional activities.   8-12 weeks ongoing    Improve FGA score to >/= 28/30 indicating improved dynamic balance and decreased falls risk.   8-12 weeks ongoing 8/26 - 27/30   Progress gait speed by an additional 0.13 m/sec or more to meet the MCID without decline in safety or quality indicating significant improvement in gait speed and increased safety during ambulation in the home and community.   8-12 weeks Ongoing 8/26 - 1.39m/s                  Ann Marie Manzano, PT

## 2024-08-29 ENCOUNTER — HOSPITAL ENCOUNTER (OUTPATIENT)
Dept: PHYSICAL THERAPY | Facility: REHABILITATION | Age: 29
Setting detail: THERAPIES SERIES
Discharge: HOME | End: 2024-08-29
Attending: LEGAL MEDICINE
Payer: COMMERCIAL

## 2024-08-29 DIAGNOSIS — Z86.69 HISTORY OF TETHERED SPINAL CORD: Primary | ICD-10-CM

## 2024-08-29 PROCEDURE — 97112 NEUROMUSCULAR REEDUCATION: CPT | Mod: GP

## 2024-08-29 PROCEDURE — 97530 THERAPEUTIC ACTIVITIES: CPT | Mod: GP

## 2024-08-29 NOTE — PROGRESS NOTES
Physical Therapy Visit    PT DAILY NOTE FOR OUTPATIENT THERAPY    Patient: Melanie Litten MRN: [id number]  : 1995 28 y.o.  Referring Physician: Remedios Whtifield MD  Date of Visit: 2024    Certification Dates: 24 through 24    Diagnosis:   1. History of tethered spinal cord        Chief Complaints:  EDS, LLE weakness, gait/balance impairment    Precautions:   Precautions comments: hypermobile - EDS, postural hypotension      TODAY'S VISIT    Time In Session:  Start Time: 935  Stop Time: 1020  Time Calculation (min): 45 min   History/Vitals/Pain/Encounter Info - 24 1057          Injury History/Precautions/Daily Required Info    Document Type daily treatment     Primary Therapist Dilan Machuca     Chief Complaint/Reason for Visit  EDS, LLE weakness, gait/balance impairment     Referring Physician Remedios Whitfield MD     Precautions comments hypermobile - EDS, postural hypotension     History of present illness/functional impairment Saranya is a 28 year old female who presents to OPPT with history of tethered cord syndrome. Pt with PMH significant for chiari malformation, hypermobile EDS, left plantar fasciitis, anxiety and postural hypotension. Pt reports her symptoms started slowly in 2019 with L hip pain, followed up with neurosurgeon in [place] with unremarkable results for all testing. Her symptoms then progressed with L leg motor weakness and shakiness (similar to clonus) with movement, chronic constipation, L drop foot, lower back pain, and neck pain.  Pt was finally diagnosed with tethered cord syndrome and underwent a clinical trial surgery at [facility], and [facility]. Due to her symptoms, she meets criteria for exploration and sectioning of her filum for the functional tethered cord clinical trial. Now s/p laminectomy for occult tethered cord release on 23 by Dr Duckworth. Patient tolerated procedure well.  "No post-op complications. Transferred stable once PACU criteria met. Patient kept FLAT for 36hr. Placed on an aseptic dex taper and post-op IV abx x24hrs.  She was mobilized on 9/27 tolerated well without any symptoms. Additionally, was instructed not to exercise for 7 weeks post-surgery. Pt reports she recovered well overall with decreased drop foot, clonus and swelling, continues to have increased coordination deficits and foot drag with fatigue. Pt has completed OPPT at [facility] in [place] and intermittent massage therapy. Pt’s functional goals are to work on higher level balance, picking up things from floor, coordination and “Hippo therapy”.     Patient/Family/Caregiver Comments/Observations Pt reports that she was tired after last session (went home and took a 2 hr nap) but \"it felt good\".  She denied having pain post tx     Patient reported fall since last visit No        Pain Assessment    Currently in pain No/Denies                    Daily Treatment Assessment and Plan - 08/29/24 1057          Daily Treatment Assessment and Plan    Progress toward goals Progressing     Daily Outcome Summary Pt with much better ability to find and maintain midline.  did not use postural straps or Harrison ball due to improved proprioception and self corrections.  Continues to exhibit spasticity LLE after dismount due to fatigue.  Pt denied pain and felt \"not as tired as last time but definitely tired\".     Plan and Recommendations cont 1 more Equestrian session.                         OBJECTIVE DATA TAKEN TODAY:    None taken    Today's Treatment:    Today's Treatment:               PT Neuro Exercises Current Session   Performed Today? (y/n)   THER ACT   CPT 23231 TOTAL TIME FOR SESSION 10 Minutes      Pain, vitals, subjective    Provided rest breaks for energy conservation     Skin inspection Base of incision, no swelling noted     FES  cycle [id number] (Age 28) , PIN [id number]   Bike height: 2 holes showing  Pedal " height: 3 holes showing  L LE set up only     FES Xcite Set Up                    Bioness Additional time for set up due to connection issues        Equestrian Horse: Demario  Mount/Dismount: Patient able to mount horse without assistance swinging leg over rump of horse.       Equipment:  Fleece, pad, surcingle.    Not used on 8/29: Other equipment used during session: 2-posture straps, 1 godwin ball under right IT  Patient has previous equestrian riding at another facility earlier this summer -      -Reviewed today's treatment plan and expectations.  Monitored level of fatigue and pain t/o session. Yes    Patient Education Patient educated regarding current impairments per testing completed today and PT POC moving forward.      Patient provided with Welcome Letter, which includes attendance policy. Provided education regarding cancellation and no-show policy. Education regarding the importance of participation and regular attendance to maximize goal attainment. Patient verbalized understanding/agreement.      Clearance for FES received, will be completed NV, start with handheld unit to assess tolerance prior to FES bike. Pt verbalized understanding.     Patient educated regarding progress made thus far, current impairments per re-assessments completed today and PT POC moving forward. Patient verbalized understanding/agreement.                                                               HEP  Verbally reviewed practicing gait over level surfaces with decreased step width, and then incorporating BUE swing and trunk rot. Pt verbalized understanding.      Floor Transfers  CloseS with increased time for LLE management, requires single UE support with review of moving from tall kneel to half kneeling position. Pt demos good understanding, will require continued practice      Subjective Outcomes Measures       ABC Scale Assessed - returned today     THER EX  CPT 86872 TOTAL TIME FOR SESSION  billed in TA     STRETCHING      "  Stretching by patient Incline board stretch: L3 - 1 min x2     Stretching by therapist/PROM Trialed modified jose stretch - not effective (pt states modified lunge position she has felt more of a stretch in hip flexors in the past)     Manual hip flexor and adductor S, gentle B          y   CARDIOVASCULAR       Nu Step 6 mins, Level: 1, BLE only      Stationary Bike Recumbent bike 5 min, L1  (Unable to complete revolution on Expresso upright bike)     Ambulation with AD        Treadmill       Endurance Testing       6mWT Assessed     STRENGTH TRAINING       HEP Review       Standing Ther-Ex Standing hip abduction 2 x 10 ea  - standing on airex   - Second set, no UE support      Step ups 6\" block x 20   - Added L LE TKE w orange TB     Step down, 2\" block, L LE only w orange band TKE x 10     Hip hikes 2\" block, VC's to discourage R hip hike      Heel raises from L2 on incline board x 15 (on floor today)        Side-lying there-ex Clamshells: 2x20 on R, L in supine: 5\"x10       Supine Ther-Ex Diaphragmatic breathin mins due to elevated BP  Glut bridge: 3\" x10, orange band  Bridge: x10   Supine hip abduction x 10, LLE, isometrics today  BKFO: x10, orange t-band only for R, active assist on L   Clamshells, x 10 ea LE        Deadbugs with physioball  - isometric holds: 10\" x 10, physioball under B heels   - alt LE marches with heels on physioball: x10  - alt UE: 3 sets x5 reps         Quadruped  hip extensions with towel under L foot: x10, B/L (modified position with BUE on EOM and LE on floor)      Quadruped <> bear crawl: 10\" x 10     Tall kneel Moderate perturbations all directions  Mini squats 2 x 10 holding 6\" med ball   Trunk rot: x10   D2 chops/lifts: x10     Prone Ther-Ex       Core exercises PPT x 10   PPT with ball squeeze 5 sec hold, 10 x  PPT with ball squeeze and bridge x 10   PPT with supine marches x 10  PPT with hip abduction isometric L LE x 3 sec hold x10, orange band      Heel slides with " "towel: 2x10  Triple flex over physioball: x10  Windshield wipers BLE over physioball 2 x 10      STS's from low EOM squeezing pilates ring  - As above, with squeeze and OH press  Marching, squeezing small ball 30 ft x 2     Modified dead bugs, alt UE 2 x 10      Functional Strength Testing       30 sec STS test (60y +)  Assessed     NEURO RE-ED  CPT 30830 TOTAL TIME FOR SESSION 35 Minutes      FES  To promote neurorecovery of gait:  Distance: 3.01 miles  Resistance: 0.77 Nm  Power: 2.9 W  Symmetry: L 2%  Total time: 21:00  Active time: 19:03     Xcite  NMRE of LLE in function:      - Sit <> stand from elevated EOM using 6 lb med ball: x10, then progressing to lowest height: x10, then x10 in staggered stance  - Modified reverse lunging with slider under R foot in parallel bars with BUE support > RUE support only: x10 reps each  - Tall kneel <> heel sit: 2x10, 2nd set with chest press   - fwd step up with LLE onto 6\" block: x10, repeated with R knee drive, x10 repeated with 3# on RUE      Bioness L300 Go  - Fwd/rev step over 6\" virginia then progressed to 12\" virginia: x20, light RUE on backwards stepping due to poor hamstring strength  - Fwd step up onto 6\" block with contra LE march: 2x10, B         - RLE step up, LLE march with 6lb db overhead press   - Fwd step down 4\" block: 2x10, orange theraband around knees for manual cues   - stance on airex with lateral L toe taps to 12\" virginia: x10  - Tandem walk 20'x2, 6#      COORDINATION       Target Tapping       Coordination ladder As appropriate for HL balance      Amb with ankle weights       Amb with proprio wrap       Pushing weighted shopping cart       POSTURAL RE-ED       Sit <> Stand  From 22\" mat holding 6# med ball, cues for midline and equal WB       Tall Kneel  Tall kneel <> short sit: x10, x10 with chest press      Seated & standing reaching for trunk strengthening       Abbey-scapular strengthening       PRE-GAIT ACTIVITIES        Tap-ups       Step-ups " "To 6\" block with contralateral knee drive: x10, repeated with 2 lb DB for UE swing      Standing weight shifting       Step-taps       BALANCE TRAINING       Standing balance - static/dynamic       HEP Review       Floor       Airex Santiago-tandem: 30 seconds x 2, HT: 30\" x 1, EC: 30 seconds x 1  Tandem stance: 30 seconds x 1      Rockerboard AP plane: x10, with light UE support   AP, chest press x 10, OH press x 10, 6lb   Squats 2 x 10        Hurdles 6\" hurdles, focus on dec L hip circumduction through swing and encouraging heel strike. Step forward/back     Repeated with side stepping pattern, requiring BUE support dud to inability for L knee extension through stance      BOSU Ball  Fwd mini lunge onto domed side: x10, CloseS   Modified lunge onto BOSU with palloff press:   - x10, then repeated x10 with perturbations on yellow sports cord      Split Stance       Biodex Balance Training  Weight shifting and motor control training with increased difficulty noted to L anterolateral planes: x5 mins      Dynamic gait        Side stepping With partial squat with orange theraband around ankles: 10'x4      Retro walking Fwd/retro amb 50'x6 with cues for shorter step length and for heel strike + knee extension through stance, mirror anteriorly, added dual task of beach bal     Tandem Walking       Suitcase carry Using small 3 lb B DB on R: 100'x2, noted delayed L knee extension through IC and mid stance      Amb with hula hoop Using hula hoop to facilitate BUE swing and trunk rot      Walking with ball toss       Proprioceptive wrap                   Blue TB around L LE  - Amb along purple line x 3 with cues to keep feet within tiles  - x 250ft with same cues as above  - Retro amb 3 x 20 ft  - Mini squats with heels elevated x 10, focus on minimizing hyperextension     EQUESTRIAN 1st - Equestrian session: 8/15  2nd session - 8/29  Horse:  Demario (17.2 hands)  Time on Horse:  45  mins total     Following 1-2 full ring circles with " "UE support performed b/l hip flexor stretching   trunk stretching with OH and lateral rotation - improvement with neutral hips following stretching.  Left LE is weaker and left hip tends to retract.  Demonstrated very min shortening of right lateral trunk and decrease right IT Wbing.  Trunk alignment and core activation improved with the following:  -b/l UE movement pattern of horizontal abd to forward flexion to OH reach and repeat.  Also perform horizontal abduction and rotation both directions during CW/CCW full ring circles.  -Utilized postural straps (crisscrossed) and 1 godwin ball under right IT to improve hip alignment and core activation with good results.  Occasional retraction of left shoulder -corrected with cueing  -CW/CCW circles, serpentines and walk/halts  - arms out to side with cues for core and scap depression, cw and ccw ring circles    Yes           Y                Y        N        Y  y   Balance Testing       FGA  Assessed     SLS  Assessed see note      10mWT Assessed      GAIT TRAINING  CPT 00685 TOTAL TIME FOR SESSION 0-7 Minutes      Ambulation without AD  Gait with no AD over level indoor surfaces with WBOS and decreased L weight shift, VC for anterolateral weight shift, heel strike, BUE swing and trunk rot, 150'x1     Dynamic Gait  20'x6 with focus on shorter step lengths, heel strike and decreasing step width with use of 1' floor tiles, repeated with bimanual ball toss with CloseS and noted decreased gait speed and increase in step lengths      With Bioness donned x 250 ftx3   - Good clearance of L LE through swing with improved knee flexion through swing   - Pt subjectively reports feeling a smoother gait pattern with decreased L foot slap     Treadmill, BUE support to 1UE support, with Bioness donned 2.1 mph x 10 min - \"[place] VR course\"  - VC for increased nancy - metranome set to 100 bpm  - Improved GABRIELLE   - Following TM, without stim on, mild L foot slap and WBOS seen.    "   Following removal of proprioceptive wrap x 250ft, no instances of L knee hyperextension thrust noted.                                                Stair negotiation       Curb negotiation       Ramp negotiation       Outdoor ambulation       MANUAL  CPT 46393 TOTAL TIME FOR SESSION Not performed      Mobilization        MODALITIES  CPT 11396 TOTAL TIME FOR SESSION       Ice       Heat       ATTENDED E-STIM  CPT 43679 TOTAL TIME FOR SESSION 0-7 Minutes     Attended E-Stim FES    [id number] (Age 28) , PIN [phone]   Stim to L quad, hamstring, glutes, tib ant, and gastroc. 250-350 pulse width, 40Hz frequency.   Muscle testing testing completed for initial set up (7/8)     Xcite use for LLE:      Stim to L quad, hamstring, glutes, tib ant, and gastroc. 250-350 pulse width, 40Hz frequency.   Muscle testing testing completed for initial set up 7/26)     Bioness:   L lower leg quick fit pads, L hamstring added 8/2  30 Hz with 0.2 ramp for loading response   Used tablet without strap                                                Unattended E-stim

## 2024-08-29 NOTE — OP PT TREATMENT LOG
Today's Treatment:               PT Neuro Exercises Current Session   Performed Today? (y/n)   THER ACT   CPT 55771 TOTAL TIME FOR SESSION 10 Minutes      Pain, vitals, subjective    Provided rest breaks for energy conservation     Skin inspection Base of incision, no swelling noted     FES  cycle [id number] (Age 28) , PIN [phone]   Bike height: 2 holes showing  Pedal height: 3 holes showing  L LE set up only     FES Xcite Set Up                    Bioness Additional time for set up due to connection issues        Equestrian Horse: Demario  Mount/Dismount: Patient able to mount horse without assistance swinging leg over rump of horse.       Equipment:  Fleece, pad, surcingle.    Not used on 8/29: Other equipment used during session: 2-posture straps, 1 godwin ball under right IT  Patient has previous equestrian riding at another facility earlier this summer -      -Reviewed today's treatment plan and expectations.  Monitored level of fatigue and pain t/o session. Yes    Patient Education Patient educated regarding current impairments per testing completed today and PT POC moving forward.      Patient provided with Welcome Letter, which includes attendance policy. Provided education regarding cancellation and no-show policy. Education regarding the importance of participation and regular attendance to maximize goal attainment. Patient verbalized understanding/agreement.      Clearance for FES received, will be completed NV, start with handheld unit to assess tolerance prior to FES bike. Pt verbalized understanding.     Patient educated regarding progress made thus far, current impairments per re-assessments completed today and PT POC moving forward. Patient verbalized understanding/agreement.                                                               HEP  Verbally reviewed practicing gait over level surfaces with decreased step width, and then incorporating BUE swing and trunk rot. Pt verbalized understanding.     "  Floor Transfers  CloseS with increased time for LLE management, requires single UE support with review of moving from tall kneel to half kneeling position. Pt demos good understanding, will require continued practice      Subjective Outcomes Measures       ABC Scale Assessed - returned today     THER EX  CPT 65395 TOTAL TIME FOR SESSION  billed in TA     STRETCHING       Stretching by patient Incline board stretch: L3 - 1 min x2     Stretching by therapist/PROM Trialed modified jose stretch - not effective (pt states modified lunge position she has felt more of a stretch in hip flexors in the past)     Manual hip flexor and adductor S, gentle B          y   CARDIOVASCULAR       Nu Step 6 mins, Level: 1, BLE only      Stationary Bike Recumbent bike 5 min, L1  (Unable to complete revolution on Expresso upright bike)     Ambulation with AD        Treadmill       Endurance Testing       6mWT Assessed     STRENGTH TRAINING       HEP Review       Standing Ther-Ex Standing hip abduction 2 x 10 ea  - standing on airex   - Second set, no UE support      Step ups 6\" block x 20   - Added L LE TKE w orange TB     Step down, 2\" block, L LE only w orange band TKE x 10     Hip hikes 2\" block, VC's to discourage R hip hike      Heel raises from L2 on incline board x 15 (on floor today)        Side-lying there-ex Clamshells: 2x20 on R, L in supine: 5\"x10       Supine Ther-Ex Diaphragmatic breathin mins due to elevated BP  Glut bridge: 3\" x10, orange band  Bridge: x10   Supine hip abduction x 10, LLE, isometrics today  BKFO: x10, orange t-band only for R, active assist on L   Clamshells, x 10 ea LE        Deadbugs with physioball  - isometric holds: 10\" x 10, physioball under B heels   - alt LE marches with heels on physioball: x10  - alt UE: 3 sets x5 reps         Quadruped  hip extensions with towel under L foot: x10, B/L (modified position with BUE on EOM and LE on floor)      Quadruped <> bear crawl: 10\" x 10     Tall " "kneel Moderate perturbations all directions  Mini squats 2 x 10 holding 6\" med ball   Trunk rot: x10   D2 chops/lifts: x10     Prone Ther-Ex       Core exercises PPT x 10   PPT with ball squeeze 5 sec hold, 10 x  PPT with ball squeeze and bridge x 10   PPT with supine marches x 10  PPT with hip abduction isometric L LE x 3 sec hold x10, orange band      Heel slides with towel: 2x10  Triple flex over physioball: x10  Windshield wipers BLE over physioball 2 x 10      STS's from low EOM squeezing pilates ring  - As above, with squeeze and OH press  Marching, squeezing small ball 30 ft x 2     Modified dead bugs, alt UE 2 x 10      Functional Strength Testing       30 sec STS test (60y +)  Assessed     NEURO RE-ED  CPT 11321 TOTAL TIME FOR SESSION 35 Minutes      FES  To promote neurorecovery of gait:  Distance: 3.01 miles  Resistance: 0.77 Nm  Power: 2.9 W  Symmetry: L 2%  Total time: 21:00  Active time: 19:03     Xcite  NMRE of LLE in function:      - Sit <> stand from elevated EOM using 6 lb med ball: x10, then progressing to lowest height: x10, then x10 in staggered stance  - Modified reverse lunging with slider under R foot in parallel bars with BUE support > RUE support only: x10 reps each  - Tall kneel <> heel sit: 2x10, 2nd set with chest press   - fwd step up with LLE onto 6\" block: x10, repeated with R knee drive, x10 repeated with 3# on RUE      Bioness L300 Go  - Fwd/rev step over 6\" virginia then progressed to 12\" virginia: x20, light RUE on backwards stepping due to poor hamstring strength  - Fwd step up onto 6\" block with contra LE march: 2x10, B         - RLE step up, LLE march with 6lb db overhead press   - Fwd step down 4\" block: 2x10, orange theraband around knees for manual cues   - stance on airex with lateral L toe taps to 12\" virginia: x10  - Tandem walk 20'x2, 6#      COORDINATION       Target Tapping       Coordination ladder As appropriate for HL balance      Amb with ankle weights       Amb " "with proprio wrap       Pushing weighted shopping cart       POSTURAL RE-ED       Sit <> Stand  From 22\" mat holding 6# med ball, cues for midline and equal WB       Tall Kneel  Tall kneel <> short sit: x10, x10 with chest press      Seated & standing reaching for trunk strengthening       Abbey-scapular strengthening       PRE-GAIT ACTIVITIES        Tap-ups       Step-ups To 6\" block with contralateral knee drive: x10, repeated with 2 lb DB for UE swing      Standing weight shifting       Step-taps       BALANCE TRAINING       Standing balance - static/dynamic       HEP Review       Floor       Airex Santiago-tandem: 30 seconds x 2, HT: 30\" x 1, EC: 30 seconds x 1  Tandem stance: 30 seconds x 1      Rockerboard AP plane: x10, with light UE support   AP, chest press x 10, OH press x 10, 6lb   Squats 2 x 10        Hurdles 6\" hurdles, focus on dec L hip circumduction through swing and encouraging heel strike. Step forward/back     Repeated with side stepping pattern, requiring BUE support dud to inability for L knee extension through stance      BOSU Ball  Fwd mini lunge onto domed side: x10, CloseS   Modified lunge onto BOSU with palloff press:   - x10, then repeated x10 with perturbations on yellow sports cord      Split Stance       Biodex Balance Training  Weight shifting and motor control training with increased difficulty noted to L anterolateral planes: x5 mins      Dynamic gait        Side stepping With partial squat with orange theraband around ankles: 10'x4      Retro walking Fwd/retro amb 50'x6 with cues for shorter step length and for heel strike + knee extension through stance, mirror anteriorly, added dual task of beach bal     Tandem Walking       Suitcase carry Using small 3 lb B DB on R: 100'x2, noted delayed L knee extension through IC and mid stance      Amb with hula hoop Using hula hoop to facilitate BUE swing and trunk rot      Walking with ball toss       Proprioceptive wrap                   Blue TB " around L LE  - Amb along purple line x 3 with cues to keep feet within tiles  - x 250ft with same cues as above  - Retro amb 3 x 20 ft  - Mini squats with heels elevated x 10, focus on minimizing hyperextension     EQUESTRIAN 1st - Equestrian session: 8/15  2nd session - 8/29  Horse:  Demario (17.2 hands)  Time on Horse:  45  mins total     Following 1-2 full ring circles with UE support performed b/l hip flexor stretching   trunk stretching with OH and lateral rotation - improvement with neutral hips following stretching.  Left LE is weaker and left hip tends to retract.  Demonstrated very min shortening of right lateral trunk and decrease right IT Wbing.  Trunk alignment and core activation improved with the following:  -b/l UE movement pattern of horizontal abd to forward flexion to OH reach and repeat.  Also perform horizontal abduction and rotation both directions during CW/CCW full ring circles.  -Utilized postural straps (crisscrossed) and 1 godwin ball under right IT to improve hip alignment and core activation with good results.  Occasional retraction of left shoulder -corrected with cueing  -CW/CCW circles, serpentines and walk/halts  - arms out to side with cues for core and scap depression, cw and ccw ring circles    Yes           Y                Y        N        Y  y   Balance Testing       FGA  Assessed     SLS  Assessed see note      10mWT Assessed      GAIT TRAINING  CPT 12298 TOTAL TIME FOR SESSION 0-7 Minutes      Ambulation without AD  Gait with no AD over level indoor surfaces with WBOS and decreased L weight shift, VC for anterolateral weight shift, heel strike, BUE swing and trunk rot, 150'x1     Dynamic Gait  20'x6 with focus on shorter step lengths, heel strike and decreasing step width with use of 1' floor tiles, repeated with bimanual ball toss with CloseS and noted decreased gait speed and increase in step lengths      With Bioness donned x 250 ftx3   - Good clearance of L LE through swing  "with improved knee flexion through swing   - Pt subjectively reports feeling a smoother gait pattern with decreased L foot slap     Treadmill, BUE support to 1UE support, with Bioness donned 2.1 mph x 10 min - \"[place] VR course\"  - VC for increased nancy - metranome set to 100 bpm  - Improved GABRIELLE   - Following TM, without stim on, mild L foot slap and WBOS seen.      Following removal of proprioceptive wrap x 250ft, no instances of L knee hyperextension thrust noted.                                                Stair negotiation       Curb negotiation       Ramp negotiation       Outdoor ambulation       MANUAL  CPT 69598 TOTAL TIME FOR SESSION Not performed      Mobilization        MODALITIES  CPT 00574 TOTAL TIME FOR SESSION       Ice       Heat       ATTENDED E-STIM  CPT 95224 TOTAL TIME FOR SESSION 0-7 Minutes     Attended E-Stim FES    [id number] (Age 28) , PIN [phone]   Stim to L quad, hamstring, glutes, tib ant, and gastroc. 250-350 pulse width, 40Hz frequency.   Muscle testing testing completed for initial set up (7/8)     Xcite use for LLE:      Stim to L quad, hamstring, glutes, tib ant, and gastroc. 250-350 pulse width, 40Hz frequency.   Muscle testing testing completed for initial set up 7/26)     Bioness:   L lower leg quick fit pads, L hamstring added 8/2  30 Hz with 0.2 ramp for loading response   Used tablet without strap                                                Unattended E-stim                                      "

## 2024-09-03 ENCOUNTER — HOSPITAL ENCOUNTER (OUTPATIENT)
Dept: PHYSICAL THERAPY | Facility: REHABILITATION | Age: 29
Setting detail: THERAPIES SERIES
Discharge: HOME | End: 2024-09-03
Attending: LEGAL MEDICINE
Payer: COMMERCIAL

## 2024-09-03 DIAGNOSIS — Z86.69 HISTORY OF TETHERED SPINAL CORD: Primary | ICD-10-CM

## 2024-09-03 PROCEDURE — 97110 THERAPEUTIC EXERCISES: CPT | Mod: GP

## 2024-09-03 PROCEDURE — 97112 NEUROMUSCULAR REEDUCATION: CPT | Mod: GP

## 2024-09-03 NOTE — OP PT TREATMENT LOG
PT Neuro Exercises Current Session   Performed Today? (y/n)   THER ACT   CPT 09320 TOTAL TIME FOR SESSION 0-7 Minutes      Pain, vitals, subjective    Provided rest breaks for energy conservation yes   Skin inspection Base of incision, no swelling noted    FES  cycle [id number] (Age 28) , PIN 1194   Bike height: 2 holes showing  Pedal height: 3 holes showing  L LE set up only    FES Xcite Set Up              Bioness     Patient Education Patient educated regarding current impairments per testing completed today and PT POC moving forward.     Patient provided with Welcome Letter, which includes attendance policy. Provided education regarding cancellation and no-show policy. Education regarding the importance of participation and regular attendance to maximize goal attainment. Patient verbalized understanding/agreement.     Clearance for FES received, will be completed NV, start with handheld unit to assess tolerance prior to FES bike. Pt verbalized understanding.    Patient educated regarding progress made thus far, current impairments per re-assessments completed today and PT POC moving forward. Patient verbalized understanding/agreement.     Discussion regarding Equestrian scheduling and scheduling with [facility]. PT to find out next time [facility] rep is at [facility] to adjust pt schedule. Will provide with forms as well.                                      HEP  Verbally reviewed practicing gait over level surfaces with decreased step width, and then incorporating BUE swing and trunk rot. Pt verbalized understanding.     Floor Transfers  CloseS with increased time for LLE management, requires single UE support with review of moving from tall kneel to half kneeling position. Pt demos good understanding, will require continued practice     Subjective Outcomes Measures       ABC Scale Assessed     THER EX  CPT 08371 TOTAL TIME FOR SESSION  23-37 Minutes      STRETCHING      Stretching by patient Incline board  "stretch: L3 - 1 min x2 Yes   Stretching by therapist/PROM Trialed modified jose stretch - not effective (pt states modified lunge position she has felt more of a stretch in hip flexors in the past)      CARDIOVASCULAR       Nu Step 6 mins, Level: 1-2, BLE only  Yes   Stationary Bike Recumbent bike 5 min, L1  (Unable to complete revolution on Expresso upright bike)    Ambulation with AD      Treadmill       Endurance Testing       6mWT Assessed    STRENGTH TRAINING     HEP Review     Standing Ther-Ex Standing hip abduction 2 x 10 ea  - standing on airex   - Second set, no UE support     Step ups 6\" block x 20   - Added L LE TKE w orange TB    Step down, 2\" block, L LE only w orange band TKE x 10    Hip hikes 2\" block, VC's to discourage R hip hike     Heel raises from L2 on incline board x 15 (on floor today)    Heel raises: 2x15, small weighted ball between heels                              Yes   Side-lying there-ex Clamshells: 2x20 on R, L in supine: 5\"x10      Supine Ther-Ex Diaphragmatic breathin mins due to elevated BP  Glut bridge: 3\" x10, orange band  Bridge: x10   Supine hip abduction x 10, LLE, isometrics today  BKFO: x10, orange t-band only for R, active assist on L   Clamshells, x 10 ea LE  Bridge with ball squeeze x 10                  Deadbugs with physioball  - isometric holds: 10\" x 10, physioball under B heels   - alt LE marches with heels on physioball: x10  - alt UE: 3 sets x5 reps       Quadruped  hip extensions with towel under L foot: x10, B/L (modified position with BUE on EOM and LE on floor)     Quadruped <> bear crawl: 10\" x10   Bear crawl: 5' - fwd/back: x3 rounds     Alternating UE flexion x 10  Alternating LE extension x 10; use of slider L LE       Yes  Yes    Yes  Yes   Tall kneel Moderate perturbations all directions  Mini squats 2 x 10 w/ 6lb ball  Trunk rot: x10   D2 chops/lifts: x10    Akil Kneel  Onto airex pad:   - static hold: 20 seconds x 3   - chest press with beach ball: " "2x10   -     Prone Ther-Ex     Core exercises PPT x 10   PPT with ball squeeze 5 sec hold, 10 x  PPT with ball squeeze and bridge x 10   PPT with supine marches x 10  PPT with hip abduction isometric L LE x 3 sec hold x10, orange band     Heel slides with towel: 2x10  Triple flex over physioball: x10  Windshield wipers BLE over physioball 2 x 10     STS's from low EOM squeezing pilates ring  - As above, with squeeze, in staggered stance  Marching, squeezing small ball 30 ft x 2    Modified dead bugs, alt UE 2 x 10     Supine B shoulder ext with GTB, emphasis on core activation; 2 x 15                    Functional Strength Testing       30 sec STS test (60y +)  Assessed    NEURO RE-ED  CPT 97906 TOTAL TIME FOR SESSION 8-22 Minutes      FES  To promote neurorecovery of gait:  Distance: 3.01 miles  Resistance: 0.77 Nm  Power: 2.9 W  Symmetry: L 2%  Total time: 21:00  Active time: 19:03    Xcite  NMRE of LLE in function:     - Sit <> stand from elevated EOM using 6 lb med ball: x10, then progressing to lowest height: x10, then x10 in staggered stance  - Modified reverse lunging with slider under R foot in parallel bars with BUE support > RUE support only: x10 reps each  - Tall kneel <> heel sit: 2x10, 2nd set with chest press   - fwd step up with LLE onto 6\" block: x10, repeated with R knee drive, x10 repeated with 3# on RUE     Bioness L300 Go  - Fwd/rev step over 12\" virginia virginia: x20,   - Fwd step up onto 6\" block with contra LE march: 2x10, B         - RLE step up, LLE march with 8lb db overhead press        - LLE step up, RLE march while holding 8lb db x 10, overhead press with 8lb db x 5  - Fwd step down 4\" block: 2x10, orange theraband around knees for manual cues   - stance on airex with lateral L toe taps to 12\" virginia: x10  - Tandem walk 20'x2, 6#   - step up on 4\" block, contralateral heel tap to 10\" block   - 12\" and 6\" hurdles, navigating reciprocally                    COORDINATION       Target " "Tapping     Coordination ladder As appropriate for HL balance     Amb with ankle weights     Amb with proprio wrap     Pushing weighted shopping cart     POSTURAL RE-ED     Sit <> Stand  From 22\" mat holding 6# med ball, cues for midline and equal WB      Tall Kneel  Tall kneel <> short sit: x10, x10 with chest press     Seated & standing reaching for trunk strengthening     Abbey-scapular strengthening     PRE-GAIT ACTIVITIES      Tap-ups     Step-ups To 6\" block with contralateral knee drive: x10, repeated with 2 lb DB for UE swing     Standing weight shifting     Step-taps     BALANCE TRAINING     Standing balance - static/dynamic       HEP Review       Floor       Airex Santiago-tandem: 30 seconds x 2, HT: 30\" x 1, EC: 30 seconds x 1  Tandem stance: 30 seconds x 1  Yes   Rockerboard AP plane: x10, with light UE support   AP, chest press x 10, OH press x 10, 6lb   Squats 2 x 10      Hurdles 6\" hurdles, focus on dec L hip circumduction through swing and encouraging heel strike. Step forward/back    Repeated with side stepping pattern, requiring BUE support dud to inability for L knee extension through stance     BOSU Ball  Fwd mini lunge onto domed side: x10, CloseS   Modified lunge onto BOSU with palloff press:   - x10, then repeated x10 with perturbations on yellow sports cord     Split Stance      Biodex Balance Training  Weight shifting and motor control training with increased difficulty noted to L anterolateral planes: x5 mins     Dynamic gait       Side stepping With partial squat with orange theraband around ankles: 10'x4     Retro walking Fwd/retro amb 50'x6 with cues for shorter step length and for heel strike + knee extension through stance, mirror anteriorly, added dual task of beach bal Yes   Tandem Walking 10'x2, CloseS/CGA  Yes   Suitcase carry Using small 3 lb B DB on R: 100'x2, noted delayed L knee extension through IC and mid stance     Amb with hula hoop Using hula hoop to facilitate BUE swing and " trunk rot     Walking with ball toss     Proprioceptive wrap Blue TB around L LE  - Amb along purple line x 3 with cues to keep feet within tiles  - x 250ft with same cues as above  - Retro amb 3 x 20 ft  - Mini squats with heels elevated x 10, focus on minimizing hyperextension    Balance Testing     FGA  Assessed    SLS  Assessed see note     10mWT Assessed  - (6mWT on 8/26)    GAIT TRAINING  CPT 70855 TOTAL TIME FOR SESSION Not performed      Ambulation without AD  Gait with no AD over level indoor surfaces with WBOS and decreased L weight shift, VC for anterolateral weight shift, heel strike, BUE swing and trunk rot, 150'x1    Dynamic Gait  20'x6 with focus on shorter step lengths, heel strike and decreasing step width with use of 1' floor tiles, repeated with bimanual ball toss with CloseS and noted decreased gait speed and increase in step lengths     With Bioness donned x 250 ft   - Good clearance of L LE through swing with improved knee flexion through swing       Treadmill, BUE support to 1UE support, with Bioness donned 2.1 mph x 10 min   - VC for increased nancy  - Improved GABRIELLE   - Following TM, without stim on, mild L foot slap and WBOS seen.     Following removal of proprioceptive wrap x 250ft, no instances of L knee hyperextension thrust noted.                               Stair negotiation  Full flight stairs 1 trial with 1 rail support; 1 trial without UE support. Tactile cues for trunk midline, verbal and visual cues for eccentric control     Curb negotiation       Ramp negotiation       Outdoor ambulation  With Bioness:  - x500 ft approx, inclines/declines  - Retro amb up incline 3 x 15 ft     MANUAL  CPT 27536 TOTAL TIME FOR SESSION Not performed      Mobilization        MODALITIES  CPT 19403 TOTAL TIME FOR SESSION      Ice       Heat       ATTENDED E-STIM  CPT 31875 TOTAL TIME FOR SESSION Not performed     Attended E-Stim FES    [id number] (Age 28) , PIN [phone]   Stim to L quad, hamstring,  glutes, tib ant, and gastroc. 250-350 pulse width, 40Hz frequency.   Muscle testing testing completed for initial set up (7/8)    Xcite use for LLE:     Stim to L quad, hamstring, glutes, tib ant, and gastroc. 250-350 pulse width, 40Hz frequency.   Muscle testing testing completed for initial set up 7/26)    Bioness:   L lower leg quick fit pads, L hamstring added 8/2  30 Hz with 0.2 ramp for loading response   Used tablet without strap                \                       Unattended E-stim

## 2024-09-03 NOTE — ADDENDUM NOTE
Encounter addended by: Ann Marie Manzano, PT on: 9/3/2024 3:28 PM   Actions taken: Patient Goal modified, Flowsheet accepted, Clinical Note Signed, Delete clinical note

## 2024-09-03 NOTE — PROGRESS NOTES
Duplicate note created to re-send POC    Physical Therapy Progress Note    PT RE-EVALUATION FOR OUTPATIENT THERAPY    Patient: Melanie Litten   MRN: [id number]  : 1995 28 y.o.    Referring Physician: Remedios Whitfield MD  Date of Visit: 2024    Certification Dates: 24 through 24    Recommended Frequency & Duration:  2 times/week for up to 3 months        Diagnosis:   1. History of tethered spinal cord        Chief Complaints:  No chief complaint on file.      Precautions:   Precautions comments: hypermobile - EDS, postural hypotension    TODAY'S VISIT:    Time In Session:  Start Time: 802  Stop Time: 857  Time Calculation (min): 55 min   General Information - 24 08          Session Details    Document Type re-evaluation        General Information    Referring Physician Remedios Whitfield MD     History of present illness/functional impairment Saranya is a 28 year old female who presents to OPPT with history of tethered cord syndrome. Pt with PMH significant for chiari malformation, hypermobile EDS, left plantar fasciitis, anxiety and postural hypotension. Pt reports her symptoms started slowly in 2019 with L hip pain, followed up with neurosurgeon in [place] with unremarkable results for all testing. Her symptoms then progressed with L leg motor weakness and shakiness (similar to clonus) with movement, chronic constipation, L drop foot, lower back pain, and neck pain.  Pt was finally diagnosed with tethered cord syndrome and underwent a clinical trial surgery at [facility], [facility], and [facility]. Due to her symptoms, she meets criteria for exploration and sectioning of her filum for the functional tethered cord clinical trial. Now s/p laminectomy for occult tethered cord release on 23 by Dr Duckworth. Patient tolerated procedure well. No post-op complications. Transferred stable once PACU criteria met. Patient kept FLAT for  36hr. Placed on an aseptic dex taper and post-op IV abx x24hrs.  She was mobilized on 9/27 tolerated well without any symptoms. Additionally, was instructed not to exercise for 7 weeks post-surgery. Pt reports she recovered well overall with decreased drop foot, clonus and swelling, continues to have increased coordination deficits and foot drag with fatigue. Pt has completed OPPT at [facility] in [place] and intermittent massage therapy. Pt’s functional goals are to work on higher level balance, picking up things from floor, coordination and “Hippo therapy”.     Patient/Family/Caregiver Comments/Observations Patient with no new changes since last visit. Notes some spasms around thoracic spine while sitting on floor this weekend     Precautions comments hypermobile - EDS, postural hypotension                    Daily Falls Screen - 08/26/24 0805          Daily Falls Assessment    Patient reported fall since last visit No                    Pain/Vitals - 08/26/24 0805          Pain Assessment    Currently in pain No/Denies        Pre Activity Vital Signs    /74     BP Location Left upper arm     BP Method Manual     Patient Position Sitting                    PT - 08/26/24 0805          Physical Therapy    Physical Therapy Specialty Spinal Cord PT        PT Plan    Frequency of treatment 2 times/week     PT Duration 3 months     PT Cert From 08/26/24     PT Cert To 11/22/24     Date PT POC was sent to provider 08/26/24     Signed PT Plan of Care received?  No   Original POC sent                   Assessment and Plan - 08/26/24 0805          Assessment    Plan of Care reviewed and patient/family in agreement Yes     System Pathology/Pathophysiology Noted musculoskeletal;neuromuscular;cardiovascular     Functional Limitations in Following Categories (PT Eval) self-care;work;community/leisure     Rehab Potential/Prognosis good, to achieve stated therapy goals     Problem List abnormal muscle tone;decreased  strength;impaired balance;impaired sensation;decreased endurance;edema;hemiparesis/hemiplegia;impaired motor control;impaired coordination     Clinical Assessment Patient arrives to PT for re-eval where she was objectively assessed on 6MWT, gait speed, FGA, and 30s STS. Her 6MWT slightly regressed from 1612 ft to 1551 ft however her gait speed improved significantly from 1.22 m/sec to 1.39 m/sec as the difference is greater 0.13 m/sec. FGA improved by 2 points from a 25/30 to a 27/30, which meets the score among community dwelling adults and lastly, 30s STS remained the same at 10 reps. ABC scale and navigation of a FF steps also assessed this visit where pt self scored a 89.5% on the ABC scale and was able to navigate a FF steps in a reciprocal pattern without UE support, however did report difficulty with descending due to decreased L eccentric strength. Patient was educated on performance with all assessments. Pt has been utilizing [employer] L300 upper and lower cuffs to improve gait mechanics on L LE, which has improved L swing, heel strike, and eccentric control in load response. She responds well to verbal and external cues to decrease GABRIELLE, however does have a tendency to revert back to compensatory patterns (increased step lengths, WBOS, and decrease L weight shift) ute once fatigued. Patient will cont to benefit from skilled OP PT 2x/week for an additional 8-12 weeks to address remaining impairments, maximize her functional mobility, and return to PLOF.     Plan and Recommendations Continue with 2 more equestrian sessions for postural education, core activation and strengthening.     Planned Services CPT 90959 Aquatic therapy/exercises;CPT 58010 Manual therapy;CPT 70610 Therapeutic activities;CPT 83989 Wheelchair management;CPT 93563 Electrical stimulation UNATTENDED;CPT 32534 Canalith repositioning procedure/maneuvers;CPT 82621 Neuromuscular Reeducation;CPT 94825 Orthotics/Prosthetics management and training  (Subsequent encounters);CPT 42879 Therapeutic exercises;CPT 60183 Hot/Cold Packs therapy;CPT 47670 Work conditioning;CPT 98034 Gait training;CPT 26142 Orthotics training (Initial encounter);CPT 59487 Self-care/Home management training;CPT 40505 Electrical stimulation ATTENDED     Comments/Additional Services Additional services that may be used: Vector BWSS for gait training, Lokomat body weight support training for gait function, full weight bearing for gait training in an eksoskeleton, FES , Xcite, Equestrian Therapy                     OBJECTIVE MEASUREMENTS/DATA:    Gait and Mobility    Gait and Mobility - 08/26/24 0805          Gait Training    Comment Decreased compensatory strategies vs previous progress note however with fatigue, pt cont to demo WBOS, L foot slap, and increased step lengths. Significant improvement noted in the above hwen [employer] L300 is donned.        Stairs Assessment    [place], Stairs independent     Assistive Device none     Number of Steps (Stairs) 12     Stair Height 7 inches     Ascending Stairs Technique step-over-step     Descending Stairs Technique step-over-step     Comment Decreased L eccentric control                   Outcome Measures    PT Outcome Measures - 08/26/24 0805          Objective Outcome Measures    6 Minute Walk Test 1551ft     Gait Speed (m/sec) 1.39 m/sec     FGA Score (out of 30 total) 27     30 Second Sit to Stand Test 10 repetitions        Subjective Outcome Measures    ABC 89.5%                     ROM and MMT          6/18/2024   PT Cervical/Lumbar/Other ROM Measurements   Other: Girth Measurement/Comments Mild dependent edema of LLE at end of day per pt reports; surgical scar approx 4 inches in length down mid lumbar spine from previous laminectomy, mild hypomobility down lower 50% of scar   Additional ROM  Hypermobile in all jointsm mild L hamstring length restrictions to approx 70 deg due to tone.   PT LE MMT   Right Hip Flexion (5/5) normal    Left Hip Flexion (3-/5) fair minus   Right Hip Extension (4+/5) good plus   Left Hip Extension (3-/5) fair minus   Right Hip ABD (4+/5) good plus   Left Hip ABD (3-/5) fair minus   Left Hip ADD (4+/5) good plus   Left Hip IR (3+/5) fair plus   Left Hip ER (3+/5) fair plus   Right Knee Flexion (5/5) normal   Left Knee Flexion (4/5) good   Right Knee Extension (5/5) normal   Left Knee Extension (4-/5) good minus   Right Ankle DF (5/5) normal   Left Ankle DF (3+/5) fair plus   Right Ankle PF (5/5) normal   Left Ankle PF (4+/5) good plus   Right Ankle Inversion (5/5) normal   Left Ankle Inversion (3-/5) fair minus   Right Ankle Eversion (5/5) normal   Left Ankle Eversion (4-/5) good minus     Outcome Measures          6/18/2024    10:09 6/24/2024    17:03 7/26/2024    08:58 8/26/2024    08:05   PT OBJECTIVE Outcome Measures   6 Minute Walk Test 1555 ft  1612 ft 1551ft   Gait Speed (m/sec) 1 m/sec  1.22 m/sec       SSV 1.39 m/sec   30 Second Sit to Stand --        TBA NV  10 repetitions 10 repetitions   FGA 24  25 27   PT SUBJECTIVE Outcome Measures   ABC  1360/1600 = 85% confidence  89.5%   Other  SLS: R: 30 seconds, L <5 seconds without pelvic drop SLS: R: 30 seconds, L: 23 seconds        Today's Treatment::    Education provided:  Yes: See treatment log for details of education provided       PT Neuro Exercises Current Session   Performed Today? (y/n)   THER ACT   CPT 62330 TOTAL TIME FOR SESSION 8-22 Minutes      Pain, vitals, subjective    Provided rest breaks for energy conservation yes   Skin inspection Base of incision, no swelling noted    FES  cycle [id number] (Age 28) , PIN [id number]   Bike height: 2 holes showing  Pedal height: 3 holes showing  L LE set up only    FES Xcite Set Up              Bioness     Patient Education Patient educated regarding current impairments per testing completed today and PT POC moving forward.     Patient provided with Welcome Letter, which includes attendance policy. Provided  "education regarding cancellation and no-show policy. Education regarding the importance of participation and regular attendance to maximize goal attainment. Patient verbalized understanding/agreement.     Clearance for FES received, will be completed NV, start with handheld unit to assess tolerance prior to FES bike. Pt verbalized understanding.    Patient educated regarding progress made thus far, current impairments per re-assessments completed today and PT POC moving forward. Patient verbalized understanding/agreement.     Discussion regarding Equestrian scheduling and scheduling with [facility] rep. PT to find out next time [facility] rep is at [facility] to adjust pt schedule. Will provide with forms as well.                                Yes                HEP  Verbally reviewed practicing gait over level surfaces with decreased step width, and then incorporating BUE swing and trunk rot. Pt verbalized understanding.     Floor Transfers  CloseS with increased time for LLE management, requires single UE support with review of moving from tall kneel to half kneeling position. Pt demos good understanding, will require continued practice     Subjective Outcomes Measures       ABC Scale Assessed  yes   THER EX  CPT 43449 TOTAL TIME FOR SESSION  23-37 Minutes      STRETCHING      Stretching by patient Incline board stretch: L3 - 1 min x2    Stretching by therapist/PROM Trialed modified jose stretch - not effective (pt states modified lunge position she has felt more of a stretch in hip flexors in the past)      CARDIOVASCULAR       Nu Step 6 mins, Level: 1, BLE only     Stationary Bike Recumbent bike 5 min, L1  (Unable to complete revolution on Expresso upright bike)    Ambulation with AD      Treadmill       Endurance Testing       6mWT Assessed yes   STRENGTH TRAINING     HEP Review     Standing Ther-Ex Standing hip abduction 2 x 10 ea  - standing on airex   - Second set, no UE support     Step ups 6\" block x 20   - Added L " "LE TKE w orange TB    Step down, 2\" block, L LE only w orange band TKE x 10    Hip hikes 2\" block, VC's to discourage R hip hike     Heel raises from L2 on incline board x 15 (on floor today)      Side-lying there-ex Clamshells: 2x20 on R, L in supine: 5\"x10      Supine Ther-Ex Diaphragmatic breathin mins due to elevated BP  Glut bridge: 3\" x10, orange band  Bridge: x10   Supine hip abduction x 10, LLE, isometrics today  BKFO: x10, orange t-band only for R, active assist on L   Clamshells, x 10 ea LE  Bridge with ball squeeze x 10                  Deadbugs with physioball  - isometric holds: 10\" x 10, physioball under B heels   - alt LE marches with heels on physioball: x10  - alt UE: 3 sets x5 reps       Quadruped  hip extensions with towel under L foot: x10, B/L (modified position with BUE on EOM and LE on floor)     Quadruped <> bear crawl: 10\" x 10    Alternating UE flexion x 10  Alternating LE extension x 10; use of slider L LE           Yes  Yes   Tall kneel Moderate perturbations all directions  Mini squats 2 x 10 w/ 6lb ball  Trunk rot: x10   D2 chops/lifts: x10    Prone Ther-Ex     Core exercises PPT x 10   PPT with ball squeeze 5 sec hold, 10 x  PPT with ball squeeze and bridge x 10   PPT with supine marches x 10  PPT with hip abduction isometric L LE x 3 sec hold x10, orange band     Heel slides with towel: 2x10  Triple flex over physioball: x10  [facility] wipers BLE over physioball 2 x 10     STS's from low EOM squeezing pilates ring  - As above, with squeeze, in staggered stance  Marching, squeezing small ball 30 ft x 2    Modified dead bugs, alt UE 2 x 10     Supine B shoulder ext with GTB, emphasis on core activation; 2 x 15                       Yes            Yes   Functional Strength Testing       30 sec STS test (60y +)  Assessed yes   NEURO RE-ED  CPT 83459 TOTAL TIME FOR SESSION 8-22 Minutes      FES  To promote neurorecovery of gait:  Distance: 3.01 miles  Resistance: 0.77 Nm  Power: " "2.9 W  Symmetry: L 2%  Total time: 21:00  Active time: 19:03    Xcite  NMRE of LLE in function:     - Sit <> stand from elevated EOM using 6 lb med ball: x10, then progressing to lowest height: x10, then x10 in staggered stance  - Modified reverse lunging with slider under R foot in parallel bars with BUE support > RUE support only: x10 reps each  - Tall kneel <> heel sit: 2x10, 2nd set with chest press   - fwd step up with LLE onto 6\" block: x10, repeated with R knee drive, x10 repeated with 3# on RUE     Bioness L300 Go  - Fwd/rev step over 12\" [place]: x20,   - Fwd step up onto 6\" block with contra LE march: 2x10, B         - RLE step up, LLE march with 8lb db overhead press        - LLE step up, RLE march while holding 8lb db x 10, overhead press with 8lb db x 5  - Fwd step down 4\" block: 2x10, orange theraband around knees for manual cues   - stance on airex with lateral L toe taps to 12\" [place]: x10  - Tandem walk 20'x2, 6#   - step up on 4\" block, contralateral heel tap to 10\" block   - 12\" and 6\" hurdles, navigating reciprocally                    COORDINATION       Target Tapping     Coordination ladder As appropriate for HL balance     Amb with ankle weights     Amb with proprio wrap     Pushing weighted shopping cart     POSTURAL RE-ED     Sit <> Stand  From 22\" mat holding 6# med ball, cues for midline and equal WB      Tall Kneel  Tall kneel <> short sit: x10, x10 with chest press     Seated & standing reaching for trunk strengthening     [place]bey-scapular strengthening     PRE-GAIT ACTIVITIES      Tap-ups     Step-ups To 6\" block with contralateral knee drive: x10, repeated with 2 lb DB for UE swing     Standing weight shifting     Step-taps     BALANCE TRAINING     Standing balance - static/dynamic       HEP Review       Floor       Airex [place]-tandem: 30 seconds x 2, HT: 30\" x 1, EC: 30 seconds x 1  Tandem stance: 30 seconds x 1     Rockerboard AP plane: x10, with light UE support   AP, chest press x " "10, OH press x 10, 6lb   Squats 2 x 10      Hurdles 6\" hurdles, focus on dec L hip circumduction through swing and encouraging heel strike. Step forward/back    Repeated with side stepping pattern, requiring BUE support dud to inability for L knee extension through stance     BOSU Ball  Fwd mini lunge onto domed side: x10, CloseS   Modified lunge onto BOSU with palloff press:   - x10, then repeated x10 with perturbations on yellow sports cord     Split Stance      [employer] Balance Training  Weight shifting and motor control training with increased difficulty noted to L anterolateral planes: x5 mins     Dynamic gait       Side stepping With partial squat with orange theraband around ankles: 10'x4     Retro walking Fwd/retro amb 50'x6 with cues for shorter step length and for heel strike + knee extension through stance, mirror anteriorly, added dual task of beach bal    Tandem Walking     Suitcase carry Using small 3 lb B DB on R: 100'x2, noted delayed L knee extension through IC and mid stance     Amb with hula hoop Using hula hoop to facilitate BUE swing and trunk rot     Walking with ball toss     Proprioceptive wrap Blue TB around L LE  - Amb along purple line x 3 with cues to keep feet within tiles  - x 250ft with same cues as above  - Retro amb 3 x 20 ft  - Mini squats with heels elevated x 10, focus on minimizing hyperextension    Balance Testing     FGA  Assessed yes   SLS  Assessed see note     10mWT Assessed  - (6mWT on 8/26) yes   GAIT TRAINING  CPT 12830 TOTAL TIME FOR SESSION Not performed      Ambulation without AD  Gait with no AD over level indoor surfaces with WBOS and decreased L weight shift, VC for anterolateral weight shift, heel strike, BUE swing and trunk rot, 150'x1    Dynamic Gait  20'x6 with focus on shorter step lengths, heel strike and decreasing step width with use of 1' floor tiles, repeated with bimanual ball toss with CloseS and noted decreased gait speed and increase in step lengths "     With Bioness donned x 250 ft   - Good clearance of L LE through swing with improved knee flexion through swing       Treadmill, BUE support to 1UE support, with Bioness donned 2.1 mph x 10 min   - VC for increased nancy  - Improved GABRIELLE   - Following TM, without stim on, mild L foot slap and WBOS seen.     Following removal of proprioceptive wrap x 250ft, no instances of L knee hyperextension thrust noted.                               Stair negotiation  Full flight stairs 1 trial with 1 rail support; 1 trial without UE support. Tactile cues for trunk midline, verbal and visual cues for eccentric control  yes (billed with neuro re-ed)   Curb negotiation       Ramp negotiation       Outdoor ambulation  With Bioness:  - x500 ft approx, inclines/declines  - Retro amb up incline 3 x 15 ft     MANUAL  CPT 15820 TOTAL TIME FOR SESSION Not performed      Mobilization        MODALITIES  CPT 36020 TOTAL TIME FOR SESSION      Ice       Heat       ATTENDED E-STIM  CPT 23602 TOTAL TIME FOR SESSION Not performed     Attended E-Stim FES    [id number] (Age 28) , PIN [id number]   Stim to L quad, hamstring, glutes, tib ant, and gastroc. 250-350 pulse width, 40Hz frequency.   Muscle testing testing completed for initial set up (7/8)    Xcite use for LLE:     Stim to L quad, hamstring, glutes, tib ant, and gastroc. 250-350 pulse width, 40Hz frequency.   Muscle testing testing completed for initial set up 7/26)    Bioness:   L lower leg quick fit pads, L hamstring added 8/2  30 Hz with 0.2 ramp for loading response   Used tablet without strap                                 Unattended E-stim             Goals Addressed                   This Visit's Progress     PT Neuro Goals        Short term goals   Short Term Goals Time Frame Result Comment/Progress   Assess ABC, floor transfers, SLS assessment, 30sSTS  2 weeks Goal met     Improve 6mWT by 191 feet or more to meet the MCID indicating significant improvement in endurance and  activity tolerance. 4-6 weeks Ongoing goal     Improve ABC Scale score to 60% or better suggesting improved balance confidence during functional tasks 4-6 weeks Goal met  85%    Improve FGA score by 5 points or more to meet the MCID indicating improving dynamic balance and decreasing falls risk. 4-6 weeks Ongoing goal  Improved by 1 point   Tolerate 10 min of CV activity with VSS 4-6 weeks Goal MET     Progress gait speed by 0.13 m/sec or more to meet the MCID without decline in safety or quality indicating significant improvement in gait speed and increased safety during ambulation in the home and community. 4-6 weeks Goal MET     Pt and caregiver will be mod I with HEP 4 weeks Goal MET     Long term goals   Long Term Goals Time Frame Result Comment/Progress   Pt will complete floor transfer without any external support Independently  8-12 weeks  ongoing    Pt will navigate FF 7 inch steps unsupported with reciprocal pattern  8-12 weeks Met     Improve 6mWT by an additional 191 feet or more to meet the MCID indicating significant improvement in endurance and activity tolerance. 8-12 weeks ongoing 8/26 - 1551ft   Improve 30 sec STS test reps to 15 reps or better to meet the age/gender matched norm and cut-off score indicating adequate LE muscle performance for functional activities. 8-12 weeks ongoing    Improve ABC Scale score to 81% or better suggesting improved balance confidence during functional tasks and decreased risk for falls. 8-12 weeks Met    Improve FGA score to > 28/30 indicating improved dynamic balance and decreased falls risk. 8-12 weeks ongoing 8/26 - 27/30   Tolerate 12-15 min of CV activity with VSS 8-12 weeks Met    Progress gait speed by an additional 0.13 m/sec or more to meet the MCID without decline in safety or quality indicating significant improvement in gait speed and increased safety during ambulation in the home and community. 8-12 weeks Met 8/26 - 1.39m/s   Pt and caregiver will be mod  I with updated HEP 8-12 weeks ongoing      LTG's following re-eval:    Long Term Goals Time Frame Result Comment/Progress   Patient will complete assessment with Bioness rep to determine fit/next steps if appropriate    8-12 weeks  Ongoing     Pt will complete floor transfer without any external support independently    8-12 weeks  ongoing    Improve 6mWT by an additional 191 feet or more to meet the MCID indicating significant improvement in endurance and activity tolerance.   8-12 weeks ongoing 8/26 - 1551ft   Improve 30 sec STS test reps to 15 reps or better to meet the age/gender matched norm and cut-off score indicating adequate LE muscle performance for functional activities.   8-12 weeks ongoing    Improve FGA score to >/= 28/30 indicating improved dynamic balance and decreased falls risk.   8-12 weeks ongoing 8/26 - 27/30   Progress gait speed by an additional 0.13 m/sec or more to meet the MCID without decline in safety or quality indicating significant improvement in gait speed and increased safety during ambulation in the home and community.   8-12 weeks Ongoing 8/26 - 1.39m/s                  Ann Marie Manzano, PT

## 2024-09-03 NOTE — PROGRESS NOTES
Physical Therapy Visit    PT DAILY NOTE FOR OUTPATIENT THERAPY    Patient: Melanie Litten MRN: [id number]  : 1995 28 y.o.  Referring Physician: Remedios Whitfield MD  Date of Visit: 9/3/2024    Certification Dates: 24 through 24    Diagnosis:   1. History of tethered spinal cord        Chief Complaints:  EDS, LLE weakness, gait/balance impairment    Precautions:   Precautions comments: hypermobile - EDS, postural hypotension      TODAY'S VISIT    Time In Session:  Start Time: 08  Stop Time: 859  Time Calculation (min): 50 min   History/Vitals/Pain/Encounter Info - 24 0808          Injury History/Precautions/Daily Required Info    Document Type daily treatment     Primary Therapist Dilan Machuca     Chief Complaint/Reason for Visit  EDS, LLE weakness, gait/balance impairment     Referring Physician Remedios Whitfield MD     Precautions comments hypermobile - EDS, postural hypotension     History of present illness/functional impairment Saranya is a 28 year old female who presents to OPPT with history of tethered cord syndrome. Pt with PMH significant for chiari malformation, hypermobile EDS, left plantar fasciitis, anxiety and postural hypotension. Pt reports her symptoms started slowly in 2019 with L hip pain, followed up with neurosurgeon in [place] with unremarkable results for all testing. Her symptoms then progressed with L leg motor weakness and shakiness (similar to clonus) with movement, chronic constipation, L drop foot, lower back pain, and neck pain.  Pt was finally diagnosed with tethered cord syndrome and underwent a clinical trial surgery at [facility], [facility], and [facility]. Due to her symptoms, she meets criteria for exploration and sectioning of her filum for the functional tethered cord clinical trial. Now s/p laminectomy for occult tethered cord release on 23 by Dr Duckworth. Patient tolerated procedure well. No  post-op complications. Transferred stable once PACU criteria met. Patient kept FLAT for 36hr. Placed on an aseptic dex taper and post-op IV abx x24hrs.  She was mobilized on 9/27 tolerated well without any symptoms. Additionally, was instructed not to exercise for 7 weeks post-surgery. Pt reports she recovered well overall with decreased drop foot, clonus and swelling, continues to have increased coordination deficits and foot drag with fatigue. Pt has completed OPPT at [facility] in [place] and intermittent massage therapy. Pt’s functional goals are to work on higher level balance, picking up things from floor, coordination and “Hippo therapy”.     Patient/Family/Caregiver Comments/Observations Pt reports her GI doc added Motrgrity due to constipation; upcomping PCP appointment and waiting for call from neurologist in [facility] for follow up imaging.     Patient reported fall since last visit No        Pain Assessment    Currently in pain No/Denies                    Daily Treatment Assessment and Plan - 09/03/24 0808          Daily Treatment Assessment and Plan    Progress toward goals Progressing     Daily Outcome Summary Pt arrives 8 minutes late, billed for 3 units total, did not utilize Bioness L300 Go today due to availibility. Initiated bear crawls today on hi/lo mat with focus on weight shift and stance control onto LLE. Pt required 4 point pattern with fatigue (isolating LE, then UE). Pt was challenged with half kneel position today with cues for L hip extension and static hold with moderate swaying noted.     Plan and Recommendations Cont use of Bioness L300 Go for pregait/gait training; plan to extend POC to continue LLE NMRE and functional strengthening                         OBJECTIVE DATA TAKEN TODAY:    None taken    Today's Treatment:       PT Neuro Exercises Current Session   Performed Today? (y/n)   THER ACT   CPT 08741 TOTAL TIME FOR SESSION 0-7 Minutes      Pain, vitals, subjective    Provided rest  breaks for energy conservation yes   Skin inspection Base of incision, no swelling noted    FES  cycle [id number] (Age 28) , PIN [id number]   Bike height: 2 holes showing  Pedal height: 3 holes showing  L LE set up only    FES Xcite Set Up              [facility]     Patient Education Patient educated regarding current impairments per testing completed today and PT POC moving forward.     Patient provided with Welcome Letter, which includes attendance policy. Provided education regarding cancellation and no-show policy. Education regarding the importance of participation and regular attendance to maximize goal attainment. Patient verbalized understanding/agreement.     Clearance for FES received, will be completed NV, start with handheld unit to assess tolerance prior to FES bike. Pt verbalized understanding.    Patient educated regarding progress made thus far, current impairments per re-assessments completed today and PT POC moving forward. Patient verbalized understanding/agreement.     Discussion regarding [employer] scheduling and scheduling with [facility] rep. PT to find out next time [facility] rep is at [facility] to adjust pt schedule. Will provide with forms as well.                                      HEP  Verbally reviewed practicing gait over level surfaces with decreased step width, and then incorporating BUE swing and trunk rot. Pt verbalized understanding.     Floor Transfers  CloseS with increased time for LLE management, requires single UE support with review of moving from tall kneel to half kneeling position. Pt demos good understanding, will require continued practice     Subjective Outcomes Measures       ABC Scale Assessed     THER EX  CPT 68194 TOTAL TIME FOR SESSION  23-37 Minutes      STRETCHING      Stretching by patient Incline board stretch: L3 - 1 min x2 Yes   Stretching by therapist/PROM Trialed modified jose stretch - not effective (pt states modified lunge position she has felt more of a stretch in  "hip flexors in the past)      CARDIOVASCULAR       Nu Step 6 mins, Level: 1-2, BLE only  Yes   Stationary Bike Recumbent bike 5 min, L1  (Unable to complete revolution on Expresso upright bike)    Ambulation with AD      Treadmill       Endurance Testing       6mWT Assessed    STRENGTH TRAINING     HEP Review     Standing Ther-Ex Standing hip abduction 2 x 10 ea  - standing on airex   - Second set, no UE support     Step ups 6\" block x 20   - Added L LE TKE w orange TB    Step down, 2\" block, L LE only w orange band TKE x 10    Hip hikes 2\" block, VC's to discourage R hip hike     Heel raises from L2 on incline board x 15 (on floor today)    Heel raises: 2x15, small weighted ball between heels                              Yes   Side-lying there-ex Clamshells: 2x20 on R, L in supine: 5\"x10      Supine Ther-Ex Diaphragmatic breathin mins due to elevated BP  Glut bridge: 3\" x10, orange band  Bridge: x10   Supine hip abduction x 10, LLE, isometrics today  BKFO: x10, orange t-band only for R, active assist on L   Clamshells, x 10 ea LE  Bridge with ball squeeze x 10                  Deadbugs with physioball  - isometric holds: 10\" x 10, physioball under B heels   - alt LE marches with heels on physioball: x10  - alt UE: 3 sets x5 reps       Quadruped  hip extensions with towel under L foot: x10, B/L (modified position with BUE on EOM and LE on floor)     Quadruped <> bear crawl: 10\" x10   Bear crawl: 5' - fwd/back: x3 rounds     Alternating UE flexion x 10  Alternating LE extension x 10; use of slider L LE       Yes  Yes    Yes  Yes   Tall kneel Moderate perturbations all directions  Mini squats 2 x 10 w/ 6lb ball  Trunk rot: x10   D2 chops/lifts: x10    Akil Kneel  Onto airex pad:   - static hold: 20 seconds x 3   - chest press with beach ball: 2x10   -     Prone Ther-Ex     Core exercises PPT x 10   PPT with ball squeeze 5 sec hold, 10 x  PPT with ball squeeze and bridge x 10   PPT with supine marches x 10  PPT with " "hip abduction isometric L LE x 3 sec hold x10, orange band     Heel slides with towel: 2x10  Triple flex over physioball: x10  Windshield wipers BLE over physioball 2 x 10     STS's from low EOM squeezing pilates ring  - As above, with squeeze, in staggered stance  Marching, squeezing small ball 30 ft x 2    Modified dead bugs, alt UE 2 x 10     Supine B shoulder ext with GTB, emphasis on core activation; 2 x 15                    Functional Strength Testing       30 sec STS test (60y +)  Assessed    NEURO RE-ED  CPT 78342 TOTAL TIME FOR SESSION 8-22 Minutes      FES  To promote neurorecovery of gait:  Distance: 3.01 miles  Resistance: 0.77 Nm  Power: 2.9 W  Symmetry: L 2%  Total time: 21:00  Active time: 19:03    Xcite  NMRE of LLE in function:     - Sit <> stand from elevated EOM using 6 lb med ball: x10, then progressing to lowest height: x10, then x10 in staggered stance  - Modified reverse lunging with slider under R foot in parallel bars with BUE support > RUE support only: x10 reps each  - Tall kneel <> heel sit: 2x10, 2nd set with chest press   - fwd step up with LLE onto 6\" block: x10, repeated with R knee drive, x10 repeated with 3# on RUE     Bioness L300 Go  - Fwd/rev step over 12\" virginia virginia: x20,   - Fwd step up onto 6\" block with contra LE march: 2x10, B         - RLE step up, LLE march with 8lb db overhead press        - LLE step up, RLE march while holding 8lb db x 10, overhead press with 8lb db x 5  - Fwd step down 4\" block: 2x10, orange theraband around knees for manual cues   - stance on airex with lateral L toe taps to 12\" virginia: x10  - Tandem walk 20'x2, 6#   - step up on 4\" block, contralateral heel tap to 10\" block   - 12\" and 6\" hurdles, navigating reciprocally                    COORDINATION       Target Tapping     Coordination ladder As appropriate for HL balance     Amb with ankle weights     Amb with proprio wrap     Pushing weighted shopping cart     POSTURAL RE-ED     Sit <> " "Stand  From 22\" mat holding 6# med ball, cues for midline and equal WB      Tall Kneel  Tall kneel <> short sit: x10, x10 with chest press     Seated & standing reaching for trunk strengthening     Abbey-scapular strengthening     PRE-GAIT ACTIVITIES      Tap-ups     Step-ups To 6\" block with contralateral knee drive: x10, repeated with 2 lb DB for UE swing     Standing weight shifting     Step-taps     BALANCE TRAINING     Standing balance - static/dynamic       HEP Review       Floor       Airex Santiago-tandem: 30 seconds x 2, HT: 30\" x 1, EC: 30 seconds x 1  Tandem stance: 30 seconds x 1  Yes   Rockerboard AP plane: x10, with light UE support   AP, chest press x 10, OH press x 10, 6lb   Squats 2 x 10      Hurdles 6\" hurdles, focus on dec L hip circumduction through swing and encouraging heel strike. Step forward/back    Repeated with side stepping pattern, requiring BUE support dud to inability for L knee extension through stance     BOSU Ball  Fwd mini lunge onto domed side: x10, CloseS   Modified lunge onto BOSU with palloff press:   - x10, then repeated x10 with perturbations on yellow sports cord     Split Stance      Bi[employer] Balance Training  Weight shifting and motor control training with increased difficulty noted to L anterolateral planes: x5 mins     Dynamic gait       Side stepping With partial squat with orange theraband around ankles: 10'x4     Retro walking Fwd/retro amb 50'x6 with cues for shorter step length and for heel strike + knee extension through stance, mirror anteriorly, added dual task of beach bal Yes   Tandem Walking 10'x2, CloseS/CGA  Yes   Suitcase carry Using small 3 lb B DB on R: 100'x2, noted delayed L knee extension through IC and mid stance     Amb with hula hoop Using hula hoop to facilitate BUE swing and trunk rot     Walking with ball toss     Proprioceptive wrap Blue TB around L LE  - Amb along purple line x 3 with cues to keep feet within tiles  - x 250ft with same cues as above  - " Retro amb 3 x 20 ft  - Mini squats with heels elevated x 10, focus on minimizing hyperextension    Balance Testing     FGA  Assessed    SLS  Assessed see note     10mWT Assessed  - (6mWT on 8/26)    GAIT TRAINING  CPT 28879 TOTAL TIME FOR SESSION Not performed      Ambulation without AD  Gait with no AD over level indoor surfaces with WBOS and decreased L weight shift, VC for anterolateral weight shift, heel strike, BUE swing and trunk rot, 150'x1    Dynamic Gait  20'x6 with focus on shorter step lengths, heel strike and decreasing step width with use of 1' floor tiles, repeated with bimanual ball toss with CloseS and noted decreased gait speed and increase in step lengths     With Bioness donned x 250 ft   - Good clearance of L LE through swing with improved knee flexion through swing       Treadmill, BUE support to 1UE support, with Bioness donned 2.1 mph x 10 min   - VC for increased nancy  - Improved GABRIELLE   - Following TM, without stim on, mild L foot slap and WBOS seen.     Following removal of proprioceptive wrap x 250ft, no instances of L knee hyperextension thrust noted.                               Stair negotiation  Full flight stairs 1 trial with 1 rail support; 1 trial without UE support. Tactile cues for trunk midline, verbal and visual cues for eccentric control     Curb negotiation       Ramp negotiation       Outdoor ambulation  With Bioness:  - x500 ft approx, inclines/declines  - Retro amb up incline 3 x 15 ft     MANUAL  CPT 57804 TOTAL TIME FOR SESSION Not performed      Mobilization        MODALITIES  CPT 14393 TOTAL TIME FOR SESSION      Ice       Heat       ATTENDED E-STIM  CPT 76154 TOTAL TIME FOR SESSION Not performed     Attended E-Stim FES    [id number] (Age 28) , PIN [id number]   Stim to L quad, hamstring, glutes, tib ant, and gastroc. 250-350 pulse width, 40Hz frequency.   Muscle testing testing completed for initial set up (7/8)    Xcite use for LLE:     Stim to L quad, hamstring, glutes,  tib ant, and gastroc. 250-350 pulse width, 40Hz frequency.   Muscle testing testing completed for initial set up 7/26)    Bioness:   L lower leg quick fit pads, L hamstring added 8/2  30 Hz with 0.2 ramp for loading response   Used tablet without strap                \                       Unattended E-stim

## 2024-09-05 ENCOUNTER — HOSPITAL ENCOUNTER (OUTPATIENT)
Dept: PHYSICAL THERAPY | Facility: REHABILITATION | Age: 29
Setting detail: THERAPIES SERIES
Discharge: HOME | End: 2024-09-05
Attending: LEGAL MEDICINE
Payer: COMMERCIAL

## 2024-09-05 DIAGNOSIS — Z86.69 HISTORY OF TETHERED SPINAL CORD: Primary | ICD-10-CM

## 2024-09-05 PROCEDURE — 97112 NEUROMUSCULAR REEDUCATION: CPT | Mod: GP,CQ

## 2024-09-06 NOTE — PROGRESS NOTES
Physical Therapy Visit    PT DAILY NOTE FOR OUTPATIENT THERAPY    Patient: Melanie Litten MRN: [id number]  : 1995 28 y.o.  Referring Physician: Remedios Whitfield MD  Date of Visit: 2024    Certification Dates: 24 through 24    Diagnosis:   1. History of tethered spinal cord        Chief Complaints:  EDS, LLE weakness, gait/balance impairment    Precautions:   Precautions comments: hypermobile - EDS, postural hypotension      TODAY'S VISIT    Time In Session:  Start Time: 09  Stop Time: 1015  Time Calculation (min): 35 min   History/Vitals/Pain/Encounter Info - 24 0940          Injury History/Precautions/Daily Required Info    Document Type daily treatment     Primary Therapist Dilan Machuca     Chief Complaint/Reason for Visit  EDS, LLE weakness, gait/balance impairment     Referring Physician Remedios Whitfield MD     Precautions comments hypermobile - EDS, postural hypotension     History of present illness/functional impairment Saranya is a 28 year old female who presents to OPPT with history of tethered cord syndrome. Pt with PMH significant for chiari malformation, hypermobile EDS, left plantar fasciitis, anxiety and postural hypotension. Pt reports her symptoms started slowly in 2019 with L hip pain, followed up with neurosurgeon in [place] with unremarkable results for all testing. Her symptoms then progressed with L leg motor weakness and shakiness (similar to clonus) with movement, chronic constipation, L drop foot, lower back pain, and neck pain.  Pt was finally diagnosed with tethered cord syndrome and underwent a clinical trial surgery at [facility], [facility], and [facility]. Due to her symptoms, she meets criteria for exploration and sectioning of her filum for the functional tethered cord clinical trial. Now s/p laminectomy for occult tethered cord release on 23 by Dr Duckworth. Patient tolerated procedure well. No  post-op complications. Transferred stable once PACU criteria met. Patient kept FLAT for 36hr. Placed on an aseptic dex taper and post-op IV abx x24hrs.  She was mobilized on 9/27 tolerated well without any symptoms. Additionally, was instructed not to exercise for 7 weeks post-surgery. Pt reports she recovered well overall with decreased drop foot, clonus and swelling, continues to have increased coordination deficits and foot drag with fatigue. Pt has completed OPPT at [facility] in [place] and intermittent massage therapy. Pt’s functional goals are to work on higher level balance, picking up things from floor, coordination and “Hippo therapy”.     Patient/Family/Caregiver Comments/Observations Patient arrived a few mins late to [facility] for equestrian therapy today.  No new issues to report.     Patient reported fall since last visit No        Pain Assessment    Currently in pain No/Denies                    Daily Treatment Assessment and Plan - 09/05/24 2108          Daily Treatment Assessment and Plan    Progress toward goals Progressing     Daily Outcome Summary No reports of discomfort t/o equestrian session but fatigued reported and observed with dismount - clonus L LE.  Hip alignment improved after 1-2 full ring circles and core activation.  Postural straps used start of session - not used when patient was performing jumpers position.  Shortened left stirrup during jumpers position to improve weightbearing through R LE - demonstrated improvement with alignment going into and out of jumpers position with stirrups offset.     Plan and Recommendations Patient to have 1 more equestrian session.                         OBJECTIVE DATA TAKEN TODAY:    None taken    Today's Treatment:    Today's Treatment:               PT Neuro Exercises Current Session   Performed Today? (y/n)   THER ACT   CPT 40590 TOTAL TIME FOR SESSION 0-7 Minutes      Pain, vitals, subjective    Provided rest breaks for energy conservation      Skin inspection Base of incision, no swelling noted     FES  cycle [id number] (Age 28) , PIN [id number]   Bike height: 2 holes showing  Pedal height: 3 holes showing  L LE set up only     FES Xcite Set Up                    Bioness Additional time for set up due to connection issues        Equestrian Horse: Demario  Mount/Dismount: Patient able to mount horse without assistance swinging leg over rump of horse.       Equipment:  Fleece, pad, surcingle.     Other equipment used during  this session: 2-posture straps, godwin ball not used under R IT this session  Patient has previous equestrian riding at another facility earlier this summer -      -Reviewed today's treatment plan and expectations.  Monitored level of fatigue and pain t/o session. Yes    Patient Education Patient educated regarding current impairments per testing completed today and PT POC moving forward.      Patient provided with Welcome Letter, which includes attendance policy. Provided education regarding cancellation and no-show policy. Education regarding the importance of participation and regular attendance to maximize goal attainment. Patient verbalized understanding/agreement.      Clearance for FES received, will be completed NV, start with handheld unit to assess tolerance prior to FES bike. Pt verbalized understanding.     Patient educated regarding progress made thus far, current impairments per re-assessments completed today and PT POC moving forward. Patient verbalized understanding/agreement.                                                               HEP  Verbally reviewed practicing gait over level surfaces with decreased step width, and then incorporating BUE swing and trunk rot. Pt verbalized understanding.      Floor Transfers  CloseS with increased time for LLE management, requires single UE support with review of moving from tall kneel to half kneeling position. Pt demos good understanding, will require continued practice     "  Subjective Outcomes Measures       ABC Scale Assessed - returned today     THER EX  CPT 70844 TOTAL TIME FOR SESSION      STRETCHING       Stretching by patient Incline board stretch: L3 - 1 min x2     Stretching by therapist/PROM Trialed modified jose stretch - not effective (pt states modified lunge position she has felt more of a stretch in hip flexors in the past)     Manual hip flexor and adductor S, gentle B           CARDIOVASCULAR       Nu Step 6 mins, Level: 1, BLE only      Stationary Bike Recumbent bike 5 min, L1  (Unable to complete revolution on Expresso upright bike)     Ambulation with AD        Treadmill       Endurance Testing       6mWT Assessed     STRENGTH TRAINING       HEP Review       Standing Ther-Ex Standing hip abduction 2 x 10 ea  - standing on airex   - Second set, no UE support      Step ups 6\" block x 20   - Added L LE TKE w orange TB     Step down, 2\" block, L LE only w orange band TKE x 10     Hip hikes 2\" block, VC's to discourage R hip hike      Heel raises from L2 on incline board x 15 (on floor today)        Side-lying there-ex Clamshells: 2x20 on R, L in supine: 5\"x10       Supine Ther-Ex Diaphragmatic breathin mins due to elevated BP  Glut bridge: 3\" x10, orange band  Bridge: x10   Supine hip abduction x 10, LLE, isometrics today  BKFO: x10, orange t-band only for R, active assist on L   Clamshells, x 10 ea LE        Deadbugs with physioball  - isometric holds: 10\" x 10, physioball under B heels   - alt LE marches with heels on physioball: x10  - alt UE: 3 sets x5 reps         Quadruped  hip extensions with towel under L foot: x10, B/L (modified position with BUE on EOM and LE on floor)      Quadruped <> bear crawl: 10\" x 10     Tall kneel Moderate perturbations all directions  Mini squats 2 x 10 holding 6\" med ball   Trunk rot: x10   D2 chops/lifts: x10     Prone Ther-Ex       Core exercises PPT x 10   PPT with ball squeeze 5 sec hold, 10 x  PPT with ball squeeze and " "bridge x 10   PPT with supine marches x 10  PPT with hip abduction isometric L LE x 3 sec hold x10, orange band      Heel slides with towel: 2x10  Triple flex over physioball: x10  Windshield wipers BLE over physioball 2 x 10      STS's from low EOM squeezing pilates ring  - As above, with squeeze and OH press  Marching, squeezing small ball 30 ft x 2     Modified dead bugs, alt UE 2 x 10      Functional Strength Testing       30 sec STS test (60y +)  Assessed     NEURO RE-ED  CPT 25192 TOTAL TIME FOR SESSION 35 Minutes      FES  To promote neurorecovery of gait:  Distance: 3.01 miles  Resistance: 0.77 Nm  Power: 2.9 W  Symmetry: L 2%  Total time: 21:00  Active time: 19:03     Xcite  NMRE of LLE in function:      - Sit <> stand from elevated EOM using 6 lb med ball: x10, then progressing to lowest height: x10, then x10 in staggered stance  - Modified reverse lunging with slider under R foot in parallel bars with BUE support > RUE support only: x10 reps each  - Tall kneel <> heel sit: 2x10, 2nd set with chest press   - fwd step up with LLE onto 6\" block: x10, repeated with R knee drive, x10 repeated with 3# on RUE      Bioness L300 Go  - Fwd/rev step over 6\" virginia then progressed to 12\" virginia: x20, light RUE on backwards stepping due to poor hamstring strength  - Fwd step up onto 6\" block with contra LE march: 2x10, B         - RLE step up, LLE march with 6lb db overhead press   - Fwd step down 4\" block: 2x10, orange theraband around knees for manual cues   - stance on airex with lateral L toe taps to 12\" virginia: x10  - Tandem walk 20'x2, 6#      COORDINATION       Target Tapping       Coordination ladder As appropriate for HL balance      Amb with ankle weights       Amb with proprio wrap       Pushing weighted shopping cart       POSTURAL RE-ED       Sit <> Stand  From 22\" mat holding 6# med ball, cues for midline and equal WB       Tall Kneel  Tall kneel <> short sit: x10, x10 with chest press      Seated & " "standing reaching for trunk strengthening       Abbey-scapular strengthening       PRE-GAIT ACTIVITIES        Tap-ups       Step-ups To 6\" block with contralateral knee drive: x10, repeated with 2 lb DB for UE swing      Standing weight shifting       Step-taps       BALANCE TRAINING       Standing balance - static/dynamic       HEP Review       Floor       Airex Santiago-tandem: 30 seconds x 2, HT: 30\" x 1, EC: 30 seconds x 1  Tandem stance: 30 seconds x 1      Rockerboard AP plane: x10, with light UE support   AP, chest press x 10, OH press x 10, 6lb   Squats 2 x 10        Hurdles 6\" hurdles, focus on dec L hip circumduction through swing and encouraging heel strike. Step forward/back     Repeated with side stepping pattern, requiring BUE support dud to inability for L knee extension through stance      BOSU Ball  Fwd mini lunge onto domed side: x10, CloseS   Modified lunge onto BOSU with palloff press:   - x10, then repeated x10 with perturbations on yellow sports cord      Split Stance       Biodex Balance Training  Weight shifting and motor control training with increased difficulty noted to L anterolateral planes: x5 mins      Dynamic gait        Side stepping With partial squat with orange theraband around ankles: 10'x4      Retro walking Fwd/retro amb 50'x6 with cues for shorter step length and for heel strike + knee extension through stance, mirror anteriorly, added dual task of beach bal     Tandem Walking       Suitcase carry Using small 3 lb B DB on R: 100'x2, noted delayed L knee extension through IC and mid stance      Amb with hula hoop Using hula hoop to facilitate BUE swing and trunk rot      Walking with ball toss       Proprioceptive wrap                   Blue TB around L LE  - Amb along purple line x 3 with cues to keep feet within tiles  - x 250ft with same cues as above  - Retro amb 3 x 20 ft  - Mini squats with heels elevated x 10, focus on minimizing hyperextension     EQUESTRIAN 1st - Equestrian " session: 8/15  2nd session - 8/29  3rd session - 9/5  Horse:  Demario (17.2 hands)  Time on Horse:  35  mins total     Following 1-2 full ring circles with minimal UE support performed b/l hip flexor stretching   trunk stretching with OH and lateral rotation - improvement with neutral hips following stretching.  Left LE is weaker and left hip tends to retract.  Demonstrated very min shortening of right lateral trunk - improvement with right IT Wbing.  Trunk alignment and core activation improved with the following:  -b/l UE movement pattern of horizontal abd to forward flexion to OH reach and repeat.  Also perform horizontal abduction and rotation both directions during CW/CCW full ring circles.  -Utilized postural straps (crisscrossed) core activation with good results.  Occasional retraction of left shoulder -corrected with cueing  -CW/CCW circles, serpentines and walk/halts  - arms out to side with cues for core and scap depression, cw and ccw ring circles   - static jumpers position initially with UE support - progressed to without with cueing  - dynamic jumpers position (horse walking full ring circles) initially with UE support f/b with arms out to the side Yes           Y                Y        N        Y  Y    Y    Y   Balance Testing       FGA  Assessed     SLS  Assessed see note      10mWT Assessed      GAIT TRAINING  CPT 39317 TOTAL TIME FOR SESSION 0-7 Minutes      Ambulation without AD  Gait with no AD over level indoor surfaces with WBOS and decreased L weight shift, VC for anterolateral weight shift, heel strike, BUE swing and trunk rot, 150'x1     Dynamic Gait  20'x6 with focus on shorter step lengths, heel strike and decreasing step width with use of 1' floor tiles, repeated with bimanual ball toss with CloseS and noted decreased gait speed and increase in step lengths      With Bioness donned x 250 ftx3   - Good clearance of L LE through swing with improved knee flexion through swing   - Pt  "subjectively reports feeling a smoother gait pattern with decreased L foot slap     Treadmill, BUE support to 1UE support, with Bioness donned 2.1 mph x 10 min - \"[place] VR course\"  - VC for increased nancy - metranome set to 100 bpm  - Improved GABRIELLE   - Following TM, without stim on, mild L foot slap and WBOS seen.      Following removal of proprioceptive wrap x 250ft, no instances of L knee hyperextension thrust noted.                                                Stair negotiation       Curb negotiation       Ramp negotiation       Outdoor ambulation       MANUAL  CPT 62509 TOTAL TIME FOR SESSION Not performed      Mobilization        MODALITIES  CPT 17409 TOTAL TIME FOR SESSION       Ice       Heat       ATTENDED E-STIM  CPT 03812 TOTAL TIME FOR SESSION 0-7 Minutes     Attended E-Stim FES    [id number] (Age 28) , PIN [phone]   Stim to L quad, hamstring, glutes, tib ant, and gastroc. 250-350 pulse width, 40Hz frequency.   Muscle testing testing completed for initial set up (7/8)     Xcite use for LLE:      Stim to L quad, hamstring, glutes, tib ant, and gastroc. 250-350 pulse width, 40Hz frequency.   Muscle testing testing completed for initial set up 7/26)     Bioness:   L lower leg quick fit pads, L hamstring added 8/2  30 Hz with 0.2 ramp for loading response   Used tablet without strap                                                Unattended E-stim                                                             "

## 2024-09-06 NOTE — OP PT TREATMENT LOG
Today's Treatment:               PT Neuro Exercises Current Session   Performed Today? (y/n)   THER ACT   CPT 19362 TOTAL TIME FOR SESSION 0-7 Minutes      Pain, vitals, subjective    Provided rest breaks for energy conservation     Skin inspection Base of incision, no swelling noted     FES  cycle [id number] (Age 28) , PIN [id number]   Bike height: 2 holes showing  Pedal height: 3 holes showing  L LE set up only     FES Xcite Set Up                    Bioness Additional time for set up due to connection issues        Equestrian Horse: Demario  Mount/Dismount: Patient able to mount horse without assistance swinging leg over rump of horse.       Equipment:  Fleece, pad, surcingle.     Other equipment used during  this session: 2-posture straps, godwin ball not used under R IT this session  Patient has previous equestrian riding at another facility earlier this summer -      -Reviewed today's treatment plan and expectations.  Monitored level of fatigue and pain t/o session. Yes    Patient Education Patient educated regarding current impairments per testing completed today and PT POC moving forward.      Patient provided with Welcome Letter, which includes attendance policy. Provided education regarding cancellation and no-show policy. Education regarding the importance of participation and regular attendance to maximize goal attainment. Patient verbalized understanding/agreement.      Clearance for FES received, will be completed NV, start with handheld unit to assess tolerance prior to FES bike. Pt verbalized understanding.     Patient educated regarding progress made thus far, current impairments per re-assessments completed today and PT POC moving forward. Patient verbalized understanding/agreement.                                                               HEP  Verbally reviewed practicing gait over level surfaces with decreased step width, and then incorporating BUE swing and trunk rot. Pt verbalized understanding.   "    Floor Transfers  CloseS with increased time for LLE management, requires single UE support with review of moving from tall kneel to half kneeling position. Pt demos good understanding, will require continued practice      Subjective Outcomes Measures       ABC Scale Assessed - returned today     THER EX  CPT 83639 TOTAL TIME FOR SESSION      STRETCHING       Stretching by patient Incline board stretch: L3 - 1 min x2     Stretching by therapist/PROM Trialed modified jose stretch - not effective (pt states modified lunge position she has felt more of a stretch in hip flexors in the past)     Manual hip flexor and adductor S, gentle B           CARDIOVASCULAR       Nu Step 6 mins, Level: 1, BLE only      Stationary Bike Recumbent bike 5 min, L1  (Unable to complete revolution on Expresso upright bike)     Ambulation with AD        Treadmill       Endurance Testing       6mWT Assessed     STRENGTH TRAINING       HEP Review       Standing Ther-Ex Standing hip abduction 2 x 10 ea  - standing on airex   - Second set, no UE support      Step ups 6\" block x 20   - Added L LE TKE w orange TB     Step down, 2\" block, L LE only w orange band TKE x 10     Hip hikes 2\" block, VC's to discourage R hip hike      Heel raises from L2 on incline board x 15 (on floor today)        Side-lying there-ex Clamshells: 2x20 on R, L in supine: 5\"x10       Supine Ther-Ex Diaphragmatic breathin mins due to elevated BP  Glut bridge: 3\" x10, orange band  Bridge: x10   Supine hip abduction x 10, LLE, isometrics today  BKFO: x10, orange t-band only for R, active assist on L   Clamshells, x 10 ea LE        Deadbugs with physioball  - isometric holds: 10\" x 10, physioball under B heels   - alt LE marches with heels on physioball: x10  - alt UE: 3 sets x5 reps         Quadruped  hip extensions with towel under L foot: x10, B/L (modified position with BUE on EOM and LE on floor)      Quadruped <> bear crawl: 10\" x 10     Tall kneel Moderate " "perturbations all directions  Mini squats 2 x 10 holding 6\" med ball   Trunk rot: x10   D2 chops/lifts: x10     Prone Ther-Ex       Core exercises PPT x 10   PPT with ball squeeze 5 sec hold, 10 x  PPT with ball squeeze and bridge x 10   PPT with supine marches x 10  PPT with hip abduction isometric L LE x 3 sec hold x10, orange band      Heel slides with towel: 2x10  Triple flex over physioball: x10  Windshield wipers BLE over physioball 2 x 10      STS's from low EOM squeezing pilates ring  - As above, with squeeze and OH press  Marching, squeezing small ball 30 ft x 2     Modified dead bugs, alt UE 2 x 10      Functional Strength Testing       30 sec STS test (60y +)  Assessed     NEURO RE-ED  CPT 91099 TOTAL TIME FOR SESSION 35 Minutes      FES  To promote neurorecovery of gait:  Distance: 3.01 miles  Resistance: 0.77 Nm  Power: 2.9 W  Symmetry: L 2%  Total time: 21:00  Active time: 19:03     Xcite  NMRE of LLE in function:      - Sit <> stand from elevated EOM using 6 lb med ball: x10, then progressing to lowest height: x10, then x10 in staggered stance  - Modified reverse lunging with slider under R foot in parallel bars with BUE support > RUE support only: x10 reps each  - Tall kneel <> heel sit: 2x10, 2nd set with chest press   - fwd step up with LLE onto 6\" block: x10, repeated with R knee drive, x10 repeated with 3# on RUE      Bioness L300 Go  - Fwd/rev step over 6\" [place]ia then progressed to 12\" virginia: x20, light RUE on backwards stepping due to poor hamstring strength  - Fwd step up onto 6\" block with contra LE march: 2x10, B         - RLE step up, LLE march with 6lb db overhead press   - Fwd step down 4\" block: 2x10, orange theraband around knees for manual cues   - stance on airex with lateral L toe taps to 12\" [place]: x10  - Tandem walk 20'x2, 6#      COORDINATION       Target Tapping       Coordination ladder As appropriate for HL balance      Amb with ankle weights       Amb with proprio wrap  " "     Pushing weighted shopping cart       POSTURAL RE-ED       Sit <> Stand  From 22\" mat holding 6# med ball, cues for midline and equal WB       Tall Kneel  Tall kneel <> short sit: x10, x10 with chest press      Seated & standing reaching for trunk strengthening       Babey-scapular strengthening       PRE-GAIT ACTIVITIES        Tap-ups       Step-ups To 6\" block with contralateral knee drive: x10, repeated with 2 lb DB for UE swing      Standing weight shifting       Step-taps       BALANCE TRAINING       Standing balance - static/dynamic       HEP Review       Floor       Airex Santiago-tandem: 30 seconds x 2, HT: 30\" x 1, EC: 30 seconds x 1  Tandem stance: 30 seconds x 1      Rockerboard AP plane: x10, with light UE support   AP, chest press x 10, OH press x 10, 6lb   Squats 2 x 10        Hurdles 6\" hurdles, focus on dec L hip circumduction through swing and encouraging heel strike. Step forward/back     Repeated with side stepping pattern, requiring BUE support dud to inability for L knee extension through stance      BOSU Ball  Fwd mini lunge onto domed side: x10, CloseS   Modified lunge onto BOSU with palloff press:   - x10, then repeated x10 with perturbations on yellow sports cord      Split Stance       Biodex Balance Training  Weight shifting and motor control training with increased difficulty noted to L anterolateral planes: x5 mins      Dynamic gait        Side stepping With partial squat with orange theraband around ankles: 10'x4      Retro walking Fwd/retro amb 50'x6 with cues for shorter step length and for heel strike + knee extension through stance, mirror anteriorly, added dual task of beach bal     Tandem Walking       Suitcase carry Using small 3 lb B DB on R: 100'x2, noted delayed L knee extension through IC and mid stance      Amb with hula hoop Using hula hoop to facilitate BUE swing and trunk rot      Walking with ball toss       Proprioceptive wrap                   Blue TB around L LE  - Amb " along purple line x 3 with cues to keep feet within tiles  - x 250ft with same cues as above  - Retro amb 3 x 20 ft  - Mini squats with heels elevated x 10, focus on minimizing hyperextension     EQUESTRIAN 1st - Equestrian session: 8/15  2nd session - 8/29  3rd session - 9/5  Horse:  Demario (17.2 hands)  Time on Horse:  35  mins total     Following 1-2 full ring circles with minimal UE support performed b/l hip flexor stretching   trunk stretching with OH and lateral rotation - improvement with neutral hips following stretching.  Left LE is weaker and left hip tends to retract.  Demonstrated very min shortening of right lateral trunk - improvement with right IT Wbing.  Trunk alignment and core activation improved with the following:  -b/l UE movement pattern of horizontal abd to forward flexion to OH reach and repeat.  Also perform horizontal abduction and rotation both directions during CW/CCW full ring circles.  -Utilized postural straps (crisscrossed) core activation with good results.  Occasional retraction of left shoulder -corrected with cueing  -CW/CCW circles, serpentines and walk/halts  - arms out to side with cues for core and scap depression, cw and ccw ring circles   - static jumpers position initially with UE support - progressed to without with cueing  - dynamic jumpers position (horse walking full ring circles) initially with UE support f/b with arms out to the side Yes           Y                Y        N        Y  Y    Y    Y   Balance Testing       FGA  Assessed     SLS  Assessed see note      10mWT Assessed      GAIT TRAINING  CPT 40971 TOTAL TIME FOR SESSION 0-7 Minutes      Ambulation without AD  Gait with no AD over level indoor surfaces with WBOS and decreased L weight shift, VC for anterolateral weight shift, heel strike, BUE swing and trunk rot, 150'x1     Dynamic Gait  20'x6 with focus on shorter step lengths, heel strike and decreasing step width with use of 1' floor tiles, repeated with  "bimanual ball toss with CloseS and noted decreased gait speed and increase in step lengths      With Bioness donned x 250 ftx3   - Good clearance of L LE through swing with improved knee flexion through swing   - Pt subjectively reports feeling a smoother gait pattern with decreased L foot slap     Treadmill, BUE support to 1UE support, with Bioness donned 2.1 mph x 10 min - \"[place] VR course\"  - VC for increased nancy - metranome set to 100 bpm  - Improved GABRIELLE   - Following TM, without stim on, mild L foot slap and WBOS seen.      Following removal of proprioceptive wrap x 250ft, no instances of L knee hyperextension thrust noted.                                                Stair negotiation       Curb negotiation       Ramp negotiation       Outdoor ambulation       MANUAL  CPT 86435 TOTAL TIME FOR SESSION Not performed      Mobilization        MODALITIES  CPT 37782 TOTAL TIME FOR SESSION       Ice       Heat       ATTENDED E-STIM  CPT 30296 TOTAL TIME FOR SESSION 0-7 Minutes     Attended E-Stim FES    [id number] (Age 28) , PIN [phone]   Stim to L quad, hamstring, glutes, tib ant, and gastroc. 250-350 pulse width, 40Hz frequency.   Muscle testing testing completed for initial set up (7/8)     Xcite use for LLE:      Stim to L quad, hamstring, glutes, tib ant, and gastroc. 250-350 pulse width, 40Hz frequency.   Muscle testing testing completed for initial set up 7/26)     Bioness:   L lower leg quick fit pads, L hamstring added 8/2  30 Hz with 0.2 ramp for loading response   Used tablet without strap                                                Unattended E-stim                                      "

## 2024-09-09 ENCOUNTER — HOSPITAL ENCOUNTER (OUTPATIENT)
Dept: PHYSICAL THERAPY | Facility: REHABILITATION | Age: 29
Setting detail: THERAPIES SERIES
Discharge: HOME | End: 2024-09-09
Attending: LEGAL MEDICINE
Payer: COMMERCIAL

## 2024-09-09 DIAGNOSIS — Z86.69 HISTORY OF TETHERED SPINAL CORD: Primary | ICD-10-CM

## 2024-09-09 PROCEDURE — 97110 THERAPEUTIC EXERCISES: CPT | Mod: GP

## 2024-09-09 PROCEDURE — 97116 GAIT TRAINING THERAPY: CPT | Mod: GP

## 2024-09-09 PROCEDURE — 97032 APPL MODALITY 1+ESTIM EA 15: CPT | Mod: GP

## 2024-09-09 NOTE — PROGRESS NOTES
Physical Therapy Visit    PT DAILY NOTE FOR OUTPATIENT THERAPY    Patient: Melanie Litten MRN: [id number]  : 1995 28 y.o.  Referring Physician: Remedios Whitfield MD  Date of Visit: 2024    Certification Dates: 24 through 24    Diagnosis:   1. History of tethered spinal cord        Chief Complaints:  EDS, LLE weakness, gait/balance impairment    Precautions:   Precautions comments: hypermobile - EDS, postural hypotension      TODAY'S VISIT    Time In Session:  Start Time: 805  Stop Time: 900  Time Calculation (min): 55 min   History/Vitals/Pain/Encounter Info - 24 0807          Injury History/Precautions/Daily Required Info    Document Type daily treatment     Primary Therapist Dilan Machuca     Chief Complaint/Reason for Visit  EDS, LLE weakness, gait/balance impairment     Referring Physician Remedios Whitfield MD     Precautions comments hypermobile - EDS, postural hypotension     History of present illness/functional impairment Saranya is a 28 year old female who presents to OPPT with history of tethered cord syndrome. Pt with PMH significant for chiari malformation, hypermobile EDS, left plantar fasciitis, anxiety and postural hypotension. Pt reports her symptoms started slowly in 2019 with L hip pain, followed up with neurosurgeon in [place] with unremarkable results for all testing. Her symptoms then progressed with L leg motor weakness and shakiness (similar to clonus) with movement, chronic constipation, L drop foot, lower back pain, and neck pain.  Pt was finally diagnosed with tethered cord syndrome and underwent a clinical trial surgery at [facility], [facility], and [facility]. Due to her symptoms, she meets criteria for exploration and sectioning of her filum for the functional tethered cord clinical trial. Now s/p laminectomy for occult tethered cord release on 23 by Dr Duckworth. Patient tolerated procedure well. No  post-op complications. Transferred stable once PACU criteria met. Patient kept FLAT for 36hr. Placed on an aseptic dex taper and post-op IV abx x24hrs.  She was mobilized on 9/27 tolerated well without any symptoms. Additionally, was instructed not to exercise for 7 weeks post-surgery. Pt reports she recovered well overall with decreased drop foot, clonus and swelling, continues to have increased coordination deficits and foot drag with fatigue. Pt has completed OPPT at [facility] [place] [facility] in [place] and intermittent massage therapy. Pt’s functional goals are to work on higher level balance, picking up things from floor, coordination and “Hippo therapy”.     Patient/Family/Caregiver Comments/Observations Cardiology cleared patient due to swelling in BL LE's. She was seen by GI doctor for CIPO and was prescribed two weeks antibiotics.     Patient reported fall since last visit No        Pain Assessment    Currently in pain No/Denies        Pre Activity Vital Signs    /90     BP Location Left upper arm     BP Method Manual     Patient Position Sitting                    Daily Treatment Assessment and Plan - 09/09/24 0807          Daily Treatment Assessment and Plan    Progress toward goals Progressing     Daily Outcome Summary Patient tolerated session well. Progressed with standing strengthening exercises with added resistance and more dynamic movements. She was challenged with squats into heel raise but did demo decreased tremor with additional reps. Bioness utilized for gait training - incline added this visit with external feedback for step width on base of TM which pt responded well to. Patient apppropriately fatigued by end of session.     Plan and Recommendations Patient to have 1 more equestrian session.                         OBJECTIVE DATA TAKEN TODAY:    None taken    Today's Treatment:       PT Neuro Exercises Current Session   Performed Today? (y/n)   THER ACT   CPT 50437 TOTAL TIME FOR SESSION 0-7  Minutes      Pain, vitals, subjective    Provided rest breaks for energy conservation yes   Skin inspection Base of incision, no swelling noted    FES  cycle [id number] (Age 28) , PIN [id number]   Bike height: 2 holes showing  Pedal height: 3 holes showing  L LE set up only    FES Xcite Set Up              Bioness     Patient Education Patient educated regarding current impairments per testing completed today and PT POC moving forward.     Patient provided with Welcome Letter, which includes attendance policy. Provided education regarding cancellation and no-show policy. Education regarding the importance of participation and regular attendance to maximize goal attainment. Patient verbalized understanding/agreement.     Clearance for FES received, will be completed NV, start with handheld unit to assess tolerance prior to FES bike. Pt verbalized understanding.    Patient educated regarding progress made thus far, current impairments per re-assessments completed today and PT POC moving forward. Patient verbalized understanding/agreement.     Discussion regarding Equestrian scheduling and scheduling with [employer] rep. PT to find out next [facility] at [facility] to adjust pt schedule. Will provide with forms as well.                                      HEP  Verbally reviewed practicing gait over level surfaces with decreased step width, and then incorporating BUE swing and trunk rot. Pt verbalized understanding.     Floor Transfers  CloseS with increased time for LLE management, requires single UE support with review of moving from tall kneel to half kneeling position. Pt demos good understanding, will require continued practice     Subjective Outcomes Measures       ABC Scale Assessed     THER EX  CPT 61104 TOTAL TIME FOR SESSION  23-37 Minutes      STRETCHING      Stretching by patient Incline board stretch: L3 - 1 min x2    Stretching by therapist/PROM Trialed modified jose stretch - not effective (pt states  "modified lunge position she has felt more of a stretch in hip flexors in the past)      CARDIOVASCULAR       Nu Step 6 mins, Level: 1-2, BLE only     Stationary Bike Recumbent bike 5 min, L1  (Unable to complete revolution on [employer] upright bike)    Ambulation with AD      Treadmill       Endurance Testing       6mWT Assessed    STRENGTH TRAINING     HEP Review     Standing Ther-Ex Standing hip abduction 2 x 10 ea  - standing on airex   - Second set, no UE support     Step ups 6\" block x 20   - Added L LE TKE w orange TB    Step down, 2\" block, L LE only w orange band TKE x 10    Hip hikes 2\" block, VC's to discourage R hip hike     Heel raises from L2 on incline board x 15 (on floor today)    Heel raises: 2x15, small weighted ball between heels     Staggered STS, 10 lb 2 x 10    Squat into BL heel raise x 10                              Yes    Yes     Yes    Side-lying there-ex Clamshells: 2x20 on R, L in supine: 5\"x10      Supine Ther-Ex Diaphragmatic breathin mins due to elevated BP  Glut bridge: 3\" x10, orange band  Bridge: x10   Supine hip abduction x 10, LLE, isometrics today  BKFO: x10, orange t-band only for R, active assist on L   Clamshells, x 10 ea LE  Bridge with ball squeeze x 10                  Deadbugs with physioball  - isometric holds: 10\" x 10, physioball under B heels   - alt LE marches with heels on physioball: x10  - alt UE: 3 sets x5 reps       Quadruped  hip extensions with towel under L foot: x10, B/L (modified position with BUE on EOM and LE on floor)     Quadruped <> bear crawl: 10\" x10   Bear crawl: 5' - fwd/back: x3 rounds     Alternating UE flexion x 10  Alternating LE extension x 10; use of slider L LE            Tall kneel Moderate perturbations all directions  Mini squats 2 x 10 blue TB resistance   Trunk rot: 2x10 w 10lb   D2 chops/lifts: x10   Yes   Yes    Akil Kneel  Onto airex pad:   - static hold: 20 seconds x 3   - chest press with beach ball: 2x10   -     Prone Ther-Ex   " "  Core exercises PPT x 10   PPT with ball squeeze 5 sec hold, 10 x  PPT with ball squeeze and bridge x 10   PPT with supine marches x 10  PPT with hip abduction isometric L LE x 3 sec hold x10, orange band     Heel slides with towel: 2x10  Triple flex over physioball: x10  Windshield wipers BLE over physioball 2 x 10     STS's from low EOM squeezing pilates ring  - As above, with squeeze, in staggered stance  Marching, squeezing small ball 30 ft x 2    Modified dead bugs, alt UE 2 x 10     Supine B shoulder ext with GTB, emphasis on core activation; 2 x 15                    Functional Strength Testing       30 sec STS test (60y +)  Assessed    NEURO RE-ED  CPT 93036 TOTAL TIME FOR SESSION 0-7 Minutes      FES  To promote neurorecovery of gait:  Distance: 3.01 miles  Resistance: 0.77 Nm  Power: 2.9 W  Symmetry: L 2%  Total time: 21:00  Active time: 19:03    Xcite  NMRE of LLE in function:     - Sit <> stand from elevated EOM using 6 lb med ball: x10, then progressing to lowest height: x10, then x10 in staggered stance  - Modified reverse lunging with slider under R foot in parallel bars with BUE support > RUE support only: x10 reps each  - Tall kneel <> heel sit: 2x10, 2nd set with chest press   - fwd step up with LLE onto 6\" block: x10, repeated with R knee drive, x10 repeated with 3# on RUE     Bioness L300 Go  - Fwd/rev step over 12\" virgin[place] virgin[place]: x20,   - Fwd step up onto 6\" block with contra LE march: 2x10, B         - RLE step up, LLE march with 8lb db overhead press        - LLE step up, RLE march while holding 8lb db x 10, overhead press with 8lb db x 5  - Fwd step down 4\" block: 2x10, orange theraband around knees for manual cues   - stance on airex with lateral L toe taps to 12\" [place]ia: x10  - Tandem walk 20'x2, 6#   - step up on 4\" block, contralateral heel tap to 10\" block   - 12\" and 6\" hurdles, navigating reciprocally                    COORDINATION       Target Tapping     Coordination ladder As " "appropriate for HL balance     Amb with ankle weights     Amb with proprio wrap     Pushing weighted shopping cart     POSTURAL RE-ED     Sit <> Stand  From 22\" mat holding 6# med ball, cues for midline and equal WB      Tall Kneel  Tall kneel <> short sit: x10, x10 with chest press     Seated & standing reaching for trunk strengthening     Abbey-scapular strengthening     PRE-GAIT ACTIVITIES      Tap-ups     Step-ups To 6\" block with contralateral knee drive: x10, repeated with 2 lb DB for UE swing     Standing weight shifting     Step-taps     BALANCE TRAINING     Standing balance - static/dynamic       HEP Review       Floor       Airex Santiago-tandem: 30 seconds x 2, HT: 30\" x 1, EC: 30 seconds x 1  Tandem stance: 30 seconds x 1     Rockerboard AP plane: x10, with light UE support   AP, chest press x 10, OH press x 10, 6lb   Squats 2 x 10      Hurdles 6\" hurdles, focus on dec L hip circumduction through swing and encouraging heel strike. Step forward/back    Repeated with side stepping pattern, requiring BUE support dud to inability for L knee extension through stance     BOSU Ball  Fwd mini lunge onto domed side: x10, CloseS   Modified lunge onto BOSU with palloff press:   - x10, then repeated x10 with perturbations on yellow sports cord     Split Stance      Biodex Balance Training  Weight shifting and motor control training with increased difficulty noted to L anterolateral planes: x5 mins     Dynamic gait       Side stepping With partial squat with orange theraband around ankles: 10'x4     Retro walking Fwd/retro amb 50'x6 with cues for shorter step length and for heel strike + knee extension through stance, mirror anteriorly, added dual task of beach bal    30ft x 2 ea today with Bioness donned, PT assist to prevent L hip hike and lateral trunk lean with retro amb         Yes (billed with gait)   Tandem Walking 10'x2, CloseS/CGA     Suitcase carry Using small 3 lb B DB on R: 100'x2, noted delayed L knee " extension through IC and mid stance     Amb with hula hoop Using hula hoop to facilitate BUE swing and trunk rot     Walking with ball toss     Proprioceptive wrap Blue TB around L LE  - Amb along purple line x 3 with cues to keep feet within tiles  - x 250ft with same cues as above  - Retro amb 3 x 20 ft  - Mini squats with heels elevated x 10, focus on minimizing hyperextension    Balance Testing     FGA  Assessed    SLS  Assessed see note     10mWT Assessed  - (6mWT on 8/26)    GAIT TRAINING  CPT 78163 TOTAL TIME FOR SESSION 8-22 Minutes      Ambulation without AD  Gait with no AD over level indoor surfaces with WBOS and decreased L weight shift, VC for anterolateral weight shift, heel strike, BUE swing and trunk rot, 150'x1    Dynamic Gait  20'x6 with focus on shorter step lengths, heel strike and decreasing step width with use of 1' floor tiles, repeated with bimanual ball toss with CloseS and noted decreased gait speed and increase in step lengths     With Bioness donned x 250 ft   - Good clearance of L LE through swing with improved knee flexion through swing       Treadmill, BUE support to 1UE support, with Bioness donned 2.1 mph x 10 min, 2.0% incline    - pink TB on base of TM as target to encourage more NBOS    Following removal of proprioceptive wrap x 250ft, no instances of L knee hyperextension thrust noted.                      Yes          Stair negotiation  Full flight stairs 1 trial with 1 rail support; 1 trial without UE support. Tactile cues for trunk midline, verbal and visual cues for eccentric control     Curb negotiation       Ramp negotiation       Outdoor ambulation  With Bioness:  - x500 ft approx, inclines/declines  - Retro amb up incline 3 x 15 ft     MANUAL  CPT 01857 TOTAL TIME FOR SESSION Not performed      Mobilization        MODALITIES  CPT 89946 TOTAL TIME FOR SESSION      Ice       Heat       ATTENDED E-STIM  CPT 10171 TOTAL TIME FOR SESSION 8-22 Minutes     Attended E-Stim FES     [id number] (Age 28) , PIN [phone]   Stim to L quad, hamstring, glutes, tib ant, and gastroc. 250-350 pulse width, 40Hz frequency.   Muscle testing testing completed for initial set up (7/8)    Xcite use for LLE:     Stim to L quad, hamstring, glutes, tib ant, and gastroc. 250-350 pulse width, 40Hz frequency.   Muscle testing testing completed for initial set up 7/26)    Bioness:   L lower leg quick fit pads, L hamstring added 8/2  30 Hz with 0.2 ramp for loading response   Used tablet without strap                \              Yes            Unattended E-stim

## 2024-09-09 NOTE — OP PT TREATMENT LOG
PT Neuro Exercises Current Session   Performed Today? (y/n)   THER ACT   CPT 41400 TOTAL TIME FOR SESSION 0-7 Minutes      Pain, vitals, subjective    Provided rest breaks for energy conservation yes   Skin inspection Base of incision, no swelling noted    FES  cycle [id number] (Age 28) , PIN [id number]   Bike height: 2 holes showing  Pedal height: 3 holes showing  L LE set up only    FES Xcite Set Up              Bioness     Patient Education Patient educated regarding current impairments per testing completed today and PT POC moving forward.     Patient provided with Welcome Letter, which includes attendance policy. Provided education regarding cancellation and no-show policy. Education regarding the importance of participation and regular attendance to maximize goal attainment. Patient verbalized understanding/agreement.     Clearance for FES received, will be completed NV, start with handheld unit to assess tolerance prior to FES bike. Pt verbalized understanding.    Patient educated regarding progress made thus far, current impairments per re-assessments completed today and PT POC moving forward. Patient verbalized understanding/agreement.     Discussion regarding Equestrian scheduling and scheduling with [facility]. PT to find out next time [facility] rep is at [facility] to adjust pt schedule. Will provide with forms as well.                                      HEP  Verbally reviewed practicing gait over level surfaces with decreased step width, and then incorporating BUE swing and trunk rot. Pt verbalized understanding.     Floor Transfers  CloseS with increased time for LLE management, requires single UE support with review of moving from tall kneel to half kneeling position. Pt demos good understanding, will require continued practice     Subjective Outcomes Measures       ABC Scale Assessed     THER EX  CPT 67597 TOTAL TIME FOR SESSION  23-37 Minutes      STRETCHING      Stretching by patient Incline board  "stretch: L3 - 1 min x2    Stretching by therapist/PROM Trialed modified jose stretch - not effective (pt states modified lunge position she has felt more of a stretch in hip flexors in the past)      CARDIOVASCULAR       Nu Step 6 mins, Level: 1-2, BLE only     Stationary Bike Recumbent bike 5 min, L1  (Unable to complete revolution on Expresso upright bike)    Ambulation with AD      Treadmill       Endurance Testing       6mWT Assessed    STRENGTH TRAINING     HEP Review     Standing Ther-Ex Standing hip abduction 2 x 10 ea  - standing on airex   - Second set, no UE support     Step ups 6\" block x 20   - Added L LE TKE w orange TB    Step down, 2\" block, L LE only w orange band TKE x 10    Hip hikes 2\" block, VC's to discourage R hip hike     Heel raises from L2 on incline board x 15 (on floor today)    Heel raises: 2x15, small weighted ball between heels     Staggered STS, 10 lb 2 x 10    Squat into BL heel raise x 10                              Yes    Yes     Yes    Side-lying there-ex Clamshells: 2x20 on R, L in supine: 5\"x10      Supine Ther-Ex Diaphragmatic breathin mins due to elevated BP  Glut bridge: 3\" x10, orange band  Bridge: x10   Supine hip abduction x 10, LLE, isometrics today  BKFO: x10, orange t-band only for R, active assist on L   Clamshells, x 10 ea LE  Bridge with ball squeeze x 10                  Deadbugs with physioball  - isometric holds: 10\" x 10, physioball under B heels   - alt LE marches with heels on physioball: x10  - alt UE: 3 sets x5 reps       Quadruped  hip extensions with towel under L foot: x10, B/L (modified position with BUE on EOM and LE on floor)     Quadruped <> bear crawl: 10\" x10   Bear crawl: 5' - fwd/back: x3 rounds     Alternating UE flexion x 10  Alternating LE extension x 10; use of slider L LE            Tall kneel Moderate perturbations all directions  Mini squats 2 x 10 blue TB resistance   Trunk rot: 2x10 w 10lb   D2 chops/lifts: x10   Yes   Yes    Akil " "Kneel  Onto airex pad:   - static hold: 20 seconds x 3   - chest press with beach ball: 2x10   -     Prone Ther-Ex     Core exercises PPT x 10   PPT with ball squeeze 5 sec hold, 10 x  PPT with ball squeeze and bridge x 10   PPT with supine marches x 10  PPT with hip abduction isometric L LE x 3 sec hold x10, orange band     Heel slides with towel: 2x10  Triple flex over physioball: x10  Windshield wipers BLE over physioball 2 x 10     STS's from low EOM squeezing pilates ring  - As above, with squeeze, in staggered stance  Marching, squeezing small ball 30 ft x 2    Modified dead bugs, alt UE 2 x 10     Supine B shoulder ext with GTB, emphasis on core activation; 2 x 15                    Functional Strength Testing       30 sec STS test (60y +)  Assessed    NEURO RE-ED  CPT 97465 TOTAL TIME FOR SESSION 0-7 Minutes      FES  To promote neurorecovery of gait:  Distance: 3.01 miles  Resistance: 0.77 Nm  Power: 2.9 W  Symmetry: L 2%  Total time: 21:00  Active time: 19:03    Xcite  NMRE of LLE in function:     - Sit <> stand from elevated EOM using 6 lb med ball: x10, then progressing to lowest height: x10, then x10 in staggered stance  - Modified reverse lunging with slider under R foot in parallel bars with BUE support > RUE support only: x10 reps each  - Tall kneel <> heel sit: 2x10, 2nd set with chest press   - fwd step up with LLE onto 6\" block: x10, repeated with R knee drive, x10 repeated with 3# on RUE     Bioness L300 Go  - Fwd/rev step over 12\" virginia virginia: x20,   - Fwd step up onto 6\" block with contra LE march: 2x10, B         - RLE step up, LLE march with 8lb db overhead press        - LLE step up, RLE march while holding 8lb db x 10, overhead press with 8lb db x 5  - Fwd step down 4\" block: 2x10, orange theraband around knees for manual cues   - stance on airex with lateral L toe taps to 12\" virginia: x10  - Tandem walk 20'x2, 6#   - step up on 4\" block, contralateral heel tap to 10\" block   - 12\" " "and 6\" hurdles, navigating reciprocally                    COORDINATION       Target Tapping     Coordination ladder As appropriate for HL balance     Amb with ankle weights     Amb with proprio wrap     Pushing weighted shopping cart     POSTURAL RE-ED     Sit <> Stand  From 22\" mat holding 6# med ball, cues for midline and equal WB      Tall Kneel  Tall kneel <> short sit: x10, x10 with chest press     Seated & standing reaching for trunk strengthening     Abbey-scapular strengthening     PRE-GAIT ACTIVITIES      Tap-ups     Step-ups To 6\" block with contralateral knee drive: x10, repeated with 2 lb DB for UE swing     Standing weight shifting     Step-taps     BALANCE TRAINING     Standing balance - static/dynamic       HEP Review       Floor       Airex Santiago-tandem: 30 seconds x 2, HT: 30\" x 1, EC: 30 seconds x 1  Tandem stance: 30 seconds x 1     Rockerboard AP plane: x10, with light UE support   AP, chest press x 10, OH press x 10, 6lb   Squats 2 x 10      Hurdles 6\" hurdles, focus on dec L hip circumduction through swing and encouraging heel strike. Step forward/back    Repeated with side stepping pattern, requiring BUE support dud to inability for L knee extension through stance     BOSU Ball  Fwd mini lunge onto domed side: x10, CloseS   Modified lunge onto BOSU with palloff press:   - x10, then repeated x10 with perturbations on yellow sports cord     Split Stance      Biodex Balance Training  Weight shifting and motor control training with increased difficulty noted to L anterolateral planes: x5 mins     Dynamic gait       Side stepping With partial squat with orange theraband around ankles: 10'x4     Retro walking Fwd/retro amb 50'x6 with cues for shorter step length and for heel strike + knee extension through stance, mirror anteriorly, added dual task of beach bal    30ft x 2 ea today with Bioness donned, PT assist to prevent L hip hike and lateral trunk lean with retro amb         Yes (billed with gait) "   Tandem Walking 10'x2, CloseS/CGA     Suitcase carry Using small 3 lb B DB on R: 100'x2, noted delayed L knee extension through IC and mid stance     Amb with hula hoop Using hula hoop to facilitate BUE swing and trunk rot     Walking with ball toss     Proprioceptive wrap Blue TB around L LE  - Amb along purple line x 3 with cues to keep feet within tiles  - x 250ft with same cues as above  - Retro amb 3 x 20 ft  - Mini squats with heels elevated x 10, focus on minimizing hyperextension    Balance Testing     FGA  Assessed    SLS  Assessed see note     10mWT Assessed  - (6mWT on 8/26)    GAIT TRAINING  CPT 22333 TOTAL TIME FOR SESSION 8-22 Minutes      Ambulation without AD  Gait with no AD over level indoor surfaces with WBOS and decreased L weight shift, VC for anterolateral weight shift, heel strike, BUE swing and trunk rot, 150'x1    Dynamic Gait  20'x6 with focus on shorter step lengths, heel strike and decreasing step width with use of 1' floor tiles, repeated with bimanual ball toss with CloseS and noted decreased gait speed and increase in step lengths     With Bioness donned x 250 ft   - Good clearance of L LE through swing with improved knee flexion through swing       Treadmill, BUE support to 1UE support, with Bioness donned 2.1 mph x 10 min, 2.0% incline    - pink TB on base of TM as target to encourage more NBOS    Following removal of proprioceptive wrap x 250ft, no instances of L knee hyperextension thrust noted.                      Yes          Stair negotiation  Full flight stairs 1 trial with 1 rail support; 1 trial without UE support. Tactile cues for trunk midline, verbal and visual cues for eccentric control     Curb negotiation       Ramp negotiation       Outdoor ambulation  With Bioness:  - x500 ft approx, inclines/declines  - Retro amb up incline 3 x 15 ft     MANUAL  CPT 12249 TOTAL TIME FOR SESSION Not performed      Mobilization        MODALITIES  CPT 50448 TOTAL TIME FOR SESSION       Ice       Heat       ATTENDED E-STIM  CPT 63272 TOTAL TIME FOR SESSION 8-22 Minutes     Attended E-Stim FES    [id number] (Age 28) , PIN [phone]   Stim to L quad, hamstring, glutes, tib ant, and gastroc. 250-350 pulse width, 40Hz frequency.   Muscle testing testing completed for initial set up (7/8)    Xcite use for LLE:     Stim to L quad, hamstring, glutes, tib ant, and gastroc. 250-350 pulse width, 40Hz frequency.   Muscle testing testing completed for initial set up 7/26)    Bioness:   L lower leg quick fit pads, L hamstring added 8/2  30 Hz with 0.2 ramp for loading response   Used tablet without strap                \              Yes            Unattended E-stim

## 2024-09-12 ENCOUNTER — HOSPITAL ENCOUNTER (OUTPATIENT)
Dept: PHYSICAL THERAPY | Facility: REHABILITATION | Age: 29
Setting detail: THERAPIES SERIES
Discharge: HOME | End: 2024-09-12
Attending: LEGAL MEDICINE
Payer: COMMERCIAL

## 2024-09-12 DIAGNOSIS — Z86.69 HISTORY OF TETHERED SPINAL CORD: Primary | ICD-10-CM

## 2024-09-12 PROCEDURE — 97112 NEUROMUSCULAR REEDUCATION: CPT | Mod: GP

## 2024-09-12 NOTE — OP PT TREATMENT LOG
Today's Treatment:               PT Neuro Exercises Current Session   Performed Today? (y/n)   THER ACT   CPT 68610 TOTAL TIME FOR SESSION 0-7 Minutes      Pain, vitals, subjective    Provided rest breaks for energy conservation     Skin inspection Base of incision, no swelling noted     FES  cycle [id number] (Age 28) , PIN 1194   Bike height: 2 holes showing  Pedal height: 3 holes showing  L LE set up only     FES Xcite Set Up                    Bioness Additional time for set up due to connection issues        Equestrian Horse: HETAL  Mount/Dismount: Patient able to mount horse without assistance swinging leg over rump of horse.       Equipment:  Fleece, pad, surcingle.      equipment used during  this session: 2-posture straps, godwin ball not used under R IT this session  Patient has previous equestrian riding at another facility earlier this summer -      -Reviewed today's treatment plan and expectations.  Monitored level of fatigue and pain t/o session. Yes           None used this session   Patient Education Patient educated regarding current impairments per testing completed today and PT POC moving forward.      Patient provided with Welcome Letter, which includes attendance policy. Provided education regarding cancellation and no-show policy. Education regarding the importance of participation and regular attendance to maximize goal attainment. Patient verbalized understanding/agreement.      Clearance for FES received, will be completed NV, start with handheld unit to assess tolerance prior to FES bike. Pt verbalized understanding.     Patient educated regarding progress made thus far, current impairments per re-assessments completed today and PT POC moving forward. Patient verbalized understanding/agreement.                                                               HEP  Verbally reviewed practicing gait over level surfaces with decreased step width, and then incorporating BUE swing and trunk rot.  "Pt verbalized understanding.      Floor Transfers  CloseS with increased time for LLE management, requires single UE support with review of moving from tall kneel to half kneeling position. Pt demos good understanding, will require continued practice      Subjective Outcomes Measures       ABC Scale Assessed - returned today     THER EX  CPT 13269 TOTAL TIME FOR SESSION      STRETCHING       Stretching by patient Incline board stretch: L3 - 1 min x2     Stretching by therapist/PROM Trialed modified jose stretch - not effective (pt states modified lunge position she has felt more of a stretch in hip flexors in the past)     Manual hip flexor and adductor S, gentle LLE only           y     CARDIOVASCULAR       Nu Step 6 mins, Level: 1, BLE only      Stationary Bike Recumbent bike 5 min, L1  (Unable to complete revolution on Expresso upright bike)     Ambulation with AD        Treadmill       Endurance Testing       6mWT Assessed     STRENGTH TRAINING       HEP Review       Standing Ther-Ex Standing hip abduction 2 x 10 ea  - standing on airex   - Second set, no UE support      Step ups 6\" block x 20   - Added L LE TKE w orange TB     Step down, 2\" block, L LE only w orange band TKE x 10     Hip hikes 2\" block, VC's to discourage R hip hike      Heel raises from L2 on incline board x 15 (on floor today)        Side-lying there-ex Clamshells: 2x20 on R, L in supine: 5\"x10       Supine Ther-Ex Diaphragmatic breathin mins due to elevated BP  Glut bridge: 3\" x10, orange band  Bridge: x10   Supine hip abduction x 10, LLE, isometrics today  BKFO: x10, orange t-band only for R, active assist on L   Clamshells, x 10 ea LE        Deadbugs with physioball  - isometric holds: 10\" x 10, physioball under B heels   - alt LE marches with heels on physioball: x10  - alt UE: 3 sets x5 reps         Quadruped  hip extensions with towel under L foot: x10, B/L (modified position with BUE on EOM and LE on floor)      Quadruped <> bear " "crawl: 10\" x 10     Tall kneel Moderate perturbations all directions  Mini squats 2 x 10 holding 6\" med ball   Trunk rot: x10   D2 chops/lifts: x10     Prone Ther-Ex       Core exercises PPT x 10   PPT with ball squeeze 5 sec hold, 10 x  PPT with ball squeeze and bridge x 10   PPT with supine marches x 10  PPT with hip abduction isometric L LE x 3 sec hold x10, orange band      Heel slides with towel: 2x10  Triple flex over physioball: x10  Windshield wipers BLE over physioball 2 x 10      STS's from low EOM squeezing pilates ring  - As above, with squeeze and OH press  Marching, squeezing small ball 30 ft x 2     Modified dead bugs, alt UE 2 x 10      Functional Strength Testing       30 sec STS test (60y +)  Assessed     NEURO RE-ED  CPT 41007 TOTAL TIME FOR SESSION 45 Minutes      FES  To promote neurorecovery of gait:  Distance: 3.01 miles  Resistance: 0.77 Nm  Power: 2.9 W  Symmetry: L 2%  Total time: 21:00  Active time: 19:03     Xcite  NMRE of LLE in function:      - Sit <> stand from elevated EOM using 6 lb med ball: x10, then progressing to lowest height: x10, then x10 in staggered stance  - Modified reverse lunging with slider under R foot in parallel bars with BUE support > RUE support only: x10 reps each  - Tall kneel <> heel sit: 2x10, 2nd set with chest press   - fwd step up with LLE onto 6\" block: x10, repeated with R knee drive, x10 repeated with 3# on RUE      Bioness L300 Go  - Fwd/rev step over 6\" virginia then progressed to 12\" virginia: x20, light RUE on backwards stepping due to poor hamstring strength  - Fwd step up onto 6\" block with contra LE march: 2x10, B         - RLE step up, LLE march with 6lb db overhead press   - Fwd step down 4\" block: 2x10, orange theraband around knees for manual cues   - stance on airex with lateral L toe taps to 12\" virginia: x10  - Tandem walk 20'x2, 6#      COORDINATION       Target Tapping       Coordination ladder As appropriate for HL balance      Amb with " "ankle weights       Amb with proprio wrap       Pushing weighted shopping cart       POSTURAL RE-ED       Sit <> Stand  From 22\" mat holding 6# med ball, cues for midline and equal WB       Tall Kneel  Tall kneel <> short sit: x10, x10 with chest press      Seated & standing reaching for trunk strengthening       Abbey-scapular strengthening       PRE-GAIT ACTIVITIES        Tap-ups       Step-ups To 6\" block with contralateral knee drive: x10, repeated with 2 lb DB for UE swing      Standing weight shifting       Step-taps       BALANCE TRAINING       Standing balance - static/dynamic       HEP Review       Floor       Airex Santiago-tandem: 30 seconds x 2, HT: 30\" x 1, EC: 30 seconds x 1  Tandem stance: 30 seconds x 1      Rockerboard AP plane: x10, with light UE support   AP, chest press x 10, OH press x 10, 6lb   Squats 2 x 10        Hurdles 6\" hurdles, focus on dec L hip circumduction through swing and encouraging heel strike. Step forward/back     Repeated with side stepping pattern, requiring BUE support dud to inability for L knee extension through stance      BOSU Ball  Fwd mini lunge onto domed side: x10, CloseS   Modified lunge onto BOSU with palloff press:   - x10, then repeated x10 with perturbations on yellow sports cord      Split Stance       Biodex Balance Training  Weight shifting and motor control training with increased difficulty noted to L anterolateral planes: x5 mins      Dynamic gait        Side stepping With partial squat with orange theraband around ankles: 10'x4      Retro walking Fwd/retro amb 50'x6 with cues for shorter step length and for heel strike + knee extension through stance, mirror anteriorly, added dual task of beach bal     Tandem Walking       Suitcase carry Using small 3 lb B DB on R: 100'x2, noted delayed L knee extension through IC and mid stance      Amb with hula hoop Using hula hoop to facilitate BUE swing and trunk rot      Walking with ball toss       Proprioceptive wrap   "                 Blue TB around L LE  - Amb along purple line x 3 with cues to keep feet within tiles  - x 250ft with same cues as above  - Retro amb 3 x 20 ft  - Mini squats with heels elevated x 10, focus on minimizing hyperextension     EQUESTRIAN 1st - Equestrian session: 8/15  2nd session - 8/29  3rd session - 9/5  Sonia:  HETAL  Time on Horse:  42 mins total     Following 1-2 full ring circles with minimal UE support performed L hip flexor stretching   trunk stretching with OH and lateral rotation - improvement with neutral hips following stretching.  Left LE is weaker and left hip tends to retract.  Demonstrated very min shortening of right lateral trunk - improvement with right IT Wbing.  Trunk alignment and core activation improved with the following:  -b/l UE movement pattern of horizontal abd to forward flexion to OH reach and repeat.  Also perform horizontal abduction and rotation both directions during CW/CCW full ring circles.  -Utilized postural straps (crisscrossed) core activation with good results.  Occasional retraction of left shoulder -corrected with cueing  -CW/CCW circles, serpentines and walk/halts  - arms out to side with cues for core and scap depression, cw and ccw ring circles   - static jumpers position initially with UE support - progressed to without with cueing  - dynamic jumpers position (horse walking full ring circles) initially with UE support f/b with arms out to the side Yes           Y                Y        N        Y  Y    n    n   Balance Testing       FGA  Assessed     SLS  Assessed see note      10mWT Assessed      GAIT TRAINING  CPT 13405 TOTAL TIME FOR SESSION 0-7 Minutes      Ambulation without AD  Gait with no AD over level indoor surfaces with WBOS and decreased L weight shift, VC for anterolateral weight shift, heel strike, BUE swing and trunk rot, 150'x1     Dynamic Gait  20'x6 with focus on shorter step lengths, heel strike and decreasing step width with use of 1'  "floor tiles, repeated with bimanual ball toss with CloseS and noted decreased gait speed and increase in step lengths      With Bioness donned x 250 ftx3   - Good clearance of L LE through swing with improved knee flexion through swing   - Pt subjectively reports feeling a smoother gait pattern with decreased L foot slap     Treadmill, BUE support to 1UE support, with Bioness donned 2.1 mph x 10 min - \"[place] VR course\"  - VC for increased nancy - metranome set to 100 bpm  - Improved GABRIELLE   - Following TM, without stim on, mild L foot slap and WBOS seen.      Following removal of proprioceptive wrap x 250ft, no instances of L knee hyperextension thrust noted.                                                Stair negotiation       Curb negotiation       Ramp negotiation       Outdoor ambulation       MANUAL  CPT 83082 TOTAL TIME FOR SESSION Not performed      Mobilization        MODALITIES  CPT 88129 TOTAL TIME FOR SESSION       Ice       Heat       ATTENDED E-STIM  CPT 81118 TOTAL TIME FOR SESSION 0-7 Minutes     Attended E-Stim FES    [id number] (Age 28) , PIN [phone]   Stim to L quad, hamstring, glutes, tib ant, and gastroc. 250-350 pulse width, 40Hz frequency.   Muscle testing testing completed for initial set up (7/8)     Xcite use for LLE:      Stim to L quad, hamstring, glutes, tib ant, and gastroc. 250-350 pulse width, 40Hz frequency.   Muscle testing testing completed for initial set up 7/26)     Bioness:   L lower leg quick fit pads, L hamstring added 8/2  30 Hz with 0.2 ramp for loading response   Used tablet without strap                                                Unattended E-stim                                      "

## 2024-09-12 NOTE — PROGRESS NOTES
Physical Therapy Visit    PT DAILY NOTE FOR OUTPATIENT THERAPY    Patient: Melanie Litten MRN: [id number]  : 1995 28 y.o.  Referring Physician: Remedios Whitfield MD  Date of Visit: 2024    Certification Dates: 24 through 24    Diagnosis:   1. History of tethered spinal cord        Chief Complaints:  EDS, LLE weakness, gait/balance impairment    Precautions:   Precautions comments: hypermobile - EDS, postural hypotension      TODAY'S VISIT    Time In Session:  Start Time: 932  Stop Time:   Time Calculation (min): 46 min   History/Vitals/Pain/Encounter Info - 24 1047          Injury History/Precautions/Daily Required Info    Document Type daily treatment     Primary Therapist Dilan Machuca     Chief Complaint/Reason for Visit  EDS, LLE weakness, gait/balance impairment     Referring Physician Remedios Whitfield MD     Precautions comments hypermobile - EDS, postural hypotension     History of present illness/functional impairment Saranya is a 28 year old female who presents to OPPT with history of tethered cord syndrome. Pt with PMH significant for chiari malformation, hypermobile EDS, left plantar fasciitis, anxiety and postural hypotension. Pt reports her symptoms started slowly in 2019 with L hip pain, followed up with neurosurgeon in [place] with unremarkable results for all testing. Her symptoms then progressed with L leg motor weakness and shakiness (similar to clonus) with movement, chronic constipation, L drop foot, lower back pain, and neck pain.  Pt was finally diagnosed with tethered cord syndrome and underwent a clinical trial surgery at [facility], [facility], and [facility]. Due to her symptoms, she meets criteria for exploration and sectioning of her filum for the functional tethered cord clinical trial. Now s/p laminectomy for occult tethered cord release on 23 by Dr Duckworth. Patient tolerated procedure well.  "No post-op complications. Transferred stable once PACU criteria met. Patient kept FLAT for 36hr. Placed on an aseptic dex taper and post-op IV abx x24hrs.  She was mobilized on 9/27 tolerated well without any symptoms. Additionally, was instructed not to exercise for 7 weeks post-surgery. Pt reports she recovered well overall with decreased drop foot, clonus and swelling, continues to have increased coordination deficits and foot drag with fatigue. Pt has completed OPPT at [facility] in [place] and intermittent massage therapy. Pt’s functional goals are to work on higher level balance, picking up things from floor, coordination and “Hippo therapy”.     Patient/Family/Caregiver Comments/Observations no new c/o.  She reports she was \"sore and tight\" in the adductors B after last session but resolved well with stretches.  no pain.  LLE spasticity to worse after last session vs previous.     Patient reported fall since last visit No        Pain Assessment    Currently in pain No/Denies                    Daily Treatment Assessment and Plan - 09/12/24 1047          Daily Treatment Assessment and Plan    Progress toward goals Progressing     Daily Outcome Summary Improved ability to maintain truncal control and midline.  Fatigue caused shortening of L torso but able to correct with cues; decreased trunk rotation to L noted vs R. Fluid pelvic movement noted t/o session.     Plan and Recommendations cont with POC                         OBJECTIVE DATA TAKEN TODAY:    None taken    Today's Treatment:    Today's Treatment:               PT Neuro Exercises Current Session   Performed Today? (y/n)   THER ACT   CPT 18505 TOTAL TIME FOR SESSION 0-7 Minutes      Pain, vitals, subjective    Provided rest breaks for energy conservation     Skin inspection Base of incision, no swelling noted     FES  cycle [id number] (Age 28) , PIN [id number]   Bike height: 2 holes showing  Pedal height: 3 holes showing  L LE set up only     FES Xcite " Set Up                    Bioness Additional time for set up due to connection issues        Equestrian Horse: HETAL  Mount/Dismount: Patient able to mount horse without assistance swinging leg over rump of horse.       Equipment:  Fleece, pad, surcingle.      equipment used during  this session: 2-posture straps, godwin ball not used under R IT this session  Patient has previous equestrian riding at another facility earlier this summer -      -Reviewed today's treatment plan and expectations.  Monitored level of fatigue and pain t/o session. Yes           None used this session   Patient Education Patient educated regarding current impairments per testing completed today and PT POC moving forward.      Patient provided with Welcome Letter, which includes attendance policy. Provided education regarding cancellation and no-show policy. Education regarding the importance of participation and regular attendance to maximize goal attainment. Patient verbalized understanding/agreement.      Clearance for FES received, will be completed NV, start with handheld unit to assess tolerance prior to FES bike. Pt verbalized understanding.     Patient educated regarding progress made thus far, current impairments per re-assessments completed today and PT POC moving forward. Patient verbalized understanding/agreement.                                                               HEP  Verbally reviewed practicing gait over level surfaces with decreased step width, and then incorporating BUE swing and trunk rot. Pt verbalized understanding.      Floor Transfers  CloseS with increased time for LLE management, requires single UE support with review of moving from tall kneel to half kneeling position. Pt demos good understanding, will require continued practice      Subjective Outcomes Measures       ABC Scale Assessed - returned today     THER EX  CPT 45952 TOTAL TIME FOR SESSION      STRETCHING       Stretching by patient Incline  "board stretch: L3 - 1 min x2     Stretching by therapist/PROM Trialed modified jose stretch - not effective (pt states modified lunge position she has felt more of a stretch in hip flexors in the past)     Manual hip flexor and adductor S, gentle LLE only           y     CARDIOVASCULAR       Nu Step 6 mins, Level: 1, BLE only      Stationary Bike Recumbent bike 5 min, L1  (Unable to complete revolution on Expresso upright bike)     Ambulation with AD        Treadmill       Endurance Testing       6mWT Assessed     STRENGTH TRAINING       HEP Review       Standing Ther-Ex Standing hip abduction 2 x 10 ea  - standing on airex   - Second set, no UE support      Step ups 6\" block x 20   - Added L LE TKE w orange TB     Step down, 2\" block, L LE only w orange band TKE x 10     Hip hikes 2\" block, VC's to discourage R hip hike      Heel raises from L2 on incline board x 15 (on floor today)        Side-lying there-ex Clamshells: 2x20 on R, L in supine: 5\"x10       Supine Ther-Ex Diaphragmatic breathin mins due to elevated BP  Glut bridge: 3\" x10, orange band  Bridge: x10   Supine hip abduction x 10, LLE, isometrics today  BKFO: x10, orange t-band only for R, active assist on L   Clamshells, x 10 ea LE        Deadbugs with physioball  - isometric holds: 10\" x 10, physioball under B heels   - alt LE marches with heels on physioball: x10  - alt UE: 3 sets x5 reps         Quadruped  hip extensions with towel under L foot: x10, B/L (modified position with BUE on EOM and LE on floor)      Quadruped <> bear crawl: 10\" x 10     Tall kneel Moderate perturbations all directions  Mini squats 2 x 10 holding 6\" med ball   Trunk rot: x10   D2 chops/lifts: x10     Prone Ther-Ex       Core exercises PPT x 10   PPT with ball squeeze 5 sec hold, 10 x  PPT with ball squeeze and bridge x 10   PPT with supine marches x 10  PPT with hip abduction isometric L LE x 3 sec hold x10, orange band      Heel slides with towel: 2x10  Triple flex " "over physioball: x10  [facility] wipers BLE over physioball 2 x 10      STS's from low EOM squeezing pilates ring  - As above, with squeeze and OH press  Marching, squeezing small ball 30 ft x 2     Modified dead bugs, alt UE 2 x 10      Functional Strength Testing       30 sec STS test (60y +)  Assessed     NEURO RE-ED  CPT 00994 TOTAL TIME FOR SESSION 45 Minutes      FES  To promote neurorecovery of gait:  Distance: 3.01 miles  Resistance: 0.77 Nm  Power: 2.9 W  Symmetry: L 2%  Total time: 21:00  Active time: 19:03     Xcite  NMRE of LLE in function:      - Sit <> stand from elevated EOM using 6 lb med ball: x10, then progressing to lowest height: x10, then x10 in staggered stance  - Modified reverse lunging with slider under R foot in parallel bars with BUE support > RUE support only: x10 reps each  - Tall kneel <> heel sit: 2x10, 2nd set with chest press   - fwd step up with LLE onto 6\" block: x10, repeated with R knee drive, x10 repeated with 3# on RUE      Bioness L300 Go  - Fwd/rev step over 6\" virginia then progressed to 12\" virginia: x20, light RUE on backwards stepping due to poor hamstring strength  - Fwd step up onto 6\" block with contra LE march: 2x10, B         - RLE step up, LLE march with 6lb db overhead press   - Fwd step down 4\" block: 2x10, orange theraband around knees for manual cues   - stance on airex with lateral L toe taps to 12\" virginia: x10  - Tandem walk 20'x2, 6#      COORDINATION       Target Tapping       Coordination ladder As appropriate for HL balance      Amb with ankle weights       Amb with proprio wrap       Pushing weighted shopping cart       POSTURAL RE-ED       Sit <> Stand  From 22\" mat holding 6# med ball, cues for midline and equal WB       Tall Kneel  Tall kneel <> short sit: x10, x10 with chest press      Seated & standing reaching for trunk strengthening       Abbey-scapular strengthening       PRE-GAIT ACTIVITIES        Tap-ups       Step-ups To 6\" block with " "contralateral knee drive: x10, repeated with 2 lb DB for UE swing      Standing weight shifting       Step-taps       BALANCE TRAINING       Standing balance - static/dynamic       HEP Review       Floor       Airex Santiago-tandem: 30 seconds x 2, HT: 30\" x 1, EC: 30 seconds x 1  Tandem stance: 30 seconds x 1      Rockerboard AP plane: x10, with light UE support   AP, chest press x 10, OH press x 10, 6lb   Squats 2 x 10        Hurdles 6\" hurdles, focus on dec L hip circumduction through swing and encouraging heel strike. Step forward/back     Repeated with side stepping pattern, requiring BUE support dud to inability for L knee extension through stance      BOSU Ball  Fwd mini lunge onto domed side: x10, CloseS   Modified lunge onto BOSU with palloff press:   - x10, then repeated x10 with perturbations on yellow sports cord      Split Stance       Biodex Balance Training  Weight shifting and motor control training with increased difficulty noted to L anterolateral planes: x5 mins      Dynamic gait        Side stepping With partial squat with orange theraband around ankles: 10'x4      Retro walking Fwd/retro amb 50'x6 with cues for shorter step length and for heel strike + knee extension through stance, mirror anteriorly, added dual task of beach bal     Tandem Walking       Suitcase carry Using small 3 lb B DB on R: 100'x2, noted delayed L knee extension through IC and mid stance      Amb with hula hoop Using hula hoop to facilitate BUE swing and trunk rot      Walking with ball toss       Proprioceptive wrap                   Blue TB around L LE  - Amb along purple line x 3 with cues to keep feet within tiles  - x 250ft with same cues as above  - Retro amb 3 x 20 ft  - Mini squats with heels elevated x 10, focus on minimizing hyperextension     EQUESTRIAN 1st - Equestrian session: 8/15  2nd session - 8/29  3rd session - 9/5  Horse:  HETAL  Time on Horse:  42 mins total     Following 1-2 full ring circles with minimal UE " "support performed L hip flexor stretching   trunk stretching with OH and lateral rotation - improvement with neutral hips following stretching.  Left LE is weaker and left hip tends to retract.  Demonstrated very min shortening of right lateral trunk - improvement with right IT Wbing.  Trunk alignment and core activation improved with the following:  -b/l UE movement pattern of horizontal abd to forward flexion to OH reach and repeat.  Also perform horizontal abduction and rotation both directions during CW/CCW full ring circles.  -Utilized postural straps (crisscrossed) core activation with good results.  Occasional retraction of left shoulder -corrected with cueing  -CW/CCW circles, serpentines and walk/halts  - arms out to side with cues for core and scap depression, cw and ccw ring circles   - static jumpers position initially with UE support - progressed to without with cueing  - dynamic jumpers position (horse walking full ring circles) initially with UE support f/b with arms out to the side Yes           Y                Y        N        Y  Y    n    n   Balance Testing       FGA  Assessed     SLS  Assessed see note      10mWT Assessed      GAIT TRAINING  CPT 75388 TOTAL TIME FOR SESSION 0-7 Minutes      Ambulation without AD  Gait with no AD over level indoor surfaces with WBOS and decreased L weight shift, VC for anterolateral weight shift, heel strike, BUE swing and trunk rot, 150'x1     Dynamic Gait  20'x6 with focus on shorter step lengths, heel strike and decreasing step width with use of 1' floor tiles, repeated with bimanual ball toss with CloseS and noted decreased gait speed and increase in step lengths      With Bioness donned x 250 ftx3   - Good clearance of L LE through swing with improved knee flexion through swing   - Pt subjectively reports feeling a smoother gait pattern with decreased L foot slap     Treadmill, BUE support to 1UE support, with Bioness donned 2.1 mph x 10 min - \"[place] VR " "course\"  - VC for increased nancy - metranome set to 100 bpm  - Improved GABRIELLE   - Following TM, without stim on, mild L foot slap and WBOS seen.      Following removal of proprioceptive wrap x 250ft, no instances of L knee hyperextension thrust noted.                                                Stair negotiation       Curb negotiation       Ramp negotiation       Outdoor ambulation       MANUAL  CPT 71917 TOTAL TIME FOR SESSION Not performed      Mobilization        MODALITIES  CPT 77416 TOTAL TIME FOR SESSION       Ice       Heat       ATTENDED E-STIM  CPT 57536 TOTAL TIME FOR SESSION 0-7 Minutes     Attended E-Stim FES    [id number] (Age 28) , PIN [phone]   Stim to L quad, hamstring, glutes, tib ant, and gastroc. 250-350 pulse width, 40Hz frequency.   Muscle testing testing completed for initial set up (7/8)     Xcite use for LLE:      Stim to L quad, hamstring, glutes, tib ant, and gastroc. 250-350 pulse width, 40Hz frequency.   Muscle testing testing completed for initial set up 7/26)     Bioness:   L lower leg quick fit pads, L hamstring added 8/2  30 Hz with 0.2 ramp for loading response   Used tablet without strap                                                Unattended E-stim                                                             "

## 2024-09-12 NOTE — ADDENDUM NOTE
Encounter addended by: Rosenda Rachel, PTA on: 9/12/2024 6:13 PM   Actions taken: Flowsheet data copied forward, Flowsheet accepted

## 2024-09-12 NOTE — ADDENDUM NOTE
Encounter addended by: Laura Mcdonough PT on: 9/12/2024 10:52 AM   Actions taken: Flowsheet accepted

## 2024-09-16 ENCOUNTER — HOSPITAL ENCOUNTER (OUTPATIENT)
Dept: PHYSICAL THERAPY | Facility: REHABILITATION | Age: 29
Setting detail: THERAPIES SERIES
Discharge: HOME | End: 2024-09-16
Attending: LEGAL MEDICINE
Payer: COMMERCIAL

## 2024-09-16 DIAGNOSIS — Z86.69 HISTORY OF TETHERED SPINAL CORD: Primary | ICD-10-CM

## 2024-09-16 PROCEDURE — 97110 THERAPEUTIC EXERCISES: CPT | Mod: GP

## 2024-09-16 PROCEDURE — 97112 NEUROMUSCULAR REEDUCATION: CPT | Mod: GP

## 2024-09-16 NOTE — OP PT TREATMENT LOG
PT Neuro Exercises Current Session   Performed Today? (y/n)   THER ACT   CPT 25348 TOTAL TIME FOR SESSION 0-7 Minutes      Pain, vitals, subjective    Provided rest breaks for energy conservation yes   Skin inspection Base of incision, no swelling noted    FES  cycle [id number] (Age 28) , PIN [id number]   Bike height: 2 holes showing  Pedal height: 3 holes showing  L LE set up only    FES Xcite Set Up              Bioness     Patient Education Patient educated regarding current impairments per testing completed today and PT POC moving forward.     Patient provided with Welcome Letter, which includes attendance policy. Provided education regarding cancellation and no-show policy. Education regarding the importance of participation and regular attendance to maximize goal attainment. Patient verbalized understanding/agreement.     Clearance for FES received, will be completed NV, start with handheld unit to assess tolerance prior to FES bike. Pt verbalized understanding.    Patient educated regarding progress made thus far, current impairments per re-assessments completed today and PT POC moving forward. Patient verbalized understanding/agreement.     Discussion regarding Equestrian scheduling and scheduling with [facility]. PT to find out next time [facility] rep is at [facility] to adjust pt schedule. Will provide with forms as well.                                      HEP  Verbally reviewed practicing gait over level surfaces with decreased step width, and then incorporating BUE swing and trunk rot. Pt verbalized understanding.     Floor Transfers  CloseS with increased time for LLE management, requires single UE support with review of moving from tall kneel to half kneeling position. Pt demos good understanding, will require continued practice     Subjective Outcomes Measures       ABC Scale Assessed     THER EX  CPT 50428 TOTAL TIME FOR SESSION  8-22 Minutes      STRETCHING      Stretching by patient Incline board  "stretch: L3 - 1 min x2  L QL stretch with small physioball 10 x 3 sec hold   Yes    Stretching by therapist/PROM Trialed modified jose stretch - not effective (pt states modified lunge position she has felt more of a stretch in hip flexors in the past)      CARDIOVASCULAR       Nu Step 6 mins, Level: 1-2, BLE only     Stationary Bike Recumbent bike 5 min, L1  (Unable to complete revolution on [employer] upright bike)    Ambulation with AD      Treadmill       Endurance Testing       6mWT Assessed    STRENGTH TRAINING     HEP Review     Standing Ther-Ex Standing hip abduction 2 x 10 ea  - standing on airex   - Second set, no UE support     Step ups 6\" block x 20   - Added L LE TKE w orange TB    Step down, 2\" block, L LE only w orange band TKE x 10    Hip hikes 2\" block, VC's to discourage R hip hike     Heel raises from L2 on incline board x 15 (on floor today)    Heel raises: 2x15, small weighted ball between heels     Staggered STS, 10 lb 2 x 10    Squat into BL heel raise x 15 w 6lb                                       Yes    Side-lying there-ex Clamshells: 2x20 on R, L in supine: 5\"x10      Supine Ther-Ex Diaphragmatic breathin mins due to elevated BP  Glut bridge: 3\" x10, orange band  Bridge: x10   Supine hip abduction x 10, LLE, isometrics today  BKFO: x10, orange t-band only for R, active assist on L   Clamshells, x 10 ea LE  Bridge with ball squeeze x 10                  Deadbugs with physioball  - isometric holds: 10\" x 10, physioball under B heels   - alt LE marches with heels on physioball: x10  - alt UE: 3 sets x5 reps       Quadruped  hip extensions with towel under L foot: x10, B/L (modified position with BUE on EOM and LE on floor)     Quadruped <> bear crawl: 10\" x10   Bear crawl: 5' - fwd/back: x3 rounds     Alternating UE flexion x 10  Alternating LE extension x 10; use of slider L LE            Tall kneel Moderate perturbations all directions  Mini squats 2 x 10 blue TB resistance   Trunk " "rot: 2x10 w 10lb   D2 chops/lifts: x10  Posterior reach back to cones w cross body reach to PT, 2lb ankle weights on wrists          Yes    Akil Kneel  Onto airex pad:   - static hold: 20 seconds x 3   - chest press with beach ball: 2x10   -     Prone Ther-Ex     Core exercises PPT x 10   PPT with ball squeeze 5 sec hold, 10 x  PPT with ball squeeze and bridge x 10   PPT with supine marches x 10  PPT with hip abduction isometric L LE x 3 sec hold x10, orange band     Heel slides with towel: 2x10  Triple flex over physioball: x10  Kadoink wipers BLE over physioball 2 x 10     STS's from low EOM squeezing pilates ring  - As above, with squeeze, in staggered stance  Marching, squeezing small ball 30 ft x 2    Modified dead bugs, alt UE 2 x 10     Supine B shoulder ext with GTB, emphasis on core activation; 2 x 15                    Functional Strength Testing       30 sec STS test (60y +)  Assessed    NEURO RE-ED  CPT 07826 TOTAL TIME FOR SESSION 23-37 Minutes      FES  To promote neurorecovery of gait:  Distance: 3.01 miles  Resistance: 0.77 Nm  Power: 2.9 W  Symmetry: L 2%  Total time: 21:00  Active time: 19:03    Xcite  NMRE of LLE in function:     - Sit <> stand from elevated EOM using 6 lb med ball: x10, then progressing to lowest height: x10, then x10 in staggered stance  - Modified reverse lunging with slider under R foot in parallel bars with BUE support > RUE support only: x10 reps each  - Tall kneel <> heel sit: 2x10, 2nd set with chest press   - fwd step up with LLE onto 6\" block: x10, repeated with R knee drive, x10 repeated with 3# on RUE     Bioness L300 Go  - Fwd/rev step over 12\" [place]: x20,   - Fwd step up onto 6\" block with contra LE march: 2x10, B         - RLE step up, LLE march with 8lb db overhead press        - LLE step up, RLE march while holding 8lb db x 10, overhead press with 8lb db x 5  - Fwd step down 4\" block: 2x10, orange theraband around knees for manual cues   - stance on " "airex with lateral L toe taps to 12\" [place]: x10  - Tandem walk 20'x2, 6#   - step up on 4\" block, contralateral heel tap to 10\" block   - 12\" and 6\" hurdles, navigating reciprocally                    COORDINATION       Target Tapping     Coordination ladder As appropriate for HL balance     Amb with ankle weights     Amb with proprio wrap     Pushing weighted shopping cart     POSTURAL RE-ED     Sit <> Stand  From 22\" mat holding 6# med ball, cues for midline and equal WB      Tall Kneel  Tall kneel <> short sit: x10, x10 with chest press     Seated & standing reaching for trunk strengthening     Abbey-scapular strengthening     PRE-GAIT ACTIVITIES      Tap-ups     Step-ups To 6\" block with contralateral knee drive: x10, repeated with 2 lb DB for UE swing     Standing weight shifting     Step-taps At 6\" main gym steps, step up to 1st step, tap to 3rd with contralateral LE  - Green TB for L TKE with step up, green TB to resist hip flexion when tapping to 3rd step Yes to all    BALANCE TRAINING     Standing balance - static/dynamic       HEP Review       Floor       Airex [place]-tandem: 30 seconds x 2, HT: 30\" x 1, EC: 30 seconds x 1  Tandem stance: 30 seconds x 1     Blue tread rockerboard:  - Semi tandem with chops, 6lb              Yes    Rockerboard AP plane: x10, with light UE support   AP, chest press x 10, OH press x 10, 6lb   Squats 2 x 10      Hurdles 6\" hurdles, focus on dec L hip circumduction through swing and encouraging heel strike. Step forward/back    Repeated with side stepping pattern, requiring BUE support dud to inability for L knee extension through stance     BOSU Ball  Fwd mini lunge onto domed side: x10, CloseS   Modified lunge onto BOSU with palloff press:   - x10, then repeated x10 with perturbations on yellow sports cord     Split Stance      Biodex Balance Training  Weight shifting and motor control training with increased difficulty noted to L anterolateral planes: x5 mins     Dynamic gait   "     Side stepping With partial squat with orange theraband around ankles: 10'x4     Retro walking Fwd/retro amb 50'x6 with cues for shorter step length and for heel strike + knee extension through stance, mirror anteriorly, added dual task of beach bal    30ft x 2 ea today with Bioness donned, PT assist to prevent L hip hike and lateral trunk lean with retro amb    2 x 30 ft with 6lb medball to promote core stabilization  - x 30ft forward amb with 6lb medball  - x 30 ft marching with 6lb medball                  Yes   Yes   Yes    Tandem Walking 10'x2, CloseS/CGA     Suitcase carry Using small 3 lb B DB on R: 100'x2, noted delayed L knee extension through IC and mid stance     Amb with hula hoop Using hula hoop to facilitate BUE swing and trunk rot     Walking with ball toss     Proprioceptive wrap Blue TB around L LE  - Amb along purple line x 3 with cues to keep feet within tiles  - x 250ft with same cues as above  - Retro amb 3 x 20 ft  - Mini squats with heels elevated x 10, focus on minimizing hyperextension    Balance Testing     FGA  Assessed    SLS  Assessed see note     10mWT Assessed  - (6mWT on 8/26)    GAIT TRAINING  CPT 74538 TOTAL TIME FOR SESSION 0-7 Minutes      Ambulation without AD  Gait with no AD over level indoor surfaces with WBOS and decreased L weight shift, VC for anterolateral weight shift, heel strike, BUE swing and trunk rot, 150'x1    Dynamic Gait  20'x6 with focus on shorter step lengths, heel strike and decreasing step width with use of 1' floor tiles, repeated with bimanual ball toss with CloseS and noted decreased gait speed and increase in step lengths     With Bioness donned x 250 ft   - Good clearance of L LE through swing with improved knee flexion through swing       Treadmill, BUE support to 1UE support, with Bioness donned 2.1 mph x 10 min, 2.0% incline    - pink TB on base of TM as target to encourage more NBOS    Following removal of proprioceptive wrap x 250ft, no instances  of L knee hyperextension thrust noted.                               Stair negotiation  Full flight stairs 1 trial with 1 rail support; 1 trial without UE support. Tactile cues for trunk midline, verbal and visual cues for eccentric control     Curb negotiation       Ramp negotiation       Outdoor ambulation  With Bioness:  - x500 ft approx, inclines/declines  - Retro amb up incline 3 x 15 ft     MANUAL  CPT 99099 TOTAL TIME FOR SESSION Not performed      Mobilization        MODALITIES  CPT 85888 TOTAL TIME FOR SESSION      Ice       Heat       ATTENDED E-STIM  CPT 90982 TOTAL TIME FOR SESSION 0-7 Minutes     Attended E-Stim FES    [id number] (Age 28) , PIN [phone]   Stim to L quad, hamstring, glutes, tib ant, and gastroc. 250-350 pulse width, 40Hz frequency.   Muscle testing testing completed for initial set up (7/8)    Xcite use for LLE:     Stim to L quad, hamstring, glutes, tib ant, and gastroc. 250-350 pulse width, 40Hz frequency.   Muscle testing testing completed for initial set up 7/26)    Bioness:   L lower leg quick fit pads, L hamstring added 8/2  30 Hz with 0.2 ramp for loading response   Used tablet without strap                \                         Unattended E-stim

## 2024-09-16 NOTE — PROGRESS NOTES
Physical Therapy Visit    PT DAILY NOTE FOR OUTPATIENT THERAPY    Patient: Melanie Litten MRN: [id number]  : 1995 28 y.o.  Referring Physician: Remedios Whitfield MD  Date of Visit: 2024    Certification Dates: 24 through 24    Diagnosis:   1. History of tethered spinal cord        Chief Complaints:  EDS, LLE weakness, gait/balance impairment    Precautions:   Precautions comments: hypermobile - EDS, postural hypotension      TODAY'S VISIT    Time In Session:  Start Time: 0810  Stop Time: 09  Time Calculation (min): 50 min   History/Vitals/Pain/Encounter Info - 24 0815          Injury History/Precautions/Daily Required Info    Document Type daily treatment     Primary Therapist Dilan Machuca     Chief Complaint/Reason for Visit  EDS, LLE weakness, gait/balance impairment     Referring Physician Remedios Whitfield MD     Precautions comments hypermobile - EDS, postural hypotension     History of present illness/functional impairment Saranya is a 28 year old female who presents to OPPT with history of tethered cord syndrome. Pt with PMH significant for chiari malformation, hypermobile EDS, left plantar fasciitis, anxiety and postural hypotension. Pt reports her symptoms started slowly in 2019 with L hip pain, followed up with neurosurgeon in [place] with unremarkable results for all testing. Her symptoms then progressed with L leg motor weakness and shakiness (similar to clonus) with movement, chronic constipation, L drop foot, lower back pain, and neck pain.  Pt was finally diagnosed with tethered cord syndrome and underwent a clinical trial surgery at [facility], [facility], and [facility]. Due to her symptoms, she meets criteria for exploration and sectioning of her filum for the functional tethered cord clinical trial. Now s/p laminectomy for occult tethered cord release on 23 by Dr Duckworth. Patient tolerated procedure well.  "No post-op complications. Transferred stable once PACU criteria met. Patient kept FLAT for 36hr. Placed on an aseptic dex taper and post-op IV abx x24hrs.  She was mobilized on 9/27 tolerated well without any symptoms. Additionally, was instructed not to exercise for 7 weeks post-surgery. Pt reports she recovered well overall with decreased drop foot, clonus and swelling, continues to have increased coordination deficits and foot drag with fatigue. Pt has completed OPPT at [facility] in [place] and intermittent massage therapy. Pt’s functional goals are to work on higher level balance, picking up things from floor, coordination and “Hippo therapy”.     Patient/Family/Caregiver Comments/Observations No new changes since last visit, arrived 10 min late to session.     Patient reported fall since last visit No        Pain Assessment    Currently in pain No/Denies        Pre Activity Vital Signs    /92     BP Location Left upper arm     BP Method Manual     Patient Position Sitting                    Daily Treatment Assessment and Plan - 09/16/24 0815          Daily Treatment Assessment and Plan    Progress toward goals Progressing     Daily Outcome Summary Patient arrived 10 min late to session so 3 units billed. She initially demo'ed R lateral trunk lean with step taps/ups at 6\" steps which improved with VC's from PT. She was challenged with neuro re-education activities, ute semi tandem on rockerboard (L>R) and retro amb. 6lb medball, along with mild tactile cueing from PT, did appear to improve core stability with dynamic gait as pt demo'ed dec truncal compensatory pattern.     Plan and Recommendations Bioness as able, L LE strenghtening (proximal hip), dual tasking, core stabilization                         OBJECTIVE DATA TAKEN TODAY:    None taken    Today's Treatment:       PT Neuro Exercises Current Session   Performed Today? (y/n)   THER ACT   CPT 91216 TOTAL TIME FOR SESSION 0-7 Minutes      Pain, " vitals, subjective    Provided rest breaks for energy conservation yes   Skin inspection Base of incision, no swelling noted    FES  cycle [id number] (Age 28) , PIN [id number]   Bike height: 2 holes showing  Pedal height: 3 holes showing  L LE set up only    FES Xcite Set Up              [facility]     Patient Education Patient educated regarding current impairments per testing completed today and PT POC moving forward.     Patient provided with Welcome Letter, which includes attendance policy. Provided education regarding cancellation and no-show policy. Education regarding the importance of participation and regular attendance to maximize goal attainment. Patient verbalized understanding/agreement.     Clearance for FES received, will be completed NV, start with handheld unit to assess tolerance prior to FES bike. Pt verbalized understanding.    Patient educated regarding progress made thus far, current impairments per re-assessments completed today and PT POC moving forward. Patient verbalized understanding/agreement.     Discussion regarding [employer] scheduling and scheduling with [employer] rep. PT to find out next time [facility] is at [facility] to adjust pt schedule. Will provide with forms as well.                                      HEP  Verbally reviewed practicing gait over level surfaces with decreased step width, and then incorporating BUE swing and trunk rot. Pt verbalized understanding.     Floor Transfers  CloseS with increased time for LLE management, requires single UE support with review of moving from tall kneel to half kneeling position. Pt demos good understanding, will require continued practice     Subjective Outcomes Measures       ABC Scale Assessed     THER EX  CPT 02961 TOTAL TIME FOR SESSION  8-22 Minutes      STRETCHING      Stretching by patient Incline board stretch: L3 - 1 min x2  L QL stretch with small physioball 10 x 3 sec hold   Yes    Stretching by therapist/PROM Trialed modified jose  "stretch - not effective (pt states modified lunge position she has felt more of a stretch in hip flexors in the past)      CARDIOVASCULAR       Nu Step 6 mins, Level: 1-2, BLE only     Stationary Bike Recumbent bike 5 min, L1  (Unable to complete revolution on [employer] upright bike)    Ambulation with AD      Treadmill       Endurance Testing       6mWT Assessed    STRENGTH TRAINING     HEP Review     Standing Ther-Ex Standing hip abduction 2 x 10 ea  - standing on airex   - Second set, no UE support     Step ups 6\" block x 20   - Added L LE TKE w orange TB    Step down, 2\" block, L LE only w orange band TKE x 10    Hip hikes 2\" block, VC's to discourage R hip hike     Heel raises from L2 on incline board x 15 (on floor today)    Heel raises: 2x15, small weighted ball between heels     Staggered STS, 10 lb 2 x 10    Squat into BL heel raise x 15 w 6lb                                       Yes    Side-lying there-ex Clamshells: 2x20 on R, L in supine: 5\"x10      Supine Ther-Ex Diaphragmatic breathin mins due to elevated BP  Glut bridge: 3\" x10, orange band  Bridge: x10   Supine hip abduction x 10, LLE, isometrics today  BKFO: x10, orange t-band only for R, active assist on L   Clamshells, x 10 ea LE  Bridge with ball squeeze x 10                  Deadbugs with physioball  - isometric holds: 10\" x 10, physioball under B heels   - alt LE marches with heels on physioball: x10  - alt UE: 3 sets x5 reps       Quadruped  hip extensions with towel under L foot: x10, B/L (modified position with BUE on EOM and LE on floor)     Quadruped <> bear crawl: 10\" x10   Bear crawl: 5' - fwd/back: x3 rounds     Alternating UE flexion x 10  Alternating LE extension x 10; use of slider L LE            Tall kneel Moderate perturbations all directions  Mini squats 2 x 10 blue TB resistance   Trunk rot: 2x10 w 10lb   D2 chops/lifts: x10  Posterior reach back to cones w cross body reach to PT, 2lb ankle weights on wrists          Yes  " "  Akil Kneel  Onto airex pad:   - static hold: 20 seconds x 3   - chest press with beach ball: 2x10   -     Prone Ther-Ex     Core exercises PPT x 10   PPT with ball squeeze 5 sec hold, 10 x  PPT with ball squeeze and bridge x 10   PPT with supine marches x 10  PPT with hip abduction isometric L LE x 3 sec hold x10, orange band     Heel slides with towel: 2x10  Triple flex over physioball: x10  Windshield wipers BLE over physioball 2 x 10     STS's from low EOM squeezing pilates ring  - As above, with squeeze, in staggered stance  Marching, squeezing small ball 30 ft x 2    Modified dead bugs, alt UE 2 x 10     Supine B shoulder ext with GTB, emphasis on core activation; 2 x 15                    Functional Strength Testing       30 sec STS test (60y +)  Assessed    NEURO RE-ED  CPT 79929 TOTAL TIME FOR SESSION 23-37 Minutes      FES  To promote neurorecovery of gait:  Distance: 3.01 miles  Resistance: 0.77 Nm  Power: 2.9 W  Symmetry: L 2%  Total time: 21:00  Active time: 19:03    Xcite  NMRE of LLE in function:     - Sit <> stand from elevated EOM using 6 lb med ball: x10, then progressing to lowest height: x10, then x10 in staggered stance  - Modified reverse lunging with slider under R foot in parallel bars with BUE support > RUE support only: x10 reps each  - Tall kneel <> heel sit: 2x10, 2nd set with chest press   - fwd step up with LLE onto 6\" block: x10, repeated with R knee drive, x10 repeated with 3# on RUE     Bioness L300 Go  - Fwd/rev step over 12\" [place] virgin[place]: x20,   - Fwd step up onto 6\" block with contra LE march: 2x10, B         - RLE step up, LLE march with 8lb db overhead press        - LLE step up, RLE march while holding 8lb db x 10, overhead press with 8lb db x 5  - Fwd step down 4\" block: 2x10, orange theraband around knees for manual cues   - stance on airex with lateral L toe taps to 12\" [place]: x10  - Tandem walk 20'x2, 6#   - step up on 4\" block, contralateral heel tap to 10\" block " "  - 12\" and 6\" hurdles, navigating reciprocally                    COORDINATION       Target Tapping     Coordination ladder As appropriate for HL balance     Amb with ankle weights     Amb with proprio wrap     Pushing weighted shopping cart     POSTURAL RE-ED     Sit <> Stand  From 22\" mat holding 6# med ball, cues for midline and equal WB      Tall Kneel  Tall kneel <> short sit: x10, x10 with chest press     Seated & standing reaching for trunk strengthening     Abbey-scapular strengthening     PRE-GAIT ACTIVITIES      Tap-ups     Step-ups To 6\" block with contralateral knee drive: x10, repeated with 2 lb DB for UE swing     Standing weight shifting     Step-taps At 6\" main gym steps, step up to 1st step, tap to 3rd with contralateral LE  - Green TB for L TKE with step up, green TB to resist hip flexion when tapping to 3rd step Yes to all    BALANCE TRAINING     Standing balance - static/dynamic       HEP Review       Floor       Airex Santiago-tandem: 30 seconds x 2, HT: 30\" x 1, EC: 30 seconds x 1  Tandem stance: 30 seconds x 1     Blue tread rockerboard:  - Semi tandem with chops, 6lb              Yes    Rockerboard AP plane: x10, with light UE support   AP, chest press x 10, OH press x 10, 6lb   Squats 2 x 10      Hurdles 6\" hurdles, focus on dec L hip circumduction through swing and encouraging heel strike. Step forward/back    Repeated with side stepping pattern, requiring BUE support dud to inability for L knee extension through stance     BOSU Ball  Fwd mini lunge onto domed side: x10, CloseS   Modified lunge onto BOSU with palloff press:   - x10, then repeated x10 with perturbations on yellow sports cord     Split Stance      [employer] Balance Training  Weight shifting and motor control training with increased difficulty noted to L anterolateral planes: x5 mins     Dynamic gait       Side stepping With partial squat with orange theraband around ankles: 10'x4     Retro walking Fwd/retro amb 50'x6 with cues for " shorter step length and for heel strike + knee extension through stance, mirror anteriorly, added dual task of beach bal    30ft x 2 ea today with Bioness donned, PT assist to prevent L hip hike and lateral trunk lean with retro amb    2 x 30 ft with 6lb medball to promote core stabilization  - x 30ft forward amb with 6lb medball  - x 30 ft marching with 6lb medball                  Yes   Yes   Yes    Tandem Walking 10'x2, CloseS/CGA     Suitcase carry Using small 3 lb B DB on R: 100'x2, noted delayed L knee extension through IC and mid stance     Amb with hula hoop Using hula hoop to facilitate BUE swing and trunk rot     Walking with ball toss     Proprioceptive wrap Blue TB around L LE  - Amb along purple line x 3 with cues to keep feet within tiles  - x 250ft with same cues as above  - Retro amb 3 x 20 ft  - Mini squats with heels elevated x 10, focus on minimizing hyperextension    Balance Testing     FGA  Assessed    SLS  Assessed see note     10mWT Assessed  - (6mWT on 8/26)    GAIT TRAINING  CPT 46901 TOTAL TIME FOR SESSION 0-7 Minutes      Ambulation without AD  Gait with no AD over level indoor surfaces with WBOS and decreased L weight shift, VC for anterolateral weight shift, heel strike, BUE swing and trunk rot, 150'x1    Dynamic Gait  20'x6 with focus on shorter step lengths, heel strike and decreasing step width with use of 1' floor tiles, repeated with bimanual ball toss with CloseS and noted decreased gait speed and increase in step lengths     With Bioness donned x 250 ft   - Good clearance of L LE through swing with improved knee flexion through swing       Treadmill, BUE support to 1UE support, with Bioness donned 2.1 mph x 10 min, 2.0% incline    - pink TB on base of TM as target to encourage more NBOS    Following removal of proprioceptive wrap x 250ft, no instances of L knee hyperextension thrust noted.                               Stair negotiation  Full flight stairs 1 trial with 1 rail  support; 1 trial without UE support. Tactile cues for trunk midline, verbal and visual cues for eccentric control     Curb negotiation       Ramp negotiation       Outdoor ambulation  With Bioness:  - x500 ft approx, inclines/declines  - Retro amb up incline 3 x 15 ft     MANUAL  CPT 23084 TOTAL TIME FOR SESSION Not performed      Mobilization        MODALITIES  CPT 01479 TOTAL TIME FOR SESSION      Ice       Heat       ATTENDED E-STIM  CPT 25351 TOTAL TIME FOR SESSION 0-7 Minutes     Attended E-Stim FES    [id number] (Age 28) , PIN [phone]   Stim to L quad, hamstring, glutes, tib ant, and gastroc. 250-350 pulse width, 40Hz frequency.   Muscle testing testing completed for initial set up (7/8)    Xcite use for LLE:     Stim to L quad, hamstring, glutes, tib ant, and gastroc. 250-350 pulse width, 40Hz frequency.   Muscle testing testing completed for initial set up 7/26)    Bioness:   L lower leg quick fit pads, L hamstring added 8/2  30 Hz with 0.2 ramp for loading response   Used tablet without strap                \                         Unattended E-stim

## 2024-09-20 ENCOUNTER — HOSPITAL ENCOUNTER (OUTPATIENT)
Dept: PHYSICAL THERAPY | Facility: REHABILITATION | Age: 29
Setting detail: THERAPIES SERIES
Discharge: HOME | End: 2024-09-20
Attending: LEGAL MEDICINE
Payer: COMMERCIAL

## 2024-09-20 DIAGNOSIS — Z86.69 HISTORY OF TETHERED SPINAL CORD: Primary | ICD-10-CM

## 2024-09-20 PROCEDURE — 97110 THERAPEUTIC EXERCISES: CPT | Mod: GP

## 2024-09-20 PROCEDURE — 97032 APPL MODALITY 1+ESTIM EA 15: CPT | Mod: GP

## 2024-09-20 PROCEDURE — 97112 NEUROMUSCULAR REEDUCATION: CPT | Mod: GP

## 2024-09-20 PROCEDURE — 97116 GAIT TRAINING THERAPY: CPT | Mod: GP

## 2024-09-20 NOTE — PROGRESS NOTES
Physical Therapy Visit    PT DAILY NOTE FOR OUTPATIENT THERAPY    Patient: Melanie Litten MRN: [id number]  : 1995 28 y.o.  Referring Physician: Remedios Whitfield MD  Date of Visit: 2024    Certification Dates: 24 through 24    Diagnosis:   1. History of tethered spinal cord        Chief Complaints:  EDS, LLE weakness, gait/balance impairment    Precautions:   Precautions comments: hypermobile - EDS, postural hypotension      TODAY'S VISIT    Time In Session:  Start Time: 0800  Stop Time: 0855  Time Calculation (min): 55 min   History/Vitals/Pain/Encounter Info - 24 0801          Injury History/Precautions/Daily Required Info    Document Type daily treatment     Primary Therapist Dilan Machuca     Chief Complaint/Reason for Visit  EDS, LLE weakness, gait/balance impairment     Referring Physician Remedios Whitfield MD     Precautions comments hypermobile - EDS, postural hypotension     History of present illness/functional impairment Saranya is a 28 year old female who presents to OPPT with history of tethered cord syndrome. Pt with PMH significant for chiari malformation, hypermobile EDS, left plantar fasciitis, anxiety and postural hypotension. Pt reports her symptoms started slowly in 2019 with L hip pain, followed up with neurosurgeon in [place] with unremarkable results for all testing. Her symptoms then progressed with L leg motor weakness and shakiness (similar to clonus) with movement, chronic constipation, L drop foot, lower back pain, and neck pain.  Pt was finally diagnosed with tethered cord syndrome and underwent a clinical trial surgery at [facility], [facility], and [facility]. Due to her symptoms, she meets criteria for exploration and sectioning of her filum for the functional tethered cord clinical trial. Now s/p laminectomy for occult tethered cord release on 23 by Dr Duckworth. Patient tolerated procedure well.  No post-op complications. Transferred stable once PACU criteria met. Patient kept FLAT for 36hr. Placed on an aseptic dex taper and post-op IV abx x24hrs.  She was mobilized on 9/27 tolerated well without any symptoms. Additionally, was instructed not to exercise for 7 weeks post-surgery. Pt reports she recovered well overall with decreased drop foot, clonus and swelling, continues to have increased coordination deficits and foot drag with fatigue. Pt has completed OPPT at [facility] in [place] and intermittent massage therapy. Pt’s functional goals are to work on higher level balance, picking up things from floor, coordination and “Hippo therapy”.     Patient/Family/Caregiver Comments/Observations No new changes since last visit, reported feeling sore in L QL following last visit.     Patient reported fall since last visit No        Pain Assessment    Currently in pain No/Denies        Pre Activity Vital Signs    /82     BP Location Left upper arm     BP Method Manual     Patient Position Sitting                    Daily Treatment Assessment and Plan - 09/20/24 0801          Daily Treatment Assessment and Plan    Progress toward goals Progressing     Daily Outcome Summary Patient tolerated session well. Challenged with quadruped core exercises but was able to achieve improved L hip extension w add/abd with CGA from PT which did seem to elicit greater glute activation. Utilized Bioness end of session with dec instances of L knee recurvatum in mid stance. She was also able to progress to no UE on TM with incline up to 2.5% w only mild compensatory patterns noted (L hip hike). Pt appropriately fatigued end of session.     Plan and Recommendations Bioness as able, L LE strenghtening (proximal hip), dual tasking, core stabilization                         OBJECTIVE DATA TAKEN TODAY:    None taken    Today's Treatment:       PT Neuro Exercises Current Session   Performed Today? (y/n)   THER ACT   CPT 82102 TOTAL  TIME FOR SESSION 0-7 Minutes      Pain, vitals, subjective    Provided rest breaks for energy conservation yes   Skin inspection Base of incision, no swelling noted    FES  cycle [id number] (Age 28) , PIN [id number]   Bike height: 2 holes showing  Pedal height: 3 holes showing  L LE set up only    FES Xcite Set Up              Bioness     Patient Education Patient educated regarding current impairments per testing completed today and PT POC moving forward.     Patient provided with Welcome Letter, which includes attendance policy. Provided education regarding cancellation and no-show policy. Education regarding the importance of participation and regular attendance to maximize goal attainment. Patient verbalized understanding/agreement.     Clearance for FES received, will be completed NV, start with handheld unit to assess tolerance prior to FES bike. Pt verbalized understanding.    Patient educated regarding progress made thus far, current impairments per re-assessments completed today and PT POC moving forward. Patient verbalized understanding/agreement.     Discussion regarding E[employer] scheduling and [facility]. PT to find out next time [facility] rep is at [facility] to adjust pt schedule. Will provide with forms as well.                                      HEP  Verbally reviewed practicing gait over level surfaces with decreased step width, and then incorporating BUE swing and trunk rot. Pt verbalized understanding.     Floor Transfers  CloseS with increased time for LLE management, requires single UE support with review of moving from tall kneel to half kneeling position. Pt demos good understanding, will require continued practice     Subjective Outcomes Measures       ABC Scale Assessed     THER EX  CPT 78087 TOTAL TIME FOR SESSION  8-22 Minutes      STRETCHING      Stretching by patient Incline board stretch: L3 - 1 min x2  L QL stretch with small physioball 10 x 3 sec hold      Stretching by  "therapist/PROM Trialed stefani garcia stretch - not effective (pt states modified lunge position she has felt more of a stretch in hip flexors in the past)      CARDIOVASCULAR       Nu Step 6 mins, Level: 1-2, BLE only     Stationary Bike Recumbent bike 5 min, L1  (Unable to complete revolution on [employer] upright bike)    Ambulation with AD      Treadmill       Endurance Testing       6mWT Assessed    STRENGTH TRAINING     HEP Review     Standing Ther-Ex Standing hip abduction 2 x 10 ea  - standing on airex   - Second set, no UE support     Step ups 6\" block x 20   - Added L LE TKE w orange TB    Step down, 2\" block, L LE only w orange band TKE x 10    Hip hikes 2\" block, VC's to discourage R hip hike     Heel raises from L2 on incline board x 15 (on floor today)    Heel raises: 2x15, small weighted ball between heels     Staggered STS, 6 lb x 12    Squat into BL heel raise x 15 w 6lb                                  Yes        Side-lying there-ex Clamshells: 2x20 on R, L in supine: 5\"x10      Supine Ther-Ex Diaphragmatic breathin mins due to elevated BP  Glut bridge: 3\" x10, orange band  Bridge: x10   Supine hip abduction x 10, LLE, isometrics today  BKFO: x10, orange t-band only for R, active assist on L   Clamshells, x 10 ea LE  Bridge with ball squeeze x 10                  Deadbugs with physioball  - isometric holds: 10\" x 10, physioball under B heels   - alt LE marches with heels on physioball: x10  - alt UE: 3 sets x5 reps       Quadruped  hip extensions with towel under L foot: x10, B/L (modified position with BUE on EOM and LE on floor)     Quadruped <> bear crawl: 10\" x10   Bear crawl: 5' - fwd/back: x3 rounds     Alternating UE flexion x 10  Alternating LE extension x 10; use of slider L LE            Tall kneel Moderate perturbations all directions  Mini squats 2 x 10 blue TB resistance   Trunk rot: 2x10 w 10lb   D2 chops/lifts: x10  Posterior reach back to cones w cross body reach to PT, 2lb ankle " "weights on wrists             Akil Kneel  Onto airex pad:   - static hold: 20 seconds x 3   - chest press with beach ball: 2x10   -     Prone Ther-Ex     Core exercises PPT x 10   PPT with ball squeeze 5 sec hold, 10 x  PPT with ball squeeze and bridge x 10   PPT with heel taps from tabletop position BLE x 10 holding 6lb medball, CGA L LE  PPT with hip abduction isometric L LE x 3 sec hold x10, orange band     Heel slides with towel: 2x10  Triple flex over physioball: x10  Windshield wipers BLE over physioball 2 x 10     STS's from low EOM squeezing pilates ring  - As above, with squeeze, in staggered stance  Marching, squeezing small ball 30 ft x 2    Modified dead bugs, alt UE 2 x 10     Supine B shoulder ext with GTB, emphasis on core activation; 2 x 15    Quadruped:  - Rainbows x 10 ea LE, min cueing for L LE   - Bear crawls, forward/backward   Yes     Yes                                       Yes   Yes    Functional Strength Testing       30 sec STS test (60y +)  Assessed    NEURO RE-ED  CPT 30951 TOTAL TIME FOR SESSION 8-22 Minutes      FES  To promote neurorecovery of gait:  Distance: 3.01 miles  Resistance: 0.77 Nm  Power: 2.9 W  Symmetry: L 2%  Total time: 21:00  Active time: 19:03    Xcite  NMRE of LLE in function:     - Sit <> stand from elevated EOM using 6 lb med ball: x10, then progressing to lowest height: x10, then x10 in staggered stance  - Modified reverse lunging with slider under R foot in parallel bars with BUE support > RUE support only: x10 reps each  - Tall kneel <> heel sit: 2x10, 2nd set with chest press   - fwd step up with LLE onto 6\" block: x10, repeated with R knee drive, x10 repeated with 3# on RUE     Bioness L300 Go  - Fwd/rev step over 12\" [place]: x20,   - Fwd step up onto 6\" block with contra LE march: 2x10, B         - RLE step up, LLE march with 8lb db overhead press        - LLE step up, RLE march while holding 8lb db x 10, overhead press with 8lb db x 5  - Fwd step " "down 4\" block: 2x10, orange theraband around knees for manual cues   - stance on airex with lateral L toe taps to 12\" [place]: x10  - Tandem walk 20'x2, 6#   - step up on 4\" block, contralateral heel tap to 10\" block   - 12\" and 6\" hurdles, navigating reciprocally                    COORDINATION       Target Tapping     Coordination ladder As appropriate for HL balance     Amb with ankle weights     Amb with proprio wrap     Pushing weighted shopping cart     POSTURAL RE-ED     Sit <> Stand  From 22\" mat holding 6# med ball, cues for midline and equal WB      Tall Kneel  Tall kneel <> short sit: x10, x10 with chest press     Seated & standing reaching for trunk strengthening     Abbey-scapular strengthening     PRE-GAIT ACTIVITIES      Tap-ups     Step-ups To 6\" block with contralateral knee drive: x10, repeated with 2 lb DB for UE swing     Standing weight shifting     Step-taps At 6\" main gym steps, step up to 1st step, tap to 3rd with contralateral LE  - w Bioness Yes    BALANCE TRAINING     Standing balance - static/dynamic       HEP Review       Floor       Airex Santiago-tandem: 30 seconds x 2, HT: 30\" x 1, EC: 30 seconds x 1  Tandem stance: 30 seconds x 1     Blue tread rockerboard:  - Semi tandem with chops, 6lb                 Rockerboard AP plane: x10, with light UE support   AP, chest press x 10, OH press x 10, 6lb   Squats 2 x 10      Hurdles 6\" hurdles, focus on dec L hip circumduction through swing and encouraging heel strike. Step forward/back    Repeated with side stepping pattern, requiring BUE support dud to inability for L knee extension through stance     BOSU Ball  Fwd mini lunge onto domed side: x10, CloseS   Modified lunge onto BOSU with palloff press:   - x10, then repeated x10 with perturbations on yellow sports cord     Split Stance      Biodex Balance Training  Weight shifting and motor control training with increased difficulty noted to L anterolateral planes: x5 mins     Dynamic gait       Side " stepping With partial squat with orange theraband around ankles: 10'x4     Retro walking Fwd/retro amb 50'x6 with cues for shorter step length and for heel strike + knee extension through stance, mirror anteriorly, added dual task of beach bal    30ft x 2 ea today with Bioness donned, PT assist to prevent L hip hike and lateral trunk lean with retro amb    2 x 30 ft while holding small physioball, w Bioness  - 2 x 30 ft marching with 6lb medball, w Biones                  Yes   Yes      Tandem Walking 10'x2, CloseS/CGA     Suitcase carry Using small 3 lb B DB on R: 100'x2, noted delayed L knee extension through IC and mid stance     Amb with hula hoop Using hula hoop to facilitate BUE swing and trunk rot     Walking with ball toss     Proprioceptive wrap Blue TB around L LE  - Amb along purple line x 3 with cues to keep feet within tiles  - x 250ft with same cues as above  - Retro amb 3 x 20 ft  - Mini squats with heels elevated x 10, focus on minimizing hyperextension    Balance Testing     FGA  Assessed    SLS  Assessed see note     10mWT Assessed  - (6mWT on 8/26)    GAIT TRAINING  CPT 85895 TOTAL TIME FOR SESSION 8-22 Minutes        Ambulation without AD  Gait with no AD over level indoor surfaces with WBOS and decreased L weight shift, VC for anterolateral weight shift, heel strike, BUE swing and trunk rot, 150'x1      Dynamic Gait  20'x6 with focus on shorter step lengths, heel strike and decreasing step width with use of 1' floor tiles, repeated with bimanual ball toss with CloseS and noted decreased gait speed and increase in step lengths     With Bioness donned x 250 ft   - Good clearance of L LE through swing with improved knee flexion through swing       Treadmill, No UE support, with Bioness donned 2.1 mph x 12 min, 2.5% incline    - Bioness donned     Following removal of proprioceptive wrap x 250ft, no instances of L knee hyperextension thrust noted.                        Yes            Stair  negotiation  Full flight stairs 1 trial with 1 rail support; 1 trial without UE support. Tactile cues for trunk midline, verbal and visual cues for eccentric control     Curb negotiation       Ramp negotiation       Outdoor ambulation  With Bioness:  - x500 ft approx, inclines/declines  - Retro amb up incline 3 x 15 ft     MANUAL  CPT 09800 TOTAL TIME FOR SESSION Not performed      Mobilization        MODALITIES  CPT 21953 TOTAL TIME FOR SESSION      Ice       Heat       ATTENDED E-STIM  CPT 42444 TOTAL TIME FOR SESSION 8-22 Minutes     Attended E-Stim FES    [id number] (Age 28) , PIN [id number]   Stim to L quad, hamstring, glutes, tib ant, and gastroc. 250-350 pulse width, 40Hz frequency.   Muscle testing testing completed for initial set up (7/8)    Xcite use for LLE:     Stim to L quad, hamstring, glutes, tib ant, and gastroc. 250-350 pulse width, 40Hz frequency.   Muscle testing testing completed for initial set up 7/26)    Bioness:   L lower leg quick fit pads, L hamstring added 8/2  30 Hz with 0.2 ramp for loading response   Used tablet without strap                \              Yes            Unattended E-stim

## 2024-09-20 NOTE — OP PT TREATMENT LOG
PT Neuro Exercises Current Session   Performed Today? (y/n)   THER ACT   CPT 82985 TOTAL TIME FOR SESSION 0-7 Minutes      Pain, vitals, subjective    Provided rest breaks for energy conservation yes   Skin inspection Base of incision, no swelling noted    FES  cycle [id number] (Age 28) , PIN [id number]   Bike height: 2 holes showing  Pedal height: 3 holes showing  L LE set up only    FES Xcite Set Up              Bioness     Patient Education Patient educated regarding current impairments per testing completed today and PT POC moving forward.     Patient provided with Welcome Letter, which includes attendance policy. Provided education regarding cancellation and no-show policy. Education regarding the importance of participation and regular attendance to maximize goal attainment. Patient verbalized understanding/agreement.     Clearance for FES received, will be completed NV, start with handheld unit to assess tolerance prior to FES bike. Pt verbalized understanding.    Patient educated regarding progress made thus far, current impairments per re-assessments completed today and PT POC moving forward. Patient verbalized understanding/agreement.     Discussion regarding Equestrian scheduling and scheduling with [facility]. PT to find out next time [facility] rep is at [facility] to adjust pt schedule. Will provide with forms as well.                                      HEP  Verbally reviewed practicing gait over level surfaces with decreased step width, and then incorporating BUE swing and trunk rot. Pt verbalized understanding.     Floor Transfers  CloseS with increased time for LLE management, requires single UE support with review of moving from tall kneel to half kneeling position. Pt demos good understanding, will require continued practice     Subjective Outcomes Measures       ABC Scale Assessed     THER EX  CPT 56355 TOTAL TIME FOR SESSION  8-22 Minutes      STRETCHING      Stretching by patient Incline board  "stretch: L3 - 1 min x2  L QL stretch with small physioball 10 x 3 sec hold      Stretching by therapist/PROM Trialed modified jose stretch - not effective (pt states modified lunge position she has felt more of a stretch in hip flexors in the past)      CARDIOVASCULAR       Nu Step 6 mins, Level: 1-2, BLE only     Stationary Bike Recumbent bike 5 min, L1  (Unable to complete revolution on Expresso upright bike)    Ambulation with AD      Treadmill       Endurance Testing       6mWT Assessed    STRENGTH TRAINING     HEP Review     Standing Ther-Ex Standing hip abduction 2 x 10 ea  - standing on airex   - Second set, no UE support     Step ups 6\" block x 20   - Added L LE TKE w orange TB    Step down, 2\" block, L LE only w orange band TKE x 10    Hip hikes 2\" block, VC's to discourage R hip hike     Heel raises from L2 on incline board x 15 (on floor today)    Heel raises: 2x15, small weighted ball between heels     Staggered STS, 6 lb x 12    Squat into BL heel raise x 15 w 6lb                                  Yes        Side-lying there-ex Clamshells: 2x20 on R, L in supine: 5\"x10      Supine Ther-Ex Diaphragmatic breathin mins due to elevated BP  Glut bridge: 3\" x10, orange band  Bridge: x10   Supine hip abduction x 10, LLE, isometrics today  BKFO: x10, orange t-band only for R, active assist on L   Clamshells, x 10 ea LE  Bridge with ball squeeze x 10                  Deadbugs with physioball  - isometric holds: 10\" x 10, physioball under B heels   - alt LE marches with heels on physioball: x10  - alt UE: 3 sets x5 reps       Quadruped  hip extensions with towel under L foot: x10, B/L (modified position with BUE on EOM and LE on floor)     Quadruped <> bear crawl: 10\" x10   Bear crawl: 5' - fwd/back: x3 rounds     Alternating UE flexion x 10  Alternating LE extension x 10; use of slider L LE            Tall kneel Moderate perturbations all directions  Mini squats 2 x 10 blue TB resistance   Trunk rot: 2x10 w " "10lb   D2 chops/lifts: x10  Posterior reach back to cones w cross body reach to PT, 2lb ankle weights on wrists             Akil Kneel  Onto airex pad:   - static hold: 20 seconds x 3   - chest press with beach ball: 2x10   -     Prone Ther-Ex     Core exercises PPT x 10   PPT with ball squeeze 5 sec hold, 10 x  PPT with ball squeeze and bridge x 10   PPT with heel taps from tabletop position BLE x 10 holding 6lb medball, CGA L LE  PPT with hip abduction isometric L LE x 3 sec hold x10, orange band     Heel slides with towel: 2x10  Triple flex over physioball: x10  Windshield wipers BLE over physioball 2 x 10     STS's from low EOM squeezing pilates ring  - As above, with squeeze, in staggered stance  Marching, squeezing small ball 30 ft x 2    Modified dead bugs, alt UE 2 x 10     Supine B shoulder ext with GTB, emphasis on core activation; 2 x 15    Quadruped:  - Rainbows x 10 ea LE, min cueing for L LE   - Bear crawls, forward/backward   Yes     Yes                                       Yes   Yes    Functional Strength Testing       30 sec STS test (60y +)  Assessed    NEURO RE-ED  CPT 18675 TOTAL TIME FOR SESSION 8-22 Minutes      FES  To promote neurorecovery of gait:  Distance: 3.01 miles  Resistance: 0.77 Nm  Power: 2.9 W  Symmetry: L 2%  Total time: 21:00  Active time: 19:03    Xcite  NMRE of LLE in function:     - Sit <> stand from elevated EOM using 6 lb med ball: x10, then progressing to lowest height: x10, then x10 in staggered stance  - Modified reverse lunging with slider under R foot in parallel bars with BUE support > RUE support only: x10 reps each  - Tall kneel <> heel sit: 2x10, 2nd set with chest press   - fwd step up with LLE onto 6\" block: x10, repeated with R knee drive, x10 repeated with 3# on RUE     Bioness L300 Go  - Fwd/rev step over 12\" virginia virginia: x20,   - Fwd step up onto 6\" block with contra LE march: 2x10, B         - RLE step up, LLE march with 8lb db overhead press        - " "LLE step up, RLE march while holding 8lb db x 10, overhead press with 8lb db x 5  - Fwd step down 4\" block: 2x10, orange theraband around knees for manual cues   - stance on airex with lateral L toe taps to 12\" virginia: x10  - Tandem walk 20'x2, 6#   - step up on 4\" block, contralateral heel tap to 10\" block   - 12\" and 6\" hurdles, navigating reciprocally                    COORDINATION       Target Tapping     Coordination ladder As appropriate for HL balance     Amb with ankle weights     Amb with proprio wrap     Pushing weighted shopping cart     POSTURAL RE-ED     Sit <> Stand  From 22\" mat holding 6# med ball, cues for midline and equal WB      Tall Kneel  Tall kneel <> short sit: x10, x10 with chest press     Seated & standing reaching for trunk strengthening     Abbey-scapular strengthening     PRE-GAIT ACTIVITIES      Tap-ups     Step-ups To 6\" block with contralateral knee drive: x10, repeated with 2 lb DB for UE swing     Standing weight shifting     Step-taps At 6\" main gym steps, step up to 1st step, tap to 3rd with contralateral LE  - w Bioness Yes    BALANCE TRAINING     Standing balance - static/dynamic       HEP Review       Floor       Airex Santiago-tandem: 30 seconds x 2, HT: 30\" x 1, EC: 30 seconds x 1  Tandem stance: 30 seconds x 1     Blue tread rockerboard:  - Semi tandem with chops, 6lb                 Rockerboard AP plane: x10, with light UE support   AP, chest press x 10, OH press x 10, 6lb   Squats 2 x 10      Hurdles 6\" hurdles, focus on dec L hip circumduction through swing and encouraging heel strike. Step forward/back    Repeated with side stepping pattern, requiring BUE support dud to inability for L knee extension through stance     BOSU Ball  Fwd mini lunge onto domed side: x10, CloseS   Modified lunge onto BOSU with palloff press:   - x10, then repeated x10 with perturbations on yellow sports cord     Split Stance      Biodex Balance Training  Weight shifting and motor control training " with increased difficulty noted to L anterolateral planes: x5 mins     Dynamic gait       Side stepping With partial squat with orange theraband around ankles: 10'x4     Retro walking Fwd/retro amb 50'x6 with cues for shorter step length and for heel strike + knee extension through stance, mirror anteriorly, added dual task of beach bal    30ft x 2 ea today with Bioness donned, PT assist to prevent L hip hike and lateral trunk lean with retro amb    2 x 30 ft while holding small physioball, w Bioness  - 2 x 30 ft marching with 6lb medball, w Biones                  Yes   Yes      Tandem Walking 10'x2, CloseS/CGA     Suitcase carry Using small 3 lb B DB on R: 100'x2, noted delayed L knee extension through IC and mid stance     Amb with hula hoop Using hula hoop to facilitate BUE swing and trunk rot     Walking with ball toss     Proprioceptive wrap Blue TB around L LE  - Amb along purple line x 3 with cues to keep feet within tiles  - x 250ft with same cues as above  - Retro amb 3 x 20 ft  - Mini squats with heels elevated x 10, focus on minimizing hyperextension    Balance Testing     FGA  Assessed    SLS  Assessed see note     10mWT Assessed  - (6mWT on 8/26)    GAIT TRAINING  CPT 80148 TOTAL TIME FOR SESSION 8-22 Minutes        Ambulation without AD  Gait with no AD over level indoor surfaces with WBOS and decreased L weight shift, VC for anterolateral weight shift, heel strike, BUE swing and trunk rot, 150'x1      Dynamic Gait  20'x6 with focus on shorter step lengths, heel strike and decreasing step width with use of 1' floor tiles, repeated with bimanual ball toss with CloseS and noted decreased gait speed and increase in step lengths     With Bioness donned x 250 ft   - Good clearance of L LE through swing with improved knee flexion through swing       Treadmill, No UE support, with Bioness donned 2.1 mph x 12 min, 2.5% incline    - Bioness donned     Following removal of proprioceptive wrap x 250ft, no  instances of L knee hyperextension thrust noted.                        Yes            Stair negotiation  Full flight stairs 1 trial with 1 rail support; 1 trial without UE support. Tactile cues for trunk midline, verbal and visual cues for eccentric control     Curb negotiation       Ramp negotiation       Outdoor ambulation  With Bioness:  - x500 ft approx, inclines/declines  - Retro amb up incline 3 x 15 ft     MANUAL  CPT 77278 TOTAL TIME FOR SESSION Not performed      Mobilization        MODALITIES  CPT 63471 TOTAL TIME FOR SESSION      Ice       Heat       ATTENDED E-STIM  CPT 95175 TOTAL TIME FOR SESSION 8-22 Minutes     Attended E-Stim FES    [id number] (Age 28) , PIN [phone]   Stim to L quad, hamstring, glutes, tib ant, and gastroc. 250-350 pulse width, 40Hz frequency.   Muscle testing testing completed for initial set up (7/8)    Xcite use for LLE:     Stim to L quad, hamstring, glutes, tib ant, and gastroc. 250-350 pulse width, 40Hz frequency.   Muscle testing testing completed for initial set up 7/26)    Bioness:   L lower leg quick fit pads, L hamstring added 8/2  30 Hz with 0.2 ramp for loading response   Used tablet without strap                \              Yes            Unattended E-stim

## 2024-09-25 ENCOUNTER — HOSPITAL ENCOUNTER (OUTPATIENT)
Dept: PHYSICAL THERAPY | Facility: REHABILITATION | Age: 29
Setting detail: THERAPIES SERIES
Discharge: HOME | End: 2024-09-25
Attending: LEGAL MEDICINE
Payer: COMMERCIAL

## 2024-09-25 DIAGNOSIS — Z86.69 HISTORY OF TETHERED SPINAL CORD: Primary | ICD-10-CM

## 2024-09-25 PROCEDURE — 97032 APPL MODALITY 1+ESTIM EA 15: CPT | Mod: GP

## 2024-09-25 PROCEDURE — 97530 THERAPEUTIC ACTIVITIES: CPT | Mod: GP

## 2024-09-25 PROCEDURE — 97116 GAIT TRAINING THERAPY: CPT | Mod: GP

## 2024-09-25 NOTE — OP PT TREATMENT LOG
PT Neuro Exercises Current Session   Performed Today? (y/n)   THER ACT   CPT 99508 TOTAL TIME FOR SESSION 8-22 Minutes      Pain, vitals, subjective    Provided rest breaks for energy conservation yes   Skin inspection Base of incision, no swelling noted    FES  cycle [id number] (Age 28) , PIN [phone]   Bike height: 2 holes showing  Pedal height: 3 holes showing  L LE set up only    FES Xcite Set Up              Bioness Gabi Patrick, PT from [facility] present for session.     Discussed options for insurance coverage vs out of pocket costs. Process to proceed. T/o session  Yes    Patient Education Patient educated regarding current impairments per testing completed today and PT POC moving forward.     Patient provided with Welcome Letter, which includes attendance policy. Provided education regarding cancellation and no-show policy. Education regarding the importance of participation and regular attendance to maximize goal attainment. Patient verbalized understanding/agreement.     Clearance for FES received, will be completed NV, start with handheld unit to assess tolerance prior to FES bike. Pt verbalized understanding.    Patient educated regarding progress made thus far, current impairments per re-assessments completed today and PT POC moving forward. Patient verbalized understanding/agreement.     Discussion regarding Equestrian scheduling and scheduling with [employer] rep. PT to find out next time [employer] rep is at [facility] to adjust pt schedule. Will provide with forms as well.     Discussion of ordering process for [email or web address].                                                    Yes    HEP  Verbally reviewed practicing gait over level surfaces with decreased step width, and then incorporating BUE swing and trunk rot. Pt verbalized understanding.     Floor Transfers  CloseS with increased time for LLE management, requires single UE support with review of moving from tall kneel to half kneeling position. Pt anthony  "good understanding, will require continued practice     Subjective Outcomes Measures       ABC Scale Assessed     THER EX  CPT 75339 TOTAL TIME FOR SESSION  Not performed      STRETCHING      Stretching by patient Incline board stretch: L3 - 1 min x2  L QL stretch with small physioball 10 x 3 sec hold      Stretching by therapist/PROM Trialed modified jose stretch - not effective (pt states modified lunge position she has felt more of a stretch in hip flexors in the past)      CARDIOVASCULAR       Nu Step 6 mins, Level: 1-2, BLE only     Stationary Bike Recumbent bike 5 min, L1  (Unable to complete revolution on [employer]o upright bike)    Ambulation with AD      Treadmill       Endurance Testing       6mWT Assessed    STRENGTH TRAINING     HEP Review     Standing Ther-Ex Standing hip abduction 2 x 10 ea  - standing on airex   - Second set, no UE support     Step ups 6\" block x 20   - Added L LE TKE w orange TB    Step down, 2\" block, L LE only w orange band TKE x 10    Hip hikes 2\" block, VC's to discourage R hip hike     Heel raises from L2 on incline board x 15 (on floor today)    Heel raises: 2x15, small weighted ball between heels     Staggered STS, 6 lb x 12    Squat into BL heel raise x 15 w 6lb                                       Side-lying there-ex Clamshells: 2x20 on R, L in supine: 5\"x10      Supine Ther-Ex Diaphragmatic breathin mins due to elevated BP  Glut bridge: 3\" x10, orange band  Bridge: x10   Supine hip abduction x 10, LLE, isometrics today  BKFO: x10, orange t-band only for R, active assist on L   Clamshells, x 10 ea LE  Bridge with ball squeeze x 10                  Deadbugs with physioball  - isometric holds: 10\" x 10, physioball under B heels   - alt LE marches with heels on physioball: x10  - alt UE: 3 sets x5 reps       Quadruped  hip extensions with towel under L foot: x10, B/L (modified position with BUE on EOM and LE on floor)     Quadruped <> bear crawl: 10\" x10   Bear crawl: 5' - " "fwd/back: x3 rounds     Alternating UE flexion x 10  Alternating LE extension x 10; use of slider L LE            Tall kneel Moderate perturbations all directions  Mini squats 2 x 10 blue TB resistance   Trunk rot: 2x10 w 10lb   D2 chops/lifts: x10  Posterior reach back to cones w cross body reach to PT, 2lb ankle weights on wrists             Akil Kneel  Onto airex pad:   - static hold: 20 seconds x 3   - chest press with beach ball: 2x10   -     Prone Ther-Ex     Core exercises PPT x 10   PPT with ball squeeze 5 sec hold, 10 x  PPT with ball squeeze and bridge x 10   PPT with heel taps from tabletop position BLE x 10 holding 6lb medball, CGA L LE  PPT with hip abduction isometric L LE x 3 sec hold x10, orange band     Heel slides with towel: 2x10  Triple flex over physioball: x10  Windshield wipers BLE over physioball 2 x 10     STS's from low EOM squeezing pilates ring  - As above, with squeeze, in staggered stance  Marching, squeezing small ball 30 ft x 2    Modified dead bugs, alt UE 2 x 10     Supine B shoulder ext with GTB, emphasis on core activation; 2 x 15    Quadruped:  - Rainbows x 10 ea LE, min cueing for L LE   - Bear crawls, forward/backward                                          Functional Strength Testing       30 sec STS test (60y +)  Assessed    NEURO RE-ED  CPT 95449 TOTAL TIME FOR SESSION 0-7 Minutes      FES  To promote neurorecovery of gait:  Distance: 3.01 miles  Resistance: 0.77 Nm  Power: 2.9 W  Symmetry: L 2%  Total time: 21:00  Active time: 19:03    Xcite  NMRE of LLE in function:     - Sit <> stand from elevated EOM using 6 lb med ball: x10, then progressing to lowest height: x10, then x10 in staggered stance  - Modified reverse lunging with slider under R foot in parallel bars with BUE support > RUE support only: x10 reps each  - Tall kneel <> heel sit: 2x10, 2nd set with chest press   - fwd step up with LLE onto 6\" block: x10, repeated with R knee drive, x10 repeated with 3# on " "RUE     Bioness L300 Go  - Fwd/rev step over 12\" virginia virginia: x20,   - Fwd step up onto 6\" block with contra LE march: 2x10, B         - RLE step up, LLE march with 8lb db overhead press        - LLE step up, RLE march while holding 8lb db x 10, overhead press with 8lb db x 5  - Fwd step down 4\" block: 2x10, orange theraband around knees for manual cues   - stance on airex with lateral L toe taps to 12\" virginia: x10  - Tandem walk 20'x2, 6#   - step up on 4\" block, contralateral heel tap to 10\" block   - 12\" and 6\" hurdles, navigating reciprocally     - 6 inch step taps 2 X 10  - 6 inch step up with repeater, cues for L hip stability                           Yes  Yes        COORDINATION       Target Tapping     Coordination ladder As appropriate for HL balance     Amb with ankle weights     Amb with proprio wrap     Pushing weighted shopping cart     POSTURAL RE-ED     Sit <> Stand  From 22\" mat holding 6# med ball, cues for midline and equal WB      Tall Kneel  Tall kneel <> short sit: x10, x10 with chest press     Seated & standing reaching for trunk strengthening     Abbey-scapular strengthening     PRE-GAIT ACTIVITIES      Tap-ups     Step-ups To 6\" block with contralateral knee drive: x10, repeated with 2 lb DB for UE swing     Standing weight shifting     Step-taps At 6\" main gym steps, step up to 1st step, tap to 3rd with contralateral LE  - w [facility]    BALANCE TRAINING     Standing balance - static/dynamic       HEP Review       Floor       Airex Santiago-tandem: 30 seconds x 2, HT: 30\" x 1, EC: 30 seconds x 1  Tandem stance: 30 seconds x 1     Blue tread rockerboard:  - Semi tandem with chops, 6lb                 Rockerboard AP plane: x10, with light UE support   AP, chest press x 10, OH press x 10, 6lb   Squats 2 x 10      Hurdles 6\" hurdles, focus on dec L hip circumduction through swing and encouraging heel strike. Step forward/back    Repeated with side stepping pattern, requiring BUE support dud to " inability for L knee extension through stance     BOSU Ball  Fwd mini lunge onto domed side: x10, CloseS   Modified lunge onto BOSU with palloff press:   - x10, then repeated x10 with perturbations on yellow sports cord     Split Stance      Biodex Balance Training  Weight shifting and motor control training with increased difficulty noted to L anterolateral planes: x5 mins     Dynamic gait       Side stepping With partial squat with orange theraband around ankles: 10'x4     Retro walking Fwd/retro amb 50'x6 with cues for shorter step length and for heel strike + knee extension through stance, mirror anteriorly, added dual task of beach bal    30ft x 2 ea today with Bioness donned, PT assist to prevent L hip hike and lateral trunk lean with retro amb    2 x 30 ft while holding small physioball, w Bioness  - 2 x 30 ft marching with 6lb medball, w Biones                     Tandem Walking 10'x2, CloseS/CGA     Suitcase carry Using small 3 lb B DB on R: 100'x2, noted delayed L knee extension through IC and mid stance     Amb with hula hoop Using hula hoop to facilitate BUE swing and trunk rot     Walking with ball toss     Proprioceptive wrap Blue TB around L LE  - Amb along purple line x 3 with cues to keep feet within tiles  - x 250ft with same cues as above  - Retro amb 3 x 20 ft  - Mini squats with heels elevated x 10, focus on minimizing hyperextension    Balance Testing     FGA  Assessed    SLS  Assessed see note     10mWT Assessed  - (6mWT on 8/26)    GAIT TRAINING  CPT 02250 TOTAL TIME FOR SESSION 23-37 Minutes        Ambulation without AD  Gait with no AD over level indoor surfaces )with WBOS and decreased L weight shift, VC for anterolateral weight shift, heel strike, BUE swing and trunk rot, 150'x1        Dynamic Gait  20'x6 with focus on shorter step lengths, heel strike and decreasing step width with use of 1' floor tiles, repeated with bimanual ball toss with CloseS and noted decreased gait speed and  increase in step lengths     With Bioness donned x 250 ft, carpet and tile   - Good clearance of L LE through swing with improved knee flexion through swing    -  50 ft without for comparison       Treadmill, No UE support, with Bioness donned 2.1 mph x 12 min, 2.5% incline    - Bioness donned     Following removal of proprioceptive wrap x 250ft, no instances of L knee hyperextension thrust noted.              Yes                        Stair negotiation  Full flight stairs 1 trial with 1 rail support; 1 trial without UE support. Tactile cues for trunk midline, verbal and visual cues for eccentric control    6 inch     Curb negotiation       Ramp negotiation  with bioness X 30 ft Yes    Outdoor ambulation  With Bioness:  - x500 ft approx, inclines/declines  - Retro amb up incline 3 x 15 ft     MANUAL  CPT 86052 TOTAL TIME FOR SESSION Not performed      Mobilization        MODALITIES  CPT 66400 TOTAL TIME FOR SESSION      Ice       Heat       ATTENDED E-STIM  CPT 17932 TOTAL TIME FOR SESSION 8-22 Minutes     Attended E-Stim FES    [id number] (Age 28) , PIN [phone]   Stim to L quad, hamstring, glutes, tib ant, and gastroc. 250-350 pulse width, 40Hz frequency.   Muscle testing testing completed for initial set up (7/8)    Xcite use for LLE:     Stim to L quad, hamstring, glutes, tib ant, and gastroc. 250-350 pulse width, 40Hz frequency.   Muscle testing testing completed for initial set up 7/26)    Bioness L300 go.   L lower leg quick fit pads, L hamstring added 8/2 ( lower leg cuff only today)   30 Hz with 0.2 ramp for loading response   Used tablet with strap   Increased time for set up/ alignment                                   Yes                Unattended E-stim

## 2024-09-25 NOTE — PROGRESS NOTES
Physical Therapy Visit    PT DAILY NOTE FOR OUTPATIENT THERAPY    Patient: Melanie Litten MRN: [id number]  : 1995 28 y.o.  Referring Physician: Remedios Whitfield MD  Date of Visit: 2024    Certification Dates: 24 through 24    Diagnosis:   1. History of tethered spinal cord        Chief Complaints:  EDS, LLE weakness, gait/balance impairment    Precautions:   Precautions comments: hypermobile - EDS, postural hypotension      TODAY'S VISIT    Time In Session:  Start Time: 1100  Stop Time: 1200  Time Calculation (min): 60 min   History/Vitals/Pain/Encounter Info - 24 1002          Injury History/Precautions/Daily Required Info    Document Type daily treatment     Primary Therapist Dilan Machuca     Chief Complaint/Reason for Visit  EDS, LLE weakness, gait/balance impairment     Referring Physician Remedios Whitfield MD     Precautions comments hypermobile - EDS, postural hypotension     History of present illness/functional impairment Saranya is a 28 year old female who presents to OPPT with history of tethered cord syndrome. Pt with PMH significant for chiari malformation, hypermobile EDS, left plantar fasciitis, anxiety and postural hypotension. Pt reports her symptoms started slowly in 2019 with L hip pain, followed up with neurosurgeon in [place] with unremarkable results for all testing. Her symptoms then progressed with L leg motor weakness and shakiness (similar to clonus) with movement, chronic constipation, L drop foot, lower back pain, and neck pain.  Pt was finally diagnosed with tethered cord syndrome and underwent a clinical trial surgery at [facility], [facility], and [facility]. Due to her symptoms, she meets criteria for exploration and sectioning of her filum for the functional tethered cord clinical trial. Now s/p laminectomy for occult tethered cord release on 23 by Dr Duckworth. Patient tolerated procedure well.  No post-op complications. Transferred stable once PACU criteria met. Patient kept FLAT for 36hr. Placed on an aseptic dex taper and post-op IV abx x24hrs.  She was mobilized on 9/27 tolerated well without any symptoms. Additionally, was instructed not to exercise for 7 weeks post-surgery. Pt reports she recovered well overall with decreased drop foot, clonus and swelling, continues to have increased coordination deficits and foot drag with fatigue. Pt has completed OPPT at [facility] in [place] and intermittent massage therapy. Pt’s functional goals are to work on higher level balance, picking up things from floor, coordination and “Hippo therapy”.     Patient/Family/Caregiver Comments/Observations Had some core soreness after last session. Denies pain, med changes.     Patient reported fall since last visit No        Pain Assessment    Currently in pain No/Denies        Pre Activity Vital Signs    Pulse 90     /70     BP Location Left upper arm     BP Method Manual     Patient Position Sitting                    Daily Treatment Assessment and Plan - 09/25/24 1335          Daily Treatment Assessment and Plan    Progress toward goals Progressing     Daily Outcome Summary Session focus on consult with rep from [facility] with use of lower leg cuff. Discussion included on process for insurance coverage/ self pay.  Pt is interested in pursuing and provided in formation to rep.  Ambulation without use of Nivela lower leg cuff  demonstrates increased base of support, L foot slap, increased lateral pelvic excursion.     Plan and Recommendations [facility] as able, L LE strenghtening (proximal hip), dual tasking, core stabilization                         OBJECTIVE DATA TAKEN TODAY:    None taken    Today's Treatment:       PT Neuro Exercises Current Session   Performed Today? (y/n)   THER ACT   CPT 98636 TOTAL TIME FOR SESSION 8-22 Minutes      Pain, vitals, subjective    Provided rest breaks for energy conservation yes    Skin inspection Base of incision, no swelling noted    FES  cycle [id number] (Age 28) , PIN [id number]   Bike height: 2 holes showing  Pedal height: 3 holes showing  L LE set up only    FES Xcite Set Up              Teodoro Patrick, PT from [facility] present for session.     Discussed options for insurance coverage vs out of pocket costs. Process to proceed. T/o session  Yes    Patient Education Patient educated regarding current impairments per testing completed today and PT POC moving forward.     Patient provided with Welcome Letter, which includes attendance policy. Provided education regarding cancellation and no-show policy. Education regarding the importance of participation and regular attendance to maximize goal attainment. Patient verbalized understanding/agreement.     Clearance for FES received, will be completed NV, start with handheld unit to assess tolerance prior to FES bike. Pt verbalized understanding.    Patient educated regarding progress made thus far, current impairments per re-assessments completed today and PT POC moving forward. Patient verbalized understanding/agreement.     Discussion regarding [employer] scheduling and scheduling [facility] [facility]. PT to find out next time [facility] rep is at [facility] to adjust pt schedule. Will provide with forms as well.     Discussion of [facility] process [facility].                                                    Yes    HEP  Verbally reviewed practicing gait over level surfaces with decreased step width, and then incorporating BUE swing and trunk rot. Pt verbalized understanding.     Floor Transfers  CloseS with increased time for LLE management, requires single UE support with review of moving from tall kneel to half kneeling position. Pt demos good understanding, will require continued practice     Subjective Outcomes Measures       ABC Scale Assessed     THER EX  CPT 44683 TOTAL TIME FOR SESSION  Not performed      STRETCHING      Stretching by  "patient Incline board stretch: L3 - 1 min x2  L QL stretch with small physioball 10 x 3 sec hold      Stretching by therapist/PROM Trialed modified jose stretch - not effective (pt states modified lunge position she has felt more of a stretch in hip flexors in the past)      CARDIOVASCULAR       Nu Step 6 mins, Level: 1-2, BLE only     Stationary Bike Recumbent bike 5 min, L1  (Unable to complete revolution on [employer] upright bike)    Ambulation with AD      Treadmill       Endurance Testing       6mWT Assessed    STRENGTH TRAINING     HEP Review     Standing Ther-Ex Standing hip abduction 2 x 10 ea  - standing on airex   - Second set, no UE support     Step ups 6\" block x 20   - Added L LE TKE w orange TB    Step down, 2\" block, L LE only w orange band TKE x 10    Hip hikes 2\" block, VC's to discourage R hip hike     Heel raises from L2 on incline board x 15 (on floor today)    Heel raises: 2x15, small weighted ball between heels     Staggered STS, 6 lb x 12    Squat into BL heel raise x 15 w 6lb                                       Side-lying there-ex Clamshells: 2x20 on R, L in supine: 5\"x10      Supine Ther-Ex Diaphragmatic breathin mins due to elevated BP  Glut bridge: 3\" x10, orange band  Bridge: x10   Supine hip abduction x 10, LLE, isometrics today  BKFO: x10, orange t-band only for R, active assist on L   Clamshells, x 10 ea LE  Bridge with ball squeeze x 10                  Deadbugs with physioball  - isometric holds: 10\" x 10, physioball under B heels   - alt LE marches with heels on physioball: x10  - alt UE: 3 sets x5 reps       Quadruped  hip extensions with towel under L foot: x10, B/L (modified position with BUE on EOM and LE on floor)     Quadruped <> bear crawl: 10\" x10   Bear crawl: 5' - fwd/back: x3 rounds     Alternating UE flexion x 10  Alternating LE extension x 10; use of slider L LE            Tall kneel Moderate perturbations all directions  Mini squats 2 x 10 blue TB resistance " "  Trunk rot: 2x10 w 10lb   D2 chops/lifts: x10  Posterior reach back to cones w cross body reach to PT, 2lb ankle weights on wrists             Aikl Kneel  Onto airex pad:   - static hold: 20 seconds x 3   - chest press with beach ball: 2x10   -     Prone Ther-Ex     Core exercises PPT x 10   PPT with ball squeeze 5 sec hold, 10 x  PPT with ball squeeze and bridge x 10   PPT with heel taps from tabletop position BLE x 10 holding 6lb medball, CGA L LE  PPT with hip abduction isometric L LE x 3 sec hold x10, orange band     Heel slides with towel: 2x10  Triple flex over physioball: x10  Windshield wipers BLE over physioball 2 x 10     STS's from low EOM squeezing pilates ring  - As above, with squeeze, in staggered stance  Marching, squeezing small ball 30 ft x 2    Modified dead bugs, alt UE 2 x 10     Supine B shoulder ext with GTB, emphasis on core activation; 2 x 15    Quadruped:  - Rainbows x 10 ea LE, min cueing for L LE   - Bear crawls, forward/backward                                          Functional Strength Testing       30 sec STS test (60y +)  Assessed    NEURO RE-ED  CPT 21621 TOTAL TIME FOR SESSION 0-7 Minutes      FES  To promote neurorecovery of gait:  Distance: 3.01 miles  Resistance: 0.77 Nm  Power: 2.9 W  Symmetry: L 2%  Total time: 21:00  Active time: 19:03    Xcite  NMRE of LLE in function:     - Sit <> stand from elevated EOM using 6 lb med ball: x10, then progressing to lowest height: x10, then x10 in staggered stance  - Modified reverse lunging with slider under R foot in parallel bars with BUE support > RUE support only: x10 reps each  - Tall kneel <> heel sit: 2x10, 2nd set with chest press   - fwd step up with LLE onto 6\" block: x10, repeated with R knee drive, x10 repeated with 3# on RUE     Bioness L300 Go  - Fwd/rev step over 12\" [place]: x20,   - Fwd step up onto 6\" block with contra LE march: 2x10, B         - RLE step up, LLE march with 8lb db overhead press        - LLE " "step up, RLE march while holding 8lb db x 10, overhead press with 8lb db x 5  - Fwd step down 4\" block: 2x10, orange theraband around knees for manual cues   - stance on airex with lateral L toe taps to 12\" [place]: x10  - Tandem walk 20'x2, 6#   - step up on 4\" block, contralateral heel tap to 10\" block   - 12\" and 6\" hurdles, navigating reciprocally     - 6 inch step taps 2 X 10  - 6 inch step up with repeater, cues for L hip stability                           Yes  Yes        COORDINATION       Target Tapping     Coordination ladder As appropriate for HL balance     Amb with ankle weights     Amb with proprio wrap     Pushing weighted shopping cart     POSTURAL RE-ED     Sit <> Stand  From 22\" mat holding 6# med ball, cues for midline and equal WB      Tall Kneel  Tall kneel <> short sit: x10, x10 with chest press     Seated & standing reaching for trunk strengthening     Abbey-scapular strengthening     PRE-GAIT ACTIVITIES      Tap-ups     Step-ups To 6\" block with contralateral knee drive: x10, repeated with 2 lb DB for UE swing     Standing weight shifting     Step-taps At 6\" main gym steps, step up to 1st step, tap to 3rd with contralateral LE  - w Bioness    BALANCE TRAINING     Standing balance - static/dynamic       HEP Review       Floor       Airex Santiago-tandem: 30 seconds x 2, HT: 30\" x 1, EC: 30 seconds x 1  Tandem stance: 30 seconds x 1     Blue tread rockerboard:  - Semi tandem with chops, 6lb                 Rockerboard AP plane: x10, with light UE support   AP, chest press x 10, OH press x 10, 6lb   Squats 2 x 10      Hurdles 6\" hurdles, focus on dec L hip circumduction through swing and encouraging heel strike. Step forward/back    Repeated with side stepping pattern, requiring BUE support dud to inability for L knee extension through stance     BOSU Ball  Fwd mini lunge onto domed side: x10, CloseS   Modified lunge onto BOSU with palloff press:   - x10, then repeated x10 with perturbations on yellow " sports cord     Split Stance      Biodex Balance Training  Weight shifting and motor control training with increased difficulty noted to L anterolateral planes: x5 mins     Dynamic gait       Side stepping With partial squat with orange theraband around ankles: 10'x4     Retro walking Fwd/retro amb 50'x6 with cues for shorter step length and for heel strike + knee extension through stance, mirror anteriorly, added dual task of beach bal    30ft x 2 ea today with Bioness donned, PT assist to prevent L hip hike and lateral trunk lean with retro amb    2 x 30 ft while holding small physioball, w Bioness  - 2 x 30 ft marching with 6lb medball, w Biones                     Tandem Walking 10'x2, CloseS/CGA     Suitcase carry Using small 3 lb B DB on R: 100'x2, noted delayed L knee extension through IC and mid stance     Amb with hula hoop Using hula hoop to facilitate BUE swing and trunk rot     Walking with ball toss     Proprioceptive wrap Blue TB around L LE  - Amb along purple line x 3 with cues to keep feet within tiles  - x 250ft with same cues as above  - Retro amb 3 x 20 ft  - Mini squats with heels elevated x 10, focus on minimizing hyperextension    Balance Testing     FGA  Assessed    SLS  Assessed see note     10mWT Assessed  - (6mWT on 8/26)    GAIT TRAINING  CPT 98220 TOTAL TIME FOR SESSION 23-37 Minutes        Ambulation without AD  Gait with no AD over level indoor surfaces )with WBOS and decreased L weight shift, VC for anterolateral weight shift, heel strike, BUE swing and trunk rot, 150'x1        Dynamic Gait  20'x6 with focus on shorter step lengths, heel strike and decreasing step width with use of 1' floor tiles, repeated with bimanual ball toss with CloseS and noted decreased gait speed and increase in step lengths     With Bioness donned x 250 ft, carpet and tile   - Good clearance of L LE through swing with improved knee flexion through swing    -  50 ft without for comparison       Treadmill, No  UE support, with Bioness donned 2.1 mph x 12 min, 2.5% incline    - Bioness donned     Following removal of proprioceptive wrap x 250ft, no instances of L knee hyperextension thrust noted.              Yes                        Stair negotiation  Full flight stairs 1 trial with 1 rail support; 1 trial without UE support. Tactile cues for trunk midline, verbal and visual cues for eccentric control    6 inch     Curb negotiation       Ramp negotiation  with bioness X 30 ft Yes    Outdoor ambulation  With Bioness:  - x500 ft approx, inclines/declines  - Retro amb up incline 3 x 15 ft     MANUAL  CPT 55768 TOTAL TIME FOR SESSION Not performed      Mobilization        MODALITIES  CPT 03993 TOTAL TIME FOR SESSION      Ice       Heat       ATTENDED E-STIM  CPT 51578 TOTAL TIME FOR SESSION 8-22 Minutes     Attended E-Stim FES    [id number] (Age 28) , PIN [phone]   Stim to L quad, hamstring, glutes, tib ant, and gastroc. 250-350 pulse width, 40Hz frequency.   Muscle testing testing completed for initial set up (7/8)    Xcite use for LLE:     Stim to L quad, hamstring, glutes, tib ant, and gastroc. 250-350 pulse width, 40Hz frequency.   Muscle testing testing completed for initial set up 7/26)    Bioness L300 go.   L lower leg quick fit pads, L hamstring added 8/2 ( lower leg cuff only today)   30 Hz with 0.2 ramp for loading response   Used tablet with strap   Increased time for set up/ alignment                                   Yes                Unattended E-stim

## 2024-09-27 ENCOUNTER — HOSPITAL ENCOUNTER (OUTPATIENT)
Dept: PHYSICAL THERAPY | Facility: REHABILITATION | Age: 29
Setting detail: THERAPIES SERIES
Discharge: HOME | End: 2024-09-27
Attending: LEGAL MEDICINE
Payer: COMMERCIAL

## 2024-09-27 DIAGNOSIS — Z86.69 HISTORY OF TETHERED SPINAL CORD: Primary | ICD-10-CM

## 2024-09-27 PROCEDURE — 97112 NEUROMUSCULAR REEDUCATION: CPT | Mod: GP

## 2024-09-27 PROCEDURE — 97110 THERAPEUTIC EXERCISES: CPT | Mod: GP

## 2024-09-27 PROCEDURE — 97530 THERAPEUTIC ACTIVITIES: CPT | Mod: GP

## 2024-09-27 NOTE — OP PT TREATMENT LOG
PT Neuro Exercises Current Session   Performed Today? (y/n)   THER ACT   CPT 58359 TOTAL TIME FOR SESSION 8-22 Minutes      Pain, vitals, subjective    Provided rest breaks for energy conservation yes   Skin inspection Base of incision, no swelling noted    FES  cycle [id number] (Age 28) , PIN [id number]   Bike height: 2 holes showing  Pedal height: 3 holes showing  L LE set up only    FES Xcite Set Up              [facility], PT from [facility] present for session.     Discussed options for insurance coverage vs out of pocket costs. Process to proceed. T/o session     Patient Education Patient educated regarding current impairments per testing completed today and PT POC moving forward.     Patient provided with Welcome Letter, which includes attendance policy. Provided education regarding cancellation and no-show policy. Education regarding the importance of participation and regular attendance to maximize goal attainment. Patient verbalized understanding/agreement.     Clearance for FES received, will be completed NV, start with handheld unit to assess tolerance prior to FES bike. Pt verbalized understanding.    Patient educated regarding progress made thus far, current impairments per re-assessments completed today and PT POC moving forward. Patient verbalized understanding/agreement.     Discussion regarding [employer] scheduling and scheduling with [email or web address] rep. PT to find out next time [email or web address] rep is at [facility] to adjust pt schedule. Will provide with forms as well.     Discussion of ordering process for [employer].    Patient educated on performance with objective assessments completed during session. Pt verbalized understanding                                                          Yes    HEP  Verbally reviewed practicing gait over level surfaces with decreased step width, and then incorporating BUE swing and trunk rot. Pt verbalized understanding.     Floor Transfers  CloseS with increased time for  "LLE management, requires single UE support with review of moving from tall kneel to half kneeling position. Pt demos good understanding, will require continued practice     Patient able to complete floor transfer independently, walked hands down to floor into quadruped. Completed opposite to stand back up.            Yes    Subjective Outcomes Measures       ABC Scale Assessed     THER EX  CPT 50909 TOTAL TIME FOR SESSION  23-37 Minutes      STRETCHING      Stretching by patient Incline board stretch: L3 - 1 min x2  L QL stretch with small physioball 10 x 3 sec hold      Stretching by therapist/PROM Trialed modified jose stretch - not effective (pt states modified lunge position she has felt more of a stretch in hip flexors in the past)      CARDIOVASCULAR       Nu Step 6 mins, Level: 1-2, BLE only     Stationary Bike Recumbent bike 5 min, L1  (Unable to complete revolution on [employer] upright bike)    Ambulation with AD      Treadmill       Endurance Testing       6mWT Assessed Yes    STRENGTH TRAINING     HEP Review     Standing Ther-Ex Standing hip abduction 2 x 10 ea  - standing on airex   - Second set, no UE support     Step ups 6\" block x 20   - Added L LE TKE w orange TB    Step down, 2\" block, L LE only w orange band TKE x 10    Hip hikes 2\" block, VC's to discourage R hip hike     Heel raises from L2 on incline board x 15 (on floor today)    Heel raises: 2x15, small weighted ball between heels     Staggered STS w pilates ring 2 x 10, VC's to abd L knee into PT's hand    Squat into BL heel raise x 15 w 6lb                                  Yes        Side-lying there-ex Clamshells: 2x20 on R, L in supine: 5\"x10      Supine Ther-Ex Diaphragmatic breathin mins due to elevated BP  Glut bridge: 3\" x10, orange band  Bridge: x10   Supine hip abduction x 10, LLE, isometrics today  BKFO: x10, orange t-band only for R, active assist on L   Clamshells, x 10 ea LE  Bridge with ball squeeze x 10                " "  Deadbugs with physioball  - isometric holds: 10\" x 10, physioball under B heels   - alt LE heels taps x 10   - alt UE: x10 reps      Yes   Yes    Quadruped  hip extensions with towel under L foot: x10, B/L (modified position with BUE on EOM and LE on floor)     Quadruped <> bear crawl: 10\" x10   Bear crawl: 5' - fwd/back: x3 rounds     Alternating UE flexion x 10  Alternating LE extension x 10; use of slider L LE            Tall kneel Moderate perturbations all directions  Mini squats 2 x 10 blue TB resistance   Trunk rot: 2x10 w 10lb   D2 chops/lifts: x10  Posterior reach back to cones w cross body reach to PT, 2lb ankle weights on wrists             Akil Kneel  Onto airex pad:   - static hold: 20 seconds x 3   - chest press with beach ball: 2x10   -     Prone Ther-Ex     Core exercises PPT x 10   PPT with ball squeeze 5 sec hold, 10 x  PPT with ball squeeze and bridge x 10   PPT with heel taps from tabletop position BLE x 10 holding 6lb medball, CGA L LE  PPT with hip abduction isometric L LE x 3 sec hold x10, orange band     Heel slides with towel: 2x10  Triple flex over physioball: x10  Windshield wipers BLE over physioball 2 x 10     STS's from low EOM squeezing pilates ring  - As above, with squeeze, in staggered stance  Marching, squeezing small ball 30 ft x 2    Modified dead bugs, alt UE 2 x 10     Supine B shoulder ext with GTB, emphasis on core activation; 2 x 15    Quadruped:  - Rainbows x 10 ea LE, min cueing for L LE   - Bear crawls, forward/backward                                          Functional Strength Testing       30 sec STS test (60y +)  Assessed Yes    NEURO RE-ED  CPT 41353 TOTAL TIME FOR SESSION 8-22 Minutes      FES  To promote neurorecovery of gait:  Distance: 3.01 miles  Resistance: 0.77 Nm  Power: 2.9 W  Symmetry: L 2%  Total time: 21:00  Active time: 19:03    Xcite  NMRE of LLE in function:     - Sit <> stand from elevated EOM using 6 lb med ball: x10, then progressing to " "lowest height: x10, then x10 in staggered stance  - Modified reverse lunging with slider under R foot in parallel bars with BUE support > RUE support only: x10 reps each  - Tall kneel <> heel sit: 2x10, 2nd set with chest press   - fwd step up with LLE onto 6\" block: x10, repeated with R knee drive, x10 repeated with 3# on RUE     Bioness L300 Go  - Fwd/rev step over 12\" [place] virgin[place]: x20,   - Fwd step up onto 6\" block with contra LE march: 2x10, B         - RLE step up, LLE march with 8lb db overhead press        - LLE step up, RLE march while holding 8lb db x 10, overhead press with 8lb db x 5  - Fwd step down 4\" block: 2x10, orange theraband around knees for manual cues   - stance on airex with lateral L toe taps to 12\" [place]: x10  - Tandem walk 20'x2, 6#   - step up on 4\" block, contralateral heel tap to 10\" block   - 12\" and 6\" hurdles, navigating reciprocally     - 6 inch step taps 2 X 10  - 6 inch step up with repeater, cues for L hip stability                                  COORDINATION       Target Tapping     Coordination ladder As appropriate for HL balance     Amb with ankle weights     Amb with proprio wrap     Pushing weighted shopping cart     POSTURAL RE-ED     Sit <> Stand  From 22\" mat holding 6# med ball, cues for midline and equal WB      Tall Kneel  Tall kneel <> short sit: x10, x10 with chest press     Seated & standing reaching for trunk strengthening     Abbey-scapular strengthening     PRE-GAIT ACTIVITIES      Tap-ups     Step-ups To 6\" block with contralateral knee drive: x10, repeated with 2 lb DB for UE swing     Standing weight shifting     Step-taps At 6\" main gym steps, step up to 1st step, tap to 3rd with contralateral LE  - w Bioness    BALANCE TRAINING     Standing balance - static/dynamic       HEP Review       Floor       Airex [place]-tandem: 30 seconds x 2, HT: 30\" x 1, EC: 30 seconds x 1  Tandem stance: 30 seconds x 1     Blue tread rockerboard:  - Semi tandem with chops, 6lb    " "             Rockerboard AP plane: x10, with light UE support   AP, chest press x 10, OH press x 10, 6lb   Squats 2 x 10      Hurdles 6\" hurdles, focus on dec L hip circumduction through swing and encouraging heel strike. Step forward/back    Repeated with side stepping pattern, requiring BUE support dud to inability for L knee extension through stance     BOSU Ball  Fwd mini lunge onto domed side: x10, CloseS   Modified lunge onto BOSU with palloff press:   - x10, then repeated x10 with perturbations on yellow sports cord     Split Stance      Bi[employer] Balance Training  Weight shifting and motor control training with increased difficulty noted to L anterolateral planes: x5 mins     Dynamic gait       Side stepping With partial squat with orange theraband around ankles: 10'x4     Retro walking Fwd/retro amb 50'x6 with cues for shorter step length and for heel strike + knee extension through stance, mirror anteriorly, added dual task of beach bal    30ft x 2 ea today with Bioness donned, PT assist to prevent L hip hike and lateral trunk lean with retro amb    2 x 30 ft while holding small physioball, w Bioness  - 2 x 30 ft marching with 6lb medball, w Biones                     Tandem Walking 10'x2, CloseS/CGA     Suitcase carry Using small 3 lb B DB on R: 100'x2, noted delayed L knee extension through IC and mid stance     Marching With pilates ring 3 x 30 ft  Yes    Amb with hula hoop Using hula hoop to facilitate BUE swing and trunk rot     Walking with ball toss     Proprioceptive wrap Blue TB around L LE  - Amb along purple line x 3 with cues to keep feet within tiles  - x 250ft with same cues as above  - Retro amb 3 x 20 ft  - Mini squats with heels elevated x 10, focus on minimizing hyperextension    Balance Testing     FGA  Assessed Yes    SLS  Assessed see note     10mWT Assessed  - (6mWT on 8/26) Yes    GAIT TRAINING  CPT 28692 TOTAL TIME FOR SESSION 0-7 Minutes        Ambulation without AD  Gait with no AD " over level indoor surfaces )with WBOS and decreased L weight shift, VC for anterolateral weight shift, heel strike, BUE swing and trunk rot, 150'x1        Dynamic Gait  20'x6 with focus on shorter step lengths, heel strike and decreasing step width with use of 1' floor tiles, repeated with bimanual ball toss with CloseS and noted decreased gait speed and increase in step lengths     With Bioness donned x 250 ft, carpet and tile   - Good clearance of L LE through swing with improved knee flexion through swing    -  50 ft without for comparison     Treadmill, No UE support, with Bioness donned 2.1 mph x 12 min, 2.5% incline    - Bioness donned     Following removal of proprioceptive wrap x 250ft, no instances of L knee hyperextension thrust noted.                                     Stair negotiation  Full flight stairs 1 trial with 1 rail support; 1 trial without UE support. Tactile cues for trunk midline, verbal and visual cues for eccentric control    6 inch     Curb negotiation       Ramp negotiation  with bioness X 30 ft    Outdoor ambulation  With Bioness:  - x500 ft approx, inclines/declines  - Retro amb up incline 3 x 15 ft     MANUAL  CPT 21512 TOTAL TIME FOR SESSION Not performed      Mobilization        MODALITIES  CPT 54313 TOTAL TIME FOR SESSION      Ice       Heat       ATTENDED E-STIM  CPT 70423 TOTAL TIME FOR SESSION 0-7 Minutes     Attended E-Stim FES    [id number] (Age 28) , PIN [phone]   Stim to L quad, hamstring, glutes, tib ant, and gastroc. 250-350 pulse width, 40Hz frequency.   Muscle testing testing completed for initial set up (7/8)    Xcite use for LLE:     Stim to L quad, hamstring, glutes, tib ant, and gastroc. 250-350 pulse width, 40Hz frequency.   Muscle testing testing completed for initial set up 7/26)    Bioness L300 go.   L lower leg quick fit pads, L hamstring added 8/2 ( lower leg cuff only today)   30 Hz with 0.2 ramp for loading response   Used tablet with strap   Increased time  for set up/ alignment                                                  Unattended E-stim

## 2024-09-27 NOTE — PROGRESS NOTES
Physical Therapy Progress Note    PT PROGRESS NOTE FOR OUTPATIENT THERAPY    Patient: Melanie Litten   MRN: [id number]  : 1995 28 y.o.    Referring Physician: Remedios Whitfield MD  Date of Visit: 2024    Certification Dates: 24 through 24    Recommended Frequency & Duration:  2 times/week for up to 3 months        Diagnosis:   1. History of tethered spinal cord        Chief Complaints:  Chief Complaint   Patient presents with    Difficulty Walking    Balance Deficits    Dec Strength    Dec Coordination    Abnormality Of Gait    Decreased Trunk Control       Precautions:   Precautions comments: hypermobile - EDS, postural hypotension    TODAY'S VISIT:    Time In Session:  Start Time: 0800  Stop Time: 0855  Time Calculation (min): 55 min   General Information - 24 0804          Session Details    Document Type progress note     Mode of Treatment individual therapy        General Information    Referring Physician Remedios Whitfield MD     History of present illness/functional impairment Saranya is a 28 year old female who presents to OPPT with history of tethered cord syndrome. Pt with PMH significant for chiari malformation, hypermobile EDS, left plantar fasciitis, anxiety and postural hypotension. Pt reports her symptoms started slowly in 2019 with L hip pain, followed up with neurosurgeon in [place] with unremarkable results for all testing. Her symptoms then progressed with L leg motor weakness and shakiness (similar to clonus) with movement, chronic constipation, L drop foot, lower back pain, and neck pain.  Pt was finally diagnosed with tethered cord syndrome and underwent a clinical trial surgery at [facility], [facility], and [facility]. Due to her symptoms, she meets criteria for exploration and sectioning of her filum for the functional tethered cord clinical trial. Now s/p laminectomy for occult tethered cord release on 23 by  Dr Duckworth. Patient tolerated procedure well. No post-op complications. Transferred stable once PACU criteria met. Patient kept FLAT for 36hr. Placed on an aseptic dex taper and post-op IV abx x24hrs.  She was mobilized on 9/27 tolerated well without any symptoms. Additionally, was instructed not to exercise for 7 weeks post-surgery. Pt reports she recovered well overall with decreased drop foot, clonus and swelling, continues to have increased coordination deficits and foot drag with fatigue. Pt has completed OPPT at [facility] in [place] and intermittent massage therapy. Pt’s functional goals are to work on higher level balance, picking up things from floor, coordination and “Hippo therapy”.     Patient/Family/Caregiver Comments/Observations No new changes since last visit     Precautions comments hypermobile - EDS, postural hypotension                    Daily Falls Screen - 09/27/24 0804          Daily Falls Assessment    Patient reported fall since last visit No                    Pain/Vitals - 09/27/24 0804          Pain Assessment    Currently in pain No/Denies        Pre Activity Vital Signs    /92     BP Location Left upper arm     BP Method Manual     Patient Position Sitting                    PT - 09/27/24 0804          Physical Therapy    Physical Therapy Specialty Spinal Cord PT        PT Plan    Frequency of treatment 2 times/week     PT Duration 3 months     PT Cert From 08/26/24     PT Cert To 11/22/24     Date PT POC was sent to provider 08/26/24     Signed PT Plan of Care received?  Yes                    Assessment and Plan - 09/27/24 1221          Assessment    Plan of Care reviewed and patient/family in agreement Yes     System Pathology/Pathophysiology Noted musculoskeletal;neuromuscular;cardiovascular     Functional Limitations in Following Categories (PT Eval) self-care;work;community/leisure     Rehab Potential/Prognosis good, to achieve stated therapy goals     Problem List  abnormal muscle tone;decreased strength;impaired balance;impaired sensation;decreased endurance;edema;hemiparesis/hemiplegia;impaired motor control;impaired coordination     Clinical Assessment Patient arrives to PT for progress note where she was objectively assessed on 6MWT, gait speed, FGA, and 30s STS. Her 6MWT improved from 1551 ft to 1665 ft and her gait speed improved from 1.39 m/sec to 1.47 m/sec. FGA remained the same at a 27/30 which cont to meet the score of community dwelling adults, placing pt at a dec risk of falls. Her 30s STS also remained the same at 10 reps. Pt was also assessed on her floor transfer, which she was able to complete independently without LOB. Patient was educated on performance with assessments. She has been utilizing [email or web address] L300 upper and lower cuffs to improve gait mechanics on L LE, which has improved L swing, heel strike, and eccentric control in loading response. She met with [email or web address] rep last visit, expressing interest in pursuing personal lower cuff unit. Pt continues to respond well to verbal and external cues to decrease GABRIELLE and has improved L glute strength demo'ing decreased hip hike/lateral trunk flexion with functional activities, but does cont to compensate with more challenging tasks. Patient will cont to benefit from skilled OP PT to address remaining impairments, maximize her functional mobility, and return to [facility].     Plan and Recommendations Biojames as able, L LE strenghtening (proximal hip), dual tasking, core stabilization                     OBJECTIVE MEASUREMENTS/DATA:    Outcome Measures    PT Outcome Measures - 09/27/24 0804          Objective Outcome Measures    6 Minute Walk Test 1665ft     Gait Speed (m/sec) 1.47 m/sec     FGA Score (out of 30 total) 27                     ROM and MMT          6/18/2024   PT Cervical/Lumbar/Other ROM Measurements   Other: Girth Measurement/Comments Mild dependent edema of LLE at end of day per pt reports; surgical scar  approx 4 inches in length down mid lumbar spine from previous laminectomy, mild hypomobility down lower 50% of scar   Additional ROM  Hypermobile in all jointsm mild L hamstring length restrictions to approx 70 deg due to tone.   PT LE MMT   Right Hip Flexion (5/5) normal   Left Hip Flexion (3-/5) fair minus   Right Hip Extension (4+/5) good plus   Left Hip Extension (3-/5) fair minus   Right Hip ABD (4+/5) good plus   Left Hip ABD (3-/5) fair minus   Left Hip ADD (4+/5) good plus   Left Hip IR (3+/5) fair plus   Left Hip ER (3+/5) fair plus   Right Knee Flexion (5/5) normal   Left Knee Flexion (4/5) good   Right Knee Extension (5/5) normal   Left Knee Extension (4-/5) good minus   Right Ankle DF (5/5) normal   Left Ankle DF (3+/5) fair plus   Right Ankle PF (5/5) normal   Left Ankle PF (4+/5) good plus   Right Ankle Inversion (5/5) normal   Left Ankle Inversion (3-/5) fair minus   Right Ankle Eversion (5/5) normal   Left Ankle Eversion (4-/5) good minus     Outcome Measures          6/18/2024    10:09 6/24/2024    17:03 7/26/2024    08:58 8/26/2024    08:05 9/27/2024    08:04   PT OBJECTIVE Outcome Measures   6 Minute Walk Test 1555 ft  1612 ft 1551ft 1665ft   Gait Speed (m/sec) 1 m/sec  1.22 m/sec       SSV 1.39 m/sec 1.47 m/sec   30 Second Sit to Stand --        TBA NV  10 repetitions 10 repetitions    FGA 24  25 27 27   PT SUBJECTIVE Outcome Measures   ABC  1360/1600 = 85% confidence  89.5%    Other  SLS: R: 30 seconds, L <5 seconds without pelvic drop SLS: R: 30 seconds, L: 23 seconds         Today's Treatment::    Education provided:  Yes: See treatment log for details of education provided       PT Neuro Exercises Current Session   Performed Today? (y/n)   THER ACT   CPT 00098 TOTAL TIME FOR SESSION 8-22 Minutes      Pain, vitals, subjective    Provided rest breaks for energy conservation yes   Skin inspection Base of incision, no swelling noted    FES  cycle [id number] (Age 28) , PIN [id number]   Bike height: 2  holes showing  Pedal height: 3 holes showing  L LE set up only    FES Xcite Set Up              [facility] Gab[facility] Patrick, PT from [facility] present for session.     Discussed options for insurance coverage vs out of pocket costs. Process to proceed. T/o session     Patient Education Patient educated regarding current impairments per testing completed today and PT POC moving forward.     Patient provided with Welcome Letter, which includes attendance policy. Provided education regarding cancellation and no-show policy. Education regarding the importance of participation and regular attendance to maximize goal attainment. Patient verbalized understanding/agreement.     Clearance for FES received, will be completed NV, start with handheld unit to assess tolerance prior to FES bike. Pt verbalized understanding.    Patient educated regarding progress made thus far, current impairments per re-assessments completed today and PT POC moving forward. Patient verbalized understanding/agreement.     Discussion regarding [employer] scheduling and scheduling with [facility] rep. PT to find out next time [facility] rep is at [facility] to adjust pt schedule. Will provide with forms as well.     Discussion of ordering process for [employer].    Patient educated on performance with objective assessments completed during session. Pt verbalized understanding                                                          Yes    HEP  Verbally reviewed practicing gait over level surfaces with decreased step width, and then incorporating BUE swing and trunk rot. Pt verbalized understanding.     Floor Transfers  CloseS with increased time for LLE management, requires single UE support with review of moving from tall kneel to half kneeling position. Pt demos good understanding, will require continued practice     Patient able to complete floor transfer independently, walked hands down to floor into quadruped. Completed opposite to stand back up.            Yes   "  Subjective Outcomes Measures       ABC Scale Assessed     THER EX  CPT 64996 TOTAL TIME FOR SESSION  23-37 Minutes      STRETCHING      Stretching by patient Incline board stretch: L3 - 1 min x2  L QL stretch with small physioball 10 x 3 sec hold      Stretching by therapist/PROM Trialed modified jose stretch - not effective (pt states modified lunge position she has felt more of a stretch in hip flexors in the past)      CARDIOVASCULAR       Nu Step 6 mins, Level: 1-2, BLE only     Stationary Bike Recumbent bike 5 min, L1  (Unable to complete revolution on [employer] upright bike)    Ambulation with AD      Treadmill       Endurance Testing       6mWT Assessed Yes    STRENGTH TRAINING     HEP Review     Standing Ther-Ex Standing hip abduction 2 x 10 ea  - standing on airex   - Second set, no UE support     Step ups 6\" block x 20   - Added L LE TKE w orange TB    Step down, 2\" block, L LE only w orange band TKE x 10    Hip hikes 2\" block, VC's to discourage R hip hike     Heel raises from L2 on incline board x 15 (on floor today)    Heel raises: 2x15, small weighted ball between heels     Staggered STS w pilates ring 2 x 10, VC's to abd L knee into PT's hand    Squat into BL heel raise x 15 w 6lb                                  Yes        Side-lying there-ex Clamshells: 2x20 on R, L in supine: 5\"x10      Supine Ther-Ex Diaphragmatic breathin mins due to elevated BP  Glut bridge: 3\" x10, orange band  Bridge: x10   Supine hip abduction x 10, LLE, isometrics today  BKFO: x10, orange t-band only for R, active assist on L   Clamshells, x 10 ea LE  Bridge with ball squeeze x 10                  Deadbugs with physioball  - isometric holds: 10\" x 10, physioball under B heels   - alt LE heels taps x 10   - alt UE: x10 reps      Yes   Yes    Quadruped  hip extensions with towel under L foot: x10, B/L (modified position with BUE on EOM and LE on floor)     Quadruped <> bear crawl: 10\" x10   Bear crawl: 5' - fwd/back: x3 " "rounds     Alternating UE flexion x 10  Alternating LE extension x 10; use of slider L LE            Tall kneel Moderate perturbations all directions  Mini squats 2 x 10 blue TB resistance   Trunk rot: 2x10 w 10lb   D2 chops/lifts: x10  Posterior reach back to cones w cross body reach to PT, 2lb ankle weights on wrists             Akil Kneel  Onto airex pad:   - static hold: 20 seconds x 3   - chest press with beach ball: 2x10   -     Prone Ther-Ex     Core exercises PPT x 10   PPT with ball squeeze 5 sec hold, 10 x  PPT with ball squeeze and bridge x 10   PPT with heel taps from tabletop position BLE x 10 holding 6lb medball, CGA L LE  PPT with hip abduction isometric L LE x 3 sec hold x10, orange band     Heel slides with towel: 2x10  Triple flex over physioball: x10  Windshield wipers BLE over physioball 2 x 10     STS's from low EOM squeezing pilates ring  - As above, with squeeze, in staggered stance  Marching, squeezing small ball 30 ft x 2    Modified dead bugs, alt UE 2 x 10     Supine B shoulder ext with GTB, emphasis on core activation; 2 x 15    Quadruped:  - Rainbows x 10 ea LE, min cueing for L LE   - Bear crawls, forward/backward                                          Functional Strength Testing       30 sec STS test (60y +)  Assessed Yes    NEURO RE-ED  CPT 71502 TOTAL TIME FOR SESSION 8-22 Minutes      FES  To promote neurorecovery of gait:  Distance: 3.01 miles  Resistance: 0.77 Nm  Power: 2.9 W  Symmetry: L 2%  Total time: 21:00  Active time: 19:03    Xcite  NMRE of LLE in function:     - Sit <> stand from elevated EOM using 6 lb med ball: x10, then progressing to lowest height: x10, then x10 in staggered stance  - Modified reverse lunging with slider under R foot in parallel bars with BUE support > RUE support only: x10 reps each  - Tall kneel <> heel sit: 2x10, 2nd set with chest press   - fwd step up with LLE onto 6\" block: x10, repeated with R knee drive, x10 repeated with 3# on RUE   " "  Bioness L300 Go  - Fwd/rev step over 12\" virginia virginia: x20,   - Fwd step up onto 6\" block with contra LE march: 2x10, B         - RLE step up, LLE march with 8lb db overhead press        - LLE step up, RLE march while holding 8lb db x 10, overhead press with 8lb db x 5  - Fwd step down 4\" block: 2x10, orange theraband around knees for manual cues   - stance on airex with lateral L toe taps to 12\" virginia: x10  - Tandem walk 20'x2, 6#   - step up on 4\" block, contralateral heel tap to 10\" block   - 12\" and 6\" hurdles, navigating reciprocally     - 6 inch step taps 2 X 10  - 6 inch step up with repeater, cues for L hip stability                                  COORDINATION       Target Tapping     Coordination ladder As appropriate for HL balance     Amb with ankle weights     Amb with proprio wrap     Pushing weighted shopping cart     POSTURAL RE-ED     Sit <> Stand  From 22\" mat holding 6# med ball, cues for midline and equal WB      Tall Kneel  Tall kneel <> short sit: x10, x10 with chest press     Seated & standing reaching for trunk strengthening     Abbey-scapular strengthening     PRE-GAIT ACTIVITIES      Tap-ups     Step-ups To 6\" block with contralateral knee drive: x10, repeated with 2 lb DB for UE swing     Standing weight shifting     Step-taps At 6\" main gym steps, step up to 1st step, tap to 3rd with contralateral LE  - w [facility]    BALANCE TRAINING     Standing balance - static/dynamic       HEP Review       Floor       Airex Santiago-tandem: 30 seconds x 2, HT: 30\" x 1, EC: 30 seconds x 1  Tandem stance: 30 seconds x 1     Blue tread rockerboard:  - Semi tandem with chops, 6lb                 Rockerboard AP plane: x10, with light UE support   AP, chest press x 10, OH press x 10, 6lb   Squats 2 x 10      Hurdles 6\" hurdles, focus on dec L hip circumduction through swing and encouraging heel strike. Step forward/back    Repeated with side stepping pattern, requiring BUE support dud to inability for L knee " extension through stance     BOSU Ball  Fwd mini lunge onto domed side: x10, CloseS   Modified lunge onto BOSU with palloff press:   - x10, then repeated x10 with perturbations on yellow sports cord     Split Stance      [employer] Balance Training  Weight shifting and motor control training with increased difficulty noted to L anterolateral planes: x5 mins     Dynamic gait       Side stepping With partial squat with orange theraband around ankles: 10'x4     Retro walking Fwd/retro amb 50'x6 with cues for shorter step length and for heel strike + knee extension through stance, mirror anteriorly, added dual task of beach bal    30ft x 2 ea today with Bioness donned, PT assist to prevent L hip hike and lateral trunk lean with retro amb    2 x 30 ft while holding small physioball, w Bioness  - 2 x 30 ft marching with 6lb medball, w Biones                     Tandem Walking 10'x2, CloseS/CGA     Suitcase carry Using small 3 lb B DB on R: 100'x2, noted delayed L knee extension through IC and mid stance     Marching With pilates ring 3 x 30 ft  Yes    Amb with hula hoop Using hula hoop to facilitate BUE swing and trunk rot     Walking with ball toss     Proprioceptive wrap Blue TB around L LE  - Amb along purple line x 3 with cues to keep feet within tiles  - x 250ft with same cues as above  - Retro amb 3 x 20 ft  - Mini squats with heels elevated x 10, focus on minimizing hyperextension    Balance Testing     FGA  Assessed Yes    SLS  Assessed see note     10mWT Assessed  - (6mWT on 8/26) Yes    GAIT TRAINING  CPT 70064 TOTAL TIME FOR SESSION 0-7 Minutes        Ambulation without AD  Gait with no AD over level indoor surfaces )with WBOS and decreased L weight shift, VC for anterolateral weight shift, heel strike, BUE swing and trunk rot, 150'x1        Dynamic Gait  20'x6 with focus on shorter step lengths, heel strike and decreasing step width with use of 1' floor tiles, repeated with bimanual ball toss with CloseS and noted  decreased gait speed and increase in step lengths     With Bioness donned x 250 ft, carpet and tile   - Good clearance of L LE through swing with improved knee flexion through swing    -  50 ft without for comparison     Treadmill, No UE support, with Bioness donned 2.1 mph x 12 min, 2.5% incline    - Bioness donned     Following removal of proprioceptive wrap x 250ft, no instances of L knee hyperextension thrust noted.                                     Stair negotiation  Full flight stairs 1 trial with 1 rail support; 1 trial without UE support. Tactile cues for trunk midline, verbal and visual cues for eccentric control    6 inch     Curb negotiation       Ramp negotiation  with bioness X 30 ft    Outdoor ambulation  With Bioness:  - x500 ft approx, inclines/declines  - Retro amb up incline 3 x 15 ft     MANUAL  CPT 92520 TOTAL TIME FOR SESSION Not performed      Mobilization        MODALITIES  CPT 84327 TOTAL TIME FOR SESSION      Ice       Heat       ATTENDED E-STIM  CPT 50046 TOTAL TIME FOR SESSION 0-7 Minutes     Attended E-Stim FES    [id number] (Age 28) , PIN [id number]   Stim to L quad, hamstring, glutes, tib ant, and gastroc. 250-350 pulse width, 40Hz frequency.   Muscle testing testing completed for initial set up (7/8)    Xcite use for LLE:     Stim to L quad, hamstring, glutes, tib ant, and gastroc. 250-350 pulse width, 40Hz frequency.   Muscle testing testing completed for initial set up 7/26)    Bioness L300 go.   L lower leg quick fit pads, L hamstring added 8/2 ( lower leg cuff only today)   30 Hz with 0.2 ramp for loading response   Used tablet with strap   Increased time for set up/ alignment                                                  Unattended E-stim             Goals Addressed                   This Visit's Progress     PT Neuro Goals        Short term goals   Short Term Goals Time Frame Result Comment/Progress   Assess ABC, floor transfers, SLS assessment, 30sSTS  2 weeks Goal met      Improve 6mWT by 191 feet or more to meet the MCID indicating significant improvement in endurance and activity tolerance. 4-6 weeks Ongoing goal     Improve ABC Scale score to 60% or better suggesting improved balance confidence during functional tasks 4-6 weeks Goal met  85%    Improve FGA score by 5 points or more to meet the MCID indicating improving dynamic balance and decreasing falls risk. 4-6 weeks Ongoing goal  Improved by 1 point   Tolerate 10 min of CV activity with VSS 4-6 weeks Goal MET     Progress gait speed by 0.13 m/sec or more to meet the MCID without decline in safety or quality indicating significant improvement in gait speed and increased safety during ambulation in the home and community. 4-6 weeks Goal MET     Pt and caregiver will be mod I with HEP 4 weeks Goal MET       Long term goals   Long Term Goals Time Frame Result Comment/Progress   Pt will complete floor transfer without any external support Independently  8-12 weeks  ongoing    Pt will navigate FF 7 inch steps unsupported with reciprocal pattern  8-12 weeks Met     Improve 6mWT by an additional 191 feet or more to meet the MCID indicating significant improvement in endurance and activity tolerance. 8-12 weeks ongoing 8/26 - 1551ft   Improve 30 sec STS test reps to 15 reps or better to meet the age/gender matched norm and cut-off score indicating adequate LE muscle performance for functional activities. 8-12 weeks ongoing    Improve ABC Scale score to 81% or better suggesting improved balance confidence during functional tasks and decreased risk for falls. 8-12 weeks Met    Improve FGA score to > 28/30 indicating improved dynamic balance and decreased falls risk. 8-12 weeks ongoing 8/26 - 27/30   Tolerate 12-15 min of CV activity with VSS 8-12 weeks Met    Progress gait speed by an additional 0.13 m/sec or more to meet the MCID without decline in safety or quality indicating significant improvement in gait speed and increased safety  during ambulation in the home and community. 8-12 weeks Met 8/26 - 1.39m/s   Pt and caregiver will be mod I with updated HEP 8-12 weeks ongoing        LTG's following re-eval:    Long Term Goals Time Frame Result Comment/Progress   Patient will complete assessment with Bioness rep to determine fit/next steps if appropriate    8-12 weeks  Met      Pt will complete floor transfer without any external support independently    8-12 weeks  ongoing    Improve 6mWT by an additional 191 feet or more to meet the MCID indicating significant improvement in endurance and activity tolerance.   8-12 weeks ongoing 8/26 - 1551ft  9.27: 1665 ft    Improve 30 sec STS test reps to 15 reps or better to meet the age/gender matched norm and cut-off score indicating adequate LE muscle performance for functional activities.   8-12 weeks ongoing    Improve FGA score to >/= 28/30 indicating improved dynamic balance and decreased falls risk.   8-12 weeks ongoing 8/26 - 27/30 9/27: 27    Progress gait speed by an additional 0.13 m/sec or more to meet the MCID without decline in safety or quality indicating significant improvement in gait speed and increased safety during ambulation in the home and community.   8-12 weeks Ongoing 8/26 - 1.39m/s  9/27: 1.47 m/sec                     Ann Marie Manzano, PT

## 2024-09-30 ENCOUNTER — HOSPITAL ENCOUNTER (OUTPATIENT)
Dept: PHYSICAL THERAPY | Facility: REHABILITATION | Age: 29
Setting detail: THERAPIES SERIES
Discharge: HOME | End: 2024-09-30
Attending: LEGAL MEDICINE
Payer: COMMERCIAL

## 2024-09-30 DIAGNOSIS — Z86.69 HISTORY OF TETHERED SPINAL CORD: Primary | ICD-10-CM

## 2024-09-30 PROCEDURE — 97116 GAIT TRAINING THERAPY: CPT | Mod: GP

## 2024-09-30 PROCEDURE — 97032 APPL MODALITY 1+ESTIM EA 15: CPT | Mod: GP

## 2024-09-30 PROCEDURE — 97112 NEUROMUSCULAR REEDUCATION: CPT | Mod: GP

## 2024-09-30 PROCEDURE — 97110 THERAPEUTIC EXERCISES: CPT | Mod: GP

## 2024-09-30 NOTE — OP PT TREATMENT LOG
PT Neuro Exercises Current Session   Performed Today? (y/n)   THER ACT   CPT 07432 TOTAL TIME FOR SESSION 0-7 Minutes      Pain, vitals, subjective    Provided rest breaks for energy conservation yes   Skin inspection Base of incision, no swelling noted    FES  cycle [id number] (Age 28) , PIN [id number]   Bike height: 2 holes showing  Pedal height: 3 holes showing  L LE set up only    FES Xcite Set Up              [facility] present for session.     Discussed options for insurance coverage vs out of pocket costs. Process to proceed. T/o session     Bioness L300 Go set up   - education on LMN signature with therapist and physician           Yes  Yes   Patient Education Patient educated regarding current impairments per testing completed today and PT POC moving forward.     Patient provided with Welcome Letter, which includes attendance policy. Provided education regarding cancellation and no-show policy. Education regarding the importance of participation and regular attendance to maximize goal attainment. Patient verbalized understanding/agreement.     Clearance for FES received, will be completed NV, start with handheld unit to assess tolerance prior to FES bike. Pt verbalized understanding.    Patient educated regarding progress made thus far, current impairments per re-assessments completed today and PT POC moving forward. Patient verbalized understanding/agreement.     Discussion regarding E[employer] scheduling and scheduling with [employer] rep. PT to find out next time [employer] rep is at [facility] to adjust pt schedule. Will provide with forms as well.     Discussion of ordering process for [employer].    Patient educated on performance with objective assessments completed during session. Pt verbalized understanding                                                 HEP  Verbally reviewed practicing gait over level surfaces with decreased step width, and then incorporating BUE swing and trunk  "rot. Pt verbalized understanding.     Floor Transfers  CloseS with increased time for LLE management, requires single UE support with review of moving from tall kneel to half kneeling position. Pt demos good understanding, will require continued practice     Patient able to complete floor transfer independently, walked hands down to floor into quadruped. Completed opposite to stand back up.     Subjective Outcomes Measures       ABC Scale Assessed     THER EX  CPT 84398 TOTAL TIME FOR SESSION  8-22 Minutes      STRETCHING      Stretching by patient Incline board stretch: L3 - 1 min x2  L QL stretch with small physioball 10 x 3 sec hold  L Q/L stretch seated in with passive overpressure: 30 seconds x 3      Yes   Stretching by therapist/PROM Trialed modified jose stretch - not effective (pt states modified lunge position she has felt more of a stretch in hip flexors in the past)      CARDIOVASCULAR       Nu Step 6 mins, Level: 1-2, BLE only     Stationary Bike Recumbent bike 5 min, L1  (Unable to complete revolution on [employer]o upright bike)    Ambulation with AD      Treadmill VR: 10 minutes, speed: 1.5-2.0, incline: 2-4%   - VC to increase nancy   - progressing from light RUE > unsupported at 5 min slime  Yes   Endurance Testing       6mWT Assessed    STRENGTH TRAINING     HEP Review     Standing Ther-Ex Standing hip abduction 2 x 10 ea  - standing on airex   - Second set, no UE support     Step ups 6\" block x 20   - Added L LE TKE w orange TB    Step down, 2\" block, L LE only w orange band TKE x 10    Hip hikes 2\" block, VC's to discourage R hip hike     Heel raises from L2 on incline board x 15 (on floor today)    Heel raises: 2x15, small weighted ball between heels     Staggered STS w pilates ring 2 x 10, VC's to abd L knee into PT's hand    Squat into BL heel raise x 15 w 6lb                                         Side-lying there-ex Clamshells: 2x20 on R, L in supine: 5\"x10      Supine Ther-Ex " "Diaphragmatic breathin mins due to elevated BP  Glut bridge: 3\" x10, orange band  Bridge: x10   Supine hip abduction x 10, LLE, isometrics today  BKFO: x10, orange t-band only for R, active assist on L   Clamshells, x 10 ea LE  Bridge with ball squeeze x 10                  Deadbugs with physioball  - isometric holds: 10\" x 10, physioball under B heels   - alt LE heels taps x 10   - alt UE: x10 reps       Planks  Full plank on hands: 30 seconds x 2   Side plank:  Yes   Quadruped  hip extensions with towel under L foot: x10, B/L (modified position with BUE on EOM and LE on floor)     Quadruped <> bear crawl: 10\" x10   Bear crawl: 5' - fwd/back: x3 rounds     Alternating UE flexion x 10  Alternating LE extension x 10; use of slider L LE            Tall kneel Moderate perturbations all directions  Mini squats 2 x 10 blue TB resistance   Trunk rot: 2x10 w 10lb   D2 chops/lifts: x10  Posterior reach back to cones w cross body reach to PT, 2lb ankle weights on wrists             Akil Kneel  Onto airex pad:   - static hold: 20 seconds x 3   - chest press with beach ball: 2x10   -     Prone Ther-Ex     Core exercises PPT x 10   PPT with ball squeeze 5 sec hold, 10 x  PPT with ball squeeze and bridge x 10   PPT with heel taps from tabletop position BLE x 10 holding 6lb medball, CGA L LE  PPT with hip abduction isometric L LE x 3 sec hold x10, orange band     Heel slides with towel: 2x10  Triple flex over physioball: x10  [facility] wipers BLE over physioball 2 x 10     STS's from low EOM squeezing pilates ring  - As above, with squeeze, in staggered stance  Marching, squeezing small ball 30 ft x 2    Modified dead bugs, alt UE 2 x 10     Supine B shoulder ext with GTB, emphasis on core activation; 2 x 15    Quadruped:  - Rainbows x 10 ea LE, min cueing for L LE   - Bear crawls, forward/backward                                          Functional Strength Testing       30 sec STS test (60y +)  Assessed    NEURO RE-ED  CPT " "30898 TOTAL TIME FOR SESSION 8-22 Minutes      FES  To promote neurorecovery of gait:  Distance: 3.01 miles  Resistance: 0.77 Nm  Power: 2.9 W  Symmetry: L 2%  Total time: 21:00  Active time: 19:03    Xcite  NMRE of LLE in function:     - Sit <> stand from elevated EOM using 6 lb med ball: x10, then progressing to lowest height: x10, then x10 in staggered stance  - Modified reverse lunging with slider under R foot in parallel bars with BUE support > RUE support only: x10 reps each  - Tall kneel <> heel sit: 2x10, 2nd set with chest press   - fwd step up with LLE onto 6\" block: x10, repeated with R knee drive, x10 repeated with 3# on RUE     Bioness L300 Go  - Fwd/rev step over 12\" [place] virgin[place]: x20,   - Fwd step up onto 6\" block with contra LE march: 2x10, B         - RLE step up, LLE march with 8lb db overhead press        - LLE step up, RLE march while holding 8lb db x 10, overhead press with 8lb db x 5  - Fwd step down 4\" block: 2x10, orange theraband around knees for manual cues   - stance on airex with lateral L toe taps to 12\" [place]: x10  - Tandem walk 20'x2, 8# on R   - tandem walk on balance beam  - step up on 4\" block, contralateral heel tap to 10\" block   - 12\" and 6\" hurdles, navigating reciprocally     - 6 inch step taps 2 X 10  - 6 inch step up with repeater, cues for L hip stability     Yes        Yes      Yes  Yes    Yes               COORDINATION       Target Tapping     Coordination ladder As appropriate for HL balance     Amb with ankle weights     Amb with proprio wrap     Pushing weighted shopping cart     POSTURAL RE-ED     Sit <> Stand  From 22\" mat holding 6# med ball, cues for midline and equal WB      Tall Kneel  Tall kneel <> short sit: x10, x10 with chest press     Seated & standing reaching for trunk strengthening     Abbey-scapular strengthening     PRE-GAIT ACTIVITIES      Tap-ups     Step-ups To 6\" block with contralateral knee drive: x10, repeated with 2 lb DB for UE swing   " "  Standing weight shifting     Step-taps At 6\" main gym steps, step up to 1st step, tap to 3rd with contralateral LE  - w Bioness    BALANCE TRAINING     Standing balance - static/dynamic       HEP Review       Floor       Airex [place]-tandem: 30 seconds x 2, HT: 30\" x 1, EC: 30 seconds x 1  Tandem stance: 30 seconds x 1     Blue tread rockerboard:  - Semi tandem with chops, 6lb                 Rockerboard AP plane: x10, with light UE support   AP, chest press x 10, OH press x 10, 6lb   Squats 2 x 10      Hurdles 6\" hurdles, focus on dec L hip circumduction through swing and encouraging heel strike. Step forward/back    Repeated with side stepping pattern, requiring BUE support dud to inability for L knee extension through stance     BOSU Ball  Fwd mini lunge onto domed side: x10, CloseS   Modified lunge onto BOSU with palloff press:   - x10, then repeated x10 with perturbations on yellow sports cord     Split Stance      [employer] Balance Training  Weight shifting and motor control training with increased difficulty noted to L anterolateral planes: x5 mins     Dynamic gait       Side stepping With partial squat with orange theraband around ankles: 10'x4     Retro walking Fwd/retro amb 50'x6 with cues for shorter step length and for heel strike + knee extension through stance, mirror anteriorly, added dual task of beach bal    30ft x 2 ea today with Bioness donned, PT assist to prevent L hip hike and lateral trunk lean with retro amb    2 x 30 ft while holding small physioball, w Bioness  - 2 x 30 ft marching with 6lb medball, w Biones                     Tandem Walking 10'x2, CloseS/CGA     Suitcase carry Using small 3 lb B DB on R: 100'x2, noted delayed L knee extension through IC and mid stance     Marching With pilates ring 3 x 30 ft     Amb with hula hoop Using hula hoop to facilitate BUE swing and trunk rot     Walking with ball toss     Proprioceptive wrap Blue TB around L LE  - Amb along purple line x 3 with cues " to keep feet within tiles  - x 250ft with same cues as above  - Retro amb 3 x 20 ft  - Mini squats with heels elevated x 10, focus on minimizing hyperextension    Balance Testing     FGA  Assessed    SLS  Assessed see note     10mWT Assessed  - (6mWT on 8/26)    GAIT TRAINING  CPT 17905 TOTAL TIME FOR SESSION 8-22 Minutes        Ambulation without AD  Gait with no AD over level indoor surfaces )with WBOS and decreased L weight shift, VC for anterolateral weight shift, heel strike, BUE swing and trunk rot, 150'x1        Dynamic Gait  20'x6 with focus on shorter step lengths, heel strike and decreasing step width with use of 1' floor tiles, repeated with bimanual ball toss with CloseS and noted decreased gait speed and increase in step lengths     With Bioness donned x 250 ft, carpet and tile   - Good clearance of L LE through swing with improved knee flexion through swing    -  50 ft without for comparison     Treadmill, No UE support, with Bioness donned 2.1 mph x 12 min, 2.5% incline    - Bioness donned     Following removal of proprioceptive wrap x 250ft, no instances of L knee hyperextension thrust noted.              Yes                       Stair negotiation  Full flight stairs 1 trial with 1 rail support; 1 trial without UE support. Tactile cues for trunk midline, verbal and visual cues for eccentric control    6 inch     Curb negotiation       Ramp negotiation  with bioness X 30 ft    Outdoor ambulation  With Bioness:  - x500 ft approx, inclines/declines  - Retro amb up incline 3 x 15 ft     MANUAL  CPT 39528 TOTAL TIME FOR SESSION Not performed      Mobilization        MODALITIES  CPT 66976 TOTAL TIME FOR SESSION      Ice       Heat       ATTENDED E-STIM  CPT 55806 TOTAL TIME FOR SESSION 8-22 Minutes     Attended E-Stim FES    [id number] (Age 28) , PIN [phone]   Stim to L quad, hamstring, glutes, tib ant, and gastroc. 250-350 pulse width, 40Hz frequency.   Muscle testing testing completed for initial set up  (7/8)    Xcite use for LLE:     Stim to L quad, hamstring, glutes, tib ant, and gastroc. 250-350 pulse width, 40Hz frequency.   Muscle testing testing completed for initial set up 7/26)    eV[employer]ues L300 go.   L lower leg quick fit pads, L hamstring added 8/2 ( lower leg cuff only today)   30 Hz with 0.2 ramp for loading response   Used tablet with strap   Increased time for set up/ alignment                                                  Unattended E-stim

## 2024-09-30 NOTE — PROGRESS NOTES
Physical Therapy Visit    PT DAILY NOTE FOR OUTPATIENT THERAPY    Patient: Melanie Litten MRN: [id number]  : 1995 28 y.o.  Referring Physician: Remedios Whitfield MD  Date of Visit: 2024    Certification Dates: 24 through 24    Diagnosis:   1. History of tethered spinal cord        Chief Complaints:  EDS, LLE weakness, gait/balance impairment    Precautions:   Precautions comments: hypermobile - EDS, postural hypotension      TODAY'S VISIT    Time In Session:  Start Time: 1702  Stop Time: 1800  Time Calculation (min): 58 min   History/Vitals/Pain/Encounter Info - 24 1701          Injury History/Precautions/Daily Required Info    Document Type progress note     Primary Therapist Dilan Machuca     Chief Complaint/Reason for Visit  EDS, LLE weakness, gait/balance impairment     Referring Physician Remedios Whitfield MD     Precautions comments hypermobile - EDS, postural hypotension     History of present illness/functional impairment Saranya is a 28 year old female who presents to OPPT with history of tethered cord syndrome. Pt with PMH significant for chiari malformation, hypermobile EDS, left plantar fasciitis, anxiety and postural hypotension. Pt reports her symptoms started slowly in 2019 with L hip pain, followed up with neurosurgeon in [place] with unremarkable results for all testing. Her symptoms then progressed with L leg motor weakness and shakiness (similar to clonus) with movement, chronic constipation, L drop foot, lower back pain, and neck pain.  Pt was finally diagnosed with tethered cord syndrome and underwent a clinical trial surgery at [facility], [facility], and [facility]. Due to her symptoms, she meets criteria for exploration and sectioning of her filum for the functional tethered cord clinical trial. Now s/p laminectomy for occult tethered cord release on 23 by Dr Duckworth. Patient tolerated procedure well. No  post-op complications. Transferred stable once PACU criteria met. Patient kept FLAT for 36hr. Placed on an aseptic dex taper and post-op IV abx x24hrs.  She was mobilized on 9/27 tolerated well without any symptoms. Additionally, was instructed not to exercise for 7 weeks post-surgery. Pt reports she recovered well overall with decreased drop foot, clonus and swelling, continues to have increased coordination deficits and foot drag with fatigue. Pt has completed OPPT at [facility] in [place] and intermittent massage therapy. Pt’s functional goals are to work on higher level balance, picking up things from floor, coordination and “Hippo therapy”.     Patient/Family/Caregiver Comments/Observations Pt reports she will be scheduling follow up with Dr. Duckworth.     Patient reported fall since last visit No        Pain Assessment    Currently in pain No/Denies        Pre Activity Vital Signs    /88     BP Location Right upper arm     BP Method Manual     Patient Position Sitting                    Daily Treatment Assessment and Plan - 09/30/24 1701          Daily Treatment Assessment and Plan    Progress toward goals Progressing     Daily Outcome Summary Bioness L300 Go utilized for most of session today for NMRE of pregait with focus on L hip drive and L toe clearance and treadmill gait training. Pt demos improved gait mechanics with cues to increase nancy. Pt was challenged with progressions of pregait and dynamic balance with focus on L q/l activation via weighting on side and with addition of planks. Pt will benefit from continued use of Bioness L300 Go for neuromotor recovery of her gait.     Plan and Recommendations Bioness as able, L LE strenghtening (proximal hip), dual tasking, core stabilization                         OBJECTIVE DATA TAKEN TODAY:    None taken    Today's Treatment:       PT Neuro Exercises Current Session   Performed Today? (y/n)   THER ACT   CPT 12876 TOTAL TIME FOR SESSION 0-7  Minutes      Pain, vitals, subjective    Provided rest breaks for energy conservation yes   Skin inspection Base of incision, no swelling noted    FES  cycle [id number] (Age 28) , PIN [id number]   Bike height: 2 holes showing  Pedal height: 3 holes showing  L LE set up only    FES Xcite Set Up              [email or web address] present for session.     Discussed options for insurance coverage vs out of pocket costs. Process to proceed. T/o session     [employer] L300 Go set up   - education on LMN signature with therapist and physician           Yes  Yes   Patient Education Patient educated regarding current impairments per testing completed today and PT POC moving forward.     Patient provided with Welcome Letter, which includes attendance policy. Provided education regarding cancellation and no-show policy. Education regarding the importance of participation and regular attendance to maximize goal attainment. Patient verbalized understanding/agreement.     Clearance for FES received, will be completed NV, start with handheld unit to assess tolerance prior to FES bike. Pt verbalized understanding.    Patient educated regarding progress made thus far, current impairments per re-assessments completed today and PT POC moving forward. Patient verbalized understanding/agreement.     Discussion regarding [employer] scheduling and scheduling with [employer] rep. PT to find out next time [employer] rep is at [facility] to adjust pt schedule. Will provide with forms as well.     Discussion of ordering process for Nitrone[employer].    Patient educated on performance with objective assessments completed during session. Pt verbalized understanding                                                 HEP  Verbally reviewed practicing gait over level surfaces with decreased step width, and then incorporating BUE swing and trunk rot. Pt verbalized understanding.     Floor Transfers  CloseS with increased time for LLE management, requires  "single UE support with review of moving from tall kneel to half kneeling position. Pt demos good understanding, will require continued practice     Patient able to complete floor transfer independently, walked hands down to floor into quadruped. Completed opposite to stand back up.     Subjective Outcomes Measures       ABC Scale Assessed     THER EX  CPT 50963 TOTAL TIME FOR SESSION  8-22 Minutes      STRETCHING      Stretching by patient Incline board stretch: L3 - 1 min x2  L QL stretch with small physioball 10 x 3 sec hold  L Q/L stretch seated in with passive overpressure: 30 seconds x 3      Yes   Stretching by therapist/PROM Trialed modified jose stretch - not effective (pt states modified lunge position she has felt more of a stretch in hip flexors in the past)      CARDIOVASCULAR       Nu Step 6 mins, Level: 1-2, BLE only     Stationary Bike Recumbent bike 5 min, L1  (Unable to complete revolution on Expresso upright bike)    Ambulation with AD      Treadmill VR: 10 minutes, speed: 1.5-2.0, incline: 2-4%   - VC to increase nancy   - progressing from light RUE > unsupported at 5 min slime  Yes   Endurance Testing       6mWT Assessed    STRENGTH TRAINING     HEP Review     Standing Ther-Ex Standing hip abduction 2 x 10 ea  - standing on airex   - Second set, no UE support     Step ups 6\" block x 20   - Added L LE TKE w orange TB    Step down, 2\" block, L LE only w orange band TKE x 10    Hip hikes 2\" block, VC's to discourage R hip hike     Heel raises from L2 on incline board x 15 (on floor today)    Heel raises: 2x15, small weighted ball between heels     Staggered STS w pilates ring 2 x 10, VC's to abd L knee into PT's hand    Squat into BL heel raise x 15 w 6lb                                         Side-lying there-ex Clamshells: 2x20 on R, L in supine: 5\"x10      Supine Ther-Ex Diaphragmatic breathin mins due to elevated BP  Glut bridge: 3\" x10, orange band  Bridge: x10   Supine hip abduction x " "10, LLE, isometrics today  BKFO: x10, orange t-band only for R, active assist on L   Clamshells, x 10 ea LE  Bridge with ball squeeze x 10                  Deadbugs with physioball  - isometric holds: 10\" x 10, physioball under B heels   - alt LE heels taps x 10   - alt UE: x10 reps       Planks  Full plank on hands: 30 seconds x 2   Side plank:  Yes   Quadruped  hip extensions with towel under L foot: x10, B/L (modified position with BUE on EOM and LE on floor)     Quadruped <> bear crawl: 10\" x10   Bear crawl: 5' - fwd/back: x3 rounds     Alternating UE flexion x 10  Alternating LE extension x 10; use of slider L LE            Tall kneel Moderate perturbations all directions  Mini squats 2 x 10 blue TB resistance   Trunk rot: 2x10 w 10lb   D2 chops/lifts: x10  Posterior reach back to cones w cross body reach to PT, 2lb ankle weights on wrists             Akil Kneel  Onto airex pad:   - static hold: 20 seconds x 3   - chest press with beach ball: 2x10   -     Prone Ther-Ex     Core exercises PPT x 10   PPT with ball squeeze 5 sec hold, 10 x  PPT with ball squeeze and bridge x 10   PPT with heel taps from tabletop position BLE x 10 holding 6lb medball, CGA L LE  PPT with hip abduction isometric L LE x 3 sec hold x10, orange band     Heel slides with towel: 2x10  Triple flex over physioball: x10  Windshield wipers BLE over physioball 2 x 10     STS's from low EOM squeezing pilates ring  - As above, with squeeze, in staggered stance  Marching, squeezing small ball 30 ft x 2    Modified dead bugs, alt UE 2 x 10     Supine B shoulder ext with GTB, emphasis on core activation; 2 x 15    Quadruped:  - Rainbows x 10 ea LE, min cueing for L LE   - Bear crawls, forward/backward                                          Functional Strength Testing       30 sec STS test (60y +)  Assessed    NEURO RE-ED  CPT 99388 TOTAL TIME FOR SESSION 8-22 Minutes      FES  To promote neurorecovery of gait:  Distance: 3.01 " "miles  Resistance: 0.77 Nm  Power: 2.9 W  Symmetry: L 2%  Total time: 21:00  Active time: 19:03    Xcite  NMRE of LLE in function:     - Sit <> stand from elevated EOM using 6 lb med ball: x10, then progressing to lowest height: x10, then x10 in staggered stance  - Modified reverse lunging with slider under R foot in parallel bars with BUE support > RUE support only: x10 reps each  - Tall kneel <> heel sit: 2x10, 2nd set with chest press   - fwd step up with LLE onto 6\" block: x10, repeated with R knee drive, x10 repeated with 3# on RUE     Bioness L300 Go  - Fwd/rev step over 12\" [place]: x20,   - Fwd step up onto 6\" block with contra LE march: 2x10, B         - RLE step up, LLE march with 8lb db overhead press        - LLE step up, RLE march while holding 8lb db x 10, overhead press with 8lb db x 5  - Fwd step down 4\" block: 2x10, orange theraband around knees for manual cues   - stance on airex with lateral L toe taps to 12\" [place]: x10  - Tandem walk 20'x2, 8# on R   - tandem walk on balance beam  - step up on 4\" block, contralateral heel tap to 10\" block   - 12\" and 6\" hurdles, navigating reciprocally     - 6 inch step taps 2 X 10  - 6 inch step up with repeater, cues for L hip stability     Yes        Yes      Yes  Yes    Yes               COORDINATION       Target Tapping     Coordination ladder As appropriate for HL balance     Amb with ankle weights     Amb with proprio wrap     Pushing weighted shopping cart     POSTURAL RE-ED     Sit <> Stand  From 22\" mat holding 6# med ball, cues for midline and equal WB      Tall Kneel  Tall kneel <> short sit: x10, x10 with chest press     Seated & standing reaching for trunk strengthening     Abbey-scapular strengthening     PRE-GAIT ACTIVITIES      Tap-ups     Step-ups To 6\" block with contralateral knee drive: x10, repeated with 2 lb DB for UE swing     Standing weight shifting     Step-taps At 6\" main gym steps, step up to 1st step, tap to 3rd with " "contralateral LE  - w Bioness    BALANCE TRAINING     Standing balance - static/dynamic       HEP Review       Floor       Airex Santiago-tandem: 30 seconds x 2, HT: 30\" x 1, EC: 30 seconds x 1  Tandem stance: 30 seconds x 1     Blue tread rockerboard:  - Semi tandem with chops, 6lb                 Rockerboard AP plane: x10, with light UE support   AP, chest press x 10, OH press x 10, 6lb   Squats 2 x 10      Hurdles 6\" hurdles, focus on dec L hip circumduction through swing and encouraging heel strike. Step forward/back    Repeated with side stepping pattern, requiring BUE support dud to inability for L knee extension through stance     BOSU Ball  Fwd mini lunge onto domed side: x10, CloseS   Modified lunge onto BOSU with palloff press:   - x10, then repeated x10 with perturbations on yellow sports cord     Split Stance      Biodex Balance Training  Weight shifting and motor control training with increased difficulty noted to L anterolateral planes: x5 mins     Dynamic gait       Side stepping With partial squat with orange theraband around ankles: 10'x4     Retro walking Fwd/retro amb 50'x6 with cues for shorter step length and for heel strike + knee extension through stance, mirror anteriorly, added dual task of beach bal    30ft x 2 ea today with Bioness donned, PT assist to prevent L hip hike and lateral trunk lean with retro amb    2 x 30 ft while holding small physioball, w Bioness  - 2 x 30 ft marching with 6lb medball, w Biones                     Tandem Walking 10'x2, CloseS/CGA     Suitcase carry Using small 3 lb B DB on R: 100'x2, noted delayed L knee extension through IC and mid stance     Marching With pilates ring 3 x 30 ft     Amb with hula hoop Using hula hoop to facilitate BUE swing and trunk rot     Walking with ball toss     Proprioceptive wrap Blue TB around L LE  - Amb along purple line x 3 with cues to keep feet within tiles  - x 250ft with same cues as above  - Retro amb 3 x 20 ft  - Mini squats " with heels elevated x 10, focus on minimizing hyperextension    Balance Testing     FGA  Assessed    SLS  Assessed see note     10mWT Assessed  - (6mWT on 8/26)    GAIT TRAINING  CPT 33050 TOTAL TIME FOR SESSION 8-22 Minutes        Ambulation without AD  Gait with no AD over level indoor surfaces )with WBOS and decreased L weight shift, VC for anterolateral weight shift, heel strike, BUE swing and trunk rot, 150'x1        Dynamic Gait  20'x6 with focus on shorter step lengths, heel strike and decreasing step width with use of 1' floor tiles, repeated with bimanual ball toss with CloseS and noted decreased gait speed and increase in step lengths     With Bioness donned x 250 ft, carpet and tile   - Good clearance of L LE through swing with improved knee flexion through swing    -  50 ft without for comparison     Treadmill, No UE support, with Bioness donned 2.1 mph x 12 min, 2.5% incline    - Bioness donned     Following removal of proprioceptive wrap x 250ft, no instances of L knee hyperextension thrust noted.              Yes                       Stair negotiation  Full flight stairs 1 trial with 1 rail support; 1 trial without UE support. Tactile cues for trunk midline, verbal and visual cues for eccentric control    6 inch     Curb negotiation       Ramp negotiation  with bioness X 30 ft    Outdoor ambulation  With Bioness:  - x500 ft approx, inclines/declines  - Retro amb up incline 3 x 15 ft     MANUAL  CPT 59705 TOTAL TIME FOR SESSION Not performed      Mobilization        MODALITIES  CPT 91562 TOTAL TIME FOR SESSION      Ice       Heat       ATTENDED E-STIM  CPT 27640 TOTAL TIME FOR SESSION 8-22 Minutes     Attended E-Stim FES    [id number] (Age 28) , PIN [phone]   Stim to L quad, hamstring, glutes, tib ant, and gastroc. 250-350 pulse width, 40Hz frequency.   Muscle testing testing completed for initial set up (7/8)    Xcite use for LLE:     Stim to L quad, hamstring, glutes, tib ant, and gastroc. 250-350  pulse width, 40Hz frequency.   Muscle testing testing completed for initial set up 7/26)    Node Management L300 go.   L lower leg quick fit pads, L hamstring added 8/2 ( lower leg cuff only today)   30 Hz with 0.2 ramp for loading response   Used tablet with strap   Increased time for set up/ alignment                                                  Unattended E-stim

## 2024-10-04 ENCOUNTER — HOSPITAL ENCOUNTER (OUTPATIENT)
Dept: PHYSICAL THERAPY | Facility: REHABILITATION | Age: 29
Setting detail: THERAPIES SERIES
Discharge: HOME | End: 2024-10-04
Attending: LEGAL MEDICINE
Payer: COMMERCIAL

## 2024-10-04 DIAGNOSIS — Z86.69 HISTORY OF TETHERED SPINAL CORD: Primary | ICD-10-CM

## 2024-10-04 PROCEDURE — 97032 APPL MODALITY 1+ESTIM EA 15: CPT | Mod: GP

## 2024-10-04 PROCEDURE — 97112 NEUROMUSCULAR REEDUCATION: CPT | Mod: GP

## 2024-10-04 PROCEDURE — 97110 THERAPEUTIC EXERCISES: CPT | Mod: GP

## 2024-10-04 NOTE — OP PT TREATMENT LOG
PT Neuro Exercises Current Session   Performed Today? (y/n)   THER ACT   CPT 61935 TOTAL TIME FOR SESSION 0-7 Minutes      Pain, vitals, subjective    Provided rest breaks for energy conservation yes   Skin inspection Base of incision, no swelling noted    FES  cycle [id number] (Age 28) , PIN [id number]   Bike height: 2 holes showing  Pedal height: 3 holes showing  L LE set up only    FES Xcite Set Up              [facility] present for session.     Discussed options for insurance coverage vs out of pocket costs. Process to proceed. T/o session     Bioness L300 Go set up   - education on LMN signature with therapist and physician           Yes  Yes   Patient Education Patient educated regarding current impairments per testing completed today and PT POC moving forward.     Patient provided with Welcome Letter, which includes attendance policy. Provided education regarding cancellation and no-show policy. Education regarding the importance of participation and regular attendance to maximize goal attainment. Patient verbalized understanding/agreement.     Clearance for FES received, will be completed NV, start with handheld unit to assess tolerance prior to FES bike. Pt verbalized understanding.    Patient educated regarding progress made thus far, current impairments per re-assessments completed today and PT POC moving forward. Patient verbalized understanding/agreement.     Discussion regarding Equestrian scheduling and scheduling with [employer] rep. PT to find out next time [employer] rep is at [facility] to adjust pt schedule. Will provide with forms as well.     Discussion of ordering process for [employer].    Patient educated on performance with objective assessments completed during session. Pt verbalized understanding                                                 HEP  Verbally reviewed practicing gait over level surfaces with decreased step width, and then incorporating BUE swing and trunk  "rot. Pt verbalized understanding.     Floor Transfers  CloseS with increased time for LLE management, requires single UE support with review of moving from tall kneel to half kneeling position. Pt demos good understanding, will require continued practice     Patient able to complete floor transfer independently, walked hands down to floor into quadruped. Completed opposite to stand back up.     Subjective Outcomes Measures       ABC Scale Assessed     THER EX  CPT 78664 TOTAL TIME FOR SESSION  8-22 Minutes      STRETCHING      Stretching by patient Incline board stretch: L3 - 1 min x2  L QL stretch with small physioball 10 x 3 sec hold  L Q/L stretch seated in with passive overpressure: 30 seconds x 3         Stretching by therapist/PROM Trialed modified jose stretch - not effective (pt states modified lunge position she has felt more of a stretch in hip flexors in the past)      CARDIOVASCULAR       Nu Step 6 mins, Level: 1-2, BLE only     Stationary Bike Recumbent bike 5 min, L1  (Unable to complete revolution on [employer]o upright bike)    Ambulation with AD      Treadmill VR: 8 minutes, speed: 2.2 mph, [place]  - VC to increase nancy   - progressing from light RUE > unsupported at 5 min slime  Yes    Endurance Testing       6mWT Assessed    STRENGTH TRAINING     HEP Review     Standing Ther-Ex Standing hip abduction 2 x 10 ea  - standing on airex   - Second set, no UE support     Step ups 6\" block x 20   - Added L LE TKE w orange TB    Step down, 2\" block, L LE only w orange band TKE x 10    Hip hikes 2\" block, VC's to discourage R hip hike     Heel raises from L2 on incline board x 15 (on floor today)    Heel raises: 2x15, small weighted ball between heels     Staggered STS w pilates ring 2 x 10, VC's to abd L knee into PT's hand    Squat into BL heel raise x 15 w 6lb                                         Side-lying there-ex Clamshells: 2x20 on R, L in supine: 5\"x10      Supine Ther-Ex Diaphragmatic " "breathin mins due to elevated BP  Glut bridge: 3\" x10, orange band  Bridge: x10   Supine hip abduction x 10, LLE, isometrics today  BKFO: x10, orange t-band only for R, active assist on L   Clamshells, x 10 ea LE  Bridge with ball squeeze x 10                  Deadbugs with physioball  - isometric holds: 10\" x 10, physioball under B heels   - alt LE heels taps x 10   - alt UE: x10 reps       Planks  Full plank on hands: 30 seconds x 2   Side plank: 30 sec x 2 ea  Yes   Yes    Quadruped  hip extensions with towel under L foot: x10, B/L (modified position with BUE on EOM and LE on floor)     Quadruped <> bear crawl: 10\" x10   Bear crawl: 5' - fwd/back: x3 rounds     Alternating UE flexion x 10  Alternating LE extension x 10; use of slider L LE            Tall kneel Moderate perturbations all directions  Mini squats 2 x 10 blue TB resistance   Trunk rot: 2x10 w 10lb   D2 chops/lifts: x10  Posterior reach back to cones w cross body reach to PT, 2lb ankle weights on wrists             Akil Kneel  Onto airex pad:   - static hold: 20 seconds x 3   - chest press with beach ball: 2x10   -     Prone Ther-Ex     Core exercises PPT x 5  PPT with ball squeeze 5 sec hold, 10 x  PPT with bridge x 10   PPT w single leg bridge x 10  PPT with heel taps from tabletop position BLE x 10 holding 6lb medball, CGA L LE  PPT with hip abduction isometric L LE x 3 sec hold x10, orange band     Heel slides with towel: 2x10  Triple flex over physioball: x10  [facility] wipers BLE over physioball 2 x 10     STS's from low EOM squeezing pilates ring  - As above, with squeeze, in staggered stance  Marching, squeezing small ball 30 ft x 2    Modified dead bugs, alt UE 2 x 10     Supine B shoulder ext with GTB, emphasis on core activation; 2 x 15    Quadruped:  - Rainbows x 10 ea LE, min cueing for L LE   - Bear crawls, forward/backward Yes     Yes   Yes                                        Functional Strength Testing       30 sec STS test (59y " "+)  Assessed    NEURO RE-ED  CPT 60464 TOTAL TIME FOR SESSION 8-22 Minutes      FES  To promote neurorecovery of gait:  Distance: 3.01 miles  Resistance: 0.77 Nm  Power: 2.9 W  Symmetry: L 2%  Total time: 21:00  Active time: 19:03    Xcite  NMRE of LLE in function:     - Sit <> stand from elevated EOM using 6 lb med ball: x10, then progressing to lowest height: x10, then x10 in staggered stance  - Modified reverse lunging with slider under R foot in parallel bars with BUE support > RUE support only: x10 reps each  - Tall kneel <> heel sit: 2x10, 2nd set with chest press   - fwd step up with LLE onto 6\" block: x10, repeated with R knee drive, x10 repeated with 3# on RUE     Bioness L300 Go  - Fwd/rev step over 12\" [place]: x20,   - Fwd step up onto 6\" block with contra LE march: 2x10, B         - RLE step up, LLE march with 8lb db overhead press        - LLE step up, RLE march while holding 8lb db x 10, overhead press with 8lb db x 5  - Fwd step down 4\" block: 2x10, orange theraband around knees for manual cues   - stance on airex with lateral L toe taps to 12\" [place]: x10  - Tandem walk 20'x2, 8# on R   - tandem walk on balance beam  - step up on 4\" block, contralateral heel tap to 10\" block   - 12\" and 6\" hurdles, navigating reciprocally     - 6 inch step taps 2 X 10  - 6 inch step up with repeater, cues for L hip stability     12\" hurdles:  - Reciprocal pattern  - side stepping                                    Yes   Yes    COORDINATION       Target Tapping     Coordination ladder As appropriate for HL balance     Amb with ankle weights     Amb with proprio wrap     Pushing weighted shopping cart     POSTURAL RE-ED     Sit <> Stand  From 22\" mat holding 6# med ball, cues for midline and equal WB      Tall Kneel  Tall kneel <> short sit: x10, x10 with chest press     Seated & standing reaching for trunk strengthening     Abbey-scapular strengthening     PRE-GAIT ACTIVITIES      Tap-ups     Step-ups To 6\" " "block with contralateral knee drive: x10, repeated with 2 lb DB for UE swing     Standing weight shifting     Step-taps At 6\" main gym steps, step up to 1st step, tap to 3rd with contralateral LE  - w Bioness    BALANCE TRAINING     Standing balance - static/dynamic       HEP Review       Floor       Airex [place]-tandem: 30 seconds x 2, HT: 30\" x 1, EC: 30 seconds x 1  Tandem stance: 30 seconds x 1     Blue tread rockerboard:  - Semi tandem with chops, 6lb                 Rockerboard AP plane: x10, with light UE support   AP, chest press x 10, OH press x 10, 6lb   Squats 2 x 10      Hurdles 6\" hurdles, focus on dec L hip circumduction through swing and encouraging heel strike. Step forward/back    Repeated with side stepping pattern, requiring BUE support dud to inability for L knee extension through stance     BOSU Ball  Fwd mini lunge onto domed side: x10, CloseS   Modified lunge onto BOSU with palloff press:   - x10, then repeated x10 with perturbations on yellow sports cord     Split Stance      [employer] Balance Training  Weight shifting and motor control training with increased difficulty noted to L anterolateral planes: x5 mins     Dynamic gait       Side stepping With partial squat with orange theraband around ankles: 10'x4     Retro walking Fwd/retro amb 50'x6 with cues for shorter step length and for heel strike + knee extension through stance, mirror anteriorly, added dual task of beach bal    30ft x 2 ea today with Bioness donned, PT assist to prevent L hip hike and lateral trunk lean with retro amb    2 x 30 ft while holding small physioball, w Bioness  - 2 x 30 ft marching with 6lb medball, w Biones                     Tandem Walking 10'x2, CloseS/CGA     Suitcase carry Using small 3 lb B DB on R: 100'x2, noted delayed L knee extension through IC and mid stance     Marching With pilates ring 3 x 30 ft     Amb with hula hoop Using hula hoop to facilitate BUE swing and trunk rot     Walking with ball toss   "   Proprioceptive wrap Blue TB around L LE  - Amb along purple line x 3 with cues to keep feet within tiles  - x 250ft with same cues as above  - Retro amb 3 x 20 ft  - Mini squats with heels elevated x 10, focus on minimizing hyperextension    Balance Testing     FGA  Assessed    SLS  Assessed see note     10mWT Assessed  - (6mWT on 8/26)    GAIT TRAINING  CPT 38895 TOTAL TIME FOR SESSION 0-7 Minutes        Ambulation without AD  Gait with no AD over level indoor surfaces )with WBOS and decreased L weight shift, VC for anterolateral weight shift, heel strike, BUE swing and trunk rot, 150'x1        Dynamic Gait  20'x6 with focus on shorter step lengths, heel strike and decreasing step width with use of 1' floor tiles, repeated with bimanual ball toss with CloseS and noted decreased gait speed and increase in step lengths     With Bioness donned x 250 ft, carpet and tile   - Good clearance of L LE through swing with improved knee flexion through swing    -  50 ft without for comparison     Treadmill, No UE support, with Bioness donned 2.2 mph x 10 min, [place]   - Bioness donned     Following removal of proprioceptive wrap x 250ft, no instances of L knee hyperextension thrust noted.                                     Stair negotiation  Full flight stairs 1 trial with 1 rail support; 1 trial without UE support. Tactile cues for trunk midline, verbal and visual cues for eccentric control    6 inch     Curb negotiation       Ramp negotiation  with bioness X 30 ft    Outdoor ambulation  With Bioness:  - x500 ft approx, inclines/declines  - Retro amb up incline 3 x 15 ft     MANUAL  CPT 16823 TOTAL TIME FOR SESSION Not performed      Mobilization        MODALITIES  CPT 98696 TOTAL TIME FOR SESSION      Ice       Heat       ATTENDED E-STIM  CPT 08768 TOTAL TIME FOR SESSION 8-22 Minutes     Attended E-Stim FES    [id number] (Age 28) , PIN [phone]   Stim to L quad, hamstring, glutes, tib ant, and gastroc. 250-350 pulse  width, 40Hz frequency.   Muscle testing testing completed for initial set up (7/8)    Xcite use for LLE:     Stim to L quad, hamstring, glutes, tib ant, and gastroc. 250-350 pulse width, 40Hz frequency.   Muscle testing testing completed for initial set up 7/26)    CEYX L300 go.   L lower leg quick fit pads, L hamstring added 8/2 ( lower leg cuff only today)   30 Hz with 0.2 ramp for loading response   Used tablet with strap   Increased time for set up/ alignment                                 Yes                  Unattended E-stim

## 2024-10-04 NOTE — PROGRESS NOTES
Physical Therapy Visit    PT DAILY NOTE FOR OUTPATIENT THERAPY    Patient: Melanie Litten MRN: [id number]  : 1995 28 y.o.  Referring Physician: Remedios Whitfield MD  Date of Visit: 10/4/2024    Certification Dates: 24 through 24    Diagnosis:   1. History of tethered spinal cord        Chief Complaints:  EDS, LLE weakness, gait/balance impairment    Precautions:   Precautions comments: hypermobile - EDS, postural hypotension      TODAY'S VISIT    Time In Session:  Start Time: 813  Stop Time: 09  Time Calculation (min): 47 min   History/Vitals/Pain/Encounter Info - 10/04/24 0815          Injury History/Precautions/Daily Required Info    Document Type daily treatment     Primary Therapist Dilan Machuca     Chief Complaint/Reason for Visit  EDS, LLE weakness, gait/balance impairment     Referring Physician Remedios Whitfield MD     Precautions comments hypermobile - EDS, postural hypotension     History of present illness/functional impairment Saranya is a 28 year old female who presents to OPPT with history of tethered cord syndrome. Pt with PMH significant for chiari malformation, hypermobile EDS, left plantar fasciitis, anxiety and postural hypotension. Pt reports her symptoms started slowly in 2019 with L hip pain, followed up with neurosurgeon in [place] with unremarkable results for all testing. Her symptoms then progressed with L leg motor weakness and shakiness (similar to clonus) with movement, chronic constipation, L drop foot, lower back pain, and neck pain.  Pt was finally diagnosed with tethered cord syndrome and underwent a clinical trial surgery at [facility], [facility], and [facility]. Due to her symptoms, she meets criteria for exploration and sectioning of her filum for the functional tethered cord clinical trial. Now s/p laminectomy for occult tethered cord release on 23 by Dr Duckworth. Patient tolerated procedure well.  "No post-op complications. Transferred stable once PACU criteria met. Patient kept FLAT for 36hr. Placed on an aseptic dex taper and post-op IV abx x24hrs.  She was mobilized on 9/27 tolerated well without any symptoms. Additionally, was instructed not to exercise for 7 weeks post-surgery. Pt reports she recovered well overall with decreased drop foot, clonus and swelling, continues to have increased coordination deficits and foot drag with fatigue. Pt has completed OPPT at [facility] in [place] and intermittent massage therapy. Pt’s functional goals are to work on higher level balance, picking up things from floor, coordination and “Hippo therapy”.     Patient/Family/Caregiver Comments/Observations No new changes since last visit - seeing MD 10/24. Will try and complete imaging prior to appt. Will be getting lumbar, possibly cervical, and brain MRI. Arrived 13 min late to session     Patient reported fall since last visit No        Pain Assessment    Currently in pain No/Denies        Pre Activity Vital Signs    /86     BP Location Left upper arm     BP Method Manual     Patient Position Sitting                    Daily Treatment Assessment and Plan - 10/04/24 0815          Daily Treatment Assessment and Plan    Progress toward goals Progressing     Daily Outcome Summary Continued w Bioness L300 for L LE NMRE. She was challenged with navigation of 12\" hurdles without compensating via hip hike however her swing clearance did improve with additional reps. Cont to requrie VC's for GABRIELLE when ambulating without external feedback (purple/white line on floor). Cont to demo dec L core/glute med activation as seen with side planks.     Plan and Recommendations Bioness as able, L LE strenghtening (proximal hip), dual tasking, core stabilization                         OBJECTIVE DATA TAKEN TODAY:    None taken    Today's Treatment:       PT Neuro Exercises Current Session   Performed Today? (y/n)   THER ACT   CPT 71177 " TOTAL TIME FOR SESSION 0-7 Minutes      Pain, vitals, subjective    Provided rest breaks for energy conservation yes   Skin inspection Base of incision, no swelling noted    FES  cycle [id number] (Age 28) , PIN [id number]   Bike height: 2 holes showing  Pedal height: 3 holes showing  L LE set up only    FES Xcite Set Up              [facility] Gabi Patrick, PT from [facility] present for session.     Discussed options for insurance coverage vs out of pocket costs. Process to proceed. T/o session     "[facility]" L300 Go set up   - education on LMN signature with therapist and physician           Yes  Yes   Patient Education Patient educated regarding current impairments per testing completed today and PT POC moving forward.     Patient provided with Welcome Letter, which includes attendance policy. Provided education regarding cancellation and no-show policy. Education regarding the importance of participation and regular attendance to maximize goal attainment. Patient verbalized understanding/agreement.     Clearance for FES received, will be completed NV, start with handheld unit to assess tolerance prior to FES bike. Pt verbalized understanding.    Patient educated regarding progress made thus far, current impairments per re-assessments completed today and PT POC moving forward. Patient verbalized understanding/agreement.     Discussion regarding Equestrian scheduling and scheduling with "[facility]" rep. PT to find out next time "[facility]" rep is at [facility] to adjust pt schedule. Will provide with forms as well.     Discussion of ordering process for [employer] and [employer].    Patient educated on performance with objective assessments completed during session. Pt verbalized understanding                                                 HEP  Verbally reviewed practicing gait over level surfaces with decreased step width, and then incorporating BUE swing and trunk rot. Pt verbalized understanding.     Floor Transfers  CloseS with increased time for  "LLE management, requires single UE support with review of moving from tall kneel to half kneeling position. Pt demos good understanding, will require continued practice     Patient able to complete floor transfer independently, walked hands down to floor into quadruped. Completed opposite to stand back up.     Subjective Outcomes Measures       ABC Scale Assessed     THER EX  CPT 92855 TOTAL TIME FOR SESSION  8-22 Minutes      STRETCHING      Stretching by patient Incline board stretch: L3 - 1 min x2  L QL stretch with small physioball 10 x 3 sec hold  L Q/L stretch seated in with passive overpressure: 30 seconds x 3         Stretching by therapist/PROM Trialed modified jose stretch - not effective (pt states modified lunge position she has felt more of a stretch in hip flexors in the past)      CARDIOVASCULAR       Nu Step 6 mins, Level: 1-2, BLE only     Stationary Bike Recumbent bike 5 min, L1  (Unable to complete revolution on Expresso upright bike)    Ambulation with AD      Treadmill VR: 8 minutes, speed: 2.2 mph, [place]  - VC to increase nancy   - progressing from light RUE > unsupported at 5 min slime  Yes    Endurance Testing       6mWT Assessed    STRENGTH TRAINING     HEP Review     Standing Ther-Ex Standing hip abduction 2 x 10 ea  - standing on airex   - Second set, no UE support     Step ups 6\" block x 20   - Added L LE TKE w orange TB    Step down, 2\" block, L LE only w orange band TKE x 10    Hip hikes 2\" block, VC's to discourage R hip hike     Heel raises from L2 on incline board x 15 (on floor today)    Heel raises: 2x15, small weighted ball between heels     Staggered STS w pilates ring 2 x 10, VC's to abd L knee into PT's hand    Squat into BL heel raise x 15 w 6lb                                         Side-lying there-ex Clamshells: 2x20 on R, L in supine: 5\"x10      Supine Ther-Ex Diaphragmatic breathin mins due to elevated BP  Glut bridge: 3\" x10, orange band  Bridge: x10 " "  Supine hip abduction x 10, LLE, isometrics today  BKFO: x10, orange t-band only for R, active assist on L   Clamshells, x 10 ea LE  Bridge with ball squeeze x 10                  Deadbugs with physioball  - isometric holds: 10\" x 10, physioball under B heels   - alt LE heels taps x 10   - alt UE: x10 reps       Planks  Full plank on hands: 30 seconds x 2   Side plank: 30 sec x 2 ea  Yes   Yes    Quadruped  hip extensions with towel under L foot: x10, B/L (modified position with BUE on EOM and LE on floor)     Quadruped <> bear crawl: 10\" x10   Bear crawl: 5' - fwd/back: x3 rounds     Alternating UE flexion x 10  Alternating LE extension x 10; use of slider L LE            Tall kneel Moderate perturbations all directions  Mini squats 2 x 10 blue TB resistance   Trunk rot: 2x10 w 10lb   D2 chops/lifts: x10  Posterior reach back to cones w cross body reach to PT, 2lb ankle weights on wrists             Akil Kneel  Onto airex pad:   - static hold: 20 seconds x 3   - chest press with beach ball: 2x10   -     Prone Ther-Ex     Core exercises PPT x 5  PPT with ball squeeze 5 sec hold, 10 x  PPT with bridge x 10   PPT w single leg bridge x 10  PPT with heel taps from tabletop position BLE x 10 holding 6lb medball, CGA L LE  PPT with hip abduction isometric L LE x 3 sec hold x10, orange band     Heel slides with towel: 2x10  Triple flex over physioball: x10  [facility] wipers BLE over physioball 2 x 10     STS's from low EOM squeezing pilates ring  - As above, with squeeze, in staggered stance  Marching, squeezing small ball 30 ft x 2    Modified dead bugs, alt UE 2 x 10     Supine B shoulder ext with GTB, emphasis on core activation; 2 x 15    Quadruped:  - Rainbows x 10 ea LE, min cueing for L LE   - Bear crawls, forward/backward Yes     Yes   Yes                                        Functional Strength Testing       30 sec STS test (60y +)  Assessed    NEURO RE-ED  CPT 28596 TOTAL TIME FOR SESSION 8-22 Minutes      FES " " To promote neurorecovery of gait:  Distance: 3.01 miles  Resistance: 0.77 Nm  Power: 2.9 W  Symmetry: L 2%  Total time: 21:00  Active time: 19:03    Xcite  NMRE of LLE in function:     - Sit <> stand from elevated EOM using 6 lb med ball: x10, then progressing to lowest height: x10, then x10 in staggered stance  - Modified reverse lunging with slider under R foot in parallel bars with BUE support > RUE support only: x10 reps each  - Tall kneel <> heel sit: 2x10, 2nd set with chest press   - fwd step up with LLE onto 6\" block: x10, repeated with R knee drive, x10 repeated with 3# on RUE     Bioness L300 Go  - Fwd/rev step over 12\" [place]: x20,   - Fwd step up onto 6\" block with contra LE march: 2x10, B         - RLE step up, LLE march with 8lb db overhead press        - LLE step up, RLE march while holding 8lb db x 10, overhead press with 8lb db x 5  - Fwd step down 4\" block: 2x10, orange theraband around knees for manual cues   - stance on airex with lateral L toe taps to 12\" [place]: x10  - Tandem walk 20'x2, 8# on R   - tandem walk on balance beam  - step up on 4\" block, contralateral heel tap to 10\" block   - 12\" and 6\" hurdles, navigating reciprocally     - 6 inch step taps 2 X 10  - 6 inch step up with repeater, cues for L hip stability     12\" hurdles:  - Reciprocal pattern  - side stepping                                    Yes   Yes    COORDINATION       Target Tapping     Coordination ladder As appropriate for HL balance     Amb with ankle weights     Amb with proprio wrap     Pushing weighted shopping cart     POSTURAL RE-ED     Sit <> Stand  From 22\" mat holding 6# med ball, cues for midline and equal WB      Tall Kneel  Tall kneel <> short sit: x10, x10 with chest press     Seated & standing reaching for trunk strengthening     Abbey-scapular strengthening     PRE-GAIT ACTIVITIES      Tap-ups     Step-ups To 6\" block with contralateral knee drive: x10, repeated with 2 lb DB for UE swing   " "  Standing weight shifting     Step-taps At 6\" main gym steps, step up to 1st step, tap to 3rd with contralateral LE  - w Bioness    BALANCE TRAINING     Standing balance - static/dynamic       HEP Review       Floor       Airex Santiago-tandem: 30 seconds x 2, HT: 30\" x 1, EC: 30 seconds x 1  Tandem stance: 30 seconds x 1     Blue tread rockerboard:  - Semi tandem with chops, 6lb                 Rockerboard AP plane: x10, with light UE support   AP, chest press x 10, OH press x 10, 6lb   Squats 2 x 10      Hurdles 6\" hurdles, focus on dec L hip circumduction through swing and encouraging heel strike. Step forward/back    Repeated with side stepping pattern, requiring BUE support dud to inability for L knee extension through stance     BOSU Ball  Fwd mini lunge onto domed side: x10, CloseS   Modified lunge onto BOSU with palloff press:   - x10, then repeated x10 with perturbations on yellow sports cord     Split Stance      Biodex Balance Training  Weight shifting and motor control training with increased difficulty noted to L anterolateral planes: x5 mins     Dynamic gait       Side stepping With partial squat with orange theraband around ankles: 10'x4     Retro walking Fwd/retro amb 50'x6 with cues for shorter step length and for heel strike + knee extension through stance, mirror anteriorly, added dual task of beach bal    30ft x 2 ea today with Bioness donned, PT assist to prevent L hip hike and lateral trunk lean with retro amb    2 x 30 ft while holding small physioball, w Bioness  - 2 x 30 ft marching with 6lb medball, w Biones                     Tandem Walking 10'x2, CloseS/CGA     Suitcase carry Using small 3 lb B DB on R: 100'x2, noted delayed L knee extension through IC and mid stance     Marching With pilates ring 3 x 30 ft     Amb with hula hoop Using hula hoop to facilitate BUE swing and trunk rot     Walking with ball toss     Proprioceptive wrap Blue TB around L LE  - Amb along purple line x 3 with cues " to keep feet within tiles  - x 250ft with same cues as above  - Retro amb 3 x 20 ft  - Mini squats with heels elevated x 10, focus on minimizing hyperextension    Balance Testing     FGA  Assessed    SLS  Assessed see note     10mWT Assessed  - (6mWT on 8/26)    GAIT TRAINING  CPT 43524 TOTAL TIME FOR SESSION 0-7 Minutes        Ambulation without AD  Gait with no AD over level indoor surfaces )with WBOS and decreased L weight shift, VC for anterolateral weight shift, heel strike, BUE swing and trunk rot, 150'x1        Dynamic Gait  20'x6 with focus on shorter step lengths, heel strike and decreasing step width with use of 1' floor tiles, repeated with bimanual ball toss with CloseS and noted decreased gait speed and increase in step lengths     With Bioness donned x 250 ft, carpet and tile   - Good clearance of L LE through swing with improved knee flexion through swing    -  50 ft without for comparison     Treadmill, No UE support, with Bioness donned 2.2 mph x 10 min, [place]   - Bioness donned     Following removal of proprioceptive wrap x 250ft, no instances of L knee hyperextension thrust noted.                                     Stair negotiation  Full flight stairs 1 trial with 1 rail support; 1 trial without UE support. Tactile cues for trunk midline, verbal and visual cues for eccentric control    6 inch     Curb negotiation       Ramp negotiation  with bioness X 30 ft    Outdoor ambulation  With Bioness:  - x500 ft approx, inclines/declines  - Retro amb up incline 3 x 15 ft     MANUAL  CPT 96708 TOTAL TIME FOR SESSION Not performed      Mobilization        MODALITIES  CPT 88087 TOTAL TIME FOR SESSION      Ice       Heat       ATTENDED E-STIM  CPT 86158 TOTAL TIME FOR SESSION 8-22 Minutes     Attended E-Stim FES    [id number] (Age 28) , PIN [phone]   Stim to L quad, hamstring, glutes, tib ant, and gastroc. 250-350 pulse width, 40Hz frequency.   Muscle testing testing completed for initial set up  (7/8)    Xcite use for LLE:     Stim to L quad, hamstring, glutes, tib ant, and gastroc. 250-350 pulse width, 40Hz frequency.   Muscle testing testing completed for initial set up 7/26)    JDLab L300 go.   L lower leg quick fit pads, L hamstring added 8/2 ( lower leg cuff only today)   30 Hz with 0.2 ramp for loading response   Used tablet with strap   Increased time for set up/ alignment                                 Yes                  Unattended E-stim

## 2024-10-07 ENCOUNTER — HOSPITAL ENCOUNTER (OUTPATIENT)
Dept: PHYSICAL THERAPY | Facility: REHABILITATION | Age: 29
Setting detail: THERAPIES SERIES
Discharge: HOME | End: 2024-10-07
Attending: LEGAL MEDICINE
Payer: COMMERCIAL

## 2024-10-07 DIAGNOSIS — Z86.69 HISTORY OF TETHERED SPINAL CORD: Primary | ICD-10-CM

## 2024-10-07 PROCEDURE — 97116 GAIT TRAINING THERAPY: CPT | Mod: GP

## 2024-10-07 PROCEDURE — 97110 THERAPEUTIC EXERCISES: CPT | Mod: GP

## 2024-10-07 PROCEDURE — 97112 NEUROMUSCULAR REEDUCATION: CPT | Mod: GP

## 2024-10-07 PROCEDURE — 97032 APPL MODALITY 1+ESTIM EA 15: CPT | Mod: GP

## 2024-10-07 NOTE — PROGRESS NOTES
Physical Therapy Visit    PT DAILY NOTE FOR OUTPATIENT THERAPY    Patient: Melanie Litten MRN: [id number]  : 1995 28 y.o.  Referring Physician: Remedios Whitfield MD  Date of Visit: 10/7/2024    Certification Dates: 24 through 24    Diagnosis:   1. History of tethered spinal cord        Chief Complaints:  EDS, LLE weakness, gait/balance impairment    Precautions:   Precautions comments: hypermobile - EDS, postural hypotension      TODAY'S VISIT    Time In Session:  Start Time: 1605  Stop Time: 1700  Time Calculation (min): 55 min   History/Vitals/Pain/Encounter Info - 10/07/24 1602          Injury History/Precautions/Daily Required Info    Document Type daily treatment     Primary Therapist Dilan Machuca     Chief Complaint/Reason for Visit  EDS, LLE weakness, gait/balance impairment     Referring Physician Remedios Whitfield MD     Precautions comments hypermobile - EDS, postural hypotension     History of present illness/functional impairment Saranya is a 28 year old female who presents to OPPT with history of tethered cord syndrome. Pt with PMH significant for chiari malformation, hypermobile EDS, left plantar fasciitis, anxiety and postural hypotension. Pt reports her symptoms started slowly in 2019 with L hip pain, followed up with neurosurgeon in [place] with unremarkable results for all testing. Her symptoms then progressed with L leg motor weakness and shakiness (similar to clonus) with movement, chronic constipation, L drop foot, lower back pain, and neck pain.  Pt was finally diagnosed with tethered cord syndrome and underwent a clinical trial surgery at [facility], [facility], and [facility]. Due to her symptoms, she meets criteria for exploration and sectioning of her filum for the functional tethered cord clinical trial. Now s/p laminectomy for occult tethered cord release on 23 by Dr Duckworth. Patient tolerated procedure well.  No post-op complications. Transferred stable once PACU criteria met. Patient kept FLAT for 36hr. Placed on an aseptic dex taper and post-op IV abx x24hrs.  She was mobilized on 9/27 tolerated well without any symptoms. Additionally, was instructed not to exercise for 7 weeks post-surgery. Pt reports she recovered well overall with decreased drop foot, clonus and swelling, continues to have increased coordination deficits and foot drag with fatigue. Pt has completed OPPT at [facility] in [place] and intermittent massage therapy. Pt’s functional goals are to work on higher level balance, picking up things from floor, coordination and “Hippo therapy”.     Patient/Family/Caregiver Comments/Observations Pt reports she will be seeing neurologist on 10/24, imaging to be completed on 10/16 - MRI of lumbar, brain and c/s.     Patient reported fall since last visit No        Pain Assessment    Currently in pain No/Denies                    Daily Treatment Assessment and Plan - 10/07/24 1602          Daily Treatment Assessment and Plan    Progress toward goals Progressing     Daily Outcome Summary Pt used Scards L3Diaphonics Go for majority of session with focus on LLE NMRE - utilizing it for blocked practice on treadmill training with cues for nancy and gait speed. Session then focused on blocked practice for L stance control and L eccentric quad strengthening over rockerboard and step downs with fair carryover during completion of stairs and outdoor ambulation. Pt will benefit from continued progressions of L q/l hip strengthening and core strengthening NV     Plan and Recommendations Scards as able, L LE strenghtening (proximal hip), dual tasking, core stabilization                         OBJECTIVE DATA TAKEN TODAY:    None taken    Today's Treatment:       PT Neuro Exercises Current Session   Performed Today? (y/n)   THER ACT   CPT 44893 TOTAL TIME FOR SESSION 0-7 Minutes      Pain, vitals, subjective    Provided rest  breaks for energy conservation yes   Skin inspection Base of incision, no swelling noted    FES  cycle [id number] (Age 28) , PIN [id number]   Bike height: 2 holes showing  Pedal height: 3 holes showing  L LE set up only    FES Xcite Set Up              [facility] Gabi Patrick, PT from [facility] present for session.     Discussed options for insurance coverage vs out of pocket costs. Process to proceed. T/o session     [facility] L300 Go set up   - education on LMN signature with therapist and physician           Yes  Yes   Patient Education Patient educated regarding current impairments per testing completed today and PT POC moving forward.     Patient provided with Welcome Letter, which includes attendance policy. Provided education regarding cancellation and no-show policy. Education regarding the importance of participation and regular attendance to maximize goal attainment. Patient verbalized understanding/agreement.     Clearance for FES received, will be completed NV, start with handheld unit to assess tolerance prior to FES bike. Pt verbalized understanding.    Patient educated regarding progress made thus far, current impairments per re-assessments completed today and PT POC moving forward. Patient verbalized understanding/agreement.     Discussion regarding [employer] scheduling and scheduling with [facility] rep. PT to find out next time [facility] rep is at [facility] to adjust pt schedule. Will provide with forms as well.     Discussion of ordering process for [facility].    Patient educated on performance with objective assessments completed during session. Pt verbalized understanding                                                 HEP  Verbally reviewed practicing gait over level surfaces with decreased step width, and then incorporating BUE swing and trunk rot. Pt verbalized understanding.     Floor Transfers  CloseS with increased time for LLE management, requires single UE support with review of moving from tall kneel  "to half kneeling position. Pt demos good understanding, will require continued practice     Patient able to complete floor transfer independently, walked hands down to floor into quadruped. Completed opposite to stand back up.     Subjective Outcomes Measures       ABC Scale Assessed     THER EX  CPT 53361 TOTAL TIME FOR SESSION  8-22 Minutes      STRETCHING      Stretching by patient Incline board stretch: L3 - 1 min x2  L QL stretch with small physioball 10 x 3 sec hold  L Q/L stretch seated in with passive overpressure: 30 seconds x 3         Stretching by therapist/PROM Trialed modified jose stretch - not effective (pt states modified lunge position she has felt more of a stretch in hip flexors in the past)      CARDIOVASCULAR       Nu Step 6 mins, Level: 1-2, BLE only     Stationary Bike Recumbent bike 5 min, L1  (Unable to complete revolution on Expresso upright bike)    Ambulation with AD      Treadmill VR: 10 minutes, speed: 2.3 mph, [place]  - VC to increase nancy   - progressing from light RUE > unsupported at 5 min slime   - using Bioness L300     2 mins of side stepping up incline leading with LLE using [employer] L300 Go  Yes           Yes   Endurance Testing       6mWT Assessed    STRENGTH TRAINING     HEP Review     Standing Ther-Ex Standing hip abduction 2 x 10 ea  - standing on airex   - Second set, no UE support     Step ups 6\" block x 20   - Added L LE TKE w orange TB    Lateral step down, 4\" block, L LE only w orange band TKE x 10    Hip hikes 2\" block, VC's to discourage R hip hike     Heel raises from L2 on incline board x 15 (on floor today)    Heel raises: 2x15, small weighted ball between heels     Staggered STS w pilates ring 2 x 10, VC's to abd L knee into PT's hand    Squat into BL heel raise x 15 w 6lb     Modified deadlift (B-stance) LLE leading anteriorly: x10, 18lbs                  Yes                            Yes     Side-lying there-ex Clamshells: 2x20 on R, L in supine: " "5\"x10      Supine Ther-Ex Diaphragmatic breathin mins due to elevated BP  Glut bridge: 3\" x10, orange band  Bridge: x10   Supine hip abduction x 10, LLE, isometrics today  BKFO: x10, orange t-band only for R, active assist on L   Clamshells, x 10 ea LE  Bridge with ball squeeze x 10                  Deadbugs with physioball  - isometric holds: 10\" x 10, physioball under B heels   - alt LE heels taps x 10   - alt UE: x10 reps       Planks  Full plank on hands: 35 seconds x 2   Side plank (modified on L): 30 sec x 2 ea  Yes   Yes    Quadruped  hip extensions with towel under L foot: x10, B/L (modified position with BUE on EOM and LE on floor)     Quadruped <> bear crawl: 10\" x10   Bear crawl: 5' - fwd/back: x3 rounds     Alternating UE flexion x 10  Alternating LE extension x 10; use of slider L LE            Tall kneel Moderate perturbations all directions  Mini squats 2 x 10 blue TB resistance   Trunk rot: 2x10 w 10lb   D2 chops/lifts: x10  Posterior reach back to cones w cross body reach to PT, 2lb ankle weights on wrists             Akil Kneel  Onto airex pad:   - static hold: 20 seconds x 3   - chest press with beach ball: 2x10   -     Prone Ther-Ex     Core exercises PPT x 5  PPT with ball squeeze 5 sec hold, 10 x  PPT with bridge x 10   PPT w single leg bridge x 10  PPT with heel taps from tabletop position BLE x 10 holding 6lb medball, CGA L LE  PPT with hip abduction isometric L LE x 3 sec hold x10, orange band     Heel slides with towel: 2x10  Triple flex over physioball: x10  Windshield wipers BLE over physioball 2 x 10     STS's from low EOM squeezing pilates ring  - As above, with squeeze, in staggered stance  Marching, squeezing small ball 30 ft x 2    Modified dead bugs, alt UE 2 x 10     Supine B shoulder ext with GTB, emphasis on core activation; 2 x 15    Quadruped:  - Rainbows x 10 ea LE, min cueing for L LE   - Bear crawls, forward/backward                                      Functional " "Strength Testing       30 sec STS test (60y +)  Assessed    NEURO RE-ED  CPT 84357 TOTAL TIME FOR SESSION 8-22 Minutes      FES  To promote neurorecovery of gait:  Distance: 3.01 miles  Resistance: 0.77 Nm  Power: 2.9 W  Symmetry: L 2%  Total time: 21:00  Active time: 19:03    Xcite  NMRE of LLE in function:     - Sit <> stand from elevated EOM using 6 lb med ball: x10, then progressing to lowest height: x10, then x10 in staggered stance  - Modified reverse lunging with slider under R foot in parallel bars with BUE support > RUE support only: x10 reps each  - Tall kneel <> heel sit: 2x10, 2nd set with chest press   - fwd step up with LLE onto 6\" block: x10, repeated with R knee drive, x10 repeated with 3# on RUE     Bioness L300 Go  - Fwd/rev step over 12\" [place]: x20,   - Fwd step up onto 6\" block with contra LE march: 2x10, B         - RLE step up, LLE march with 8lb db overhead press        - LLE step up, RLE march while holding 8lb db x 10, overhead press with 8lb db x 5  - Fwd step down 4\" block: 2x10, orange theraband around knees for manual cues   - stance on airex with lateral L toe taps to 12\" [place]: x10  - Tandem walk 20'x2, 8# on R   - tandem walk on balance beam  - step up on 4\" block, contralateral heel tap to 10\" block   - 12\" and 6\" hurdles, navigating reciprocally     - 6 inch step taps 2 X 10  - 6 inch step up with repeater, cues for L hip stability     12\" hurdles:  - Reciprocal pattern  - side stepping                                 COORDINATION       Target Tapping     Coordination ladder As appropriate for HL balance     Amb with ankle weights     Amb with proprio wrap     Pushing weighted shopping cart     POSTURAL RE-ED     Sit <> Stand  From 22\" mat holding 6# med ball, cues for midline and equal WB      Tall Kneel  Tall kneel <> short sit: x10, x10 with chest press     Seated & standing reaching for trunk strengthening     Abbey-scapular strengthening     PRE-GAIT ACTIVITIES    " "  Tap-ups     Step-ups To 6\" block with contralateral knee drive: x10, repeated with 2 lb DB for UE swing     Standing weight shifting     Step-taps At 6\" main gym steps, step up to 1st step, tap to 3rd with contralateral LE  - w Bioness    BALANCE TRAINING     Standing balance - static/dynamic       HEP Review       Floor       Airex [place]-tandem: 30 seconds x 2, HT: 30\" x 1, EC: 30 seconds x 1  Tandem stance: 30 seconds x 1     Blue tread rockerboard:  - Semi tandem with chops, 6lb                 Rockerboard AP plane: split stance x10, with light UE support, B/L   AP, chest press x 10, OH press x 10, 6lb   Squats 2 x 10   Yes   Hurdles 6\" hurdles, focus on dec L hip circumduction through swing and encouraging heel strike. Step forward/back    Repeated with side stepping pattern, requiring BUE support dud to inability for L knee extension through stance     BOSU Ball  Fwd mini lunge onto domed side: x10, CloseS   Modified lunge onto BOSU with palloff press:   - x10, then repeated x10 with perturbations on yellow sports cord     Split Stance      Biodex Balance Training  Weight shifting and motor control training with increased difficulty noted to L anterolateral planes: x5 mins     Dynamic gait       Side stepping With partial squat with orange theraband around ankles: 10'x4     Retro walking Fwd/retro amb 50'x6 with cues for shorter step length and for heel strike + knee extension through stance, mirror anteriorly, added dual task of beach bal    30ft x 2 ea today with Bioness donned, PT assist to prevent L hip hike and lateral trunk lean with retro amb    2 x 30 ft while holding small physioball, w Bioness  - 2 x 30 ft marching with 6lb medball, w Biones                     Tandem Walking 10'x2, CloseS/CGA     Suitcase carry Using small 3 lb B DB on R: 100'x2, noted delayed L knee extension through IC and mid stance     Marching With pilates ring 3 x 30 ft     Amb with hula hoop Using hula hoop to facilitate BUE " swing and trunk rot     Walking with ball toss     Proprioceptive wrap Blue TB around L LE  - Amb along purple line x 3 with cues to keep feet within tiles  - x 250ft with same cues as above  - Retro amb 3 x 20 ft  - Mini squats with heels elevated x 10, focus on minimizing hyperextension    Balance Testing     FGA  Assessed    SLS  Assessed see note     10mWT Assessed  - (6mWT on 8/26)    GAIT TRAINING  CPT 58338 TOTAL TIME FOR SESSION 8-22 Minutes        Ambulation without AD  Gait with no AD over level indoor surfaces with WBOS and decreased L weight shift, VC for anterolateral weight shift, heel strike, BUE swing and trunk rot, 300'x1  - with bioness L300 lower cuff donned    Yes   Dynamic Gait  20'x6 with focus on shorter step lengths, heel strike and decreasing step width with use of 1' floor tiles, repeated with bimanual ball toss with CloseS and noted decreased gait speed and increase in step lengths     With Bioness donned x 250 ft, carpet and tile   - Good clearance of L LE through swing with improved knee flexion through swing    -  50 ft without for comparison     Treadmill, No UE support, with Bioness donned 2.2 mph x 10 min, [place]   - Bioness donned     Following removal of proprioceptive wrap x 250ft, no instances of L knee hyperextension thrust noted.                                     Stair negotiation  Full flight stairs 2 trials with 1 rail support on descent only Tactile cues for trunk midline, verbal and visual cues for eccentric control    6 inch  Yes   Curb negotiation       Ramp negotiation  with bioness X 30 ft    Outdoor ambulation  With Bioness:  - x500 ft approx, inclines/declines  - Retro amb up incline 2 x 15 ft   - slow controlled descent: 15'x2  Yes to all    MANUAL  CPT 74009 TOTAL TIME FOR SESSION Not performed      Mobilization        MODALITIES  CPT 87654 TOTAL TIME FOR SESSION      Ice       Heat       ATTENDED E-STIM  CPT 65758 TOTAL TIME FOR SESSION 8-22 Minutes      Attended E-Stim FES    [id number] (Age 28) , PIN [phone]   Stim to L quad, hamstring, glutes, tib ant, and gastroc. 250-350 pulse width, 40Hz frequency.   Muscle testing testing completed for initial set up (7/8)    Xcite use for LLE:     Stim to L quad, hamstring, glutes, tib ant, and gastroc. 250-350 pulse width, 40Hz frequency.   Muscle testing testing completed for initial set up 7/26)    "[employer] L300 go.   L lower leg quick fit pads, L hamstring added 8/2 ( lower leg cuff only today)   30 Hz with 0.2 ramp for loading response   Used tablet with strap   Increased time for set up/ alignment                                 Yes                  Unattended E-stim

## 2024-10-07 NOTE — OP PT TREATMENT LOG
PT Neuro Exercises Current Session   Performed Today? (y/n)   THER ACT   CPT 56696 TOTAL TIME FOR SESSION 0-7 Minutes      Pain, vitals, subjective    Provided rest breaks for energy conservation yes   Skin inspection Base of incision, no swelling noted    FES  cycle [id number] (Age 28) , PIN [id number]   Bike height: 2 holes showing  Pedal height: 3 holes showing  L LE set up only    FES Xcite Set Up              [facility] Gabi Patrick, PT from [facility] present for session.     Discussed options for insurance coverage vs out of pocket costs. Process to proceed. T/o session     Bioness L300 Go set up   - education on LMN signature with therapist and physician           Yes  Yes   Patient Education Patient educated regarding current impairments per testing completed today and PT POC moving forward.     Patient provided with Welcome Letter, which includes attendance policy. Provided education regarding cancellation and no-show policy. Education regarding the importance of participation and regular attendance to maximize goal attainment. Patient verbalized understanding/agreement.     Clearance for FES received, will be completed NV, start with handheld unit to assess tolerance prior to FES bike. Pt verbalized understanding.    Patient educated regarding progress made thus far, current impairments per re-assessments completed today and PT POC moving forward. Patient verbalized understanding/agreement.     Discussion regarding [employer] scheduling and scheduling with [employer] rep. PT to find out next time [employer] rep is at [facility] to adjust pt schedule. Will provide with forms as well.     Discussion of ordering process for [employer].    Patient educated on performance with objective assessments completed during session. Pt verbalized understanding                                                 HEP  Verbally reviewed practicing gait over level surfaces with decreased step width, and then incorporating BUE swing and trunk  "rot. Pt verbalized understanding.     Floor Transfers  CloseS with increased time for LLE management, requires single UE support with review of moving from tall kneel to half kneeling position. Pt demos good understanding, will require continued practice     Patient able to complete floor transfer independently, walked hands down to floor into quadruped. Completed opposite to stand back up.     Subjective Outcomes Measures       ABC Scale Assessed     THER EX  CPT 36566 TOTAL TIME FOR SESSION  8-22 Minutes      STRETCHING      Stretching by patient Incline board stretch: L3 - 1 min x2  L QL stretch with small physioball 10 x 3 sec hold  L Q/L stretch seated in with passive overpressure: 30 seconds x 3         Stretching by therapist/PROM Trialed modified jose stretch - not effective (pt states modified lunge position she has felt more of a stretch in hip flexors in the past)      CARDIOVASCULAR       Nu Step 6 mins, Level: 1-2, BLE only     Stationary Bike Recumbent bike 5 min, L1  (Unable to complete revolution on [employer] upright bike)    Ambulation with AD      Treadmill VR: 10 minutes, speed: 2.3 mph, [place]  - VC to increase nancy   - progressing from light RUE > unsupported at 5 min slime   - using Bioness L300     2 mins of side stepping up incline leading with LLE using Bioness L300 Go  Yes           Yes   Endurance Testing       6mWT Assessed    STRENGTH TRAINING     HEP Review     Standing Ther-Ex Standing hip abduction 2 x 10 ea  - standing on airex   - Second set, no UE support     Step ups 6\" block x 20   - Added L LE TKE w orange TB    Lateral step down, 4\" block, L LE only w orange band TKE x 10    Hip hikes 2\" block, VC's to discourage R hip hike     Heel raises from L2 on incline board x 15 (on floor today)    Heel raises: 2x15, small weighted ball between heels     Staggered STS w pilates ring 2 x 10, VC's to abd L knee into PT's hand    Squat into BL heel raise x 15 w 6lb     Modified " "deadlift (B-stance) LLE leading anteriorly: x10, 18lbs                  Yes                            Yes     Side-lying there-ex Clamshells: 2x20 on R, L in supine: 5\"x10      Supine Ther-Ex Diaphragmatic breathin mins due to elevated BP  Glut bridge: 3\" x10, orange band  Bridge: x10   Supine hip abduction x 10, LLE, isometrics today  BKFO: x10, orange t-band only for R, active assist on L   Clamshells, x 10 ea LE  Bridge with ball squeeze x 10                  Deadbugs with physioball  - isometric holds: 10\" x 10, physioball under B heels   - alt LE heels taps x 10   - alt UE: x10 reps       Planks  Full plank on hands: 35 seconds x 2   Side plank (modified on L): 30 sec x 2 ea  Yes   Yes    Quadruped  hip extensions with towel under L foot: x10, B/L (modified position with BUE on EOM and LE on floor)     Quadruped <> bear crawl: 10\" x10   Bear crawl: 5' - fwd/back: x3 rounds     Alternating UE flexion x 10  Alternating LE extension x 10; use of slider L LE            Tall kneel Moderate perturbations all directions  Mini squats 2 x 10 blue TB resistance   Trunk rot: 2x10 w 10lb   D2 chops/lifts: x10  Posterior reach back to cones w cross body reach to PT, 2lb ankle weights on wrists             Akil Kneel  Onto airex pad:   - static hold: 20 seconds x 3   - chest press with beach ball: 2x10   -     Prone Ther-Ex     Core exercises PPT x 5  PPT with ball squeeze 5 sec hold, 10 x  PPT with bridge x 10   PPT w single leg bridge x 10  PPT with heel taps from tabletop position BLE x 10 holding 6lb medball, CGA L LE  PPT with hip abduction isometric L LE x 3 sec hold x10, orange band     Heel slides with towel: 2x10  Triple flex over physioball: x10  Windshield wipers BLE over physioball 2 x 10     STS's from low EOM squeezing pilates ring  - As above, with squeeze, in staggered stance  Marching, squeezing small ball 30 ft x 2    Modified dead bugs, alt UE 2 x 10     Supine B shoulder ext with GTB, emphasis on core " "activation; 2 x 15    Quadruped:  - Rainbows x 10 ea LE, min cueing for L LE   - Bear crawls, forward/backward                                      Functional Strength Testing       30 sec STS test (60y +)  Assessed    NEURO RE-ED  CPT 32326 TOTAL TIME FOR SESSION 8-22 Minutes      FES  To promote neurorecovery of gait:  Distance: 3.01 miles  Resistance: 0.77 Nm  Power: 2.9 W  Symmetry: L 2%  Total time: 21:00  Active time: 19:03    Xcite  NMRE of LLE in function:     - Sit <> stand from elevated EOM using 6 lb med ball: x10, then progressing to lowest height: x10, then x10 in staggered stance  - Modified reverse lunging with slider under R foot in parallel bars with BUE support > RUE support only: x10 reps each  - Tall kneel <> heel sit: 2x10, 2nd set with chest press   - fwd step up with LLE onto 6\" block: x10, repeated with R knee drive, x10 repeated with 3# on RUE     Bioness L300 Go  - Fwd/rev step over 12\" [place]: x20,   - Fwd step up onto 6\" block with contra LE march: 2x10, B         - RLE step up, LLE march with 8lb db overhead press        - LLE step up, RLE march while holding 8lb db x 10, overhead press with 8lb db x 5  - Fwd step down 4\" block: 2x10, orange theraband around knees for manual cues   - stance on airex with lateral L toe taps to 12\" [place]: x10  - Tandem walk 20'x2, 8# on R   - tandem walk on balance beam  - step up on 4\" block, contralateral heel tap to 10\" block   - 12\" and 6\" hurdles, navigating reciprocally     - 6 inch step taps 2 X 10  - 6 inch step up with repeater, cues for L hip stability     12\" hurdles:  - Reciprocal pattern  - side stepping                                 COORDINATION       Target Tapping     Coordination ladder As appropriate for HL balance     Amb with ankle weights     Amb with proprio wrap     Pushing weighted shopping cart     POSTURAL RE-ED     Sit <> Stand  From 22\" mat holding 6# med ball, cues for midline and equal WB      Tall Kneel  Tall " "kneel <> short sit: x10, x10 with chest press     Seated & standing reaching for trunk strengthening     Abbey-scapular strengthening     PRE-GAIT ACTIVITIES      Tap-ups     Step-ups To 6\" block with contralateral knee drive: x10, repeated with 2 lb DB for UE swing     Standing weight shifting     Step-taps At 6\" main gym steps, step up to 1st step, tap to 3rd with contralateral LE  - w Bioness    BALANCE TRAINING     Standing balance - static/dynamic       HEP Review       Floor       Airex [place]-tandem: 30 seconds x 2, HT: 30\" x 1, EC: 30 seconds x 1  Tandem stance: 30 seconds x 1     Blue tread rockerboard:  - Semi tandem with chops, 6lb                 Rockerboard AP plane: split stance x10, with light UE support, B/L   AP, chest press x 10, OH press x 10, 6lb   Squats 2 x 10   Yes   Hurdles 6\" hurdles, focus on dec L hip circumduction through swing and encouraging heel strike. Step forward/back    Repeated with side stepping pattern, requiring BUE support dud to inability for L knee extension through stance     BOSU Ball  Fwd mini lunge onto domed side: x10, CloseS   Modified lunge onto BOSU with palloff press:   - x10, then repeated x10 with perturbations on yellow sports cord     Split Stance      [employer] Balance Training  Weight shifting and motor control training with increased difficulty noted to L anterolateral planes: x5 mins     Dynamic gait       Side stepping With partial squat with orange theraband around ankles: 10'x4     Retro walking Fwd/retro amb 50'x6 with cues for shorter step length and for heel strike + knee extension through stance, mirror anteriorly, added dual task of beach bal    30ft x 2 ea today with Bioness donned, PT assist to prevent L hip hike and lateral trunk lean with retro amb    2 x 30 ft while holding small physioball, w Bioness  - 2 x 30 ft marching with 6lb medball, w Biones                     Tandem Walking 10'x2, CloseS/CGA     Suitcase carry Using small 3 lb B DB on R: " 100'x2, noted delayed L knee extension through IC and mid stance     Marching With pilates ring 3 x 30 ft     Amb with hula hoop Using hula hoop to facilitate BUE swing and trunk rot     Walking with ball toss     Proprioceptive wrap Blue TB around L LE  - Amb along purple line x 3 with cues to keep feet within tiles  - x 250ft with same cues as above  - Retro amb 3 x 20 ft  - Mini squats with heels elevated x 10, focus on minimizing hyperextension    Balance Testing     FGA  Assessed    SLS  Assessed see note     10mWT Assessed  - (6mWT on 8/26)    GAIT TRAINING  CPT 26372 TOTAL TIME FOR SESSION 8-22 Minutes        Ambulation without AD  Gait with no AD over level indoor surfaces with WBOS and decreased L weight shift, VC for anterolateral weight shift, heel strike, BUE swing and trunk rot, 300'x1  - with bioness L300 lower cuff donned    Yes   Dynamic Gait  20'x6 with focus on shorter step lengths, heel strike and decreasing step width with use of 1' floor tiles, repeated with bimanual ball toss with CloseS and noted decreased gait speed and increase in step lengths     With Bioness donned x 250 ft, carpet and tile   - Good clearance of L LE through swing with improved knee flexion through swing    -  50 ft without for comparison     Treadmill, No UE support, with Bioness donned 2.2 mph x 10 min, [place]   - Bioness donned     Following removal of proprioceptive wrap x 250ft, no instances of L knee hyperextension thrust noted.                                     Stair negotiation  Full flight stairs 2 trials with 1 rail support on descent only Tactile cues for trunk midline, verbal and visual cues for eccentric control    6 inch  Yes   Curb negotiation       Ramp negotiation  with bioness X 30 ft    Outdoor ambulation  With Bioness:  - x500 ft approx, inclines/declines  - Retro amb up incline 2 x 15 ft   - slow controlled descent: 15'x2  Yes to all    MANUAL  CPT 77869 TOTAL TIME FOR SESSION Not performed       Mobilization        MODALITIES  CPT 24078 TOTAL TIME FOR SESSION      Ice       Heat       ATTENDED E-STIM  CPT 66994 TOTAL TIME FOR SESSION 8-22 Minutes     Attended E-Stim FES    [id number] (Age 28) , PIN [phone]   Stim to L quad, hamstring, glutes, tib ant, and gastroc. 250-350 pulse width, 40Hz frequency.   Muscle testing testing completed for initial set up (7/8)    Xcite use for LLE:     Stim to L quad, hamstring, glutes, tib ant, and gastroc. 250-350 pulse width, 40Hz frequency.   Muscle testing testing completed for initial set up 7/26)    [employer] L300 go.   L lower leg quick fit pads, L hamstring added 8/2 ( lower leg cuff only today)   30 Hz with 0.2 ramp for loading response   Used tablet with strap   Increased time for set up/ alignment                                 Yes                  Unattended E-stim

## 2024-10-10 ENCOUNTER — HOSPITAL ENCOUNTER (OUTPATIENT)
Dept: PHYSICAL THERAPY | Facility: REHABILITATION | Age: 29
Setting detail: THERAPIES SERIES
Discharge: HOME | End: 2024-10-10
Attending: LEGAL MEDICINE
Payer: COMMERCIAL

## 2024-10-10 DIAGNOSIS — Z86.69 HISTORY OF TETHERED SPINAL CORD: Primary | ICD-10-CM

## 2024-10-10 PROCEDURE — 97110 THERAPEUTIC EXERCISES: CPT | Mod: GP

## 2024-10-10 NOTE — OP PT TREATMENT LOG
PT Neuro Exercises Current Session   Performed Today? (y/n)   THER ACT   CPT 83054 TOTAL TIME FOR SESSION 0-7 Minutes      Pain, vitals, subjective    Provided rest breaks for energy conservation yes   Skin inspection Base of incision, no swelling noted    FES  cycle [id number] (Age 28) , PIN [phone]   Bike height: 2 holes showing  Pedal height: 3 holes showing  L LE set up only    FES Xcite Set Up              [facility] for [facility].     Discussed options for insurance coverage vs out of pocket costs. Process to proceed. T/o session     [employer] L300 Go set up   - education on LMN signature with therapist and physician        Patient Education Patient educated regarding current impairments per testing completed today and PT POC moving forward.     Patient provided with Welcome Letter, which includes attendance policy. Provided education regarding cancellation and no-show policy. Education regarding the importance of participation and regular attendance to maximize goal attainment. Patient verbalized understanding/agreement.     Clearance for FES received, will be completed NV, start with handheld unit to assess tolerance prior to FES bike. Pt verbalized understanding.    Patient educated regarding progress made thus far, current impairments per re-assessments completed today and PT POC moving forward. Patient verbalized understanding/agreement.     Discussion regarding Equestrian scheduling and scheduling with [employer] rep. PT to find out next time [employer] rep is at [facility] to adjust pt schedule. Will provide with forms as well.     Discussion of ordering process for [employer].    Patient educated on performance with objective assessments completed during session. Pt verbalized understanding                                                 HEP  Verbally reviewed practicing gait over level surfaces with decreased step width, and then incorporating BUE swing and trunk rot. Pt  "verbalized understanding.     Floor Transfers  CloseS with increased time for LLE management, requires single UE support with review of moving from tall kneel to half kneeling position. Pt demos good understanding, will require continued practice     Patient able to complete floor transfer independently, walked hands down to floor into quadruped. Completed opposite to stand back up.     Subjective Outcomes Measures       ABC Scale Assessed     THER EX  CPT 74285 TOTAL TIME FOR SESSION  53-67 Minutes      STRETCHING      Stretching by patient Incline board stretch: L3 - 1 min x2  L QL stretch with small physioball 10 x 3 sec hold  L Q/L stretch seated in with passive overpressure: 30 seconds x 3         Stretching by therapist/PROM Trialed modified jose stretch - not effective (pt states modified lunge position she has felt more of a stretch in hip flexors in the past)      CARDIOVASCULAR       Nu Step 12 mins, Level: 4, BLE only, SPM: >25  Yes   Stationary Bike Recumbent bike 5 min, L1  (Unable to complete revolution on Expresso upright bike)    Ambulation with AD      Treadmill VR: 10 minutes, speed: 2.3 mph, [place]  - VC to increase nancy   - progressing from light RUE > unsupported at 5 min slime   - using Bioness L300     2 mins of side stepping up incline leading with LLE using Bioness L300 Go     Endurance Testing       6mWT Assessed    STRENGTH TRAINING     HEP Review     Standing Ther-Ex Standing hip abduction 2 x 10 ea  - standing on airex   - Second set, no UE support     Step ups 6\" block x 20   - Added L LE TKE w orange TB    Lateral step down, 4\" block, L LE only w orange band TKE x 10    Hip hikes 2\" block, VC's to discourage R hip hike     Heel raises from L2 on incline board x 15 (on floor today)    Heel raises: 2x15, small weighted ball between heels     Staggered STS w pilates ring 2 x 10, VC's to abd L knee into PT's hand    Squat into BL heel raise x 15 w 6lb     Modified deadlift " "(B-stance) LLE leading anteriorly: x10, 18lbs                        Y                      Y     Side-lying there-ex Clamshells: 2x20 on R, L in supine: 5\"x10      Supine Ther-Ex Diaphragmatic breathin mins due to elevated BP  Glut bridge: 3\" x10, orange band  Bridge: x10   Supine hip abduction x 10, LLE, isometrics today  BKFO: x10, orange t-band only for R, active assist on L   Clamshells, x 10 ea LE  Bridge with ball squeeze x 10       Yes           Deadbugs with physioball  - isometric holds: 10\" x 10, physioball under B heels   - alt LE heels taps: 2x5   - alt UE: x10 reps    Y  Y  Y   Planks  Full plank on hands: 35 seconds x 2  Plank with lateral weight pull: x5, b/l, 4#  Side plank (modified on L): 30 sec x 2 ea  Yes   Yes    Quadruped  hip extensions with towel under L foot: x10, B/L (modified position with BUE on EOM and LE on floor)     Quadruped <> bear crawl: 10\" x10   Bear crawl: 5' - fwd/back: x3 rounds     Alternating UE flexion x 10  Alternating LE extension x 10; use of slider L LE       Yes         Tall kneel Moderate perturbations all directions  Mini squats 2 x 10 blue TB resistance   Trunk rot: 2x10 w 10lb   D2 chops/lifts: x10  Posterior reach back to cones w cross body reach to PT, 2lb ankle weights on wrists             Akil Kneel  Onto airex pad:   - static hold: 30 seconds x 3   - chest press with beach ball: 2x10 (no)  - D2 flexion: x10, B/L, 4#  - passing 4# from L to R: x10  Yes to all    Prone Ther-Ex     Core exercises PPT x 5  PPT with ball squeeze 5 sec hold, 10 x  PPT with bridge x 10   PPT w single leg bridge x 10  PPT with heel taps from tabletop position BLE x 10 holding 6lb medball, CGA L LE  PPT with hip abduction isometric L LE x 3 sec hold x10, orange band     Heel slides with towel: 2x10  Triple flex over physioball: x10  Windshield wipers BLE over physioball 2 x 10     STS's from low EOM squeezing pilates ring  - As above, with squeeze, in staggered stance  Marching, " "squeezing small ball 30 ft x 2    Modified dead bugs, alt UE 2 x 10     Supine B shoulder ext with GTB, emphasis on core activation; 2 x 15    Quadruped:  - Rainbows x 10 ea LE, min cueing for L LE   - Bear crawls, forward/backward                                      Functional Strength Testing       30 sec STS test (60y +)  Assessed    NEURO RE-ED  CPT 72938 TOTAL TIME FOR SESSION Not performed      FES  To promote neurorecovery of gait:  Distance: 3.01 miles  Resistance: 0.77 Nm  Power: 2.9 W  Symmetry: L 2%  Total time: 21:00  Active time: 19:03    Xcite  NMRE of LLE in function:     - Sit <> stand from elevated EOM using 6 lb med ball: x10, then progressing to lowest height: x10, then x10 in staggered stance  - Modified reverse lunging with slider under R foot in parallel bars with BUE support > RUE support only: x10 reps each  - Tall kneel <> heel sit: 2x10, 2nd set with chest press   - fwd step up with LLE onto 6\" block: x10, repeated with R knee drive, x10 repeated with 3# on RUE     Bioness L300 Go  - Fwd/rev step over 12\" [place]: x20,   - Fwd step up onto 6\" block with contra LE march: 2x10, B         - RLE step up, LLE march with 8lb db overhead press        - LLE step up, RLE march while holding 8lb db x 10, overhead press with 8lb db x 5  - Fwd step down 4\" block: 2x10, orange theraband around knees for manual cues   - stance on airex with lateral L toe taps to 12\" [place]: x10  - Tandem walk 20'x2, 8# on R   - tandem walk on balance beam  - step up on 4\" block, contralateral heel tap to 10\" block   - 12\" and 6\" hurdles, navigating reciprocally     - 6 inch step taps 2 X 10  - 6 inch step up with repeater, cues for L hip stability     12\" hurdles:  - Reciprocal pattern  - side stepping                                 COORDINATION       Target Tapping     Coordination ladder As appropriate for HL balance     Amb with ankle weights     Amb with proprio wrap     Pushing weighted shopping cart   " "  POSTURAL RE-ED     Sit <> Stand  From 22\" mat holding 6# med ball, cues for midline and equal WB      Tall Kneel  Tall kneel <> short sit: x10, x10 with chest press     Seated & standing reaching for trunk strengthening     Abbey-scapular strengthening     PRE-GAIT ACTIVITIES      Tap-ups     Step-ups To 6\" block with contralateral knee drive: x10, repeated with 2 lb DB for UE swing     Standing weight shifting     Step-taps At 6\" main gym steps, step up to 1st step, tap to 3rd with contralateral LE  - w Bioness    BALANCE TRAINING     Standing balance - static/dynamic       HEP Review       Floor       Airex [place]-tandem: 30 seconds x 2, HT: 30\" x 1, EC: 30 seconds x 1  Tandem stance: 30 seconds x 1     Blue tread rockerboard:  - Semi tandem with chops, 6lb                 Rockerboard AP plane: split stance x10, with light UE support, B/L   AP, chest press x 10, OH press x 10, 6lb   Squats 2 x 10   Yes   Hurdles 6\" hurdles, focus on dec L hip circumduction through swing and encouraging heel strike. Step forward/back    Repeated with side stepping pattern, requiring BUE support dud to inability for L knee extension through stance     BOSU Ball  Fwd mini lunge onto domed side: x10, CloseS   Modified lunge onto BOSU with palloff press:   - x10, then repeated x10 with perturbations on yellow sports cord     Split Stance      [employer] Balance Training  Weight shifting and motor control training with increased difficulty noted to L anterolateral planes: x5 mins     Dynamic gait       Side stepping With partial squat with orange theraband around ankles: 10'x4     Retro walking Fwd/retro amb 50'x6 with cues for shorter step length and for heel strike + knee extension through stance, mirror anteriorly, added dual task of beach bal    30ft x 2 ea today with Bioness donned, PT assist to prevent L hip hike and lateral trunk lean with retro amb    2 x 30 ft while holding small physioball, w Bioness  - 2 x 30 ft marching with 6lb " medball, w Biones                     Tandem Walking 10'x2, CloseS/CGA     Suitcase carry Using small 3 lb B DB on R: 100'x2, noted delayed L knee extension through IC and mid stance     Marching With pilates ring 3 x 30 ft     Amb with hula hoop Using hula hoop to facilitate BUE swing and trunk rot     Walking with ball toss     Proprioceptive wrap Blue TB around L LE  - Amb along purple line x 3 with cues to keep feet within tiles  - x 250ft with same cues as above  - Retro amb 3 x 20 ft  - Mini squats with heels elevated x 10, focus on minimizing hyperextension    Balance Testing     FGA  Assessed    SLS  Assessed see note     10mWT Assessed  - (6mWT on 8/26)    GAIT TRAINING  CPT 78233 TOTAL TIME FOR SESSION Not performed        Ambulation without AD  Gait with no AD over level indoor surfaces with WBOS and decreased L weight shift, VC for anterolateral weight shift, heel strike, BUE swing and trunk rot, 300'x1  - with bioness L300 lower cuff donned       Dynamic Gait  20'x6 with focus on shorter step lengths, heel strike and decreasing step width with use of 1' floor tiles, repeated with bimanual ball toss with CloseS and noted decreased gait speed and increase in step lengths     With Bioness donned x 250 ft, carpet and tile   - Good clearance of L LE through swing with improved knee flexion through swing    -  50 ft without for comparison     Treadmill, No UE support, with Bioness donned 2.2 mph x 10 min, [place]   - Bioness donned     Following removal of proprioceptive wrap x 250ft, no instances of L knee hyperextension thrust noted.                                     Stair negotiation  Full flight stairs 2 trials with 1 rail support on descent only Tactile cues for trunk midline, verbal and visual cues for eccentric control    6 inch    Curb negotiation      Ramp negotiation  with bioness X 30 ft    Outdoor ambulation  With Bioness:  - x500 ft approx, inclines/declines  - Retro amb up incline 2 x 15  ft   - slow controlled descent: 15'x2     MANUAL  CPT 93743 TOTAL TIME FOR SESSION Not performed      Mobilization        MODALITIES  CPT 02769 TOTAL TIME FOR SESSION      Ice       Heat       ATTENDED E-STIM  CPT 50864 TOTAL TIME FOR SESSION Not performed     Attended E-Stim FES    [id number] (Age 28) , PIN [phone]   Stim to L quad, hamstring, glutes, tib ant, and gastroc. 250-350 pulse width, 40Hz frequency.   Muscle testing testing completed for initial set up (7/8)    Xcite use for LLE:     Stim to L quad, hamstring, glutes, tib ant, and gastroc. 250-350 pulse width, 40Hz frequency.   Muscle testing testing completed for initial set up 7/26)    [employer] L300 go.   L lower leg quick fit pads, L hamstring added 8/2 ( lower leg cuff only today)   30 Hz with 0.2 ramp for loading response   Used tablet with strap   Increased time for set up/ alignment                                 Yes                  Unattended E-stim

## 2024-10-10 NOTE — PROGRESS NOTES
Physical Therapy Visit    PT DAILY NOTE FOR OUTPATIENT THERAPY    Patient: Melanie Litten MRN: [id number]  : 1995 28 y.o.  Referring Physician: Remedios Whitfield MD  Date of Visit: 10/10/2024    Certification Dates: 24 through 24    Diagnosis:   1. History of tethered spinal cord        Chief Complaints:  EDS, LLE weakness, gait/balance impairment    Precautions:   Precautions comments: hypermobile - EDS, postural hypotension      TODAY'S VISIT    Time In Session:  Start Time: 806  Stop Time: 900  Time Calculation (min): 54 min   History/Vitals/Pain/Encounter Info - 10/10/24 0804          Injury History/Precautions/Daily Required Info    Document Type daily treatment     Primary Therapist Dilan Machuca     Chief Complaint/Reason for Visit  EDS, LLE weakness, gait/balance impairment     Referring Physician Remedios Whitfield MD     Precautions comments hypermobile - EDS, postural hypotension     History of present illness/functional impairment Saranya is a 28 year old female who presents to OPPT with history of tethered cord syndrome. Pt with PMH significant for chiari malformation, hypermobile EDS, left plantar fasciitis, anxiety and postural hypotension. Pt reports her symptoms started slowly in 2019 with L hip pain, followed up with neurosurgeon in [place] with unremarkable results for all testing. Her symptoms then progressed with L leg motor weakness and shakiness (similar to clonus) with movement, chronic constipation, L drop foot, lower back pain, and neck pain.  Pt was finally diagnosed with tethered cord syndrome and underwent a clinical trial surgery at [facility], [facility], and [facility]. Due to her symptoms, she meets criteria for exploration and sectioning of her filum for the functional tethered cord clinical trial. Now s/p laminectomy for occult tethered cord release on 23 by Dr Duckworth. Patient tolerated procedure well.  No post-op complications. Transferred stable once PACU criteria met. Patient kept FLAT for 36hr. Placed on an aseptic dex taper and post-op IV abx x24hrs.  She was mobilized on 9/27 tolerated well without any symptoms. Additionally, was instructed not to exercise for 7 weeks post-surgery. Pt reports she recovered well overall with decreased drop foot, clonus and swelling, continues to have increased coordination deficits and foot drag with fatigue. Pt has completed OPPT at [facility] in [place] and intermittent massage therapy. Pt’s functional goals are to work on higher level balance, picking up things from floor, coordination and “Hippo therapy”.     Patient/Family/Caregiver Comments/Observations Pt reports she completed eye testing and is waiting on further imaging next week.     Patient reported fall since last visit No        Pain Assessment    Currently in pain No/Denies                    Daily Treatment Assessment and Plan - 10/10/24 0804          Daily Treatment Assessment and Plan    Progress toward goals Progressing     Daily Outcome Summary Pt tolerated progressive core and proximal hip strengthening today with cues for neutral pelvis and L hip extension in quadruped and half kneeling. Pt continues to demonstrate increased difficulty with L lateral trunk and proximal hip strength and motor control deficits with addition of R rotation.     Plan and Recommendations Bioness as able, L LE strenghtening (proximal hip), dual tasking, core stabilization                         OBJECTIVE DATA TAKEN TODAY:    None taken    Today's Treatment:       PT Neuro Exercises Current Session   Performed Today? (y/n)   THER ACT   CPT 22513 TOTAL TIME FOR SESSION 0-7 Minutes      Pain, vitals, subjective    Provided rest breaks for energy conservation yes   Skin inspection Base of incision, no swelling noted    FES  cycle [id number] (Age 28) , PIN [id number]   Bike height: 2 holes showing  Pedal height: 3 holes showing  L LE  set up only    FES Xcite Set Up              [employer] Gabi Patrick, [facility] present for session.     Discussed options for insurance coverage vs out of pocket costs. Process to proceed. T/o session     Teodoro L300 Go set up   - education on LMN signature with therapist and physician        Patient Education Patient educated regarding current impairments per testing completed today and PT POC moving forward.     Patient provided with Welcome Letter, which includes attendance policy. Provided education regarding cancellation and no-show policy. Education regarding the importance of participation and regular attendance to maximize goal attainment. Patient verbalized understanding/agreement.     Clearance for FES received, will be completed [place], start with handheld unit to assess tolerance prior to FES bike. Pt verbalized understanding.    Patient educated regarding progress made thus far, current impairments per re-assessments completed today and PT POC moving forward. Patient verbalized understanding/agreement.     Discussion regarding [employer] scheduling and scheduling with [facility]. PT to find out next time [facility] rep is at [facility] to adjust pt schedule. Will provide with forms as well.     Discussion of ordering process for [employer].    Patient educated on performance with objective assessments completed during session. Pt verbalized understanding                                                 HEP  Verbally reviewed practicing gait over level surfaces with decreased step width, and then incorporating BUE swing and trunk rot. Pt verbalized understanding.     Floor Transfers  CloseS with increased time for LLE management, requires single UE support with review of moving from tall kneel to half kneeling position. Pt demos good understanding, will require continued practice     Patient able to complete floor transfer independently, walked hands down to floor into quadruped. Completed opposite to stand  "back up.     Subjective Outcomes Measures       ABC Scale Assessed     THER EX  CPT 73086 TOTAL TIME FOR SESSION  53-67 Minutes      STRETCHING      Stretching by patient Incline board stretch: L3 - 1 min x2  L QL stretch with small physioball 10 x 3 sec hold  L Q/L stretch seated in with passive overpressure: 30 seconds x 3         Stretching by therapist/PROM Trialed modified jose stretch - not effective (pt states modified lunge position she has felt more of a stretch in hip flexors in the past)      CARDIOVASCULAR       Nu Step 12 mins, Level: 4, BLE only, SPM: >25  Yes   Stationary Bike Recumbent bike 5 min, L1  (Unable to complete revolution on Expresso upright bike)    Ambulation with AD      Treadmill VR: 10 minutes, speed: 2.3 mph, [place]  - VC to increase nancy   - progressing from light RUE > unsupported at 5 min slime   - using Bioness L300     2 mins of side stepping up incline leading with LLE using Bioness L300 Go     Endurance Testing       6mWT Assessed    STRENGTH TRAINING     HEP Review     Standing Ther-Ex Standing hip abduction 2 x 10 ea  - standing on airex   - Second set, no UE support     Step ups 6\" block x 20   - Added L LE TKE w orange TB    Lateral step down, 4\" block, L LE only w orange band TKE x 10    Hip hikes 2\" block, VC's to discourage R hip hike     Heel raises from L2 on incline board x 15 (on floor today)    Heel raises: 2x15, small weighted ball between heels     Staggered STS w pilates ring 2 x 10, VC's to abd L knee into PT's hand    Squat into BL heel raise x 15 w 6lb     Modified deadlift (B-stance) LLE leading anteriorly: x10, 18lbs                        Y                      Y     Side-lying there-ex Clamshells: 2x20 on R, L in supine: 5\"x10      Supine Ther-Ex Diaphragmatic breathin mins due to elevated BP  Glut bridge: 3\" x10, orange band  Bridge: x10   Supine hip abduction x 10, LLE, isometrics today  BKFO: x10, orange t-band only for R, active assist on " "L   Clamshells, x 10 ea LE  Bridge with ball squeeze x 10       Yes           Deadbugs with physioball  - isometric holds: 10\" x 10, physioball under B heels   - alt LE heels taps: 2x5   - alt UE: x10 reps    Y  Y  Y   Planks  Full plank on hands: 35 seconds x 2  Plank with lateral weight pull: x5, b/l, 4#  Side plank (modified on L): 30 sec x 2 ea  Yes   Yes    Quadruped  hip extensions with towel under L foot: x10, B/L (modified position with BUE on EOM and LE on floor)     Quadruped <> bear crawl: 10\" x10   Bear crawl: 5' - fwd/back: x3 rounds     Alternating UE flexion x 10  Alternating LE extension x 10; use of slider L LE       Yes         Tall kneel Moderate perturbations all directions  Mini squats 2 x 10 blue TB resistance   Trunk rot: 2x10 w 10lb   D2 chops/lifts: x10  Posterior reach back to cones w cross body reach to PT, 2lb ankle weights on wrists             Akil Kneel  Onto airex pad:   - static hold: 30 seconds x 3   - chest press with beach ball: 2x10 (no)  - D2 flexion: x10, B/L, 4#  - passing 4# from L to R: x10  Yes to all    Prone Ther-Ex     Core exercises PPT x 5  PPT with ball squeeze 5 sec hold, 10 x  PPT with bridge x 10   PPT w single leg bridge x 10  PPT with heel taps from tabletop position BLE x 10 holding 6lb medball, CGA L LE  PPT with hip abduction isometric L LE x 3 sec hold x10, orange band     Heel slides with towel: 2x10  Triple flex over physioball: x10  [facility] wipers BLE over physioball 2 x 10     STS's from low EOM squeezing pilates ring  - As above, with squeeze, in staggered stance  Marching, squeezing small ball 30 ft x 2    Modified dead bugs, alt UE 2 x 10     Supine B shoulder ext with GTB, emphasis on core activation; 2 x 15    Quadruped:  - Rainbows x 10 ea LE, min cueing for L LE   - Bear crawls, forward/backward                                      Functional Strength Testing       30 sec STS test (60y +)  Assessed    NEURO RE-ED  CPT 15683 TOTAL TIME FOR SESSION " "Not performed      FES  To promote neurorecovery of gait:  Distance: 3.01 miles  Resistance: 0.77 Nm  Power: 2.9 W  Symmetry: L 2%  Total time: 21:00  Active time: 19:03    Xcite  NMRE of LLE in function:     - Sit <> stand from elevated EOM using 6 lb med ball: x10, then progressing to lowest height: x10, then x10 in staggered stance  - Modified reverse lunging with slider under R foot in parallel bars with BUE support > RUE support only: x10 reps each  - Tall kneel <> heel sit: 2x10, 2nd set with chest press   - fwd step up with LLE onto 6\" block: x10, repeated with R knee drive, x10 repeated with 3# on RUE     Bioness L300 Go  - Fwd/rev step over 12\" [place]: x20,   - Fwd step up onto 6\" block with contra LE march: 2x10, B         - RLE step up, LLE march with 8lb db overhead press        - LLE step up, RLE march while holding 8lb db x 10, overhead press with 8lb db x 5  - Fwd step down 4\" block: 2x10, orange theraband around knees for manual cues   - stance on airex with lateral L toe taps to 12\" [place]: x10  - Tandem walk 20'x2, 8# on R   - tandem walk on balance beam  - step up on 4\" block, contralateral heel tap to 10\" block   - 12\" and 6\" hurdles, navigating reciprocally     - 6 inch step taps 2 X 10  - 6 inch step up with repeater, cues for L hip stability     12\" hurdles:  - Reciprocal pattern  - side stepping                                 COORDINATION       Target Tapping     Coordination ladder As appropriate for HL balance     Amb with ankle weights     Amb with proprio wrap     Pushing weighted shopping cart     POSTURAL RE-ED     Sit <> Stand  From 22\" mat holding 6# med ball, cues for midline and equal WB      Tall Kneel  Tall kneel <> short sit: x10, x10 with chest press     Seated & standing reaching for trunk strengthening     Abbey-scapular strengthening     PRE-GAIT ACTIVITIES      Tap-ups     Step-ups To 6\" block with contralateral knee drive: x10, repeated with 2 lb DB for UE swing " "    Standing weight shifting     Step-taps At 6\" main gym steps, step up to 1st step, tap to 3rd with contralateral LE  - w Bioness    BALANCE TRAINING     Standing balance - static/dynamic       HEP Review       Floor       Airex [place]-tandem: 30 seconds x 2, HT: 30\" x 1, EC: 30 seconds x 1  Tandem stance: 30 seconds x 1     Blue tread rockerboard:  - Semi tandem with chops, 6lb                 Rockerboard AP plane: split stance x10, with light UE support, B/L   AP, chest press x 10, OH press x 10, 6lb   Squats 2 x 10   Yes   Hurdles 6\" hurdles, focus on dec L hip circumduction through swing and encouraging heel strike. Step forward/back    Repeated with side stepping pattern, requiring BUE support dud to inability for L knee extension through stance     BOSU Ball  Fwd mini lunge onto domed side: x10, CloseS   Modified lunge onto BOSU with palloff press:   - x10, then repeated x10 with perturbations on yellow sports cord     Split Stance      Biodex Balance Training  Weight shifting and motor control training with increased difficulty noted to L anterolateral planes: x5 mins     Dynamic gait       Side stepping With partial squat with orange theraband around ankles: 10'x4     Retro walking Fwd/retro amb 50'x6 with cues for shorter step length and for heel strike + knee extension through stance, mirror anteriorly, added dual task of beach bal    30ft x 2 ea today with Bioness donned, PT assist to prevent L hip hike and lateral trunk lean with retro amb    2 x 30 ft while holding small physioball, w Bioness  - 2 x 30 ft marching with 6lb medball, w Biones                     Tandem Walking 10'x2, CloseS/CGA     Suitcase carry Using small 3 lb B DB on R: 100'x2, noted delayed L knee extension through IC and mid stance     Marching With pilates ring 3 x 30 ft     Amb with hula hoop Using hula hoop to facilitate BUE swing and trunk rot     Walking with ball toss     Proprioceptive wrap Blue TB around L LE  - Amb along " purple line x 3 with cues to keep feet within tiles  - x 250ft with same cues as above  - Retro amb 3 x 20 ft  - Mini squats with heels elevated x 10, focus on minimizing hyperextension    Balance Testing     FGA  Assessed    SLS  Assessed see note     10mWT Assessed  - (6mWT on 8/26)    GAIT TRAINING  CPT 55156 TOTAL TIME FOR SESSION Not performed        Ambulation without AD  Gait with no AD over level indoor surfaces with WBOS and decreased L weight shift, VC for anterolateral weight shift, heel strike, BUE swing and trunk rot, 300'x1  - with bioness L300 lower cuff donned       Dynamic Gait  20'x6 with focus on shorter step lengths, heel strike and decreasing step width with use of 1' floor tiles, repeated with bimanual ball toss with CloseS and noted decreased gait speed and increase in step lengths     With Bioness donned x 250 ft, carpet and tile   - Good clearance of L LE through swing with improved knee flexion through swing    -  50 ft without for comparison     Treadmill, No UE support, with Bioness donned 2.2 mph x 10 min, [place]   - Bioness donned     Following removal of proprioceptive wrap x 250ft, no instances of L knee hyperextension thrust noted.                                     Stair negotiation  Full flight stairs 2 trials with 1 rail support on descent only Tactile cues for trunk midline, verbal and visual cues for eccentric control    6 inch    Curb negotiation      Ramp negotiation  with bioness X 30 ft    Outdoor ambulation  With Bioness:  - x500 ft approx, inclines/declines  - Retro amb up incline 2 x 15 ft   - slow controlled descent: 15'x2     MANUAL  CPT 49472 TOTAL TIME FOR SESSION Not performed      Mobilization        MODALITIES  CPT 08892 TOTAL TIME FOR SESSION      Ice       Heat       ATTENDED E-STIM  CPT 20536 TOTAL TIME FOR SESSION Not performed     Attended E-Stim FES    [id number] (Age 28) , PIN [phone]   Stim to L quad, hamstring, glutes, tib ant, and gastroc. 250-350  pulse width, 40Hz frequency.   Muscle testing testing completed for initial set up (7/8)    Xcite use for LLE:     Stim to L quad, hamstring, glutes, tib ant, and gastroc. 250-350 pulse width, 40Hz frequency.   Muscle testing testing completed for initial set up 7/26)    21GRAMS L300 go.   L lower leg quick fit pads, L hamstring added 8/2 ( lower leg cuff only today)   30 Hz with 0.2 ramp for loading response   Used tablet with strap   Increased time for set up/ alignment                                 Yes                  Unattended E-stim

## 2024-10-14 ENCOUNTER — HOSPITAL ENCOUNTER (OUTPATIENT)
Dept: PHYSICAL THERAPY | Facility: REHABILITATION | Age: 29
Setting detail: THERAPIES SERIES
Discharge: HOME | End: 2024-10-14
Attending: LEGAL MEDICINE
Payer: COMMERCIAL

## 2024-10-14 DIAGNOSIS — Z86.69 HISTORY OF TETHERED SPINAL CORD: Primary | ICD-10-CM

## 2024-10-14 PROCEDURE — 97112 NEUROMUSCULAR REEDUCATION: CPT | Mod: GP

## 2024-10-14 PROCEDURE — 97032 APPL MODALITY 1+ESTIM EA 15: CPT | Mod: GP

## 2024-10-14 PROCEDURE — 97110 THERAPEUTIC EXERCISES: CPT | Mod: GP

## 2024-10-14 NOTE — OP PT TREATMENT LOG
PT Neuro Exercises Current Session   Performed Today? (y/n)   THER ACT   CPT 10407 TOTAL TIME FOR SESSION 0-7 Minutes      Pain, vitals, subjective    Provided rest breaks for energy conservation yes   Skin inspection Base of incision, no swelling noted    FES  cycle [id number] (Age 28) , PIN [id number]   Bike height: 2 holes showing  Pedal height: 3 holes showing  L LE set up only    FES Xcite Set Up of LLE              Yes   Bioness Gabi Patrick, PT from [facility] present for session.     Discussed options for insurance coverage vs out of pocket costs. Process to proceed. T/o session     Bioness L300 Go set up   - education on LMN signature with therapist and physician        Patient Education Patient educated regarding current impairments per testing completed today and PT POC moving forward.     Patient provided with Welcome Letter, which includes attendance policy. Provided education regarding cancellation and no-show policy. Education regarding the importance of participation and regular attendance to maximize goal attainment. Patient verbalized understanding/agreement.     Clearance for FES received, will be completed NV, start with handheld unit to assess tolerance prior to FES bike. Pt verbalized understanding.    Patient educated regarding progress made thus far, current impairments per re-assessments completed today and PT POC moving forward. Patient verbalized understanding/agreement.     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 PT to find out next time [employer] rep is at [facility] to adjust pt schedule. Will provide with forms as well.     Discussion of ordering process for [employer].    Patient educated on performance with objective assessments completed during session. Pt verbalized understanding      10/14: Pt plans to get imaging done for brain, cervical spine and lumbar spine on 10/16 prior to following up with Neuro team.                                "                                  Yes   HEP  Verbally reviewed practicing gait over level surfaces with decreased step width, and then incorporating BUE swing and trunk rot. Pt verbalized understanding.     Floor Transfers  CloseS with increased time for LLE management, requires single UE support with review of moving from tall kneel to half kneeling position. Pt demos good understanding, will require continued practice     Patient able to complete floor transfer independently, walked hands down to floor into quadruped. Completed opposite to stand back up.     Subjective Outcomes Measures       ABC Scale Assessed     THER EX  CPT 19484 TOTAL TIME FOR SESSION  8-22 Minutes      STRETCHING      Stretching by patient Incline board stretch: L3 - 1 min x2  L QL stretch with small physioball 10 x 3 sec hold  L Q/L stretch seated in with passive overpressure: 30 seconds x 3         Stretching by therapist/PROM Trialed modified jose stretch - not effective (pt states modified lunge position she has felt more of a stretch in hip flexors in the past)      CARDIOVASCULAR       Nu Step 12 mins, Level: 4, BLE only, SPM: >25     Stationary Bike Recumbent bike 5 min, L1  (Unable to complete revolution on [employer]o upright bike)    Ambulation with AD      Treadmill VR: 10 minutes, speed: 2.3 mph, [place]  - VC to increase nancy   - progressing from light RUE > unsupported at 5 min slime   - using Bioness L300     2 mins of side stepping up incline leading with LLE using Bioness L300 Go     Endurance Testing       6mWT Assessed    STRENGTH TRAINING     HEP Review     Standing Ther-Ex Standing hip abduction 2 x 10 ea  - standing on airex   - Second set, no UE support     Step ups 6\" block x 20   - Added L LE TKE w orange TB    Lateral step down, 4\" block, L LE only w orange band TKE x 10    Hip hikes 2\" block, VC's to discourage R hip hike     Heel raises from L2 on incline board x 15 (on floor today)    Heel raises: 2x15, " "small weighted ball between heels     Staggered STS w pilates ring 2 x 10, VC's to abd L knee into PT's hand    Squat into BL heel raise x 15 w 6lb     Modified deadlift (B-stance) LLE leading anteriorly: x10, 18lbs                           Side-lying there-ex Clamshells: 2x20 on R, L in supine: 5\"x10      Supine Ther-Ex Diaphragmatic breathin mins due to elevated BP  Glut bridge: 3\" x10, orange band  Bridge: x10   Supine hip abduction x 10, LLE, isometrics today  BKFO: x10, orange t-band only for R, active assist on L   Clamshells, x 10 ea LE  Bridge with ball squeeze x 10        Deadbugs with physioball  - isometric holds: 10\" x 10, physioball under B heels   - alt LE heels taps: 2x5   - alt UE: x10 reps       Planks  Full plank on hands: 35 seconds x 2  Plank with lateral weight pull: x5, b/l, 4#  Side plank (modified on L): 30 sec x 2 ea  Y  Y  Y   Slider Fit  Fwd gliding/roll outs with half ROM: x10  Y   Quadruped  hip extensions with towel under L foot: x10, B/L (modified position with BUE on EOM and LE on floor)     Quadruped <> bear crawl: 10\" x10   Bear crawl: 5' - fwd/back: x3 rounds     Alternating UE flexion x 10  Alternating LE extension x 10; use of slider L LE    Tall kneel Moderate perturbations all directions  Mini squats 2 x 10 blue TB resistance   Trunk rot: 2x10 w 10lb   D2 chops/lifts: x10  Posterior reach back to cones w cross body reach to PT, 2lb ankle weights on wrists             Akil Kneel  Onto airex pad:   - static hold: 30 seconds x 3   - chest press with beach ball: 2x10 (no)  - D2 flexion: x10, B/L, 4#  - passing 4# from L to R: x10     Prone Ther-Ex     Core exercises PPT x 5  PPT with ball squeeze 5 sec hold, 10 x  PPT with bridge x 10   PPT w single leg bridge x 10  PPT with heel taps from tabletop position BLE x 10 holding 6lb medball, CGA L LE  PPT with hip abduction isometric L LE x 3 sec hold x10, orange band     Heel slides with towel: 2x10  Triple flex over physioball: " "x10  [employer] wipers BLE over physioball 2 x 10     STS's from low EOM squeezing pilates ring  - As above, with squeeze, in staggered stance  Marching, squeezing small ball 30 ft x 2    Modified dead bugs, alt UE 2 x 10     Supine B shoulder ext with GTB, emphasis on core activation; 2 x 15    Quadruped:  - Rainbows x 10 ea LE, min cueing for L LE   - Bear crawls, forward/backward                                      Functional Strength Testing       30 sec STS test (60y +)  Assessed    NEURO RE-ED  CPT 18318 TOTAL TIME FOR SESSION 23-37 Minutes      FES  To promote neurorecovery of gait:  Distance: 3.01 miles  Resistance: 0.77 Nm  Power: 2.9 W  Symmetry: L 2%  Total time: 21:00  Active time: 19:03    Xcite  NMRE of LLE in function:     - Sit <> stand from elevated EOM using 6 lb med ball: x10, then progressing to lowest height: x10  - then x10 in staggered stance  - x10 with dynadisc under sitting surface  - x10 with dynadisc and airex under B feet       - Modified reverse lunging with SliderFit under R foot in parallel bars with RUE support only > unsupported:2 x10 reps  - L toe tap to 4\" block from airex pad: x10, cues for weight shift then hip flexion vs lateral trunk flexion         - Tall kneel <> heel sit: 2x10, 2nd set with chest press   - fwd step up with LLE onto 6\" block: x10, repeated with R knee drive, x10 repeated with 3# on RUE      Yes  Y  N  Y  Y      Y      Y   Bioness L300 Go  - Fwd/rev step over 12\" [place]: x20,   - Fwd step up onto 6\" block with contra LE march: 2x10, B         - RLE step up, LLE march with 8lb db overhead press        - LLE step up, RLE march while holding 8lb db x 10, overhead press with 8lb db x 5  - Fwd step down 4\" block: 2x10, orange theraband around knees for manual cues   - stance on airex with lateral L toe taps to 12\" [place]: x10  - Tandem walk 20'x2, 8# on R   - tandem walk on balance beam  - step up on 4\" block, contralateral heel tap to 10\" block   - " "12\" and 6\" hurdles, navigating reciprocally     - 6 inch step taps 2 X 10  - 6 inch step up with repeater, cues for L hip stability     12\" hurdles:  - Reciprocal pattern  - side stepping                                 COORDINATION       Target Tapping     Coordination ladder As appropriate for HL balance     Amb with ankle weights     Amb with proprio wrap     Pushing weighted shopping cart     POSTURAL RE-ED     Sit <> Stand  From 22\" mat holding 6# med ball, cues for midline and equal WB      Tall Kneel  Tall kneel <> short sit: x10, x10 with chest press     Seated & standing reaching for trunk strengthening     Abbey-scapular strengthening     PRE-GAIT ACTIVITIES      Tap-ups     Step-ups To 6\" block with contralateral knee drive: x10, repeated with 2 lb DB for UE swing     Standing weight shifting     Step-taps At 6\" main gym steps, step up to 1st step, tap to 3rd with contralateral LE  - w Bioness    BALANCE TRAINING     Standing balance - static/dynamic       HEP Review       Floor       Airex [place]-tandem: 30 seconds x 2, HT: 30\" x 1, EC: 30 seconds x 1  Tandem stance: 30 seconds x 1     Blue treElement Power rockerboard:  - Semi tandem with chops, 6lb                 Rockerboard AP plane: split stance x10, with light UE support, B/L   AP, chest press x 10, OH press x 10, 6lb   Squats 2 x 10      Hurdles 6\" hurdles, focus on dec L hip circumduction through swing and encouraging heel strike. Step forward/back    Repeated with side stepping pattern, requiring BUE support dud to inability for L knee extension through stance     BOSU Ball  Fwd mini lunge onto domed side: x10, CloseS   Modified lunge onto BOSU with palloff press:   - x10, then repeated x10 with perturbations on yellow sports cord     Split Stance      Biodex Balance Training  Weight shifting and motor control training with increased difficulty noted to L anterolateral planes: x5 mins     Dynamic gait       Side stepping With partial squat with orange " theraband around ankles: 10'x4     Retro walking Fwd/retro amb 50'x6 with cues for shorter step length and for heel strike + knee extension through stance, mirror anteriorly, added dual task of beach bal    30ft x 2 ea today with Bioness donned, PT assist to prevent L hip hike and lateral trunk lean with retro amb    2 x 30 ft while holding small physioball, w Bioness  - 2 x 30 ft marching with 6lb medball, w Biones                     Tandem Walking 10'x2, CloseS/CGA     Suitcase carry Using small 3 lb B DB on R: 100'x2, noted delayed L knee extension through IC and mid stance     Marching With pilates ring 3 x 30 ft     Amb with hula hoop Using hula hoop to facilitate BUE swing and trunk rot     Walking with ball toss     Proprioceptive wrap Blue TB around L LE  - Amb along purple line x 3 with cues to keep feet within tiles  - x 250ft with same cues as above  - Retro amb 3 x 20 ft  - Mini squats with heels elevated x 10, focus on minimizing hyperextension    Balance Testing     FGA  Assessed    SLS  Assessed see note     10mWT Assessed  - (6mWT on 8/26)    GAIT TRAINING  CPT 49374 TOTAL TIME FOR SESSION Not performed        Ambulation without AD  Gait with no AD over level indoor surfaces with WBOS and decreased L weight shift, VC for anterolateral weight shift, heel strike, BUE swing and trunk rot, 300'x1  - with bioness L300 lower cuff donned       Dynamic Gait  20'x6 with focus on shorter step lengths, heel strike and decreasing step width with use of 1' floor tiles, repeated with bimanual ball toss with CloseS and noted decreased gait speed and increase in step lengths     With Bioness donned x 250 ft, carpet and tile   - Good clearance of L LE through swing with improved knee flexion through swing    -  50 ft without for comparison     Treadmill, No UE support, with Bioness donned 2.2 mph x 10 min, [place]   - Bioness donned     Following removal of proprioceptive wrap x 250ft, no instances of L knee  hyperextension thrust noted.                                     Stair negotiation  Full flight stairs 2 trials with 1 rail support on descent only Tactile cues for trunk midline, verbal and visual cues for eccentric control    6 inch    Curb negotiation      Ramp negotiation  with bioness X 30 ft    Outdoor ambulation  With Bioness:  - x500 ft approx, inclines/declines  - Retro amb up incline 2 x 15 ft   - slow controlled descent: 15'x2     MANUAL  CPT 30277 TOTAL TIME FOR SESSION Not performed      Mobilization        MODALITIES  CPT 64010 TOTAL TIME FOR SESSION      Ice       Heat       ATTENDED E-STIM  CPT 79073 TOTAL TIME FOR SESSION 8-22 Minutes     Attended E-Stim FES    [id number] (Age 28) , PIN [phone]   Stim to L quad, hamstring, glutes, tib ant, and gastroc. 250-350 pulse width, 40Hz frequency.   Muscle testing testing completed for initial set up (7/8)    Xcite use for LLE:     Stim to L quad, hamstring, glutes, tib ant, and gastroc. 250-350 pulse width, 40Hz frequency.   Muscle testing testing completed for initial set up 7/26)    Bioness L300 go.   L lower leg quick fit pads, L hamstring added 8/2 ( lower leg cuff only today)   30 Hz with 0.2 ramp for loading response   Used tablet with strap   Increased time for set up/ alignment                     YES                               Unattended E-stim

## 2024-10-14 NOTE — PROGRESS NOTES
Physical Therapy Visit    PT DAILY NOTE FOR OUTPATIENT THERAPY    Patient: Melanie Litten MRN: [id number]  : 1995 28 y.o.  Referring Physician: Remedios Whitfield MD  Date of Visit: 10/14/2024    Certification Dates: 24 through 24    Diagnosis:   1. History of tethered spinal cord        Chief Complaints:  EDS, LLE weakness, gait/balance impairment    Precautions:   Precautions comments: hypermobile - EDS, postural hypotension      TODAY'S VISIT    Time In Session:  Start Time: 1400  Stop Time: 1455  Time Calculation (min): 55 min   History/Vitals/Pain/Encounter Info - 10/14/24 1358          Injury History/Precautions/Daily Required Info    Document Type daily treatment     Primary Therapist Dilan Machuca     Chief Complaint/Reason for Visit  EDS, LLE weakness, gait/balance impairment     Referring Physician Remedios Whitfield MD     Precautions comments hypermobile - EDS, postural hypotension     History of present illness/functional impairment Saranya is a 28 year old female who presents to OPPT with history of tethered cord syndrome. Pt with PMH significant for chiari malformation, hypermobile EDS, left plantar fasciitis, anxiety and postural hypotension. Pt reports her symptoms started slowly in 2019 with L hip pain, followed up with neurosurgeon in [place] with unremarkable results for all testing. Her symptoms then progressed with L leg motor weakness and shakiness (similar to clonus) with movement, chronic constipation, L drop foot, lower back pain, and neck pain.  Pt was finally diagnosed with tethered cord syndrome and underwent a clinical trial surgery at [facility], [facility], and [facility]. Due to her symptoms, she meets criteria for exploration and sectioning of her filum for the functional tethered cord clinical trial. Now s/p laminectomy for occult tethered cord release on 23 by Dr Duckworth. Patient tolerated procedure well.  No post-op complications. Transferred stable once PACU criteria met. Patient kept FLAT for 36hr. Placed on an aseptic dex taper and post-op IV abx x24hrs.  She was mobilized on 9/27 tolerated well without any symptoms. Additionally, was instructed not to exercise for 7 weeks post-surgery. Pt reports she recovered well overall with decreased drop foot, clonus and swelling, continues to have increased coordination deficits and foot drag with fatigue. Pt has completed OPPT at [facility] in [place] and intermittent massage therapy. Pt’s functional goals are to work on higher level balance, picking up things from floor, coordination and “Hippo therapy”.     Patient/Family/Caregiver Comments/Observations Pt reports no new concerns     Patient reported fall since last visit No        Pain Assessment    Currently in pain No/Denies                    Daily Treatment Assessment and Plan - 10/14/24 1358          Daily Treatment Assessment and Plan    Progress toward goals Progressing     Daily Outcome Summary Pt utilized Xcite FES with sit <> stand progressions today with various additions to challenge fwd weight shift and ankle strategies in standing over compliant surface. Addition of posterior lunging today with FES donned to focus on improving hip/knee extension timing with CGA at trunk to avoid compensatory patterns and to assist in balance due to increased fatigue at end of session.     Plan and Recommendations Bioness as able, L LE strenghtening (proximal hip), dual tasking, core stabilization                         OBJECTIVE DATA TAKEN TODAY:    None taken    Today's Treatment:       PT Neuro Exercises Current Session   Performed Today? (y/n)   THER ACT   CPT 37787 TOTAL TIME FOR SESSION 0-7 Minutes      Pain, vitals, subjective    Provided rest breaks for energy conservation yes   Skin inspection Base of incision, no swelling noted    FES  cycle [id number] (Age 28) , PIN [id number]   Bike height: 2 holes showing  Pedal  height: 3 holes showing  L LE set up only    FES Xcite Set Up of LLE              Yes   Teodoro Patrick, PT from [facility] present for session.     Discussed options for insurance coverage vs out of pocket costs. Process to proceed. T/o session     Bioness L300 Go set up   - education on LMN signature with therapist and physician        Patient Education Patient educated regarding current impairments per testing completed today and PT POC moving forward.     Patient provided with Welcome Letter, which includes attendance policy. Provided education regarding cancellation and no-show policy. Education regarding the importance of participation and regular attendance to maximize goal attainment. Patient verbalized understanding/agreement.     Clearance for FES received, will be completed NV, start with handheld unit to assess tolerance prior to FES bike. Pt verbalized understanding.    Patient educated regarding progress made thus far, current impairments per re-assessments completed today and PT POC moving forward. Patient verbalized understanding/agreement.     Discussion regarding [employer] scheduling and scheduling with [facility] rep. PT to find out next time [facility] is at [facility] to adjust pt schedule. Will provide with forms as well.     Discussion of ordering process for [facility].    Patient educated on performance with objective assessments completed during session. Pt verbalized understanding      10/14: Pt plans to get imaging done for brain, cervical spine and lumbar spine on 10/16 prior to following up with Neuro team.                                                                 Yes   HEP  Verbally reviewed practicing gait over level surfaces with decreased step width, and then incorporating BUE swing and trunk rot. Pt verbalized understanding.     Floor Transfers  CloseS with increased time for LLE management, requires single UE support with review of moving from tall kneel to half kneeling  "position. Pt demos good understanding, will require continued practice     Patient able to complete floor transfer independently, walked hands down to floor into quadruped. Completed opposite to stand back up.     Subjective Outcomes Measures       ABC Scale Assessed     THER EX  CPT 95046 TOTAL TIME FOR SESSION  8-22 Minutes      STRETCHING      Stretching by patient Incline board stretch: L3 - 1 min x2  L QL stretch with small physioball 10 x 3 sec hold  L Q/L stretch seated in with passive overpressure: 30 seconds x 3         Stretching by therapist/PROM Trialed modified jose stretch - not effective (pt states modified lunge position she has felt more of a stretch in hip flexors in the past)      CARDIOVASCULAR       Nu Step 12 mins, Level: 4, BLE only, SPM: >25     Stationary Bike Recumbent bike 5 min, L1  (Unable to complete revolution on Expresso upright bike)    Ambulation with AD      Treadmill VR: 10 minutes, speed: 2.3 mph, [place]  - VC to increase nancy   - progressing from light RUE > unsupported at 5 min slime   - using Shopear L300     2 mins of side stepping up incline leading with LLE using Shopear L300 Go     Endurance Testing       6mWT Assessed    STRENGTH TRAINING     HEP Review     Standing Ther-Ex Standing hip abduction 2 x 10 ea  - standing on airex   - Second set, no UE support     Step ups 6\" block x 20   - Added L LE TKE w orange TB    Lateral step down, 4\" block, L LE only w orange band TKE x 10    Hip hikes 2\" block, VC's to discourage R hip hike     Heel raises from L2 on incline board x 15 (on floor today)    Heel raises: 2x15, small weighted ball between heels     Staggered STS w pilates ring 2 x 10, VC's to abd L knee into PT's hand    Squat into BL heel raise x 15 w 6lb     Modified deadlift (B-stance) LLE leading anteriorly: x10, 18lbs                           Side-lying there-ex Clamshells: 2x20 on R, L in supine: 5\"x10      Supine Ther-Ex Diaphragmatic breathin mins " "due to elevated BP  Glut bridge: 3\" x10, orange band  Bridge: x10   Supine hip abduction x 10, LLE, isometrics today  BKFO: x10, orange t-band only for R, active assist on L   Clamshells, x 10 ea LE  Bridge with ball squeeze x 10        Deadbugs with physioball  - isometric holds: 10\" x 10, physioball under B heels   - alt LE heels taps: 2x5   - alt UE: x10 reps       Planks  Full plank on hands: 35 seconds x 2  Plank with lateral weight pull: x5, b/l, 4#  Side plank (modified on L): 30 sec x 2 ea  Y  Y  Y   Slider Fit  Fwd gliding/roll outs with half ROM: x10  Y   Quadruped  hip extensions with towel under L foot: x10, B/L (modified position with BUE on EOM and LE on floor)     Quadruped <> bear crawl: 10\" x10   Bear crawl: 5' - fwd/back: x3 rounds     Alternating UE flexion x 10  Alternating LE extension x 10; use of slider L LE    Tall kneel Moderate perturbations all directions  Mini squats 2 x 10 blue TB resistance   Trunk rot: 2x10 w 10lb   D2 chops/lifts: x10  Posterior reach back to cones w cross body reach to PT, 2lb ankle weights on wrists             Akil Kneel  Onto airex pad:   - static hold: 30 seconds x 3   - chest press with beach ball: 2x10 (no)  - D2 flexion: x10, B/L, 4#  - passing 4# from L to R: x10     Prone Ther-Ex     Core exercises PPT x 5  PPT with ball squeeze 5 sec hold, 10 x  PPT with bridge x 10   PPT w single leg bridge x 10  PPT with heel taps from tabletop position BLE x 10 holding 6lb medball, CGA L LE  PPT with hip abduction isometric L LE x 3 sec hold x10, orange band     Heel slides with towel: 2x10  Triple flex over physioball: x10  Windshield wipers BLE over physioball 2 x 10     STS's from low EOM squeezing pilates ring  - As above, with squeeze, in staggered stance  Marching, squeezing small ball 30 ft x 2    Modified dead bugs, alt UE 2 x 10     Supine B shoulder ext with GTB, emphasis on core activation; 2 x 15    Quadruped:  - Rainbows x 10 ea LE, min cueing for L LE   - " "Bear crawls, forward/backward                                      Functional Strength Testing       30 sec STS test (60y +)  Assessed    NEURO RE-ED  CPT 16469 TOTAL TIME FOR SESSION 23-37 Minutes      FES  To promote neurorecovery of gait:  Distance: 3.01 miles  Resistance: 0.77 Nm  Power: 2.9 W  Symmetry: L 2%  Total time: 21:00  Active time: 19:03    Xcite  NMRE of LLE in function:     - Sit <> stand from elevated EOM using 6 lb med ball: x10, then progressing to lowest height: x10  - then x10 in staggered stance  - x10 with dynadisc under sitting surface  - x10 with dynadisc and airex under B feet       - Modified reverse lunging with SliderFit under R foot in parallel bars with RUE support only > unsupported:2 x10 reps  - L toe tap to 4\" block from airex pad: x10, cues for weight shift then hip flexion vs lateral trunk flexion         - Tall kneel <> heel sit: 2x10, 2nd set with chest press   - fwd step up with LLE onto 6\" block: x10, repeated with R knee drive, x10 repeated with 3# on RUE      Yes  Y  N  Y  Y      Y      Y   Bioness L300 Go  - Fwd/rev step over 12\" [place]: x20,   - Fwd step up onto 6\" block with contra LE march: 2x10, B         - RLE step up, LLE march with 8lb db overhead press        - LLE step up, RLE march while holding 8lb db x 10, overhead press with 8lb db x 5  - Fwd step down 4\" block: 2x10, orange theraband around knees for manual cues   - stance on airex with lateral L toe taps to 12\" [place]: x10  - Tandem walk 20'x2, 8# on R   - tandem walk on balance beam  - step up on 4\" block, contralateral heel tap to 10\" block   - 12\" and 6\" hurdles, navigating reciprocally     - 6 inch step taps 2 X 10  - 6 inch step up with repeater, cues for L hip stability     12\" hurdles:  - Reciprocal pattern  - side stepping                                 COORDINATION       Target Tapping     Coordination ladder As appropriate for HL balance     Amb with ankle weights     Amb with proprio " "wrap     Pushing weighted shopping cart     POSTURAL RE-ED     Sit <> Stand  From 22\" mat holding 6# med ball, cues for midline and equal WB      Tall Kneel  Tall kneel <> short sit: x10, x10 with chest press     Seated & standing reaching for trunk strengthening     Abbey-scapular strengthening     PRE-GAIT ACTIVITIES      Tap-ups     Step-ups To 6\" block with contralateral knee drive: x10, repeated with 2 lb DB for UE swing     Standing weight shifting     Step-taps At 6\" main gym steps, step up to 1st step, tap to 3rd with contralateral LE  - w Bioness    BALANCE TRAINING     Standing balance - static/dynamic       HEP Review       Floor       Airex [place]-tandem: 30 seconds x 2, HT: 30\" x 1, EC: 30 seconds x 1  Tandem stance: 30 seconds x 1     Blue tread rockerboard:  - Semi tandem with chops, 6lb                 Rockerboard AP plane: split stance x10, with light UE support, B/L   AP, chest press x 10, OH press x 10, 6lb   Squats 2 x 10      Hurdles 6\" hurdles, focus on dec L hip circumduction through swing and encouraging heel strike. Step forward/back    Repeated with side stepping pattern, requiring BUE support dud to inability for L knee extension through stance     BOSU Ball  Fwd mini lunge onto domed side: x10, CloseS   Modified lunge onto BOSU with palloff press:   - x10, then repeated x10 with perturbations on yellow sports cord     Split Stance      Biodex Balance Training  Weight shifting and motor control training with increased difficulty noted to L anterolateral planes: x5 mins     Dynamic gait       Side stepping With partial squat with orange theraband around ankles: 10'x4     Retro walking Fwd/retro amb 50'x6 with cues for shorter step length and for heel strike + knee extension through stance, mirror anteriorly, added dual task of beach bal    30ft x 2 ea today with Bioness donned, PT assist to prevent L hip hike and lateral trunk lean with retro amb    2 x 30 ft while holding small physioball, w " Bioness  - 2 x 30 ft marching with 6lb medball, w Biones                     Tandem Walking 10'x2, CloseS/CGA     Suitcase carry Using small 3 lb B DB on R: 100'x2, noted delayed L knee extension through IC and mid stance     Marching With pilates ring 3 x 30 ft     Amb with hula hoop Using hula hoop to facilitate BUE swing and trunk rot     Walking with ball toss     Proprioceptive wrap Blue TB around L LE  - Amb along purple line x 3 with cues to keep feet within tiles  - x 250ft with same cues as above  - Retro amb 3 x 20 ft  - Mini squats with heels elevated x 10, focus on minimizing hyperextension    Balance Testing     FGA  Assessed    SLS  Assessed see note     10mWT Assessed  - (6mWT on 8/26)    GAIT TRAINING  CPT 45498 TOTAL TIME FOR SESSION Not performed        Ambulation without AD  Gait with no AD over level indoor surfaces with WBOS and decreased L weight shift, VC for anterolateral weight shift, heel strike, BUE swing and trunk rot, 300'x1  - with bioness L300 lower cuff donned       Dynamic Gait  20'x6 with focus on shorter step lengths, heel strike and decreasing step width with use of 1' floor tiles, repeated with bimanual ball toss with CloseS and noted decreased gait speed and increase in step lengths     With Bioness donned x 250 ft, carpet and tile   - Good clearance of L LE through swing with improved knee flexion through swing    -  50 ft without for comparison     Treadmill, No UE support, with Bioness donned 2.2 mph x 10 min, [place]   - Bioness donned     Following removal of proprioceptive wrap x 250ft, no instances of L knee hyperextension thrust noted.                                     Stair negotiation  Full flight stairs 2 trials with 1 rail support on descent only Tactile cues for trunk midline, verbal and visual cues for eccentric control    6 inch    Curb negotiation      Ramp negotiation  with bioness X 30 ft    Outdoor ambulation  With Bioness:  - x500 ft approx,  inclines/declines  - Retro amb up incline 2 x 15 ft   - slow controlled descent: 15'x2     MANUAL  CPT 93011 TOTAL TIME FOR SESSION Not performed      Mobilization        MODALITIES  CPT 02348 TOTAL TIME FOR SESSION      Ice       Heat       ATTENDED E-STIM  CPT 66496 TOTAL TIME FOR SESSION 8-22 Minutes     Attended E-Stim FES    [id number] (Age 28) , PIN [phone]   Stim to L quad, hamstring, glutes, tib ant, and gastroc. 250-350 pulse width, 40Hz frequency.   Muscle testing testing completed for initial set up (7/8)    Xcite use for LLE:     Stim to L quad, hamstring, glutes, tib ant, and gastroc. 250-350 pulse width, 40Hz frequency.   Muscle testing testing completed for initial set up 7/26)    Appuri L300 go.   L lower leg quick fit pads, L hamstring added 8/2 ( lower leg cuff only today)   30 Hz with 0.2 ramp for loading response   Used tablet with strap   Increased time for set up/ alignment                     YES                               Unattended E-stim

## 2024-10-18 ENCOUNTER — HOSPITAL ENCOUNTER (OUTPATIENT)
Dept: PHYSICAL THERAPY | Facility: REHABILITATION | Age: 29
Setting detail: THERAPIES SERIES
Discharge: HOME | End: 2024-10-18
Attending: LEGAL MEDICINE
Payer: COMMERCIAL

## 2024-10-18 DIAGNOSIS — Z86.69 HISTORY OF TETHERED SPINAL CORD: Primary | ICD-10-CM

## 2024-10-18 PROCEDURE — 97110 THERAPEUTIC EXERCISES: CPT | Mod: GP

## 2024-10-18 PROCEDURE — 97112 NEUROMUSCULAR REEDUCATION: CPT | Mod: GP

## 2024-10-18 PROCEDURE — 97032 APPL MODALITY 1+ESTIM EA 15: CPT | Mod: GP

## 2024-10-18 NOTE — OP PT TREATMENT LOG
PT Neuro Exercises Current Session   Performed Today? (y/n)   THER ACT   CPT 17139 TOTAL TIME FOR SESSION 0-7 Minutes      Pain, vitals, subjective    Provided rest breaks for energy conservation yes   Skin inspection Base of incision, no swelling noted    FES  cycle [id number] (Age 28) , PIN [id number]   Bike height: 2 holes showing  Pedal height: 3 holes showing  L LE set up only    FES Xcite Set Up of LLE                 [facility] Gabi Patrick, PT from [employer] present for session.     Discussed options for insurance coverage vs out of pocket costs. Process to proceed. T/o session     [facility] L300 Go set up   - education on LMN signature with therapist and physician        Patient Education Patient educated regarding current impairments per testing completed today and PT POC moving forward.     Patient provided with Welcome Letter, which includes attendance policy. Provided education regarding cancellation and no-show policy. Education regarding the importance of participation and regular attendance to maximize goal attainment. Patient verbalized understanding/agreement.     Clearance for FES received, will be completed NV, start with handheld unit to assess tolerance prior to FES bike. Pt verbalized understanding.    Patient educated regarding progress made thus far, current impairments per re-assessments completed today and PT POC moving forward. Patient verbalized understanding/agreement.     Discussion regarding [employer] scheduling and scheduling with [facility] rep. PT to find out next time [facility] rep is at [facility] to adjust pt schedule. Will provide with forms as well.     Discussion of ordering process for [employer].    Patient educated on performance with objective assessments completed during session. Pt verbalized understanding      10/14: Pt plans to get imaging done for brain, cervical spine and lumbar spine on 10/16 prior to following up with Neuro team.                                "                                  Yes   HEP  Verbally reviewed practicing gait over level surfaces with decreased step width, and then incorporating BUE swing and trunk rot. Pt verbalized understanding.     Floor Transfers  CloseS with increased time for LLE management, requires single UE support with review of moving from tall kneel to half kneeling position. Pt demos good understanding, will require continued practice     Patient able to complete floor transfer independently, walked hands down to floor into quadruped. Completed opposite to stand back up.     Subjective Outcomes Measures       ABC Scale Assessed     THER EX  CPT 88418 TOTAL TIME FOR SESSION  8-22 Minutes      STRETCHING      Stretching by patient Incline board stretch: L3 - 1 min x2  L QL stretch with small physioball 10 x 3 sec hold  L Q/L stretch seated in with passive overpressure: 30 seconds x 3  Yes       Stretching by therapist/PROM Trialed modified jose stretch - not effective (pt states modified lunge position she has felt more of a stretch in hip flexors in the past)      CARDIOVASCULAR       Nu Step 12 mins, Level: 4, BLE only, SPM: >25     Stationary Bike Recumbent bike 5 min, L1  (Unable to complete revolution on [employer] upright bike)    Ambulation with AD      Treadmill VR: 10 minutes, speed: 2.3 mph, [place]  - VC to increase nancy   - progressing from light RUE > unsupported at 5 min slime   - using Bioness L300     2 mins of side stepping up incline leading with LLE using Bioness L300 Go  Yes   Endurance Testing       6mWT Assessed    STRENGTH TRAINING     HEP Review     Standing Ther-Ex Standing hip abduction 2 x 10 ea  - standing on airex   - Second set, no UE support     Step ups 6\" block x 20   - Added L LE TKE w orange TB    Lateral step down, 4\" block, L LE only w orange band TKE x 10    Hip hikes 2\" block, VC's to discourage R hip hike     Heel raises from L2 on incline board x 15 (on floor today)    Heel raises: " "2x15, small weighted ball between heels     Staggered STS w pilates ring 2 x 10, VC's to abd L knee into PT's hand    Squat into BL heel raise x 15 w 6lb     Modified deadlift (B-stance) LLE leading anteriorly: x10, 18lbs                           Side-lying there-ex Clamshells: 2x20 on R, L in supine: 5\"x10      Supine Ther-Ex Diaphragmatic breathin mins due to elevated BP  Glut bridge: 3\" x10, orange band  Bridge: x10   Supine hip abduction x 10, LLE, isometrics today  BKFO: x10, orange t-band only for R, active assist on L   Clamshells, x 10 ea LE  Bridge with ball squeeze x 10        Deadbugs with physioball  - isometric holds: 10\" x 10, physioball under B heels   - alt LE heels taps: 2x5   - alt UE: x10 reps       Planks  Full plank on hands: 35 seconds x 2  Plank with lateral weight pull: x5, b/l, 4#  Side plank (modified on L): 30 sec x 2 ea     Slider Fit  Fwd gliding/roll outs with half ROM: x10     Quadruped  hip extensions with towel under L foot: x10, B/L (modified position with BUE on EOM and LE on floor)     Quadruped <> bear crawl: 10\" x10   Bear crawl: 5' - fwd/back: x3 rounds     Alternating UE flexion x 10  Alternating LE extension x 10; use of slider L LE Y   Tall kneel Moderate perturbations all directions  Mini squats 2 x 10 blue TB resistance   Trunk rot: 2x10 w 10lb   D2 chops/lifts: x10  Posterior reach back to cones w cross body reach to PT, 2lb ankle weights on wrists             Akil Kneel  Onto airex pad:   - static hold: 30 seconds x 3   - chest press with beach ball: 2x10 (no)  - D2 flexion: x10, B/L, 4#  - passing 4# from L to R: x10     Prone Ther-Ex     Core exercises PPT x 5  PPT with ball squeeze 5 sec hold, 10 x  PPT with bridge x 10   PPT w single leg bridge x 10  PPT with heel taps from tabletop position BLE x 10 holding 6lb medball, CGA L LE  PPT with hip abduction isometric L LE x 3 sec hold x10, orange band     Heel slides with towel: 2x10  Triple flex over physioball: " "x10  Windshield wipers BLE over physioball 2 x 10     STS's from low EOM squeezing pilates ring  - As above, with squeeze, in staggered stance  Marching, squeezing small ball 30 ft x 2    Modified dead bugs, alt UE 2 x 10     Supine B shoulder ext with GTB, emphasis on core activation; 2 x 15    Quadruped:  - Rainbows x 10 ea LE, min cueing for L LE   - Bear crawls, forward/backward                                      Functional Strength Testing       30 sec STS test (60y +)  Assessed    NEURO RE-ED  CPT 04139 TOTAL TIME FOR SESSION 23-37 Minutes      FES  To promote neurorecovery of gait:  Distance: 3.01 miles  Resistance: 0.77 Nm  Power: 2.9 W  Symmetry: L 2%  Total time: 21:00  Active time: 19:03    Xcite  NMRE of LLE in function:     - Sit <> stand from elevated EOM using 6 lb med ball: x10, then progressing to lowest height: x10  - then x10 in staggered stance  - x10 with dynadisc under sitting surface  - x10 with dynadisc and airex under B feet       - Modified reverse lunging with SliderFit under R foot in parallel bars with RUE support only > unsupported:2 x10 reps  - L toe tap to 4\" block from airex pad: x10, cues for weight shift then hip flexion vs lateral trunk flexion         - Tall kneel <> heel sit: 2x10, 2nd set with chest press   - fwd step up with LLE onto 6\" block: x10, repeated with R knee drive, x10 repeated with 3# on RUE     Bioness L300 Go  - Fwd/rev step over 12\" [place]: x20,   - Fwd step up onto 6\" block with contra LE march: 2x10, B         - RLE step up, LLE march with 8lb db overhead press        - LLE step up, RLE march while holding 8lb db x 10, overhead press with 8lb db x 5  - Fwd step down 4\" block: 2x10, orange theraband around knees for manual cues   - stance on airex with lateral L toe taps to 12\" [place]: x10  - Tandem walk 20'x4, 8# on R UE. Cues to prevent lateral lean  - tandem walk on balance beam  - step up on 4\" block, contralateral heel tap to 10\" block- fwd " "step over 1/2 bolster x20, cues for L hip stability and   6\" hurdles, navigating reciprocally     - 6 inch step taps 2 X 10  - 6 inch step up with repeater, cues for L hip stability     -weight shifting over 1/2 bolster. Cues for L hip stability and L knee extension    6\" hurdles:  - Fwd, cues for L heel strike, weight shift, and knee extension.  - Reciprocal pattern  - side stepping                  Yes                    Yes        Yes to all     COORDINATION       Target Tapping     Coordination ladder As appropriate for HL balance     Amb with ankle weights     Amb with proprio wrap     Pushing weighted shopping cart     POSTURAL RE-ED     Sit <> Stand  From 22\" mat holding 6# med ball, cues for midline and equal WB      Tall Kneel  Tall kneel <> short sit: x10, x10 with chest press     Seated & standing reaching for trunk strengthening     Abbey-scapular strengthening     PRE-GAIT ACTIVITIES      Tap-ups     Step-ups To 6\" block with contralateral knee drive: x10, repeated with 2 lb DB for UE swing     Standing weight shifting     Step-taps At 6\" main gym steps, step up to 1st step, tap to 3rd with contralateral LE  - w Bioness    BALANCE TRAINING     Standing balance - static/dynamic       HEP Review       Floor       Airex [place]-tandem: 30 seconds x 2, HT: 30\" x 1, EC: 30 seconds x 1  Tandem stance: 30 seconds x 1     Blue tread rockerboard:  - Semi tandem with chops, 6lb                 Rockerboard AP plane: split stance x10, with light UE support, B/L   AP, chest press x 10, OH press x 10, 6lb   Squats 2 x 10      Hurdles 6\" hurdles, focus on dec L hip circumduction through swing and encouraging heel strike. Step forward/back     Repeated with side stepping pattern, requiring BUE support dud to inability for L knee extension through stance     BOSU Ball  Fwd mini lunge onto domed side: x10, CloseS   Modified lunge onto BOSU with palloff press:   - x10, then repeated x10 with perturbations on yellow sports cord "     Split Stance      Biodex Balance Training  Weight shifting and motor control training with increased difficulty noted to L anterolateral planes: x5 mins     Dynamic gait       Side stepping With partial squat with orange theraband around ankles: 10'x4     Retro walking Fwd/retro amb 50'x6 with cues for shorter step length and for heel strike + knee extension through stance, mirror anteriorly, added dual task of beach bal    Retro 30ft x 2 ea today with Bioness donned  Cues for decreased lateral trunk lean with retro amb    2 x 30 ft while holding small physioball, w Bioness  - 2 x 30 ft marching with 6lb medball, w Biones            Yes               Tandem Walking 10'x2, CloseS/CGA     Suitcase carry Using small 3 lb B DB on R: 100'x2, noted delayed L knee extension through IC and mid stance     Marching With pilates ring 3 x 30 ft     Amb with hula hoop Using hula hoop to facilitate BUE swing and trunk rot     Walking with ball toss     Proprioceptive wrap Blue TB around L LE  - Amb along purple line x 3 with cues to keep feet within tiles  - x 250ft with same cues as above  - Retro amb 3 x 20 ft  - Mini squats with heels elevated x 10, focus on minimizing hyperextension    Balance Testing     FGA  Assessed    SLS  Assessed see note     10mWT Assessed  - (6mWT on 8/26)    GAIT TRAINING  CPT 85763 TOTAL TIME FOR SESSION Not performed        Ambulation without AD  Gait with no AD over level indoor surfaces with WBOS and decreased L weight shift, VC for anterolateral weight shift, heel strike, BUE swing and trunk rot, 300'x1  - with bioness L300 lower cuff donned       Dynamic Gait  20'x6 with focus on shorter step lengths, heel strike and decreasing step width with use of 1' floor tiles, repeated with bimanual ball toss with CloseS and noted decreased gait speed and increase in step lengths     With Bioness donned x 250 ft, carpet and tile   - Good clearance of L LE through swing with improved knee flexion  through swing    -  50 ft without for comparison     Treadmill, No UE support, with Bioness donned 2.2 mph x 10 min, [place]   - Bioness donned     Following removal of proprioceptive wrap x 250ft, no instances of L knee hyperextension thrust noted.                                     Stair negotiation  Full flight stairs 2 trials with 1 rail support on descent only Tactile cues for trunk midline, verbal and visual cues for eccentric control    6 inch    Curb negotiation      Ramp negotiation  with bioness X 30 ft    Outdoor ambulation  With Bioness:  - x500 ft approx, inclines/declines  - Retro amb up incline 2 x 15 ft   - slow controlled descent: 15'x2     MANUAL  CPT 75579 TOTAL TIME FOR SESSION Not performed      Mobilization        MODALITIES  CPT 76086 TOTAL TIME FOR SESSION      Ice       Heat       ATTENDED E-STIM  CPT 45116 TOTAL TIME FOR SESSION 8-22 Minutes     Attended E-Stim FES    [id number] (Age 28) , PIN [phone]   Stim to L quad, hamstring, glutes, tib ant, and gastroc. 250-350 pulse width, 40Hz frequency.   Muscle testing testing completed for initial set up (7/8)    Xcite use for LLE:     Stim to L quad, hamstring, glutes, tib ant, and gastroc. 250-350 pulse width, 40Hz frequency.   Muscle testing testing completed for initial set up 7/26)    Bioness L300 go.   L lower leg quick fit pads, L hamstring added 8/2 ( lower leg cuff only today)   30 Hz with 0.2 ramp for loading response   Used tablet with strap   Increased time for set up/ alignment                            YES    Unattended E-stim

## 2024-10-18 NOTE — PROGRESS NOTES
Hematology/Oncology Progress Note        Subjective  Patient appears about the same      Medications  Current Facility-Administered Medications   Medication Dose Route Frequency Provider Last Rate Last Admin   • haloperidol lactate (HALDOL) 5 MG/ML short-acting injection 1 mg  1 mg Intravenous Nightly PRN Jasson Forrest MD       • metoPROLOL succinate (TOPROL-XL) ER tablet 25 mg  25 mg Oral Daily Lara Phouikham, DO   25 mg at 11/12/21 1038   • tamsulosin (FLOMAX) capsule 0.4 mg  0.4 mg Oral Daily Lara Phouikham, DO   0.4 mg at 11/12/21 1038   • sodium chloride (NORMAL SALINE) 0.9 % bolus 1,000 mL  1,000 mL Intravenous Once Kenyon Henderson MD       • acetaminophen (TYLENOL) tablet 650 mg  650 mg Oral Q6H PRN Lara Phouikham, DO       • ondansetron (ZOFRAN) injection 4 mg  4 mg Intravenous Q8H PRN Lara Phouikham, DO             Vitals  Vitals with min/max:    Vital Last Value 24 Hour Range   Temperature 97.5 °F (36.4 °C) (11/12/21 0555) Temp  Min: 97.3 °F (36.3 °C)  Max: 97.5 °F (36.4 °C)   Pulse 95 (11/12/21 1448) Pulse  Min: 67  Max: 95   Respiratory 16 (11/12/21 1448) Resp  Min: 16  Max: 18   Non-Invasive  Blood Pressure (!) 162/78 (11/12/21 1448) BP  Min: 149/72  Max: 162/78   Pulse Oximetry 97 % (11/12/21 1448) SpO2  Min: 94 %  Max: 100 %   Arterial   Blood Pressure   No data recorded        Physical Exam      General:  Well developed chronic ill-appearing in no distress  Skin:  Warm and dry   Oropharynx:  Clear  Lungs:  Clear  Heart:  Regular  Abdomen:  Soft and nontender.  Nondistended  Extremities:  Without edema    Labs   Recent Labs   Lab 11/12/21  0534 11/11/21  0607 11/10/21  0620 11/09/21  1523   WBC 3.4* 3.2* 2.6* 2.7*   HGB 12.3* 13.0 12.7* 12.3*   HCT 38.5* 39.9 40.2 38.1*   MCV 93.7 91.9 93.1 93.2    286 274 254   Absolute Neutrophils 1.9 1.6* 1.3* 1.3*       Recent Labs   Lab 11/12/21  0534 11/11/21  0607 11/10/21  0620 11/09/21  1523   SODIUM 140 142 140 141   CHLORIDE 106 106 105 105   CO2  Physical Therapy Visit    PT DAILY NOTE FOR OUTPATIENT THERAPY    Patient: Melanie Litten MRN: [id number]  : 1995 28 y.o.  Referring Physician: Remedios Whitfield MD  Date of Visit: 10/18/2024    Certification Dates: 24 through 24    Diagnosis:   1. History of tethered spinal cord        Chief Complaints:  EDS, LLE weakness, gait/balance impairment    Precautions:   Precautions comments: hypermobile - EDS, postural hypotension      TODAY'S VISIT    Time In Session:      History/Vitals/Pain/Encounter Info - 10/18/24 0804          Injury History/Precautions/Daily Required Info    Document Type daily treatment     Primary Therapist Chocolouise Machuca     Chief Complaint/Reason for Visit  EDS, LLE weakness, gait/balance impairment     Referring Physician Remedios Whitfield MD     Precautions comments hypermobile - EDS, postural hypotension     History of present illness/functional impairment Saranya is a 28 year old female who presents to OPPT with history of tethered cord syndrome. Pt with PMH significant for chiari malformation, hypermobile EDS, left plantar fasciitis, anxiety and postural hypotension. Pt reports her symptoms started slowly in 2019 with L hip pain, followed up with neurosurgeon in [place] with unremarkable results for all testing. Her symptoms then progressed with L leg motor weakness and shakiness (similar to clonus) with movement, chronic constipation, L drop foot, lower back pain, and neck pain.  Pt was finally diagnosed with tethered cord syndrome and underwent a clinical trial surgery at [facility], [facility], and [facility]. Due to her symptoms, she meets criteria for exploration and sectioning of her filum for the functional tethered cord clinical trial. Now s/p laminectomy for occult tethered cord release on 23 by Dr Duckworth. Patient tolerated procedure well. No post-op complications. Transferred stable once PACU criteria  29 27 27 29   BUN 16 15 10 13   CREATININE 1.24* 1.26* 1.15 1.26*   CALCIUM 8.4 8.4 8.7 8.3*   ALBUMIN  --   --   --  3.3*   BILIRUBIN  --   --   --  0.3   ALKPT  --   --   --  64   GPT  --   --   --  26   AST  --   --   --  35   GLUCOSE 69* 75 72 83        Lab Results   Component Value Date     (H) 11/11/2021       Lab Results   Component Value Date    PT 10.5 05/09/2015    PTT 26 05/09/2015       Imaging  US KIDNEY BILATERAL   Final Result      Unremarkable ultrasound of the kidneys.      Electronically Signed by: KATY WINN MD    Signed on: 11/11/2021 9:17 PM          CT HEAD WO CONTRAST   Final Result   No acute pathology is identified.      Electronically Signed by: DANIEL CHUNG MD    Signed on: 11/9/2021 4:09 PM                 Impression  Leukopenia of unclear etiology.  This may have been reactive to system stress.  It is somewhat better today.  It appears asymptomatic.  Screening laboratories do not show any obvious cause otherwise    Plan  Continue expectant observation  Certainly further evaluation could include a bone marrow biopsy however I suspect this is unlikely to yield a treatable diagnosis  This certainly should be considered however if there is any worsening trend of his blood counts and the patient becomes symptomatic      Juni Gutierrez MD  Hematology/Oncology    11/12/2021     met. Patient kept FLAT for 36hr. Placed on an aseptic dex taper and post-op IV abx x24hrs.  She was mobilized on 9/27 tolerated well without any symptoms. Additionally, was instructed not to exercise for 7 weeks post-surgery. Pt reports she recovered well overall with decreased drop foot, clonus and swelling, continues to have increased coordination deficits and foot drag with fatigue. Pt has completed OPPT at [facility] in [place] and intermittent massage therapy. Pt’s functional goals are to work on higher level balance, picking up things from floor, coordination and “Hippo therapy”.     Patient/Family/Caregiver Comments/Observations Pt reports she completed the MRI of her lumbar spine and brain on 10/16/24. Pt reports is scheduled to recieve imaging of the C/S and CSF analysis next month.     Patient reported fall since last visit No        Pain Assessment    Currently in pain No/Denies                    Daily Treatment Assessment and Plan - 10/18/24 0804          Daily Treatment Assessment and Plan    Progress toward goals Progressing     Daily Outcome Summary Pt utilized the [employer] L300 Go for majority of todays session for treadmill training and standing activities. On the treadmill, pt required cues for nancy and weight shifting on the L LE. Pt benefited from blocked practice [place] training for weight shifting on the L LE focusing on terminal knee extension in stance. Pt demonstrated good carry over for the gait training and required less cueing for proper gait mechanics.     Plan and Recommendations Route[employer]Me as able, L LE strenghtening (proximal hip), dual tasking, core stabilization                         OBJECTIVE DATA TAKEN TODAY:    None taken    Today's Treatment:       PT Neuro Exercises Current Session   Performed Today? (y/n)   THER ACT   CPT 45187 TOTAL TIME FOR SESSION 0-7 Minutes      Pain, vitals, subjective    Provided rest breaks for energy conservation yes   Skin inspection Base of  incision, no swelling noted    FES  cycle [id number] (Age 28) , PIN [id number]   Bike height: 2 holes showing  Pedal height: 3 holes showing  L LE set up only    FES Xcite Set Up of LLE                 Teodoro Patrick, PT from [facility] present for session.     Discussed options for insurance coverage vs out of pocket costs. Process to proceed. T/o session     [employer] L300 Go set up   - education on LMN signature with therapist and physician        Patient Education Patient educated regarding current impairments per testing completed today and PT POC moving forward.     Patient provided with Welcome Letter, which includes attendance policy. Provided education regarding cancellation and no-show policy. Education regarding the importance of participation and regular attendance to maximize goal attainment. Patient verbalized understanding/agreement.     Clearance for FES received, will be completed NV, start with handheld unit to assess tolerance prior to FES bike. Pt verbalized understanding.    Patient educated regarding progress made thus far, current impairments per re-assessments completed today and PT POC moving forward. Patient verbalized understanding/agreement.     Discussion regarding [employer] scheduling and scheduling with [facility] rep. PT to find out next time [facility] rep is at [facility] to adjust pt schedule. Will provide with forms as well.     Discussion of ordering process for [employer].    Patient educated on performance with objective assessments completed during session. Pt verbalized understanding      10/14: Pt plans to get imaging done for brain, cervical spine and lumbar spine on 10/16 prior to following up with Neuro team.                                                                 Yes   HEP  Verbally reviewed practicing gait over level surfaces with decreased step width, and then incorporating BUE swing and trunk rot. Pt verbalized understanding.     Floor Transfers  CloseS with increased  "time for LLE management, requires single UE support with review of moving from tall kneel to half kneeling position. Pt demos good understanding, will require continued practice     Patient able to complete floor transfer independently, walked hands down to floor into quadruped. Completed opposite to stand back up.     Subjective Outcomes Measures       ABC Scale Assessed     THER EX  CPT 30914 TOTAL TIME FOR SESSION  8-22 Minutes      STRETCHING      Stretching by patient Incline board stretch: L3 - 1 min x2  L QL stretch with small physioball 10 x 3 sec hold  L Q/L stretch seated in with passive overpressure: 30 seconds x 3  Yes       Stretching by therapist/PROM Trialed modified jose stretch - not effective (pt states modified lunge position she has felt more of a stretch in hip flexors in the past)      CARDIOVASCULAR       Nu Step 12 mins, Level: 4, BLE only, SPM: >25     Stationary Bike Recumbent bike 5 min, L1  (Unable to complete revolution on [employer] upright bike)    Ambulation with AD      Treadmill VR: 10 minutes, speed: 2.3 mph, [place]  - VC to increase nancy   - progressing from light RUE > unsupported at 5 min slime   - using Bioness L300     2 mins of side stepping up incline leading with LLE using Bioness L300 Go  Yes   Endurance Testing       6mWT Assessed    STRENGTH TRAINING     HEP Review     Standing Ther-Ex Standing hip abduction 2 x 10 ea  - standing on airex   - Second set, no UE support     Step ups 6\" block x 20   - Added L LE TKE w orange TB    Lateral step down, 4\" block, L LE only w orange band TKE x 10    Hip hikes 2\" block, VC's to discourage R hip hike     Heel raises from L2 on incline board x 15 (on floor today)    Heel raises: 2x15, small weighted ball between heels     Staggered STS w pilates ring 2 x 10, VC's to abd L knee into PT's hand    Squat into BL heel raise x 15 w 6lb     Modified deadlift (B-stance) LLE leading anteriorly: x10, 18lbs                         " "  Side-lying there-ex Clamshells: 2x20 on R, L in supine: 5\"x10      Supine Ther-Ex Diaphragmatic breathin mins due to elevated BP  Glut bridge: 3\" x10, orange band  Bridge: x10   Supine hip abduction x 10, LLE, isometrics today  BKFO: x10, orange t-band only for R, active assist on L   Clamshells, x 10 ea LE  Bridge with ball squeeze x 10        Deadbugs with physioball  - isometric holds: 10\" x 10, physioball under B heels   - alt LE heels taps: 2x5   - alt UE: x10 reps       Planks  Full plank on hands: 35 seconds x 2  Plank with lateral weight pull: x5, b/l, 4#  Side plank (modified on L): 30 sec x 2 ea     Slider Fit  Fwd gliding/roll outs with half ROM: x10     Quadruped  hip extensions with towel under L foot: x10, B/L (modified position with BUE on EOM and LE on floor)     Quadruped <> bear crawl: 10\" x10   Bear crawl: 5' - fwd/back: x3 rounds     Alternating UE flexion x 10  Alternating LE extension x 10; use of slider L LE Y   Tall kneel Moderate perturbations all directions  Mini squats 2 x 10 blue TB resistance   Trunk rot: 2x10 w 10lb   D2 chops/lifts: x10  Posterior reach back to cones w cross body reach to PT, 2lb ankle weights on wrists             Akil Kneel  Onto airex pad:   - static hold: 30 seconds x 3   - chest press with beach ball: 2x10 (no)  - D2 flexion: x10, B/L, 4#  - passing 4# from L to R: x10     Prone Ther-Ex     Core exercises PPT x 5  PPT with ball squeeze 5 sec hold, 10 x  PPT with bridge x 10   PPT w single leg bridge x 10  PPT with heel taps from tabletop position BLE x 10 holding 6lb medball, CGA L LE  PPT with hip abduction isometric L LE x 3 sec hold x10, orange band     Heel slides with towel: 2x10  Triple flex over physioball: x10  Windield wipers BLE over physioball 2 x 10     STS's from low EOM squeezing pilates ring  - As above, with squeeze, in staggered stance  Marching, squeezing small ball 30 ft x 2    Modified dead bugs, alt UE 2 x 10     Supine B shoulder ext " "with GTB, emphasis on core activation; 2 x 15    Quadruped:  - Rainbows x 10 ea LE, min cueing for L LE   - Bear crawls, forward/backward                                      Functional Strength Testing       30 sec STS test (60y +)  Assessed    NEURO RE-ED  CPT 49167 TOTAL TIME FOR SESSION 23-37 Minutes      FES  To promote neurorecovery of gait:  Distance: 3.01 miles  Resistance: 0.77 Nm  Power: 2.9 W  Symmetry: L 2%  Total time: 21:00  Active time: 19:03    Xcite  NMRE of LLE in function:     - Sit <> stand from elevated EOM using 6 lb med ball: x10, then progressing to lowest height: x10  - then x10 in staggered stance  - x10 with dynadisc under sitting surface  - x10 with dynadisc and airex under B feet       - Modified reverse lunging with SliderFit under R foot in parallel bars with RUE support only > unsupported:2 x10 reps  - L toe tap to 4\" block from airex pad: x10, cues for weight shift then hip flexion vs lateral trunk flexion         - Tall kneel <> heel sit: 2x10, 2nd set with chest press   - fwd step up with LLE onto 6\" block: x10, repeated with R knee drive, x10 repeated with 3# on RUE     Bioness L300 Go  - Fwd/rev step over 12\" [place]: x20,   - Fwd step up onto 6\" block with contra LE march: 2x10, B         - RLE step up, LLE march with 8lb db overhead press        - LLE step up, RLE march while holding 8lb db x 10, overhead press with 8lb db x 5  - Fwd step down 4\" block: 2x10, orange theraband around knees for manual cues   - stance on airex with lateral L toe taps to 12\" [place]: x10  - Tandem walk 20'x4, 8# on R UE. Cues to prevent lateral lean  - tandem walk on balance beam  - step up on 4\" block, contralateral heel tap to 10\" block- fwd step over 1/2 bolster x20, cues for L hip stability and   6\" hurdles, navigating reciprocally     - 6 inch step taps 2 X 10  - 6 inch step up with repeater, cues for L hip stability     -weight shifting over 1/2 bolster. Cues for L hip stability and " "L knee extension    6\" hurdles:  - Fwd, cues for L heel strike, weight shift, and knee extension.  - Reciprocal pattern  - side stepping                  Yes                    Yes        Yes to all     COORDINATION       Target Tapping     Coordination ladder As appropriate for HL balance     Amb with ankle weights     Amb with proprio wrap     Pushing weighted shopping cart     POSTURAL RE-ED     Sit <> Stand  From 22\" mat holding 6# med ball, cues for midline and equal WB      Tall Kneel  Tall kneel <> short sit: x10, x10 with chest press     Seated & standing reaching for trunk strengthening     Abbey-scapular strengthening     PRE-GAIT ACTIVITIES      Tap-ups     Step-ups To 6\" block with contralateral knee drive: x10, repeated with 2 lb DB for UE swing     Standing weight shifting     Step-taps At 6\" main gym steps, step up to 1st step, tap to 3rd with contralateral LE  - w Bioness    BALANCE TRAINING     Standing balance - static/dynamic       HEP Review       Floor       Airex [place]-tandem: 30 seconds x 2, HT: 30\" x 1, EC: 30 seconds x 1  Tandem stance: 30 seconds x 1     Blue [employer] | [employer] rockerboard:  - Semi tandem with chops, 6lb                 Rockerboard AP plane: split stance x10, with light UE support, B/L   AP, chest press x 10, OH press x 10, 6lb   Squats 2 x 10      Hurdles 6\" hurdles, focus on dec L hip circumduction through swing and encouraging heel strike. Step forward/back     Repeated with side stepping pattern, requiring BUE support dud to inability for L knee extension through stance     BOSU Ball  Fwd mini lunge onto domed side: x10, CloseS   Modified lunge onto BOSU with palloff press:   - x10, then repeated x10 with perturbations on yellow sports cord     Split Stance      [employer] Balance Training  Weight shifting and motor control training with increased difficulty noted to L anterolateral planes: x5 mins     Dynamic gait       Side stepping With partial squat with orange theraband around ankles: " 10'x4     Retro walking Fwd/retro amb 50'x6 with cues for shorter step length and for heel strike + knee extension through stance, mirror anteriorly, added dual task of beach bal    Retro 30ft x 2 ea today with Bioness donned  Cues for decreased lateral trunk lean with retro amb    2 x 30 ft while holding small physioball, w Bioness  - 2 x 30 ft marching with 6lb medball, w Biones            Yes               Tandem Walking 10'x2, CloseS/CGA     Suitcase carry Using small 3 lb B DB on R: 100'x2, noted delayed L knee extension through IC and mid stance     Marching With pilates ring 3 x 30 ft     Amb with hula hoop Using hula hoop to facilitate BUE swing and trunk rot     Walking with ball toss     Proprioceptive wrap Blue TB around L LE  - Amb along purple line x 3 with cues to keep feet within tiles  - x 250ft with same cues as above  - Retro amb 3 x 20 ft  - Mini squats with heels elevated x 10, focus on minimizing hyperextension    Balance Testing     FGA  Assessed    SLS  Assessed see note     10mWT Assessed  - (6mWT on 8/26)    GAIT TRAINING  CPT 19640 TOTAL TIME FOR SESSION Not performed        Ambulation without AD  Gait with no AD over level indoor surfaces with WBOS and decreased L weight shift, VC for anterolateral weight shift, heel strike, BUE swing and trunk rot, 300'x1  - with bioness L300 lower cuff donned       Dynamic Gait  20'x6 with focus on shorter step lengths, heel strike and decreasing step width with use of 1' floor tiles, repeated with bimanual ball toss with CloseS and noted decreased gait speed and increase in step lengths     With Bioness donned x 250 ft, carpet and tile   - Good clearance of L LE through swing with improved knee flexion through swing    -  50 ft without for comparison     Treadmill, No UE support, with Bioness donned 2.2 mph x 10 min, [place]   - Bioness donned     Following removal of proprioceptive wrap x 250ft, no instances of L knee hyperextension thrust noted.                                      Stair negotiation  Full flight stairs 2 trials with 1 rail support on descent only Tactile cues for trunk midline, verbal and visual cues for eccentric control    6 inch    Curb negotiation      Ramp negotiation  with bioness X 30 ft    Outdoor ambulation  With Bioness:  - x500 ft approx, inclines/declines  - Retro amb up incline 2 x 15 ft   - slow controlled descent: 15'x2     MANUAL  CPT 49206 TOTAL TIME FOR SESSION Not performed      Mobilization        MODALITIES  CPT 01018 TOTAL TIME FOR SESSION      Ice       Heat       ATTENDED E-STIM  CPT 78502 TOTAL TIME FOR SESSION 8-22 Minutes     Attended E-Stim FES    [id number] (Age 28) , PIN [phone]   Stim to L quad, hamstring, glutes, tib ant, and gastroc. 250-350 pulse width, 40Hz frequency.   Muscle testing testing completed for initial set up (7/8)    Xcite use for LLE:     Stim to L quad, hamstring, glutes, tib ant, and gastroc. 250-350 pulse width, 40Hz frequency.   Muscle testing testing completed for initial set up 7/26)    Bioness L300 go.   L lower leg quick fit pads, L hamstring added 8/2 ( lower leg cuff only today)   30 Hz with 0.2 ramp for loading response   Used tablet with strap   Increased time for set up/ alignment                            YES    Unattended E-stim

## 2024-10-21 ENCOUNTER — HOSPITAL ENCOUNTER (OUTPATIENT)
Dept: PHYSICAL THERAPY | Facility: REHABILITATION | Age: 29
Setting detail: THERAPIES SERIES
Discharge: HOME | End: 2024-10-21
Attending: LEGAL MEDICINE
Payer: COMMERCIAL

## 2024-10-21 DIAGNOSIS — Z86.69 HISTORY OF TETHERED SPINAL CORD: Primary | ICD-10-CM

## 2024-10-21 PROCEDURE — 97112 NEUROMUSCULAR REEDUCATION: CPT | Mod: GP

## 2024-10-21 PROCEDURE — 97110 THERAPEUTIC EXERCISES: CPT | Mod: GP

## 2024-10-21 NOTE — OP PT TREATMENT LOG
PT Neuro Exercises Current Session   Performed Today? (y/n)   THER ACT   CPT 57058 TOTAL TIME FOR SESSION 0-7 Minutes      Pain, vitals, subjective    Provided rest breaks for energy conservation yes   Skin inspection Base of incision, no swelling noted    FES  cycle [id number] (Age 28) , PIN [id number]   Bike height: 2 holes showing  Pedal height: 3 holes showing  L LE set up only    FES Xcite Set Up of LLE                 [facility] Gabi Patrick, PT from [facility] present for session.     Discussed options for insurance coverage vs out of pocket costs. Process to proceed. T/o session     [facility] L300 Go set up   - education on LMN signature with therapist and physician        Patient Education Patient educated regarding current impairments per testing completed today and PT POC moving forward.     Patient provided with Welcome Letter, which includes attendance policy. Provided education regarding cancellation and no-show policy. Education regarding the importance of participation and regular attendance to maximize goal attainment. Patient verbalized understanding/agreement.     Clearance for FES received, will be completed NV, start with handheld unit to assess tolerance prior to FES bike. Pt verbalized understanding.    Patient educated regarding progress made thus far, current impairments per re-assessments completed today and PT POC moving forward. Patient verbalized understanding/agreement.     Discussion regarding [facility] scheduling and scheduling with [facility] rep. PT to find out next time [facility] rep is at [facility] to adjust pt schedule. Will provide with forms as well.     Discussion of ordering process for [employer].    Patient educated on performance with objective assessments completed during session. Pt verbalized understanding      10/14: Pt plans to get imaging done for brain, cervical spine and lumbar spine on 10/16 prior to following up with Neuro team.                                "                                  Yes   HEP  Verbally reviewed practicing gait over level surfaces with decreased step width, and then incorporating BUE swing and trunk rot. Pt verbalized understanding.     Floor Transfers  CloseS with increased time for LLE management, requires single UE support with review of moving from tall kneel to half kneeling position. Pt demos good understanding, will require continued practice     Patient able to complete floor transfer independently, walked hands down to floor into quadruped. Completed opposite to stand back up.     Subjective Outcomes Measures       ABC Scale Assessed     THER EX  CPT 51107 TOTAL TIME FOR SESSION  38-52 Minutes      STRETCHING      Stretching by patient Incline board stretch: L3 - 1 min x2  L QL stretch with small physioball 10 x 3 sec hold  L Q/L stretch seated in with passive overpressure: 30 seconds x 3  Yes       Stretching by therapist/PROM Trialed modified jose stretch - not effective (pt states modified lunge position she has felt more of a stretch in hip flexors in the past)      CARDIOVASCULAR       Nu Step 12 mins, Level: 4, BLE only, SPM: >25     Stationary Bike Recumbent bike 5 min, L1  (Unable to complete revolution on Expresso upright bike)    Elliptical  9 minutes, with BLE/UE, Level: 1  Yes   Ambulation with AD      Treadmill VR: 10 minutes, speed: 2.3 mph, [place]  - VC to increase nancy   - progressing from light RUE > unsupported at 5 min slime   - using Bioness L300     2 mins of side stepping up incline leading with LLE using Bioness L300 Go     Endurance Testing       6mWT Assessed    STRENGTH TRAINING     HEP Review     Standing Ther-Ex Standing hip abduction 2 x 10 ea  - standing on airex   - Second set, no UE support     Step ups 6\" block x 20   - Added L LE TKE w orange TB    Lateral step down, 4\" block, L LE only w orange band TKE x 10    Hip hikes 2\" block, VC's to discourage R hip hike     Heel raises from L2 on " "incline board x 15 (on floor today)    Heel raises: 2x15, small weighted ball between heels     Staggered STS w pilates ring 2 x 10, VC's to abd L knee into PT's hand    Squat into BL heel raise x 15 w 6lb     Modified deadlift (B-stance) LLE leading anteriorly: x10, 18lbs                           Side-lying there-ex Clamshells: 2x20 on R, L in supine: 5\"x10      Seated Ther-ex Seated on large physioball:   - alt march: 10\" x 5  - perturbations: 30 seconds x 2   - lateral ball toss with rot: 1 min x1, B/L, 6#   - fwd ball toss with overhead hold and toss: 1 min x1, b/l, 6#   - D2 flexion with BUE with 6#: 30 seconds x 2    Y  Y  Y  Y    Y   Supine Ther-Ex Diaphragmatic breathin mins due to elevated BP  Glut bridge: 3\" x10, orange band  Bridge: x10   Supine hip abduction x 10, LLE, isometrics today  BKFO: x10, orange t-band only for R, active assist on L   Clamshells, x 10 ea LE  Bridge with ball squeeze x 10        Deadbugs with physioball  - isometric holds: 10\" x 10, physioball under B heels   - alt LE heels taps: 2x5   - alt UE: x10 reps       Planks  Full plank on hands: 35 seconds x 2  Plank with lateral weight pull: x5, b/l, 6#  Side plank: 30 sec x 1 ea   Side plank - with hip dip: x10, b/l  Plank with trunk rotation using 6#: 1 min x 1  Y    Y  Y  Y   Slider Fit  Fwd gliding/roll outs with half ROM: x10     Quadruped  hip extensions with towel under L foot: x10, B/L (modified position with BUE on EOM and LE on floor)     Quadruped <> bear crawl: 10\" x10   Bear crawl: 5' - fwd/back: x3 rounds     Alternating UE flexion x 10  Alternating LE extension x 10; use of slider L LE    Tall kneel Moderate perturbations all directions  Mini squats 2 x 10 blue TB resistance   Trunk rot: 2x10 w 10lb   D2 chops/lifts: x10  Posterior reach back to cones w cross body reach to PT, 2lb ankle weights on wrists             Akil Kneel  Onto airex pad:   - static hold: 30 seconds x 3   - chest press with beach ball: 2x10 " "(no)  - D2 flexion: x10, B/L, 4#  - passing 4# from L to R: x10     Prone Ther-Ex     Core exercises PPT x 5  PPT with ball squeeze 5 sec hold, 10 x  PPT with bridge x 10   PPT w single leg bridge x 10  PPT with heel taps from tabletop position BLE x 10 holding 6lb medball, CGA L LE  PPT with hip abduction isometric L LE x 3 sec hold x10, orange band     Heel slides with towel: 2x10  Triple flex over physioball: x10  Windshield wipers BLE over physioball 2 x 10     STS's from low EOM squeezing pilates ring  - As above, with squeeze, in staggered stance  Marching, squeezing small ball 30 ft x 2    Modified dead bugs, alt UE 2 x 10     Supine B shoulder ext with GTB, emphasis on core activation; 2 x 15    Quadruped:  - Rainbows x 10 ea LE, min cueing for L LE   - Bear crawls, forward/backward                                      Functional Strength Testing       30 sec STS test (60y +)  Assessed    NEURO RE-ED  CPT 27038 TOTAL TIME FOR SESSION 8-22 Minutes      FES  To promote neurorecovery of gait:  Distance: 3.01 miles  Resistance: 0.77 Nm  Power: 2.9 W  Symmetry: L 2%  Total time: 21:00  Active time: 19:03    Xcite  NMRE of LLE in function:     - Sit <> stand from elevated EOM using 6 lb med ball: x10, then progressing to lowest height: x10  - then x10 in staggered stance  - x10 with dynadisc under sitting surface  - x10 with dynadisc and airex under B feet       - Modified reverse lunging with SliderFit under R foot in parallel bars with RUE support only > unsupported:2 x10 reps  - L toe tap to 4\" block from airex pad: x10, cues for weight shift then hip flexion vs lateral trunk flexion         - Tall kneel <> heel sit: 2x10, 2nd set with chest press   - fwd step up with LLE onto 6\" block: x10, repeated with R knee drive, x10 repeated with 3# on RUE     Bioness L300 Go  - Fwd/rev step over 12\" [place]: x20,   - Fwd step up onto 6\" block with contra LE march: 2x10, B         - RLE step up, LLE march with " "8lb db overhead press        - LLE step up, RLE march while holding 8lb db x 10, overhead press with 8lb db x 5  - Fwd step down 4\" block: 2x10, orange theraband around knees for manual cues   - stance on airex with lateral L toe taps to 12\" [place]: x10  - Tandem walk 20'x4, 8# on R UE. Cues to prevent lateral lean  - tandem walk on balance beam  - step up on 4\" block, contralateral heel tap to 10\" block- fwd step over 1/2 bolster x20, cues for L hip stability and   6\" hurdles, navigating reciprocally     - 6 inch step taps 2 X 10  - 6 inch step up with repeater, cues for L hip stability     -weight shifting over 1/2 bolster. Cues for L hip stability and L knee extension    6\" hurdles:  - Fwd, cues for L heel strike, weight shift, and knee extension.  - Reciprocal pattern  - side stepping                       COORDINATION       Target Tapping     Coordination ladder As appropriate for HL balance     Amb with ankle weights     Amb with proprio wrap     Pushing weighted shopping cart     POSTURAL RE-ED     Sit <> Stand  From 22\" mat holding 6# med ball, cues for midline and equal WB      Tall Kneel  Tall kneel <> short sit: x10, x10 with chest press     Seated & standing reaching for trunk strengthening     Abbey-scapular strengthening     PRE-GAIT ACTIVITIES      Tap-ups     Step-ups To 6\" block with contralateral knee drive: x10, repeated with 2 lb DB for UE swing     Standing weight shifting     Step-taps At 6\" main gym steps, step up to 1st step, tap to 3rd with contralateral LE  - w Bioness    BALANCE TRAINING     Standing balance - static/dynamic       HEP Review       Floor       Airex [place]-tandem: 30 seconds x 2, HT: 30\" x 1, EC: 30 seconds x 1  Tandem stance: 30 seconds x 1     Blue tread rockerboard:  - Semi tandem with chops, 6lb                 Rockerboard AP plane: split stance x10, with light UE support, B/L   AP, chest press x 10, OH press x 10, 6lb   Squats 2 x 10      Hurdles 6\" hurdles, focus on dec L " hip circumduction through swing and encouraging heel strike. Step forward/back     Repeated with side stepping pattern, requiring BUE support dud to inability for L knee extension through stance     BOSU Ball  Fwd mini lunge onto domed side with pause: x10, CloseS   Modified lunge onto BOSU with palloff press:   - x10, then repeated x10 with perturbations on yellow sports cord  Y     Split Stance      [employer] Balance Training  Weight shifting and motor control training with increased difficulty noted to L anterolateral planes: x5 mins     Dynamic gait       Side stepping With partial squat with orange theraband around ankles: 10'x4     Retro walking Fwd/retro amb 50'x6 with cues for shorter step length and for heel strike + knee extension through stance, mirror anteriorly, added dual task of beach bal    Retro 30ft x 2 ea today with Bioness donned  Cues for decreased lateral trunk lean with retro amb    2 x 30 ft while holding small physioball, w Bioness  - 2 x 30 ft marching with 6lb medball, w Biones                         Tandem Walking 10'x2, CloseS/CGA     Suitcase carry Using small 3 lb B DB on R: 100'x2, noted delayed L knee extension through IC and mid stance     Marching With pilates ring 3 x 30 ft     Amb with hula hoop Using hula hoop to facilitate BUE swing and trunk rot     Walking with ball toss     Proprioceptive wrap Blue TB around L LE  - Amb along purple line x 3 with cues to keep feet within tiles  - x 250ft with same cues as above  - Retro amb 3 x 20 ft  - Mini squats with heels elevated x 10, focus on minimizing hyperextension    Balance Testing     FGA  Assessed    SLS  Assessed see note     10mWT Assessed  - (6mWT on 8/26)    GAIT TRAINING  CPT 09786 TOTAL TIME FOR SESSION Not performed        Ambulation without AD  Gait with no AD over level indoor surfaces with WBOS and decreased L weight shift, VC for anterolateral weight shift, heel strike, BUE swing and trunk rot, 300'x1  - with bioness  L300 lower cuff donned       Dynamic Gait  20'x6 with focus on shorter step lengths, heel strike and decreasing step width with use of 1' floor tiles, repeated with bimanual ball toss with CloseS and noted decreased gait speed and increase in step lengths     With Bioness donned x 250 ft, carpet and tile   - Good clearance of L LE through swing with improved knee flexion through swing    -  50 ft without for comparison     Treadmill, No UE support, with Bioness donned 2.2 mph x 10 min, [place]   - Bioness donned     Following removal of proprioceptive wrap x 250ft, no instances of L knee hyperextension thrust noted.                                     Stair negotiation  Full flight stairs 2 trials with 1 rail support on descent only Tactile cues for trunk midline, verbal and visual cues for eccentric control    6 inch    Curb negotiation      Ramp negotiation  with bioness X 30 ft    Outdoor ambulation  With Bioness:  - x500 ft approx, inclines/declines  - Retro amb up incline 2 x 15 ft   - slow controlled descent: 15'x2     MANUAL  CPT 31218 TOTAL TIME FOR SESSION Not performed      Mobilization        MODALITIES  CPT 03716 TOTAL TIME FOR SESSION      Ice       Heat       ATTENDED E-STIM  CPT 63678 TOTAL TIME FOR SESSION Not performed     Attended E-Stim FES    [id number] (Age 28) , PIN [phone]   Stim to L quad, hamstring, glutes, tib ant, and gastroc. 250-350 pulse width, 40Hz frequency.   Muscle testing testing completed for initial set up (7/8)    Xcite use for LLE:     Stim to L quad, hamstring, glutes, tib ant, and gastroc. 250-350 pulse width, 40Hz frequency.   Muscle testing testing completed for initial set up 7/26)    Bioness L300 go.   L lower leg quick fit pads, L hamstring added 8/2 ( lower leg cuff only today)   30 Hz with 0.2 ramp for loading response   Used tablet with strap   Increased time for set up/ alignment                            YES    Unattended E-stim

## 2024-10-21 NOTE — PROGRESS NOTES
Physical Therapy Visit    PT DAILY NOTE FOR OUTPATIENT THERAPY    Patient: Melanie Litten MRN: [id number]  : 1995 28 y.o.  Referring Physician: Remedios Whitfield MD  Date of Visit: 10/21/2024    Certification Dates: 24 through 24    Diagnosis:   1. History of tethered spinal cord        Chief Complaints:  EDS, LLE weakness, gait/balance impairment    Precautions:   Precautions comments: hypermobile - EDS, postural hypotension      TODAY'S VISIT    Time In Session:  Start Time: 1605  Stop Time: 1700  Time Calculation (min): 55 min   History/Vitals/Pain/Encounter Info - 10/21/24 1603          Injury History/Precautions/Daily Required Info    Document Type daily treatment     Primary Therapist Dialn Machuca     Chief Complaint/Reason for Visit  EDS, LLE weakness, gait/balance impairment     Referring Physician Remedios Whitfield MD     Precautions comments hypermobile - EDS, postural hypotension     History of present illness/functional impairment Saranya is a 28 year old female who presents to OPPT with history of tethered cord syndrome. Pt with PMH significant for chiari malformation, hypermobile EDS, left plantar fasciitis, anxiety and postural hypotension. Pt reports her symptoms started slowly in 2019 with L hip pain, followed up with neurosurgeon in [place] with unremarkable results for all testing. Her symptoms then progressed with L leg motor weakness and shakiness (similar to clonus) with movement, chronic constipation, L drop foot, lower back pain, and neck pain.  Pt was finally diagnosed with tethered cord syndrome and underwent a clinical trial surgery at [facility], [facility], and [facility]. Due to her symptoms, she meets criteria for exploration and sectioning of her filum for the functional tethered cord clinical trial. Now s/p laminectomy for occult tethered cord release on 23 by Dr Duckworth. Patient tolerated procedure well.  No post-op complications. Transferred stable once PACU criteria met. Patient kept FLAT for 36hr. Placed on an aseptic dex taper and post-op IV abx x24hrs.  She was mobilized on 9/27 tolerated well without any symptoms. Additionally, was instructed not to exercise for 7 weeks post-surgery. Pt reports she recovered well overall with decreased drop foot, clonus and swelling, continues to have increased coordination deficits and foot drag with fatigue. Pt has completed OPPT at [facility] in [place] and intermittent massage therapy. Pt’s functional goals are to work on higher level balance, picking up things from floor, coordination and “Hippo therapy”.     Patient reported fall since last visit No        Pain Assessment    Currently in pain No/Denies                    Daily Treatment Assessment and Plan - 10/21/24 1603          Daily Treatment Assessment and Plan    Progress toward goals Progressing     Daily Outcome Summary Pt completes core strengthening and postural re-ed with sitting balance over large physioball today. Pt required cues for midline and initiating with abdominals for trunk flexion vs hip hinging on the ball.     [facility], L LE strenghtening (proximal hip), dual tasking, core stabilization                         OBJECTIVE DATA TAKEN TODAY:    None taken    Today's Treatment:       PT Neuro Exercises Current Session   Performed Today? (y/n)   THER ACT   CPT 63189 TOTAL TIME FOR SESSION 0-7 Minutes      Pain, vitals, subjective    Provided rest breaks for energy conservation yes   Skin inspection Base of incision, no swelling noted    FES  cycle [id number] (Age 28) , PIN [id number]   Bike height: 2 holes showing  Pedal height: 3 holes showing  L LE set up only    FES Xcite Set Up of E                 Teodoro Patrick, PT from [facility] present for session.     Discussed options for insurance coverage vs out of pocket costs. Process to proceed. T/o session      Bioness L300 Go set up   - education on LMN signature with therapist and physician        Patient Education Patient educated regarding current impairments per testing completed today and PT POC moving forward.     Patient provided with Welcome Letter, which includes attendance policy. Provided education regarding cancellation and no-show policy. Education regarding the importance of participation and regular attendance to maximize goal attainment. Patient verbalized understanding/agreement.     Clearance for FES received, will be completed NV, start with handheld unit to assess tolerance prior to FES bike. Pt verbalized understanding.    Patient educated regarding progress made thus far, current impairments per re-assessments completed today and PT POC moving forward. Patient verbalized understanding/agreement.     Discussion regarding Equestrian scheduling and scheduling with [employer] rep. PT to find out next time Bioness rep is at [facility] to adjust pt schedule. Will provide with forms as well.     Discussion of ordering process for bioness.    Patient educated on performance with objective assessments completed during session. Pt verbalized understanding      10/14: Pt plans to get imaging done for brain, cervical spine and lumbar spine on 10/16 prior to following up with Neuro team.                                                                 Yes   HEP  Verbally reviewed practicing gait over level surfaces with decreased step width, and then incorporating BUE swing and trunk rot. Pt verbalized understanding.     Floor Transfers  CloseS with increased time for LLE management, requires single UE support with review of moving from tall kneel to half kneeling position. Pt demos good understanding, will require continued practice     Patient able to complete floor transfer independently, walked hands down to floor into quadruped. Completed opposite to stand back up.     Subjective Outcomes Measures       ABC Scale  "Assessed     THER EX  CPT 03217 TOTAL TIME FOR SESSION  38-52 Minutes      STRETCHING      Stretching by patient Incline board stretch: L3 - 1 min x2  L QL stretch with small physioball 10 x 3 sec hold  L Q/L stretch seated in with passive overpressure: 30 seconds x 3  Yes       Stretching by therapist/PROM Trialed modified jose stretch - not effective (pt states modified lunge position she has felt more of a stretch in hip flexors in the past)      CARDIOVASCULAR       Nu Step 12 mins, Level: 4, BLE only, SPM: >25     Stationary Bike Recumbent bike 5 min, L1  (Unable to complete revolution on [employer] upright bike)    Elliptical  9 minutes, with BLE/UE, Level: 1  Yes   Ambulation with AD      Treadmill VR: 10 minutes, speed: 2.3 mph, [place]  - VC to increase nancy   - progressing from light RUE > unsupported at 5 min slime   - using Bioness L300     2 mins of side stepping up incline leading with LLE using Bioness L300 Go     Endurance Testing       6mWT Assessed    STRENGTH TRAINING     HEP Review     Standing Ther-Ex Standing hip abduction 2 x 10 ea  - standing on airex   - Second set, no UE support     Step ups 6\" block x 20   - Added L LE TKE w orange TB    Lateral step down, 4\" block, L LE only w orange band TKE x 10    Hip hikes 2\" block, VC's to discourage R hip hike     Heel raises from L2 on incline board x 15 (on floor today)    Heel raises: 2x15, small weighted ball between heels     Staggered STS w pilates ring 2 x 10, VC's to abd L knee into PT's hand    Squat into BL heel raise x 15 w 6lb     Modified deadlift (B-stance) LLE leading anteriorly: x10, 18lbs                           Side-lying there-ex Clamshells: 2x20 on R, L in supine: 5\"x10      Seated Ther-ex Seated on large physioball:   - alt march: 10\" x 5  - perturbations: 30 seconds x 2   - lateral ball toss with rot: 1 min x1, B/L, 6#   - fwd ball toss with overhead hold and toss: 1 min x1, b/l, 6#   - D2 flexion with BUE with 6#: 30 " "seconds x 2    Y  Y  Y  Y    Y   Supine Ther-Ex Diaphragmatic breathin mins due to elevated BP  Glut bridge: 3\" x10, orange band  Bridge: x10   Supine hip abduction x 10, LLE, isometrics today  BKFO: x10, orange t-band only for R, active assist on L   Clamshells, x 10 ea LE  Bridge with ball squeeze x 10        Deadbugs with physioball  - isometric holds: 10\" x 10, physioball under B heels   - alt LE heels taps: 2x5   - alt UE: x10 reps       Planks  Full plank on hands: 35 seconds x 2  Plank with lateral weight pull: x5, b/l, 6#  Side plank: 30 sec x 1 ea   Side plank - with hip dip: x10, b/l  Plank with trunk rotation using 6#: 1 min x 1  Y    Y  Y  Y   Slider Fit  Fwd gliding/roll outs with half ROM: x10     Quadruped  hip extensions with towel under L foot: x10, B/L (modified position with BUE on EOM and LE on floor)     Quadruped <> bear crawl: 10\" x10   Bear crawl: 5' - fwd/back: x3 rounds     Alternating UE flexion x 10  Alternating LE extension x 10; use of slider L LE    Tall kneel Moderate perturbations all directions  Mini squats 2 x 10 blue TB resistance   Trunk rot: 2x10 w 10lb   D2 chops/lifts: x10  Posterior reach back to cones w cross body reach to PT, 2lb ankle weights on wrists             Akil Kneel  Onto airex pad:   - static hold: 30 seconds x 3   - chest press with beach ball: 2x10 (no)  - D2 flexion: x10, B/L, 4#  - passing 4# from L to R: x10     Prone Ther-Ex     Core exercises PPT x 5  PPT with ball squeeze 5 sec hold, 10 x  PPT with bridge x 10   PPT w single leg bridge x 10  PPT with heel taps from tabletop position BLE x 10 holding 6lb medball, CGA L LE  PPT with hip abduction isometric L LE x 3 sec hold x10, orange band     Heel slides with towel: 2x10  Triple flex over physioball: x10  Windshield wipers BLE over physioball 2 x 10     STS's from low EOM squeezing pilates ring  - As above, with squeeze, in staggered stance  Marching, squeezing small ball 30 ft x 2    Modified dead " "bugs, alt UE 2 x 10     Supine B shoulder ext with GTB, emphasis on core activation; 2 x 15    Quadruped:  - Rainbows x 10 ea LE, min cueing for L LE   - Bear crawls, forward/backward                                      Functional Strength Testing       30 sec STS test (60y +)  Assessed    NEURO RE-ED  CPT 87904 TOTAL TIME FOR SESSION 8-22 Minutes      FES  To promote neurorecovery of gait:  Distance: 3.01 miles  Resistance: 0.77 Nm  Power: 2.9 W  Symmetry: L 2%  Total time: 21:00  Active time: 19:03    Xcite  NMRE of LLE in function:     - Sit <> stand from elevated EOM using 6 lb med ball: x10, then progressing to lowest height: x10  - then x10 in staggered stance  - x10 with dynadisc under sitting surface  - x10 with dynadisc and airex under B feet       - Modified reverse lunging with SliderFit under R foot in parallel bars with RUE support only > unsupported:2 x10 reps  - L toe tap to 4\" block from airex pad: x10, cues for weight shift then hip flexion vs lateral trunk flexion         - Tall kneel <> heel sit: 2x10, 2nd set with chest press   - fwd step up with LLE onto 6\" block: x10, repeated with R knee drive, x10 repeated with 3# on RUE     Bioness L300 Go  - Fwd/rev step over 12\" [place]: x20,   - Fwd step up onto 6\" block with contra LE march: 2x10, B         - RLE step up, LLE march with 8lb db overhead press        - LLE step up, RLE march while holding 8lb db x 10, overhead press with 8lb db x 5  - Fwd step down 4\" block: 2x10, orange theraband around knees for manual cues   - stance on airex with lateral L toe taps to 12\" [place]: x10  - Tandem walk 20'x4, 8# on R UE. Cues to prevent lateral lean  - tandem walk on balance beam  - step up on 4\" block, contralateral heel tap to 10\" block- fwd step over 1/2 bolster x20, cues for L hip stability and   6\" hurdles, navigating reciprocally     - 6 inch step taps 2 X 10  - 6 inch step up with repeater, cues for L hip stability     -weight shifting " "over 1/2 bolster. Cues for L hip stability and L knee extension    6\" hurdles:  - Fwd, cues for L heel strike, weight shift, and knee extension.  - Reciprocal pattern  - side stepping                       COORDINATION       Target Tapping     Coordination ladder As appropriate for HL balance     Amb with ankle weights     Amb with proprio wrap     Pushing weighted shopping cart     POSTURAL RE-ED     Sit <> Stand  From 22\" mat holding 6# med ball, cues for midline and equal WB      Tall Kneel  Tall kneel <> short sit: x10, x10 with chest press     Seated & standing reaching for trunk strengthening     Abbey-scapular strengthening     PRE-GAIT ACTIVITIES      Tap-ups     Step-ups To 6\" block with contralateral knee drive: x10, repeated with 2 lb DB for UE swing     Standing weight shifting     Step-taps At 6\" main gym steps, step up to 1st step, tap to 3rd with contralateral LE  - w Bioness    BALANCE TRAINING     Standing balance - static/dynamic       HEP Review       Floor       Airex [place]-tandem: 30 seconds x 2, HT: 30\" x 1, EC: 30 seconds x 1  Tandem stance: 30 seconds x 1     Blue Kedzoh rockerboard:  - Semi tandem with chops, 6lb                 Rockerboard AP plane: split stance x10, with light UE support, B/L   AP, chest press x 10, OH press x 10, 6lb   Squats 2 x 10      Hurdles 6\" hurdles, focus on dec L hip circumduction through swing and encouraging heel strike. Step forward/back     Repeated with side stepping pattern, requiring BUE support dud to inability for L knee extension through stance     BOSU Ball  Fwd mini lunge onto domed side with pause: x10, CloseS   Modified lunge onto BOSU with palloff press:   - x10, then repeated x10 with perturbations on yellow sports cord  Y     Split Stance      Bi[employer] Balance Training  Weight shifting and motor control training with increased difficulty noted to L anterolateral planes: x5 mins     Dynamic gait       Side stepping With partial squat with orange " theraband around ankles: 10'x4     Retro walking Fwd/retro amb 50'x6 with cues for shorter step length and for heel strike + knee extension through stance, mirror anteriorly, added dual task of beach bal    Retro 30ft x 2 ea today with Bioness donned  Cues for decreased lateral trunk lean with retro amb    2 x 30 ft while holding small physioball, w Bioness  - 2 x 30 ft marching with 6lb medball, w Biones                         Tandem Walking 10'x2, CloseS/CGA     Suitcase carry Using small 3 lb B DB on R: 100'x2, noted delayed L knee extension through IC and mid stance     Marching With pilates ring 3 x 30 ft     Amb with hula hoop Using hula hoop to facilitate BUE swing and trunk rot     Walking with ball toss     Proprioceptive wrap Blue TB around L LE  - Amb along purple line x 3 with cues to keep feet within tiles  - x 250ft with same cues as above  - Retro amb 3 x 20 ft  - Mini squats with heels elevated x 10, focus on minimizing hyperextension    Balance Testing     FGA  Assessed    SLS  Assessed see note     10mWT Assessed  - (6mWT on 8/26)    GAIT TRAINING  CPT 35231 TOTAL TIME FOR SESSION Not performed        Ambulation without AD  Gait with no AD over level indoor surfaces with WBOS and decreased L weight shift, VC for anterolateral weight shift, heel strike, BUE swing and trunk rot, 300'x1  - with bioness L300 lower cuff donned       Dynamic Gait  20'x6 with focus on shorter step lengths, heel strike and decreasing step width with use of 1' floor tiles, repeated with bimanual ball toss with CloseS and noted decreased gait speed and increase in step lengths     With Bioness donned x 250 ft, carpet and tile   - Good clearance of L LE through swing with improved knee flexion through swing    -  50 ft without for comparison     Treadmill, No UE support, with Bioness donned 2.2 mph x 10 min, [place]   - Bioness donned     Following removal of proprioceptive wrap x 250ft, no instances of L knee  hyperextension thrust noted.                                     Stair negotiation  Full flight stairs 2 trials with 1 rail support on descent only Tactile cues for trunk midline, verbal and visual cues for eccentric control    6 inch    Curb negotiation      Ramp negotiation  with bioness X 30 ft    Outdoor ambulation  With Bioness:  - x500 ft approx, inclines/declines  - Retro amb up incline 2 x 15 ft   - slow controlled descent: 15'x2     MANUAL  CPT 20686 TOTAL TIME FOR SESSION Not performed      Mobilization        MODALITIES  CPT 41240 TOTAL TIME FOR SESSION      Ice       Heat       ATTENDED E-STIM  CPT 39215 TOTAL TIME FOR SESSION Not performed     Attended E-Stim FES    [id number] (Age 28) , PIN [id number]   Stim to L quad, hamstring, glutes, tib ant, and gastroc. 250-350 pulse width, 40Hz frequency.   Muscle testing testing completed for initial set up (7/8)    Xcite use for LLE:     Stim to L quad, hamstring, glutes, tib ant, and gastroc. 250-350 pulse width, 40Hz frequency.   Muscle testing testing completed for initial set up 7/26)    Bioness L300 go.   L lower leg quick fit pads, L hamstring added 8/2 ( lower leg cuff only today)   30 Hz with 0.2 ramp for loading response   Used tablet with strap   Increased time for set up/ alignment                            YES    Unattended E-stim

## 2024-10-23 ENCOUNTER — HOSPITAL ENCOUNTER (OUTPATIENT)
Dept: PHYSICAL THERAPY | Facility: REHABILITATION | Age: 29
Setting detail: THERAPIES SERIES
Discharge: HOME | End: 2024-10-23
Attending: LEGAL MEDICINE
Payer: COMMERCIAL

## 2024-10-23 DIAGNOSIS — Z86.69 HISTORY OF TETHERED SPINAL CORD: Primary | ICD-10-CM

## 2024-10-23 PROCEDURE — 97530 THERAPEUTIC ACTIVITIES: CPT | Mod: GP

## 2024-10-23 PROCEDURE — 97110 THERAPEUTIC EXERCISES: CPT | Mod: GP

## 2024-10-23 NOTE — OP PT TREATMENT LOG
PT Neuro Exercises Current Session   Performed Today? (y/n)   THER ACT   CPT 22986 TOTAL TIME FOR SESSION 8-22 Minutes      Pain, vitals, subjective  BP monitored throughout session   Provided rest breaks for energy conservation  Diaphragmatic breathing beginning of session due to elevated BP Yes    Yes    Skin inspection Base of incision, no swelling noted    FES  cycle [id number] (Age 28) , PIN [id number]   Bike height: 2 holes showing  Pedal height: 3 holes showing  L LE set up only    FES Xcite Set Up of LLE                 [facility], PT from [employer] present for session.     Discussed options for insurance coverage vs out of pocket costs. Process to proceed. T/o session     [employer] L300 Go set up   - education on LMN signature with therapist and physician        Patient Education Patient educated regarding current impairments per testing completed today and PT POC moving forward.     Patient provided with Welcome Letter, which includes attendance policy. Provided education regarding cancellation and no-show policy. Education regarding the importance of participation and regular attendance to maximize goal attainment. Patient verbalized understanding/agreement.     Clearance for FES received, will be completed NV, start with handheld unit to assess tolerance prior to FES bike. Pt verbalized understanding.    Patient educated regarding progress made thus far, current impairments per re-assessments completed today and PT POC moving forward. Patient verbalized understanding/agreement.     Discussion regarding [employer] scheduling and scheduling with [employer] rep. PT to find out next time [employer] rep is at [facility] to adjust pt schedule. Will provide with forms as well.     Discussion of ordering process for [employer].    Patient educated on performance with objective assessments completed during session. Pt verbalized understanding      10/14: Pt plans to get imaging done for brain, cervical spine and  "lumbar spine on 10/16 prior to following up with Neuro team.                                                                    HEP  Verbally reviewed practicing gait over level surfaces with decreased step width, and then incorporating BUE swing and trunk rot. Pt verbalized understanding.     Floor Transfers  CloseS with increased time for LLE management, requires single UE support with review of moving from tall kneel to half kneeling position. Pt demos good understanding, will require continued practice     Patient able to complete floor transfer independently, walked hands down to floor into quadruped. Completed opposite to stand back up.     Subjective Outcomes Measures       ABC Scale Assessed     THER EX  CPT 35979 TOTAL TIME FOR SESSION  38-52 Minutes      STRETCHING      Stretching by patient Incline board stretch: L3 - 1 min x2  L QL stretch with small physioball 10 x 3 sec hold  L Q/L stretch seated in with passive overpressure: 30 seconds x 3         Stretching by therapist/PROM Trialed modified jose stretch - not effective (pt states modified lunge position she has felt more of a stretch in hip flexors in the past)      CARDIOVASCULAR       Nu Step 12 mins, Level: 4, BLE only, SPM: >25     Stationary Bike Recumbent bike 5 min, L1  (Unable to complete revolution on [employer] upright bike)    Elliptical  9 minutes, with BLE/UE, Level: 1     Ambulation with AD      Treadmill VR: 10 minutes, speed: 2.3 mph, [place]  - VC to increase nancy   - progressing from light RUE > unsupported at 5 min slime   - using Bioness L300     2 mins of side stepping up incline leading with LLE using Bioness L300 Go     Endurance Testing       6mWT Assessed    STRENGTH TRAINING     HEP Review     Standing Ther-Ex Standing hip abduction 2 x 10 ea  - standing on airex   - Second set, no UE support     Step ups 6\" block x 20   - Added L LE TKE w orange TB    Lateral step down, 4\" block, L LE only w orange band TKE x " "10    Hip hikes 2\" block, VC's to discourage R hip hike     Heel raises from L2 on incline board x 15 (on floor today)    Heel raises: 2x15, small weighted ball between heels     Staggered STS w pilates ring 2 x 10, VC's to abd L knee into PT's hand    Squat into BL heel raise x 15 w 10lb     Modified deadlift from 6\" block: 2x10, 20lbs                                          Yes     Yes    Side-lying there-ex Clamshells: 2x20 on R, L in supine: 5\"x10      Seated Ther-ex Seated on large physioball:   - alt march: 10\" x 5  - perturbations: 30 seconds   - lateral ball toss with rot: 1 min x1, B/L,    - fwd ball toss with overhead hold and toss: 1 min x1, b/l, 6#   - D2 flexion with BUE with 6#: 30 seconds x 2   - Adductor squeeze with LAQ x 10 ea    Y  Y  Y        Y     Supine Ther-Ex Diaphragmatic breathin mins due to elevated BP  Glut bridge: 3\" x10, orange band  Bridge: x10   Supine hip abduction x 10, LLE, isometrics today  BKFO: x10, orange t-band only for R, active assist on L   Clamshells, x 10 ea LE  Bridge with ball squeeze x 10        Deadbugs with physioball  - isometric holds: 10\" x 10, physioball under B heels   - alt LE heels taps: 2x5   - alt UE: x10 reps       Planks  Full plank on hands: 35 seconds x 2  Plank with lateral weight pull: x5, b/l, 6#  Side plank: 30 sec x 1 ea   Side plank - with hip dip: x10, b/l  Plank with trunk rotation using 6#: 1 min x 1       Slider Fit  Fwd gliding/roll outs with half ROM: x10     Quadruped  hip extensions with towel under L foot: x10, B/L (modified position with BUE on EOM and LE on floor)     Quadruped <> bear crawl: 10\" x10   Bear crawl: 5' - fwd/back: x3 rounds     Alternating UE flexion x 10  Alternating LE extension x 10; use of slider L LE    Tall kneel Moderate perturbations all directions  Mini squats 2 x 10 blue TB resistance   Trunk rot: 2x10 w 10lb   D2 chops/lifts: x10  Posterior reach back to cones w cross body reach to PT, 2lb ankle weights on " "wrists             Akil Kneel  Onto airex pad:   - static hold: 30 seconds x 3   - chest press with beach ball: 2x10 (no)  - D2 flexion: x10, B/L, 4#  - passing 4# from L to R: x10     Prone Ther-Ex     Core exercises PPT x 5  PPT with ball squeeze 5 sec hold, 10 x  PPT with bridge x 10   PPT w single leg bridge x 10  PPT with heel taps from tabletop position BLE x 10 holding 6lb medball, CGA L LE  PPT with hip abduction isometric L LE x 3 sec hold x10, orange band     Heel slides with towel: 2x10  Triple flex over physioball: x10  Windshield wipers BLE over physioball 2 x 10     STS's from low EOM squeezing pilates ring  - As above, with squeeze, in staggered stance  Marching, squeezing small ball 30 ft x 2    Modified dead bugs, alt UE 2 x 10     Supine B shoulder ext with GTB, emphasis on core activation; 2 x 15    Quadruped:  - Rainbows x 10 ea LE, min cueing for L LE   - Bear crawls, forward/backward    Suitcase carry, 12lb x 150 ft ea UE    Polish twists holding small ball, LE extended, heels touching ground 2 x 15                                                      Yes     Yes   Functional Strength Testing       30 sec STS test (60y +)  Assessed    NEURO RE-ED  CPT 18323 TOTAL TIME FOR SESSION 0-7 Minutes      FES  To promote neurorecovery of gait:  Distance: 3.01 miles  Resistance: 0.77 Nm  Power: 2.9 W  Symmetry: L 2%  Total time: 21:00  Active time: 19:03    Xcite  NMRE of LLE in function:     - Sit <> stand from elevated EOM using 6 lb med ball: x10, then progressing to lowest height: x10  - then x10 in staggered stance  - x10 with dynadisc under sitting surface  - x10 with dynadisc and airex under B feet       - Modified reverse lunging with SliderFit under R foot in parallel bars with RUE support only > unsupported:2 x10 reps  - L toe tap to 4\" block from airex pad: x10, cues for weight shift then hip flexion vs lateral trunk flexion         - Tall kneel <> heel sit: 2x10, 2nd set with chest press " "  - fwd step up with LLE onto 6\" block: x10, repeated with R knee drive, x10 repeated with 3# on RUE     Bioness L300 Go  - Fwd/rev step over 12\" [place]: x20,   - Fwd step up onto 6\" block with contra LE march: 2x10, B         - RLE step up, LLE march with 8lb db overhead press        - LLE step up, RLE march while holding 8lb db x 10, overhead press with 8lb db x 5  - Fwd step down 4\" block: 2x10, orange theraband around knees for manual cues   - stance on airex with lateral L toe taps to 12\" [place]: x10  - Tandem walk 20'x4, 8# on R UE. Cues to prevent lateral lean  - tandem walk on balance beam  - step up on 4\" block, contralateral heel tap to 10\" block- fwd step over 1/2 bolster x20, cues for L hip stability and   6\" hurdles, navigating reciprocally     - 6 inch step taps 2 X 10  - 6 inch step up with repeater, cues for L hip stability     -weight shifting over 1/2 bolster. Cues for L hip stability and L knee extension    6\" hurdles:  - Fwd, cues for L heel strike, weight shift, and knee extension.  - Reciprocal pattern  - side stepping                       COORDINATION       Target Tapping     Coordination ladder As appropriate for HL balance     Amb with ankle weights     Amb with proprio wrap     Pushing weighted shopping cart     POSTURAL RE-ED     Sit <> Stand  From 22\" mat holding 6# med ball, cues for midline and equal WB      Tall Kneel  Tall kneel <> short sit: x10, x10 with chest press     Seated & standing reaching for trunk strengthening     Abbey-scapular strengthening     PRE-GAIT ACTIVITIES      Tap-ups     Step-ups To 6\" block with contralateral knee drive: x10, repeated with 2 lb DB for UE swing     Standing weight shifting     Step-taps At 6\" main gym steps, step up to 1st step, tap to 3rd with contralateral LE  - w Bioness    BALANCE TRAINING     Standing balance - static/dynamic       HEP Review       Floor       Airex [place]-tandem: 30 seconds x 2, HT: 30\" x 1, EC: 30 seconds x 1  Tandem " "stance: 30 seconds x 1     Blue tread rockerboard:  - Semi tandem with chops, 6lb                 Rockerboard AP plane: split stance x10, with light UE support, B/L   AP, chest press x 10, OH press x 10, 6lb   Squats 2 x 10      Hurdles 6\" hurdles, focus on dec L hip circumduction through swing and encouraging heel strike. Step forward/back     Repeated with side stepping pattern, requiring BUE support dud to inability for L knee extension through stance     BOSU Ball  Fwd mini lunge onto domed side with pause: x10, CloseS   Modified lunge onto BOSU with palloff press:   - x10, then repeated x10 with perturbations on yellow sports cord     Split Stance      [employer] Balance Training  Weight shifting and motor control training with increased difficulty noted to L anterolateral planes: x5 mins     Dynamic gait       Side stepping With partial squat with orange theraband around ankles: 10'x4     Retro walking Fwd/retro amb 50'x6 with cues for shorter step length and for heel strike + knee extension through stance, mirror anteriorly, added dual task of beach bal    Retro 30ft x 2 ea today with Bioness donned  Cues for decreased lateral trunk lean with retro amb    2 x 30 ft while holding small physioball, w Bioness  - 2 x 30 ft marching with 6lb medball, w Biones                         Tandem Walking 10'x2, CloseS/CGA     Suitcase carry Using small 3 lb B DB on R: 100'x2, noted delayed L knee extension through IC and mid stance     Marching With pilates ring 3 x 30 ft     Amb with hula hoop Using hula hoop to facilitate BUE swing and trunk rot     Walking with ball toss     Proprioceptive wrap Blue TB around L LE  - Amb along purple line x 3 with cues to keep feet within tiles  - x 250ft with same cues as above  - Retro amb 3 x 20 ft  - Mini squats with heels elevated x 10, focus on minimizing hyperextension    Balance Testing     FGA  Assessed    SLS  Assessed see note     10mWT Assessed  - (6mWT on 8/26)    GAIT " TRAINING  CPT 53428 TOTAL TIME FOR SESSION Not performed        Ambulation without AD  Gait with no AD over level indoor surfaces with WBOS and decreased L weight shift, VC for anterolateral weight shift, heel strike, BUE swing and trunk rot, 300'x1  - with bioness L300 lower cuff donned       Dynamic Gait  20'x6 with focus on shorter step lengths, heel strike and decreasing step width with use of 1' floor tiles, repeated with bimanual ball toss with CloseS and noted decreased gait speed and increase in step lengths     With Bioness donned x 250 ft, carpet and tile   - Good clearance of L LE through swing with improved knee flexion through swing    -  50 ft without for comparison     Treadmill, No UE support, with Bioness donned 2.2 mph x 10 min, [place]   - Bioness donned     Following removal of proprioceptive wrap x 250ft, no instances of L knee hyperextension thrust noted.                                     Stair negotiation  Full flight stairs 2 trials with 1 rail support on descent only Tactile cues for trunk midline, verbal and visual cues for eccentric control    6 inch    Curb negotiation      Ramp negotiation  with bioness X 30 ft    Outdoor ambulation  With Bioness:  - x500 ft approx, inclines/declines  - Retro amb up incline 2 x 15 ft   - slow controlled descent: 15'x2     MANUAL  CPT 92234 TOTAL TIME FOR SESSION Not performed      Mobilization        MODALITIES  CPT 43534 TOTAL TIME FOR SESSION      Ice       Heat       ATTENDED E-STIM  CPT 30092 TOTAL TIME FOR SESSION Not performed     Attended E-Stim FES    [id number] (Age 28) , PIN [phone]   Stim to L quad, hamstring, glutes, tib ant, and gastroc. 250-350 pulse width, 40Hz frequency.   Muscle testing testing completed for initial set up (7/8)    Xcite use for LLE:     Stim to L quad, hamstring, glutes, tib ant, and gastroc. 250-350 pulse width, 40Hz frequency.   Muscle testing testing completed for initial set up 7/26)    Bioness L300 go.   L  lower leg quick fit pads, L hamstring added 8/2 ( lower leg cuff only today)   30 Hz with 0.2 ramp for loading response   Used tablet with strap   Increased time for set up/ alignment                               Unattended E-stim

## 2024-10-23 NOTE — PROGRESS NOTES
Physical Therapy Visit    PT DAILY NOTE FOR OUTPATIENT THERAPY    Patient: Melanie Litten MRN: [id number]  : 1995 28 y.o.  Referring Physician: Remedios Whitfield MD  Date of Visit: 10/23/2024    Certification Dates: 24 through 24    Diagnosis:   1. History of tethered spinal cord        Chief Complaints:  EDS, LLE weakness, gait/balance impairment    Precautions:   Precautions comments: hypermobile - EDS, postural hypotension      TODAY'S VISIT    Time In Session:  Start Time: 1505  Stop Time: 1558  Time Calculation (min): 53 min   History/Vitals/Pain/Encounter Info - 10/23/24 1506          Injury History/Precautions/Daily Required Info    Document Type daily treatment     Primary Therapist Dilan Machuca     Chief Complaint/Reason for Visit  EDS, LLE weakness, gait/balance impairment     Referring Physician Remedios Whitfield MD     Precautions comments hypermobile - EDS, postural hypotension     History of present illness/functional impairment Saranya is a 28 year old female who presents to OPPT with history of tethered cord syndrome. Pt with PMH significant for chiari malformation, hypermobile EDS, left plantar fasciitis, anxiety and postural hypotension. Pt reports her symptoms started slowly in 2019 with L hip pain, followed up with neurosurgeon in [place] with unremarkable results for all testing. Her symptoms then progressed with L leg motor weakness and shakiness (similar to clonus) with movement, chronic constipation, L drop foot, lower back pain, and neck pain.  Pt was finally diagnosed with tethered cord syndrome and underwent a clinical trial surgery at [facility], [facility], and [facility]. Due to her symptoms, she meets criteria for exploration and sectioning of her filum for the functional tethered cord clinical trial. Now s/p laminectomy for occult tethered cord release on 23 by Dr Duckworth. Patient tolerated procedure well.  No post-op complications. Transferred stable once PACU criteria met. Patient kept FLAT for 36hr. Placed on an aseptic dex taper and post-op IV abx x24hrs.  She was mobilized on 9/27 tolerated well without any symptoms. Additionally, was instructed not to exercise for 7 weeks post-surgery. Pt reports she recovered well overall with decreased drop foot, clonus and swelling, continues to have increased coordination deficits and foot drag with fatigue. Pt has completed OPPT at [facility] in [place] and intermittent massage therapy. Pt’s functional goals are to work on higher level balance, picking up things from floor, coordination and “Hippo therapy”.     Patient/Family/Caregiver Comments/Observations Imaging completed last Thursday and is going up to [place] tomorrow for follow up appt. No new changes otherwise. Inc anxiety today due to follow up appt tomorrow     Patient reported fall since last visit No        Pain Assessment    Currently in pain No/Denies        Pre Activity Vital Signs    /102   132/96 5 min later    BP Location Left upper arm     BP Method Manual     Patient Position Sitting        Activity Vital Signs    Activity /94     Activity BP Location Left upper arm     Activity BP Method Manual     Patient Position Sitting        Post Activity Vital Signs    Post Activity /82     Post Activity BP Location Left upper arm     Post Activity BP Method Manual     Patient Position Sitting                    Daily Treatment Assessment and Plan - 10/23/24 1506          Daily Treatment Assessment and Plan    Progress toward goals Progressing     Daily Outcome Summary Patient with increased anxiety this session due to follow up MD appt tomorrow so additional time beginning of session spent working on diaphragmatic breathing. With activity, PT initially decreased resistance/weight to aide with vitals as well. By end of session, BP WNL. Aside from anxiety, patient was asymptomatic throughout the  session.     Plan and Recommendations Good4U as able, L LE strenghtening (proximal hip), dual tasking, core stabilization                         OBJECTIVE DATA TAKEN TODAY:    None taken    Today's Treatment:       PT Neuro Exercises Current Session   Performed Today? (y/n)   THER ACT   CPT 53782 TOTAL TIME FOR SESSION 8-22 Minutes      Pain, vitals, subjective  BP monitored throughout session   Provided rest breaks for energy conservation  Diaphragmatic breathing beginning of session due to elevated BP Yes    Yes    Skin inspection Base of incision, no swelling noted    FES  cycle [id number] (Age 28) , PIN [id number]   Bike height: 2 holes showing  Pedal height: 3 holes showing  L LE set up only    FES Xcite Set Up of LLE                 [facility] Gabi Patrick, PT from [facility] present for session.     Discussed options for insurance coverage vs out of pocket costs. Process to proceed. T/o session     Good4U L300 Go set up   - education on LMN signature with therapist and physician        Patient Education Patient educated regarding current impairments per testing completed today and PT POC moving forward.     Patient provided with Welcome Letter, which includes attendance policy. Provided education regarding cancellation and no-show policy. Education regarding the importance of participation and regular attendance to maximize goal attainment. Patient verbalized understanding/agreement.     Clearance for FES received, will be completed [place], start with handheld unit to assess tolerance prior to FES bike. Pt verbalized understanding.    Patient educated regarding progress made thus far, current impairments per re-assessments completed today and PT POC moving forward. Patient verbalized understanding/agreement.     Discussion regarding Equestrian scheduling and scheduling with [facility] rep. PT to find out next time [facility] rep is at [facility] to adjust pt schedule. Will provide with forms as well.     Discussion of  ordering process for bioness.    Patient educated on performance with objective assessments completed during session. Pt verbalized understanding      10/14: Pt plans to get imaging done for brain, cervical spine and lumbar spine on 10/16 prior to following up with Neuro team.                                                                    HEP  Verbally reviewed practicing gait over level surfaces with decreased step width, and then incorporating BUE swing and trunk rot. Pt verbalized understanding.     Floor Transfers  CloseS with increased time for LLE management, requires single UE support with review of moving from tall kneel to half kneeling position. Pt demos good understanding, will require continued practice     Patient able to complete floor transfer independently, walked hands down to floor into quadruped. Completed opposite to stand back up.     Subjective Outcomes Measures       ABC Scale Assessed     THER EX  CPT 61467 TOTAL TIME FOR SESSION  38-52 Minutes      STRETCHING      Stretching by patient Incline board stretch: L3 - 1 min x2  L QL stretch with small physioball 10 x 3 sec hold  L Q/L stretch seated in with passive overpressure: 30 seconds x 3         Stretching by therapist/PROM Trialed modified jose stretch - not effective (pt states modified lunge position she has felt more of a stretch in hip flexors in the past)      CARDIOVASCULAR       Nu Step 12 mins, Level: 4, BLE only, SPM: >25     Stationary Bike Recumbent bike 5 min, L1  (Unable to complete revolution on Expresso upright bike)    Elliptical  9 minutes, with BLE/UE, Level: 1     Ambulation with AD      Treadmill VR: 10 minutes, speed: 2.3 mph, [place]  - VC to increase nancy   - progressing from light RUE > unsupported at 5 min slime   - using [employer] L300     2 mins of side stepping up incline leading with LLE using [employer] L300 Go     Endurance Testing       6mWT Assessed    STRENGTH TRAINING     HEP Review     Standing  "Ther-Ex Standing hip abduction 2 x 10 ea  - standing on airex   - Second set, no UE support     Step ups 6\" block x 20   - Added L LE TKE w orange TB    Lateral step down, 4\" block, L LE only w orange band TKE x 10    Hip hikes 2\" block, VC's to discourage R hip hike     Heel raises from L2 on incline board x 15 (on floor today)    Heel raises: 2x15, small weighted ball between heels     Staggered STS w pilates ring 2 x 10, VC's to abd L knee into PT's hand    Squat into BL heel raise x 15 w 10lb     Modified deadlift from 6\" block: 2x10, 20lbs                                          Yes     Yes    Side-lying there-ex Clamshells: 2x20 on R, L in supine: 5\"x10      Seated Ther-ex Seated on large physioball:   - alt march: 10\" x 5  - perturbations: 30 seconds   - lateral ball toss with rot: 1 min x1, B/L,    - fwd ball toss with overhead hold and toss: 1 min x1, b/l, 6#   - D2 flexion with BUE with 6#: 30 seconds x 2   - Adductor squeeze with LAQ x 10 ea    Y  Y  Y        Y     Supine Ther-Ex Diaphragmatic breathin mins due to elevated BP  Glut bridge: 3\" x10, orange band  Bridge: x10   Supine hip abduction x 10, LLE, isometrics today  BKFO: x10, orange t-band only for R, active assist on L   Clamshells, x 10 ea LE  Bridge with ball squeeze x 10        Deadbugs with physioball  - isometric holds: 10\" x 10, physioball under B heels   - alt LE heels taps: 2x5   - alt UE: x10 reps       Planks  Full plank on hands: 35 seconds x 2  Plank with lateral weight pull: x5, b/l, 6#  Side plank: 30 sec x 1 ea   Side plank - with hip dip: x10, b/l  Plank with trunk rotation using 6#: 1 min x 1       Slider Fit  Fwd gliding/roll outs with half ROM: x10     Quadruped  hip extensions with towel under L foot: x10, B/L (modified position with BUE on EOM and LE on floor)     Quadruped <> bear crawl: 10\" x10   Bear crawl: 5' - fwd/back: x3 rounds     Alternating UE flexion x 10  Alternating LE extension x 10; use of slider L LE  "   Tall kneel Moderate perturbations all directions  Mini squats 2 x 10 blue TB resistance   Trunk rot: 2x10 w 10lb   D2 chops/lifts: x10  Posterior reach back to cones w cross body reach to PT, 2lb ankle weights on wrists             Akil Kneel  Onto airex pad:   - static hold: 30 seconds x 3   - chest press with beach ball: 2x10 (no)  - D2 flexion: x10, B/L, 4#  - passing 4# from L to R: x10     Prone Ther-Ex     Core exercises PPT x 5  PPT with ball squeeze 5 sec hold, 10 x  PPT with bridge x 10   PPT w single leg bridge x 10  PPT with heel taps from tabletop position BLE x 10 holding 6lb medball, CGA L LE  PPT with hip abduction isometric L LE x 3 sec hold x10, orange band     Heel slides with towel: 2x10  Triple flex over physioball: x10  WindiRezQield wipers BLE over physioball 2 x 10     STS's from low EOM squeezing pilates ring  - As above, with squeeze, in staggered stance  Marching, squeezing small ball 30 ft x 2    Modified dead bugs, alt UE 2 x 10     Supine B shoulder ext with GTB, emphasis on core activation; 2 x 15    Quadruped:  - Rainbows x 10 ea LE, min cueing for L LE   - Bear crawls, forward/backward    Suitcase carry, 12lb x 150 ft ea UE    Grenadian twists holding small ball, LE extended, heels touching ground 2 x 15                                                      Yes     Yes   Functional Strength Testing       30 sec STS test (60y +)  Assessed    NEURO RE-ED  CPT 08911 TOTAL TIME FOR SESSION 0-7 Minutes      FES  To promote neurorecovery of gait:  Distance: 3.01 miles  Resistance: 0.77 Nm  Power: 2.9 W  Symmetry: L 2%  Total time: 21:00  Active time: 19:03    Xcite  NMRE of LLE in function:     - Sit <> stand from elevated EOM using 6 lb med ball: x10, then progressing to lowest height: x10  - then x10 in staggered stance  - x10 with dynadisc under sitting surface  - x10 with dynadisc and airex under B feet       - Modified reverse lunging with SliderFit under R foot in parallel bars with  "RUE support only > unsupported:2 x10 reps  - L toe tap to 4\" block from airex pad: x10, cues for weight shift then hip flexion vs lateral trunk flexion         - Tall kneel <> heel sit: 2x10, 2nd set with chest press   - fwd step up with LLE onto 6\" block: x10, repeated with R knee drive, x10 repeated with 3# on RUE     Bioness L300 Go  - Fwd/rev step over 12\" [place]: x20,   - Fwd step up onto 6\" block with contra LE march: 2x10, B         - RLE step up, LLE march with 8lb db overhead press        - LLE step up, RLE march while holding 8lb db x 10, overhead press with 8lb db x 5  - Fwd step down 4\" block: 2x10, orange theraband around knees for manual cues   - stance on airex with lateral L toe taps to 12\" [place]: x10  - Tandem walk 20'x4, 8# on R UE. Cues to prevent lateral lean  - tandem walk on balance beam  - step up on 4\" block, contralateral heel tap to 10\" block- fwd step over 1/2 bolster x20, cues for L hip stability and   6\" hurdles, navigating reciprocally     - 6 inch step taps 2 X 10  - 6 inch step up with repeater, cues for L hip stability     -weight shifting over 1/2 bolster. Cues for L hip stability and L knee extension    6\" hurdles:  - Fwd, cues for L heel strike, weight shift, and knee extension.  - Reciprocal pattern  - side stepping                       COORDINATION       Target Tapping     Coordination ladder As appropriate for HL balance     Amb with ankle weights     Amb with proprio wrap     Pushing weighted shopping cart     POSTURAL RE-ED     Sit <> Stand  From 22\" mat holding 6# med ball, cues for midline and equal WB      Tall Kneel  Tall kneel <> short sit: x10, x10 with chest press     Seated & standing reaching for trunk strengthening     Abbey-scapular strengthening     PRE-GAIT ACTIVITIES      Tap-ups     Step-ups To 6\" block with contralateral knee drive: x10, repeated with 2 lb DB for UE swing     Standing weight shifting     Step-taps At 6\" main gym steps, step up to 1st " "step, tap to 3rd with contralateral LE  - w Bioness    BALANCE TRAINING     Standing balance - static/dynamic       HEP Review       Floor       Airex [place]-tandem: 30 seconds x 2, HT: 30\" x 1, EC: 30 seconds x 1  Tandem stance: 30 seconds x 1     Blue tread rockerboard:  - Semi tandem with chops, 6lb                 Rockerboard AP plane: split stance x10, with light UE support, B/L   AP, chest press x 10, OH press x 10, 6lb   Squats 2 x 10      Hurdles 6\" hurdles, focus on dec L hip circumduction through swing and encouraging heel strike. Step forward/back     Repeated with side stepping pattern, requiring BUE support dud to inability for L knee extension through stance     BOSU Ball  Fwd mini lunge onto domed side with pause: x10, CloseS   Modified lunge onto BOSU with palloff press:   - x10, then repeated x10 with perturbations on yellow sports cord     Split Stance      Biodex Balance Training  Weight shifting and motor control training with increased difficulty noted to L anterolateral planes: x5 mins     Dynamic gait       Side stepping With partial squat with orange theraband around ankles: 10'x4     Retro walking Fwd/retro amb 50'x6 with cues for shorter step length and for heel strike + knee extension through stance, mirror anteriorly, added dual task of beach bal    Retro 30ft x 2 ea today with Bioness donned  Cues for decreased lateral trunk lean with retro amb    2 x 30 ft while holding small physioball, w Bioness  - 2 x 30 ft marching with 6lb medball, w Biones                         Tandem Walking 10'x2, CloseS/CGA     Suitcase carry Using small 3 lb B DB on R: 100'x2, noted delayed L knee extension through IC and mid stance     Marching With pilates ring 3 x 30 ft     Amb with hula hoop Using hula hoop to facilitate BUE swing and trunk rot     Walking with ball toss     Proprioceptive wrap Blue TB around L LE  - Amb along purple line x 3 with cues to keep feet within tiles  - x 250ft with same cues as " above  - Retro amb 3 x 20 ft  - Mini squats with heels elevated x 10, focus on minimizing hyperextension    Balance Testing     FGA  Assessed    SLS  Assessed see note     10mWT Assessed  - (6mWT on 8/26)    GAIT TRAINING  CPT 51004 TOTAL TIME FOR SESSION Not performed        Ambulation without AD  Gait with no AD over level indoor surfaces with WBOS and decreased L weight shift, VC for anterolateral weight shift, heel strike, BUE swing and trunk rot, 300'x1  - with bioness L300 lower cuff donned       Dynamic Gait  20'x6 with focus on shorter step lengths, heel strike and decreasing step width with use of 1' floor tiles, repeated with bimanual ball toss with CloseS and noted decreased gait speed and increase in step lengths     With Bioness donned x 250 ft, carpet and tile   - Good clearance of L LE through swing with improved knee flexion through swing    -  50 ft without for comparison     Treadmill, No UE support, with Bioness donned 2.2 mph x 10 min, [place]   - Bioness donned     Following removal of proprioceptive wrap x 250ft, no instances of L knee hyperextension thrust noted.                                     Stair negotiation  Full flight stairs 2 trials with 1 rail support on descent only Tactile cues for trunk midline, verbal and visual cues for eccentric control    6 inch    Curb negotiation      Ramp negotiation  with bioness X 30 ft    Outdoor ambulation  With Bioness:  - x500 ft approx, inclines/declines  - Retro amb up incline 2 x 15 ft   - slow controlled descent: 15'x2     MANUAL  CPT 50156 TOTAL TIME FOR SESSION Not performed      Mobilization        MODALITIES  CPT 56746 TOTAL TIME FOR SESSION      Ice       Heat       ATTENDED E-STIM  CPT 71027 TOTAL TIME FOR SESSION Not performed     Attended E-Stim FES    [id number] (Age 28) , PIN [id number]   Stim to L quad, hamstring, glutes, tib ant, and gastroc. 250-350 pulse width, 40Hz frequency.   Muscle testing testing completed for initial set up  (7/8)    Xcite use for LLE:     Stim to L quad, hamstring, glutes, tib ant, and gastroc. 250-350 pulse width, 40Hz frequency.   Muscle testing testing completed for initial set up 7/26)    [employer] L300 go.   L lower leg quick fit pads, L hamstring added 8/2 ( lower leg cuff only today)   30 Hz with 0.2 ramp for loading response   Used tablet with strap   Increased time for set up/ alignment                               Unattended E-stim

## 2024-10-28 ENCOUNTER — HOSPITAL ENCOUNTER (OUTPATIENT)
Dept: PHYSICAL THERAPY | Facility: REHABILITATION | Age: 29
Setting detail: THERAPIES SERIES
Discharge: HOME | End: 2024-10-28
Attending: LEGAL MEDICINE
Payer: COMMERCIAL

## 2024-10-28 DIAGNOSIS — Z86.69 HISTORY OF TETHERED SPINAL CORD: Primary | ICD-10-CM

## 2024-10-28 PROCEDURE — 97112 NEUROMUSCULAR REEDUCATION: CPT | Mod: GP

## 2024-10-28 PROCEDURE — 97110 THERAPEUTIC EXERCISES: CPT | Mod: GP

## 2024-10-28 PROCEDURE — 97530 THERAPEUTIC ACTIVITIES: CPT | Mod: GP

## 2024-10-28 NOTE — PROGRESS NOTES
Physical Therapy Progress Note    PT PROGRESS NOTE FOR OUTPATIENT THERAPY    Patient: Melanie Litten   MRN: [id number]  : 1995 28 y.o.    Referring Physician: Remedios Whitfield MD  Date of Visit: 10/28/2024    Certification Dates: 24 through 24    Recommended Frequency & Duration:  2 times/week for up to 3 months        Diagnosis:   1. History of tethered spinal cord        Chief Complaints:  Chief Complaint   Patient presents with    Difficulty Walking    Dec Strength    Dec Coordination    Decreased Trunk Control    Abnormality Of Gait       Precautions:   Precautions comments: hypermobile - EDS, postural hypotension    TODAY'S VISIT:    Time In Session:  Start Time: 1400  Stop Time: 1455  Time Calculation (min): 55 min   General Information - 10/28/24 1358          Session Details    Document Type progress note     Mode of Treatment individual therapy        General Information    Referring Physician Remedios Whitfield MD     History of present illness/functional impairment Saranya is a 28 year old female who presents to OPPT with history of tethered cord syndrome. Pt with PMH significant for chiari malformation, hypermobile EDS, left plantar fasciitis, anxiety and postural hypotension. Pt reports her symptoms started slowly in 2019 with L hip pain, followed up with neurosurgeon in [place] with unremarkable results for all testing. Her symptoms then progressed with L leg motor weakness and shakiness (similar to clonus) with movement, chronic constipation, L drop foot, lower back pain, and neck pain.  Pt was finally diagnosed with tethered cord syndrome and underwent a clinical trial surgery at [facility], [facility], and [facility]. Due to her symptoms, she meets criteria for exploration and sectioning of her filum for the functional tethered cord clinical trial. Now s/p laminectomy for occult tethered cord release on 23 by Dr Duckworth.  Patient tolerated procedure well. No post-op complications. Transferred stable once PACU criteria met. Patient kept FLAT for 36hr. Placed on an aseptic dex taper and post-op IV abx x24hrs.  She was mobilized on 9/27 tolerated well without any symptoms. Additionally, was instructed not to exercise for 7 weeks post-surgery. Pt reports she recovered well overall with decreased drop foot, clonus and swelling, continues to have increased coordination deficits and foot drag with fatigue. Pt has completed OPPT at [facility] in [place] and intermittent massage therapy. Pt’s functional goals are to work on higher level balance, picking up things from floor, coordination and “Hippo therapy”.     Patient/Family/Caregiver Comments/Observations Reporting there was some regression (in terms of shakiness, clonus, strength). No re-tether on imaging. Pituitary is squished. They will be looking for syrinx or compression on the cervical imaging - they had a cancelation for the cervical MRI for tomorrow so patient will be doing that. MD was not concerned about new face symptoms. She reports they will message results. She will also have a CSF flow study complete (being completing during cervical imaging tomorrow). She was also prescribed anti-anxiety medications (Lexapro).     Precautions comments hypermobile - EDS, postural hypotension                    Daily Falls Screen - 10/28/24 1358          Daily Falls Assessment    Patient reported fall since last visit No                    Pain/Vitals - 10/28/24 1358          Pain Assessment    Currently in pain No/Denies        Pre Activity Vital Signs    /90     BP Location Left upper arm     BP Method Manual     Patient Position Sitting                    PT - 10/28/24 1358          Physical Therapy    Physical Therapy Specialty Spinal Cord PT        PT Plan    Frequency of treatment 2 times/week     PT Duration 3 months     PT Cert From 08/26/24     PT Cert To 11/22/24     Date PT  POC was sent to provider 08/26/24     Signed PT Plan of Care received?  Yes                    Assessment and Plan - 10/28/24 1358          Assessment    Plan of Care reviewed and patient/family in agreement Yes     System Pathology/Pathophysiology Noted musculoskeletal;neuromuscular;cardiovascular     Functional Limitations in Following Categories (PT Eval) self-care;work;community/leisure     Rehab Potential/Prognosis good, to achieve stated therapy goals     Problem List abnormal muscle tone;decreased strength;impaired balance;impaired sensation;decreased endurance;edema;hemiparesis/hemiplegia;impaired motor control;impaired coordination     Clinical Assessment Patient arrives to PT for progress note where she was objectively assessed on 6MWT, gait speed, FGA, and 30s STS. Her 6MWT improved by 3 ft to 1668 ft however her gait speed decreased from 1.47 m/sec to 1.36 m/sec. Her FGA improved to a 28/30, which does meet one of her LTG's for this POC. She completed 10 reps during her 30s STS which remains the same as previous assessments. Lastly, she was able to complete a floor transfer independently. Pt was educated on performance with assessments. PT discussed this session about possible early discharge due to performance with objective assessments. PT also provided information for local neuro PT clinic, which does offer cash based sessions and they are familiar with B[employer] process. Patient was in agreement with this plan. PT to discuss with primary PT to determine d/c date. Pt will also be obtaining cervical imaging tomorrow and will follow up with provider via messages.     Plan and Recommendations Bioness as able, L LE strenghtening (proximal hip), dual tasking, core stabilization                     OBJECTIVE MEASUREMENTS/DATA:    Outcome Measures    PT Outcome Measures - 10/28/24 1358          Objective Outcome Measures    6 Minute Walk Test 1668 ft     Gait Speed (m/sec) 1.36 m/sec     FGA Score (out of 30  total) 28     30 Second Sit to Stand Test 10 repetitions                     ROM and MMT          6/18/2024   PT Cervical/Lumbar/Other ROM Measurements   Other: Girth Measurement/Comments Mild dependent edema of LLE at end of day per pt reports; surgical scar approx 4 inches in length down mid lumbar spine from previous laminectomy, mild hypomobility down lower 50% of scar   Additional ROM  Hypermobile in all jointsm mild L hamstring length restrictions to approx 70 deg due to tone.   PT LE MMT   Right Hip Flexion (5/5) normal   Left Hip Flexion (3-/5) fair minus   Right Hip Extension (4+/5) good plus   Left Hip Extension (3-/5) fair minus   Right Hip ABD (4+/5) good plus   Left Hip ABD (3-/5) fair minus   Left Hip ADD (4+/5) good plus   Left Hip IR (3+/5) fair plus   Left Hip ER (3+/5) fair plus   Right Knee Flexion (5/5) normal   Left Knee Flexion (4/5) good   Right Knee Extension (5/5) normal   Left Knee Extension (4-/5) good minus   Right Ankle DF (5/5) normal   Left Ankle DF (3+/5) fair plus   Right Ankle PF (5/5) normal   Left Ankle PF (4+/5) good plus   Right Ankle Inversion (5/5) normal   Left Ankle Inversion (3-/5) fair minus   Right Ankle Eversion (5/5) normal   Left Ankle Eversion (4-/5) good minus     Outcome Measures          6/18/2024    10:09 6/24/2024    17:03 7/26/2024    08:58 8/26/2024    08:05 9/27/2024    08:04 10/28/2024    13:58   PT OBJECTIVE Outcome Measures   6 Minute Walk Test 1555 ft  1612 ft 1551ft 1665ft 1668 ft   Gait Speed (m/sec) 1 m/sec  1.22 m/sec       SSV 1.39 m/sec 1.47 m/sec 1.36 m/sec   30 Second Sit to Stand --        TBA NV  10 repetitions 10 repetitions  10 repetitions   FGA 24  25 27 27 28   PT SUBJECTIVE Outcome Measures   ABC  1360/1600 = 85% confidence  89.5%     Other  SLS: R: 30 seconds, L <5 seconds without pelvic drop SLS: R: 30 seconds, L: 23 seconds          Today's Treatment::    Education provided:  None/NA         PT Neuro Exercises Current Session   Performed  Today? (y/n)   THER ACT   CPT 67380 TOTAL TIME FOR SESSION 8-22 Minutes      Pain, vitals, subjective  BP monitored throughout session   Provided rest breaks for energy conservation  Diaphragmatic breathing beginning of session due to elevated BP Yes       Skin inspection Base of incision, no swelling noted    FES  cycle [id number] (Age 28) , PIN [id number]   Bike height: 2 holes showing  Pedal height: 3 holes showing  L LE set up only    FES Xcite Set Up of LLE                 Teodoro Patrick, PT from [employer] present for session.     Discussed options for insurance coverage vs out of pocket costs. Process to proceed. T/o session     [employer] L300 Go set up   - education on LMN signature with therapist and physician        Patient Education Patient educated regarding current impairments per testing completed today and PT POC moving forward.     Patient provided with Welcome Letter, which includes attendance policy. Provided education regarding cancellation and no-show policy. Education regarding the importance of participation and regular attendance to maximize goal attainment. Patient verbalized understanding/agreement.     Clearance for FES received, will be completed NV, start with handheld unit to assess tolerance prior to FES bike. Pt verbalized understanding.    Patient educated regarding progress made thus far, current impairments per re-assessments completed today and PT POC moving forward. Patient verbalized understanding/agreement.     Discussion regarding [employer] scheduling and scheduling with [employer] rep. PT to find out next time [facility] rep is at [facility] to adjust pt schedule. Will provide with forms as well.     Discussion of ordering process for [employer].    Patient educated on performance with objective assessments completed during session. Pt verbalized understanding      10/14: Pt plans to get imaging done for brain, cervical spine and lumbar spine on 10/16 prior to following up with Neuro  team.     Patient educated on performance with objective assessments completed during session. Discussed possible early discharge due to performance with objective assessments, which pt was understanding of. PT provided patient information for [facility] as an option to transition to following d/c and advised to contact office for further information. Pt verbalized understanding                                                                          Yes    HEP  Verbally reviewed practicing gait over level surfaces with decreased step width, and then incorporating BUE swing and trunk rot. Pt verbalized understanding.     Floor Transfers  CloseS with increased time for LLE management, requires single UE support with review of moving from tall kneel to half kneeling position. Pt demos good understanding, will require continued practice     Patient able to complete floor transfer independently, walked hands down to floor into quadruped. Completed opposite to stand back up.            Yes    Subjective Outcomes Measures       ABC Scale Assessed     THER EX  CPT 77865 TOTAL TIME FOR SESSION  23-37 Minutes      STRETCHING      Stretching by patient Incline board stretch: L3 - 1 min x2  L QL stretch with small physioball 10 x 3 sec hold  L Q/L stretch seated in with passive overpressure: 30 seconds x 3         Stretching by therapist/PROM Trialed modified jose stretch - not effective (pt states modified lunge position she has felt more of a stretch in hip flexors in the past)      CARDIOVASCULAR       Nu Step 12 mins, Level: 4, BLE only, SPM: >25     Stationary Bike Recumbent bike 5 min, L1  (Unable to complete revolution on Expresso upright bike)    Elliptical  9 minutes, with BLE/UE, Level: 1     Ambulation with AD      Treadmill VR: 10 minutes, speed: 2.3 mph, [place]  -  to increase nancy   - progressing from light RUE > unsupported at 5 min slime   - using [employer] L300     2 mins of side stepping up  "incline leading with LLE using [employer] L300 Go     Endurance Testing       6mWT Assessed Yes    STRENGTH TRAINING     HEP Review     Standing Ther-Ex Standing hip abduction 2 x 10 ea  - standing on airex   - Second set, no UE support     Step ups 6\" block x 20   - Added L LE TKE w orange TB    Lateral step down, 4\" block, L LE only w orange band TKE x 10    Hip hikes 2\" block, VC's to discourage R hip hike     Heel raises from L2 on incline board x 15 (on floor today)    Heel raises: 2x15, small weighted ball between heels     Staggered STS w pilates ring 2 x 10, VC's to abd L knee into PT's hand    Squat into BL heel raise x 15 w 10lb     Modified deadlift from 6\" block: 2x10, 20lbs     Split squat w front foot on airex x 10    Step up w contralateral LE march to airex x 10    Heels elevated squats, 4 second decline x 10                                                   Yes     Yes     Yes    Side-lying there-ex Clamshells: 2x20 on R, L in supine: 5\"x10      Seated Ther-ex Seated on large physioball:   - alt march: 10\" x 5  - perturbations: 30 seconds   - lateral ball toss with rot: 1 min x1, B/L,    - fwd ball toss with overhead hold and toss: 1 min x1, b/l, 6#   - D2 flexion with BUE with 6#: 30 seconds x 2   - Adductor squeeze with LAQ x 10 ea     Supine Ther-Ex Diaphragmatic breathin mins due to elevated BP  Glut bridge: 3\" x10, orange band  Bridge: x10   Supine hip abduction x 10, LLE, isometrics today  BKFO: x10, orange t-band only for R, active assist on L   Clamshells, x 10 ea LE  Bridge with ball squeeze x 10        Deadbugs with physioball  - isometric holds: 10\" x 10, physioball under B heels   - alt LE heels taps: 2x5   - alt UE: x10 reps       Planks  Full plank on hands: 35 seconds x 2  Plank with lateral weight pull: x5, b/l, 6#  Side plank: 30 sec x 1 ea   Side plank - with hip dip: x10, b/l  Plank with trunk rotation using 6#: 1 min x 1       Slider Fit  Fwd gliding/roll outs with half ROM: x10 " "    Quadruped  hip extensions with towel under L foot: x10, B/L (modified position with BUE on EOM and LE on floor)     Quadruped <> bear crawl: 10\" x10   Bear crawl: 5' - fwd/back: x3 rounds     Alternating UE flexion x 10  Alternating LE extension x 10; use of slider L LE    Tall kneel Moderate perturbations all directions  Mini squats 2 x 10 blue TB resistance   Trunk rot: 2x10 w 10lb   D2 chops/lifts: x10  Posterior reach back to cones w cross body reach to PT, 2lb ankle weights on wrists             Akil Kneel  Onto airex pad:   - static hold: 30 seconds x 3   - chest press with beach ball: 2x10 (no)  - D2 flexion: x10, B/L, 4#  - passing 4# from L to R: x10     Prone Ther-Ex     Core exercises PPT x 5  PPT with ball squeeze 5 sec hold, 10 x  PPT with bridge x 10   PPT w single leg bridge x 10  PPT with heel taps from tabletop position BLE x 10 holding 6lb medball, CGA L LE  PPT with hip abduction isometric L LE x 3 sec hold x10, orange band     Heel slides with towel: 2x10  Triple flex over physioball: x10  Windshield wipers BLE over physioball 2 x 10     STS's from low EOM squeezing pilates ring  - As above, with squeeze, in staggered stance  Marching, squeezing small ball 30 ft x 2    Modified dead bugs, alt UE 2 x 10     Supine B shoulder ext with GTB, emphasis on core activation; 2 x 15    Quadruped:  - Rainbows x 10 ea LE, min cueing for L LE   - Bear crawls, forward/backward    Suitcase carry, 12lb x 150 ft ea UE    Marshallese twists holding small ball, LE extended, heels touching ground 2 x 15                                                       Functional Strength Testing       30 sec STS test (60y +)  Assessed Yes    NEURO RE-ED  CPT 48532 TOTAL TIME FOR SESSION 8-22 Minutes      FES  To promote neurorecovery of gait:  Distance: 3.01 miles  Resistance: 0.77 Nm  Power: 2.9 W  Symmetry: L 2%  Total time: 21:00  Active time: 19:03    Xcite  NMRE of LLE in function:     - Sit <> stand from elevated EOM " "using 6 lb med ball: x10, then progressing to lowest height: x10  - then x10 in staggered stance  - x10 with dynadisc under sitting surface  - x10 with dynadisc and airex under B feet       - Modified reverse lunging with SliderFit under R foot in parallel bars with RUE support only > unsupported:2 x10 reps  - L toe tap to 4\" block from airex pad: x10, cues for weight shift then hip flexion vs lateral trunk flexion         - Tall kneel <> heel sit: 2x10, 2nd set with chest press   - fwd step up with LLE onto 6\" block: x10, repeated with R knee drive, x10 repeated with 3# on RUE     [employer] L300 Go  - Fwd/rev step over 12\" [place]: x20,   - Fwd step up onto 6\" block with contra LE march: 2x10, B         - RLE step up, LLE march with 8lb db overhead press        - LLE step up, RLE march while holding 8lb db x 10, overhead press with 8lb db x 5  - Fwd step down 4\" block: 2x10, orange theraband around knees for manual cues   - stance on airex with lateral L toe taps to 12\" [place]: x10  - Tandem walk 20'x4, 8# on R UE. Cues to prevent lateral lean  - tandem walk on balance beam  - step up on 4\" block, contralateral heel tap to 10\" block- fwd step over 1/2 bolster x20, cues for L hip stability and   6\" hurdles, navigating reciprocally     - 6 inch step taps 2 X 10  - 6 inch step up with repeater, cues for L hip stability     -weight shifting over 1/2 bolster. Cues for L hip stability and L knee extension    6\" hurdles:  - Fwd, cues for L heel strike, weight shift, and knee extension.  - Reciprocal pattern  - side stepping                       COORDINATION       Target Tapping     Coordination ladder As appropriate for HL balance     Amb with ankle weights     Amb with proprio wrap     Pushing weighted shopping cart     POSTURAL RE-ED     Sit <> Stand  From 22\" mat holding 6# med ball, cues for midline and equal WB      Tall Kneel  Tall kneel <> short sit: x10, x10 with chest press     Seated & standing reaching for " "trunk strengthening     Abbey-scapular strengthening     PRE-GAIT ACTIVITIES      Tap-ups     Step-ups To 6\" block with contralateral knee drive: x10, repeated with 2 lb DB for UE swing     Standing weight shifting     Step-taps At 6\" main gym steps, step up to 1st step, tap to 3rd with contralateral LE  - w [employer]ess    BALANCE TRAINING     Standing balance - static/dynamic       HEP Review       Floor       Airex Santiago-tandem: 30 seconds x 2, HT: 30\" x 1, EC: 30 seconds x 1  Tandem stance: 30 seconds x 1     Blue tread rockerboard:  - Semi tandem with chops, 6lb                 Rockerboard AP plane: split stance x10, with light UE support, B/L   AP, chest press x 10, OH press x 10, 6lb   Squats 2 x 10      Hurdles 6\" hurdles, focus on dec L hip circumduction through swing and encouraging heel strike. Step forward/back     Repeated with side stepping pattern, requiring BUE support dud to inability for L knee extension through stance     BOSU Ball  Fwd mini lunge onto domed side with pause: x10, CloseS   Modified lunge onto BOSU with palloff press:   - x10, then repeated x10 with perturbations on yellow sports cord     Split Stance      [employer] Balance Training  Weight shifting and motor control training with increased difficulty noted to L anterolateral planes: x5 mins     Dynamic gait       Side stepping With partial squat with orange theraband around ankles: 10'x4     Retro walking Fwd/retro amb 50'x6 with cues for shorter step length and for heel strike + knee extension through stance, mirror anteriorly, added dual task of beach bal    Retro 30ft x 2 ea today with Bioness donned  Cues for decreased lateral trunk lean with retro amb    2 x 30 ft while holding small physioball, w Bioness  - 2 x 30 ft marching with 6lb medball, w Biones                         Tandem Walking 10'x2, CloseS/CGA     Suitcase carry Using small 3 lb B DB on R: 100'x2, noted delayed L knee extension through IC and mid stance     Marching With " pilates ring 3 x 30 ft     Amb with hula hoop Using hula hoop to facilitate BUE swing and trunk rot     Walking with ball toss     Proprioceptive wrap Blue TB around L LE  - Amb along purple line x 3 with cues to keep feet within tiles  - x 250ft with same cues as above  - Retro amb 3 x 20 ft  - Mini squats with heels elevated x 10, focus on minimizing hyperextension    Balance Testing     FGA  Assessed Yes    SLS  Assessed see note     10mWT Assessed  - (6mWT on 8/26) Yes    GAIT TRAINING  CPT 51558 TOTAL TIME FOR SESSION Not performed        Ambulation without AD  Gait with no AD over level indoor surfaces with WBOS and decreased L weight shift, VC for anterolateral weight shift, heel strike, BUE swing and trunk rot, 300'x1  - with bioness L300 lower cuff donned       Dynamic Gait  20'x6 with focus on shorter step lengths, heel strike and decreasing step width with use of 1' floor tiles, repeated with bimanual ball toss with CloseS and noted decreased gait speed and increase in step lengths     With Bioness donned x 250 ft, carpet and tile   - Good clearance of L LE through swing with improved knee flexion through swing    -  50 ft without for comparison     Treadmill, No UE support, with Bioness donned 2.2 mph x 10 min, [place]   - Bioness donned     Following removal of proprioceptive wrap x 250ft, no instances of L knee hyperextension thrust noted.                                     Stair negotiation  Full flight stairs 2 trials with 1 rail support on descent only Tactile cues for trunk midline, verbal and visual cues for eccentric control    6 inch    Curb negotiation      Ramp negotiation  with bioness X 30 ft    Outdoor ambulation  With Bioness:  - x500 ft approx, inclines/declines  - Retro amb up incline 2 x 15 ft   - slow controlled descent: 15'x2     MANUAL  CPT 91999 TOTAL TIME FOR SESSION Not performed      Mobilization        MODALITIES  CPT 41515 TOTAL TIME FOR SESSION      Ice       Heat        ATTENDED E-STIM  CPT 34624 TOTAL TIME FOR SESSION Not performed     Attended E-Stim FES    [id number] (Age 28) , PIN [id number]   Stim to L quad, hamstring, glutes, tib ant, and gastroc. 250-350 pulse width, 40Hz frequency.   Muscle testing testing completed for initial set up (7/8)    Xcite use for LLE:     Stim to L quad, hamstring, glutes, tib ant, and gastroc. 250-350 pulse width, 40Hz frequency.   Muscle testing testing completed for initial set up 7/26)    [employer] L300 go.   L lower leg quick fit pads, L hamstring added 8/2 ( lower leg cuff only today)   30 Hz with 0.2 ramp for loading response   Used tablet with strap   Increased time for set up/ alignment                               Unattended E-stim               Goals Addressed                   This Visit's Progress     PT Neuro Goals        Short term goals   Short Term Goals Time Frame Result Comment/Progress   Assess ABC, floor transfers, SLS assessment, 30sSTS  2 weeks Goal met     Improve 6mWT by 191 feet or more to meet the MCID indicating significant improvement in endurance and activity tolerance. 4-6 weeks Ongoing goal     Improve ABC Scale score to 60% or better suggesting improved balance confidence during functional tasks 4-6 weeks Goal met  85%    Improve FGA score by 5 points or more to meet the MCID indicating improving dynamic balance and decreasing falls risk. 4-6 weeks Ongoing goal  Improved by 1 point   Tolerate 10 min of CV activity with VSS 4-6 weeks Goal MET     Progress gait speed by 0.13 m/sec or more to meet the MCID without decline in safety or quality indicating significant improvement in gait speed and increased safety during ambulation in the home and community. 4-6 weeks Goal MET     Pt and caregiver will be mod I with HEP 4 weeks Goal MET       Long term goals   Long Term Goals Time Frame Result Comment/Progress   Pt will complete floor transfer without any external support Independently  8-12 weeks  ongoing    Pt will  navigate FF 7 inch steps unsupported with reciprocal pattern  8-12 weeks Met     Improve 6mWT by an additional 191 feet or more to meet the MCID indicating significant improvement in endurance and activity tolerance. 8-12 weeks ongoing 8/26 - 1551ft   Improve 30 sec STS test reps to 15 reps or better to meet the age/gender matched norm and cut-off score indicating adequate LE muscle performance for functional activities. 8-12 weeks ongoing    Improve ABC Scale score to 81% or better suggesting improved balance confidence during functional tasks and decreased risk for falls. 8-12 weeks Met    Improve FGA score to > 28/30 indicating improved dynamic balance and decreased falls risk. 8-12 weeks ongoing 8/26 - 27/30   Tolerate 12-15 min of CV activity with VSS 8-12 weeks Met    Progress gait speed by an additional 0.13 m/sec or more to meet the MCID without decline in safety or quality indicating significant improvement in gait speed and increased safety during ambulation in the home and community. 8-12 weeks Met 8/26 - 1.39m/s   Pt and caregiver will be mod I with updated HEP 8-12 weeks ongoing        LTG's following re-eval:    Long Term Goals Time Frame Result Comment/Progress   Patient will complete assessment with Bioness rep to determine fit/next steps if appropriate    8-12 weeks  Met      Pt will complete floor transfer without any external support independently    8-12 weeks  Met     Improve 6mWT by an additional 191 feet or more to meet the MCID indicating significant improvement in endurance and activity tolerance.   8-12 weeks ongoing 8/26 - 1551ft  9.27: 1665 ft   10/28: 1668   Improve 30 sec STS test reps to 15 reps or better to meet the age/gender matched norm and cut-off score indicating adequate LE muscle performance for functional activities.   8-12 weeks ongoing 10/28: 10     Improve FGA score to >/= 28/30 indicating improved dynamic balance and decreased falls risk.   8-12 weeks Met  8/26 -  27/30 9/27: 27   10/28: 28   Progress gait speed by an additional 0.13 m/sec or more to meet the MCID without decline in safety or quality indicating significant improvement in gait speed and increased safety during ambulation in the home and community.   8-12 weeks Ongoing 8/26 - 1.39m/s  9/27: 1.47 m/sec   10/28: 1.36                    Ann Marie Manzano, PT

## 2024-10-28 NOTE — OP PT TREATMENT LOG
PT Neuro Exercises Current Session   Performed Today? (y/n)   THER ACT   CPT 23008 TOTAL TIME FOR SESSION 8-22 Minutes      Pain, vitals, subjective  BP monitored throughout session   Provided rest breaks for energy conservation  Diaphragmatic breathing beginning of session due to elevated BP Yes       Skin inspection Base of incision, no swelling noted    FES  cycle [id number] (Age 28) , PIN [id number]   Bike height: 2 holes showing  Pedal height: 3 holes showing  L LE set up only    FES Xcite Set Up of LLE                 C-nariojames Patrick, PT from [employer] present for session.     Discussed options for insurance coverage vs out of pocket costs. Process to proceed. T/o session     [employer] L300 Go set up   - education on LMN signature with therapist and physician        Patient Education Patient educated regarding current impairments per testing completed today and PT POC moving forward.     Patient provided with Welcome Letter, which includes attendance policy. Provided education regarding cancellation and no-show policy. Education regarding the importance of participation and regular attendance to maximize goal attainment. Patient verbalized understanding/agreement.     Clearance for FES received, will be completed NV, start with handheld unit to assess tolerance prior to FES bike. Pt verbalized understanding.    Patient educated regarding progress made thus far, current impairments per re-assessments completed today and PT POC moving forward. Patient verbalized understanding/agreement.     Discussion regarding [employer] scheduling and scheduling with [employer] rep. PT to find out next time [employer] rep is at [facility] to adjust pt schedule. Will provide with forms as well.     Discussion of ordering process for [employer].    Patient educated on performance with objective assessments completed during session. Pt verbalized understanding      10/14: Pt plans to get imaging done for brain, cervical spine and lumbar  spine on 10/16 prior to following up with Neuro team.     Patient educated on performance with objective assessments completed during session. Discussed possible early discharge due to performance with objective assessments, which pt was understanding of. PT provided patient information for [facility] as an option to transition to following d/c and advised to contact office for further information. Pt verbalized understanding                                                                          Yes    HEP  Verbally reviewed practicing gait over level surfaces with decreased step width, and then incorporating BUE swing and trunk rot. Pt verbalized understanding.     Floor Transfers  CloseS with increased time for LLE management, requires single UE support with review of moving from tall kneel to half kneeling position. Pt demos good understanding, will require continued practice     Patient able to complete floor transfer independently, walked hands down to floor into quadruped. Completed opposite to stand back up.            Yes    Subjective Outcomes Measures       ABC Scale Assessed     THER EX  CPT 35522 TOTAL TIME FOR SESSION  23-37 Minutes      STRETCHING      Stretching by patient Incline board stretch: L3 - 1 min x2  L QL stretch with small physioball 10 x 3 sec hold  L Q/L stretch seated in with passive overpressure: 30 seconds x 3         Stretching by therapist/PROM Trialed modified jose stretch - not effective (pt states modified lunge position she has felt more of a stretch in hip flexors in the past)      CARDIOVASCULAR       Nu Step 12 mins, Level: 4, BLE only, SPM: >25     Stationary Bike Recumbent bike 5 min, L1  (Unable to complete revolution on [employer]o upright bike)    Elliptical  9 minutes, with BLE/UE, Level: 1     Ambulation with AD      Treadmill VR: 10 minutes, speed: 2.3 mph, [place]  - VC to increase nancy   - progressing from light RUE > unsupported at 5 min slime   -  "using Bioness L300     2 mins of side stepping up incline leading with LLE using Bioness L300 Go     Endurance Testing       6mWT Assessed Yes    STRENGTH TRAINING     HEP Review     Standing Ther-Ex Standing hip abduction 2 x 10 ea  - standing on airex   - Second set, no UE support     Step ups 6\" block x 20   - Added L LE TKE w orange TB    Lateral step down, 4\" block, L LE only w orange band TKE x 10    Hip hikes 2\" block, VC's to discourage R hip hike     Heel raises from L2 on incline board x 15 (on floor today)    Heel raises: 2x15, small weighted ball between heels     Staggered STS w pilates ring 2 x 10, VC's to abd L knee into PT's hand    Squat into BL heel raise x 15 w 10lb     Modified deadlift from 6\" block: 2x10, 20lbs     Split squat w front foot on airex x 10    Step up w contralateral LE march to airex x 10    Heels elevated squats, 4 second decline x 10                                                   Yes     Yes     Yes    Side-lying there-ex Clamshells: 2x20 on R, L in supine: 5\"x10      Seated Ther-ex Seated on large physioball:   - alt march: 10\" x 5  - perturbations: 30 seconds   - lateral ball toss with rot: 1 min x1, B/L,    - fwd ball toss with overhead hold and toss: 1 min x1, b/l, 6#   - D2 flexion with BUE with 6#: 30 seconds x 2   - Adductor squeeze with LAQ x 10 ea     Supine Ther-Ex Diaphragmatic breathin mins due to elevated BP  Glut bridge: 3\" x10, orange band  Bridge: x10   Supine hip abduction x 10, LLE, isometrics today  BKFO: x10, orange t-band only for R, active assist on L   Clamshells, x 10 ea LE  Bridge with ball squeeze x 10        Deadbugs with physioball  - isometric holds: 10\" x 10, physioball under B heels   - alt LE heels taps: 2x5   - alt UE: x10 reps       Planks  Full plank on hands: 35 seconds x 2  Plank with lateral weight pull: x5, b/l, 6#  Side plank: 30 sec x 1 ea   Side plank - with hip dip: x10, b/l  Plank with trunk rotation using 6#: 1 min x 1     " "  Slider Fit  Fwd gliding/roll outs with half ROM: x10     Quadruped  hip extensions with towel under L foot: x10, B/L (modified position with BUE on EOM and LE on floor)     Quadruped <> bear crawl: 10\" x10   Bear crawl: 5' - fwd/back: x3 rounds     Alternating UE flexion x 10  Alternating LE extension x 10; use of slider L LE    Tall kneel Moderate perturbations all directions  Mini squats 2 x 10 blue TB resistance   Trunk rot: 2x10 w 10lb   D2 chops/lifts: x10  Posterior reach back to cones w cross body reach to PT, 2lb ankle weights on wrists             Akil Kneel  Onto airex pad:   - static hold: 30 seconds x 3   - chest press with beach ball: 2x10 (no)  - D2 flexion: x10, B/L, 4#  - passing 4# from L to R: x10     Prone Ther-Ex     Core exercises PPT x 5  PPT with ball squeeze 5 sec hold, 10 x  PPT with bridge x 10   PPT w single leg bridge x 10  PPT with heel taps from tabletop position BLE x 10 holding 6lb medball, CGA L LE  PPT with hip abduction isometric L LE x 3 sec hold x10, orange band     Heel slides with towel: 2x10  Triple flex over physioball: x10  Windshield wipers BLE over physioball 2 x 10     STS's from low EOM squeezing pilates ring  - As above, with squeeze, in staggered stance  Marching, squeezing small ball 30 ft x 2    Modified dead bugs, alt UE 2 x 10     Supine B shoulder ext with GTB, emphasis on core activation; 2 x 15    Quadruped:  - Rainbows x 10 ea LE, min cueing for L LE   - Bear crawls, forward/backward    Suitcase carry, 12lb x 150 ft ea UE    [place] twists holding small ball, LE extended, heels touching ground 2 x 15                                                       Functional Strength Testing       30 sec STS test (60y +)  Assessed Yes    NEURO RE-ED  CPT 85281 TOTAL TIME FOR SESSION 8-22 Minutes      FES  To promote neurorecovery of gait:  Distance: 3.01 miles  Resistance: 0.77 Nm  Power: 2.9 W  Symmetry: L 2%  Total time: 21:00  Active time: 19:03    Xcite  NMRE of " "LLE in function:     - Sit <> stand from elevated EOM using 6 lb med ball: x10, then progressing to lowest height: x10  - then x10 in staggered stance  - x10 with dynadisc under sitting surface  - x10 with dynadisc and airex under B feet       - Modified reverse lunging with SliderFit under R foot in parallel bars with RUE support only > unsupported:2 x10 reps  - L toe tap to 4\" block from airex pad: x10, cues for weight shift then hip flexion vs lateral trunk flexion         - Tall kneel <> heel sit: 2x10, 2nd set with chest press   - fwd step up with LLE onto 6\" block: x10, repeated with R knee drive, x10 repeated with 3# on RUE     Bioness L300 Go  - Fwd/rev step over 12\" [place]: x20,   - Fwd step up onto 6\" block with contra LE march: 2x10, B         - RLE step up, LLE march with 8lb db overhead press        - LLE step up, RLE march while holding 8lb db x 10, overhead press with 8lb db x 5  - Fwd step down 4\" block: 2x10, orange theraband around knees for manual cues   - stance on airex with lateral L toe taps to 12\" [place]: x10  - Tandem walk 20'x4, 8# on R UE. Cues to prevent lateral lean  - tandem walk on balance beam  - step up on 4\" block, contralateral heel tap to 10\" block- fwd step over 1/2 bolster x20, cues for L hip stability and   6\" hurdles, navigating reciprocally     - 6 inch step taps 2 X 10  - 6 inch step up with repeater, cues for L hip stability     -weight shifting over 1/2 bolster. Cues for L hip stability and L knee extension    6\" hurdles:  - Fwd, cues for L heel strike, weight shift, and knee extension.  - Reciprocal pattern  - side stepping                       COORDINATION       Target Tapping     Coordination ladder As appropriate for HL balance     Amb with ankle weights     Amb with proprio wrap     Pushing weighted shopping cart     POSTURAL RE-ED     Sit <> Stand  From 22\" mat holding 6# med ball, cues for midline and equal WB      Tall Kneel  Tall kneel <> short sit: x10, " "x10 with chest press     Seated & standing reaching for trunk strengthening     Abbey-scapular strengthening     PRE-GAIT ACTIVITIES      Tap-ups     Step-ups To 6\" block with contralateral knee drive: x10, repeated with 2 lb DB for UE swing     Standing weight shifting     Step-taps At 6\" main gym steps, step up to 1st step, tap to 3rd with contralateral LE  - w Bioness    BALANCE TRAINING     Standing balance - static/dynamic       HEP Review       Floor       Airex [place]-tandem: 30 seconds x 2, HT: 30\" x 1, EC: 30 seconds x 1  Tandem stance: 30 seconds x 1     Blue tread rockerboard:  - Semi tandem with chops, 6lb                 Rockerboard AP plane: split stance x10, with light UE support, B/L   AP, chest press x 10, OH press x 10, 6lb   Squats 2 x 10      Hurdles 6\" hurdles, focus on dec L hip circumduction through swing and encouraging heel strike. Step forward/back     Repeated with side stepping pattern, requiring BUE support dud to inability for L knee extension through stance     BOSU Ball  Fwd mini lunge onto domed side with pause: x10, CloseS   Modified lunge onto BOSU with palloff press:   - x10, then repeated x10 with perturbations on yellow sports cord     Split Stance      Bi[employer] Balance Training  Weight shifting and motor control training with increased difficulty noted to L anterolateral planes: x5 mins     Dynamic gait       Side stepping With partial squat with orange theraband around ankles: 10'x4     Retro walking Fwd/retro amb 50'x6 with cues for shorter step length and for heel strike + knee extension through stance, mirror anteriorly, added dual task of beach bal    Retro 30ft x 2 ea today with Bioness donned  Cues for decreased lateral trunk lean with retro amb    2 x 30 ft while holding small physioball, w Bioness  - 2 x 30 ft marching with 6lb medball, w Biones                         Tandem Walking 10'x2, CloseS/CGA     Suitcase carry Using small 3 lb B DB on R: 100'x2, noted delayed L " knee extension through IC and mid stance     Marching With pilates ring 3 x 30 ft     Amb with hula hoop Using hula hoop to facilitate BUE swing and trunk rot     Walking with ball toss     Proprioceptive wrap Blue TB around L LE  - Amb along purple line x 3 with cues to keep feet within tiles  - x 250ft with same cues as above  - Retro amb 3 x 20 ft  - Mini squats with heels elevated x 10, focus on minimizing hyperextension    Balance Testing     FGA  Assessed Yes    SLS  Assessed see note     10mWT Assessed  - (6mWT on 8/26) Yes    GAIT TRAINING  CPT 75169 TOTAL TIME FOR SESSION Not performed        Ambulation without AD  Gait with no AD over level indoor surfaces with WBOS and decreased L weight shift, VC for anterolateral weight shift, heel strike, BUE swing and trunk rot, 300'x1  - with bioness L300 lower cuff donned       Dynamic Gait  20'x6 with focus on shorter step lengths, heel strike and decreasing step width with use of 1' floor tiles, repeated with bimanual ball toss with CloseS and noted decreased gait speed and increase in step lengths     With Bioness donned x 250 ft, carpet and tile   - Good clearance of L LE through swing with improved knee flexion through swing    -  50 ft without for comparison     Treadmill, No UE support, with Bioness donned 2.2 mph x 10 min, [place]   - Bioness donned     Following removal of proprioceptive wrap x 250ft, no instances of L knee hyperextension thrust noted.                                     Stair negotiation  Full flight stairs 2 trials with 1 rail support on descent only Tactile cues for trunk midline, verbal and visual cues for eccentric control    6 inch    Curb negotiation      Ramp negotiation  with bioness X 30 ft    Outdoor ambulation  With Bioness:  - x500 ft approx, inclines/declines  - Retro amb up incline 2 x 15 ft   - slow controlled descent: 15'x2     MANUAL  CPT 70545 TOTAL TIME FOR SESSION Not performed      Mobilization         MODALITIES  CPT 13637 TOTAL TIME FOR SESSION      Ice       Heat       ATTENDED E-STIM  CPT 07935 TOTAL TIME FOR SESSION Not performed     Attended E-Stim FES    [id number] (Age 28) , PIN [phone]   Stim to L quad, hamstring, glutes, tib ant, and gastroc. 250-350 pulse width, 40Hz frequency.   Muscle testing testing completed for initial set up (7/8)    Xcite use for LLE:     Stim to L quad, hamstring, glutes, tib ant, and gastroc. 250-350 pulse width, 40Hz frequency.   Muscle testing testing completed for initial set up 7/26)    [employer] L300 go.   L lower leg quick fit pads, L hamstring added 8/2 ( lower leg cuff only today)   30 Hz with 0.2 ramp for loading response   Used tablet with strap   Increased time for set up/ alignment                               Unattended E-stim

## 2024-10-31 ENCOUNTER — HOSPITAL ENCOUNTER (OUTPATIENT)
Dept: PHYSICAL THERAPY | Facility: REHABILITATION | Age: 29
Setting detail: THERAPIES SERIES
Discharge: HOME | End: 2024-10-31
Attending: LEGAL MEDICINE
Payer: COMMERCIAL

## 2024-10-31 DIAGNOSIS — Z86.69 HISTORY OF TETHERED SPINAL CORD: Primary | ICD-10-CM

## 2024-10-31 PROCEDURE — 97530 THERAPEUTIC ACTIVITIES: CPT | Mod: GP

## 2024-10-31 PROCEDURE — 97110 THERAPEUTIC EXERCISES: CPT | Mod: GP

## 2024-10-31 NOTE — OP PT TREATMENT LOG
PT Neuro Exercises Current Session   Performed Today? (y/n)   THER ACT   CPT 14584 TOTAL TIME FOR SESSION 8-22 Minutes      Pain, vitals, subjective BP monitored throughout session   Provided rest breaks for energy conservation  Diaphragmatic breathing beginning of session due to elevated BP Yes       Skin inspection Base of incision, no swelling noted    FES  cycle [id number] (Age 28) , PIN [id number]   Bike height: 2 holes showing  Pedal height: 3 holes showing  L LE set up only    FES Xcite Set Up of LLE                 ProPublicajames Patrick, PT from [employer] present for session.     Discussed options for insurance coverage vs out of pocket costs. Process to proceed. T/o session     [employer] L300 Go set up   - education on LMN signature with therapist and physician        Patient Education Patient educated regarding current impairments per testing completed today and PT POC moving forward.     Patient provided with Welcome Letter, which includes attendance policy. Provided education regarding cancellation and no-show policy. Education regarding the importance of participation and regular attendance to maximize goal attainment. Patient verbalized understanding/agreement.     Clearance for FES received, will be completed NV, start with handheld unit to assess tolerance prior to FES bike. Pt verbalized understanding.    Patient educated regarding progress made thus far, current impairments per re-assessments completed today and PT POC moving forward. Patient verbalized understanding/agreement.     Discussion regarding [employer] scheduling and scheduling with [employer] rep. PT to find out next time [employer] rep is at [facility] to adjust pt schedule. Will provide with forms as well.     Discussion of ordering process for [employer].    Patient educated on performance with objective assessments completed during session. Pt verbalized understanding      10/14: Pt plans to get imaging done for brain, cervical spine and lumbar  spine on 10/16 prior to following up with Neuro team.     Patient educated on performance with objective assessments completed during session. Discussed possible early discharge due to performance with objective assessments, which pt was understanding of. PT provided patient information for [facility] as an option to transition to following d/c and advised to contact office for further information. Pt verbalized understanding                                                                             HEP  Verbally reviewed practicing gait over level surfaces with decreased step width, and then incorporating BUE swing and trunk rot. Pt verbalized understanding.     Updated HEP issued with print out and thorough review of program, Discussed progressing volume and frequency as tolerated          Yes   Floor Transfers  CloseS with increased time for LLE management, requires single UE support with review of moving from tall kneel to half kneeling position. Pt demos good understanding, will require continued practice     Patient able to complete floor transfer independently, walked hands down to floor into quadruped. Completed opposite to stand back up.               Subjective Outcomes Measures       ABC Scale Assessed     THER EX  CPT 35716 TOTAL TIME FOR SESSION  38-52 Minutes      STRETCHING      Stretching by patient Incline board stretch: L3 - 1 min x2  L QL stretch with small physioball 10 x 3 sec hold  L Q/L stretch seated in with passive overpressure: 30 seconds x 3         Stretching by therapist/PROM Trialed modified jose stretch - not effective (pt states modified lunge position she has felt more of a stretch in hip flexors in the past)      CARDIOVASCULAR       Nu Step 12 mins, Level: 4, BLE only, SPM: >25     Stationary Bike Recumbent bike 5 min, L1  (Unable to complete revolution on Expresso upright bike)    Elliptical  10 minutes, with BLE/UE, Level: 1  Yes   Ambulation with AD      Treadmill  "VR: 10 minutes, speed: 2.3 mph, [place]  - VC to increase nancy   - progressing from light RUE > unsupported at 5 min slime   - using Bioness L300     2 mins of side stepping up incline leading with LLE using Bioness L300 Go     Endurance Testing       6mWT Assessed    STRENGTH TRAINING     HEP Review See tx log below for all exercises reviewed in session for HEP Yes    Standing Ther-Ex Standing hip abduction 2 x 10 ea  - standing on airex   - Second set, no UE support     Step ups 6\" block x 20   - Added L LE TKE w orange TB    Lateral step down, 4\" block, L LE only w orange band TKE x 10    Hip hikes 2\" block, VC's to discourage R hip hike     Heel raises from L2 on incline board x 15 (on floor today)    Heel raises: 2x15, small weighted ball between heels     Staggered STS w pilates ring 2 x 10, VC's to abd L knee into PT's hand    Squat into BL heel raise x 15 w 10lb     Modified deadlift from 6\" block: 2x10, 20lbs     Split squat w front foot on airex x 10    Step up w contralateral LE march to airex x 10    Heels elevated squats, 4 second decline x 10                                              Side-lying there-ex Clamshells: 2x20 on R, L in supine: 5\"x10      Seated Ther-ex Seated on large physioball:   - alt march: 10\" x 5  - perturbations: 30 seconds   - lateral ball toss with rot: 1 min x1, B/L,    - fwd ball toss with overhead hold and toss: 1 min x1, b/l, 6#   - D2 flexion with BUE with 6#: 30 seconds x 2   - Adductor squeeze with LAQ x 10 ea     Supine Ther-Ex Diaphragmatic breathin mins due to elevated BP  Glut bridge: 3\" x10, orange band  Bridge: x10   Supine hip abduction x 10, LLE, isometrics today  BKFO: x10, orange t-band only for R, active assist on L   Clamshells, x 10 ea LE  Bridge with ball squeeze x 10        Deadbugs with physioball  - isometric holds: 10\" x 10, physioball under B heels   - alt LE heels taps: 2x5   - alt UE: x10 reps       Planks  Full plank on hands: 35 seconds x " "2  Plank with lateral weight pull: x5, b/l, 6#  Side plank: 30 sec x 1 ea   Side plank - with hip dip: x10, b/l  Plank with trunk rotation using 6#: 1 min x 1       Slider Fit  Fwd gliding/roll outs with half ROM: x10     Quadruped  hip extensions with towel under L foot: x10, B/L (modified position with BUE on EOM and LE on floor)     Quadruped <> bear crawl: 10\" x10   Bear crawl: 5' - fwd/back: x3 rounds     Alternating UE flexion x 10  Alternating LE extension x 10; use of slider L LE    Tall kneel Moderate perturbations all directions  Mini squats 2 x 10 blue TB resistance   Trunk rot: 2x10 w 10lb   D2 chops/lifts: x10  Posterior reach back to cones w cross body reach to PT, 2lb ankle weights on wrists             Akil Kneel  Onto airex pad:   - static hold: 30 seconds x 3   - chest press with beach ball: 2x10 (no)  - D2 flexion: x10, B/L, 4#  - passing 4# from L to R: x10     Prone Ther-Ex     Core exercises PPT x 5  PPT with ball squeeze 5 sec hold, 10 x  PPT with bridge x 10   PPT w single leg bridge x 10  PPT with heel taps from tabletop position BLE x 10 holding 6lb medball, CGA L LE  PPT with hip abduction isometric L LE x 3 sec hold x10, orange band     Heel slides with towel: 2x10  Triple flex over physioball: x10  [facility] wipers BLE over physioball 2 x 10     STS's from low EOM squeezing pilates ring  - As above, with squeeze, in staggered stance  Marching, squeezing small ball 30 ft x 2    Modified dead bugs, alt UE 2 x 10     Supine B shoulder ext with GTB, emphasis on core activation; 2 x 15    Quadruped:  - Rainbows x 10 ea LE, min cueing for L LE   - Bear crawls, forward/backward    Suitcase carry, 12lb x 150 ft ea UE    [place] twists holding small ball, LE extended, heels touching ground 2 x 15                                                       Functional Strength Testing       30 sec STS test (60y +)  Assessed    NEURO RE-ED  CPT 16877 TOTAL TIME FOR SESSION Not performed      FES  To " "promote neurorecovery of gait:  Distance: 3.01 miles  Resistance: 0.77 Nm  Power: 2.9 W  Symmetry: L 2%  Total time: 21:00  Active time: 19:03    Xcite  NMRE of LLE in function:     - Sit <> stand from elevated EOM using 6 lb med ball: x10, then progressing to lowest height: x10  - then x10 in staggered stance  - x10 with dynadisc under sitting surface  - x10 with dynadisc and airex under B feet       - Modified reverse lunging with SliderFit under R foot in parallel bars with RUE support only > unsupported:2 x10 reps  - L toe tap to 4\" block from airex pad: x10, cues for weight shift then hip flexion vs lateral trunk flexion         - Tall kneel <> heel sit: 2x10, 2nd set with chest press   - fwd step up with LLE onto 6\" block: x10, repeated with R knee drive, x10 repeated with 3# on RUE     Bioness L300 Go  - Fwd/rev step over 12\" [place]: x20,   - Fwd step up onto 6\" block with contra LE march: 2x10, B         - RLE step up, LLE march with 8lb db overhead press        - LLE step up, RLE march while holding 8lb db x 10, overhead press with 8lb db x 5  - Fwd step down 4\" block: 2x10, orange theraband around knees for manual cues   - stance on airex with lateral L toe taps to 12\" [place]: x10  - Tandem walk 20'x4, 8# on R UE. Cues to prevent lateral lean  - tandem walk on balance beam  - step up on 4\" block, contralateral heel tap to 10\" block- fwd step over 1/2 bolster x20, cues for L hip stability and   6\" hurdles, navigating reciprocally     - 6 inch step taps 2 X 10  - 6 inch step up with repeater, cues for L hip stability     -weight shifting over 1/2 bolster. Cues for L hip stability and L knee extension    6\" hurdles:  - Fwd, cues for L heel strike, weight shift, and knee extension.  - Reciprocal pattern  - side stepping                       COORDINATION       Target Tapping     Coordination ladder As appropriate for HL balance     Amb with ankle weights     Amb with proprio wrap     Pushing weighted " "shopping cart     POSTURAL RE-ED     Sit <> Stand  From 22\" mat holding 6# med ball, cues for midline and equal WB      Tall Kneel  Tall kneel <> short sit: x10, x10 with chest press     Seated & standing reaching for trunk strengthening     Abbey-scapular strengthening     PRE-GAIT ACTIVITIES      Tap-ups     Step-ups To 6\" block with contralateral knee drive: x10, repeated with 2 lb DB for UE swing     Standing weight shifting     Step-taps At 6\" main gym steps, step up to 1st step, tap to 3rd with contralateral LE  - w Bioness    BALANCE TRAINING     Standing balance - static/dynamic       HEP Review       Floor       Airex [place]-tandem: 30 seconds x 2, HT: 30\" x 1, EC: 30 seconds x 1  Tandem stance: 30 seconds x 1     Blue tread rockerboard:  - Semi tandem with chops, 6lb                 Rockerboard AP plane: split stance x10, with light UE support, B/L   AP, chest press x 10, OH press x 10, 6lb   Squats 2 x 10      Hurdles 6\" hurdles, focus on dec L hip circumduction through swing and encouraging heel strike. Step forward/back     Repeated with side stepping pattern, requiring BUE support dud to inability for L knee extension through stance     BOSU Ball  Fwd mini lunge onto domed side with pause: x10, CloseS   Modified lunge onto BOSU with palloff press:   - x10, then repeated x10 with perturbations on yellow sports cord     Split Stance      [employer] Balance Training  Weight shifting and motor control training with increased difficulty noted to L anterolateral planes: x5 mins     Dynamic gait       Side stepping With partial squat with orange theraband around ankles: 10'x4     Retro walking Fwd/retro amb 50'x6 with cues for shorter step length and for heel strike + knee extension through stance, mirror anteriorly, added dual task of beach bal    Retro 30ft x 2 ea today with Bioness donned  Cues for decreased lateral trunk lean with retro amb    2 x 30 ft while holding small physioball, w Bioness  - 2 x 30 ft " marching with 6lb medball, w Biones                         Tandem Walking 10'x2, CloseS/CGA     Suitcase carry Using small 3 lb B DB on R: 100'x2, noted delayed L knee extension through IC and mid stance     Marching With pilates ring 3 x 30 ft     Amb with hula hoop Using hula hoop to facilitate BUE swing and trunk rot     Walking with ball toss     Proprioceptive wrap Blue TB around L LE  - Amb along purple line x 3 with cues to keep feet within tiles  - x 250ft with same cues as above  - Retro amb 3 x 20 ft  - Mini squats with heels elevated x 10, focus on minimizing hyperextension    Balance Testing     FGA  Assessed    SLS  Assessed see note     10mWT Assessed  - (6mWT on 8/26)    GAIT TRAINING  CPT 12091 TOTAL TIME FOR SESSION Not performed        Ambulation without AD  Gait with no AD over level indoor surfaces with WBOS and decreased L weight shift, VC for anterolateral weight shift, heel strike, BUE swing and trunk rot, 300'x1  - with bioness L300 lower cuff donned       Dynamic Gait  20'x6 with focus on shorter step lengths, heel strike and decreasing step width with use of 1' floor tiles, repeated with bimanual ball toss with CloseS and noted decreased gait speed and increase in step lengths     With Bioness donned x 250 ft, carpet and tile   - Good clearance of L LE through swing with improved knee flexion through swing    -  50 ft without for comparison     Treadmill, No UE support, with Bioness donned 2.2 mph x 10 min, [place]   - Bioness donned     Following removal of proprioceptive wrap x 250ft, no instances of L knee hyperextension thrust noted.                                     Stair negotiation  Full flight stairs 2 trials with 1 rail support on descent only Tactile cues for trunk midline, verbal and visual cues for eccentric control    6 inch    Curb negotiation      Ramp negotiation  with bioness X 30 ft    Outdoor ambulation  With Bioness:  - x500 ft approx, inclines/declines  - Retro  amb up incline 2 x 15 ft   - slow controlled descent: 15'x2     MANUAL  CPT 26463 TOTAL TIME FOR SESSION Not performed      Mobilization        MODALITIES  CPT 45776 TOTAL TIME FOR SESSION      Ice       Heat       ATTENDED E-STIM  CPT 68380 TOTAL TIME FOR SESSION Not performed     Attended E-Stim FES    [id number] (Age 28) , PIN [phone]   Stim to L quad, hamstring, glutes, tib ant, and gastroc. 250-350 pulse width, 40Hz frequency.   Muscle testing testing completed for initial set up (7/8)    Xcite use for LLE:     Stim to L quad, hamstring, glutes, tib ant, and gastroc. 250-350 pulse width, 40Hz frequency.   Muscle testing testing completed for initial set up 7/26)    [employer] L300 go.   L lower leg quick fit pads, L hamstring added 8/2 ( lower leg cuff only today)   30 Hz with 0.2 ramp for loading response   Used tablet with strap   Increased time for set up/ alignment                               Unattended E-stim            HEP 10/31/24:

## 2024-10-31 NOTE — PROGRESS NOTES
Physical Therapy Visit    PT DAILY NOTE FOR OUTPATIENT THERAPY    Patient: Melanie Litten MRN: [id number]  : 1995 28 y.o.  Referring Physician: Remedios Whitfield MD  Date of Visit: 10/31/2024    Certification Dates: 24 through 24    Diagnosis:   1. History of tethered spinal cord        Chief Complaints:  EDS, LLE weakness, gait/balance impairment    Precautions:   Precautions comments: hypermobile - EDS, postural hypotension      TODAY'S VISIT    Time In Session:  Start Time: 0800  Stop Time: 0855  Time Calculation (min): 55 min   History/Vitals/Pain/Encounter Info - 10/31/24 0759          Injury History/Precautions/Daily Required Info    Document Type daily treatment     Primary Therapist Dilan Machuca     Chief Complaint/Reason for Visit  EDS, LLE weakness, gait/balance impairment     Referring Physician Remedios Whitfield MD     Precautions comments hypermobile - EDS, postural hypotension     History of present illness/functional impairment Saranya is a 28 year old female who presents to OPPT with history of tethered cord syndrome. Pt with PMH significant for chiari malformation, hypermobile EDS, left plantar fasciitis, anxiety and postural hypotension. Pt reports her symptoms started slowly in 2019 with L hip pain, followed up with neurosurgeon in [place] with unremarkable results for all testing. Her symptoms then progressed with L leg motor weakness and shakiness (similar to clonus) with movement, chronic constipation, L drop foot, lower back pain, and neck pain.  Pt was finally diagnosed with tethered cord syndrome and underwent a clinical trial surgery at [facility], [facility], and [facility]. Due to her symptoms, she meets criteria for exploration and sectioning of her filum for the functional tethered cord clinical trial. Now s/p laminectomy for occult tethered cord release on 23 by Dr Duckworth. Patient tolerated procedure well.  No post-op complications. Transferred stable once PACU criteria met. Patient kept FLAT for 36hr. Placed on an aseptic dex taper and post-op IV abx x24hrs.  She was mobilized on 9/27 tolerated well without any symptoms. Additionally, was instructed not to exercise for 7 weeks post-surgery. Pt reports she recovered well overall with decreased drop foot, clonus and swelling, continues to have increased coordination deficits and foot drag with fatigue. Pt has completed OPPT at [facility] in [place] and intermittent massage therapy. Pt’s functional goals are to work on higher level balance, picking up things from floor, coordination and “Hippo therapy”.     Patient/Family/Caregiver Comments/Observations Pt reports she was not able to do cervical imaging and CSF flow near her home but was able to reschedule to today at another location.     Patient reported fall since last visit No        Pain Assessment    Currently in pain No/Denies                    Daily Treatment Assessment and Plan - 10/31/24 0759          Daily Treatment Assessment and Plan    Progress toward goals Progressing     Daily Outcome Summary Session focused on HEP review of mat and standing proximal hip strengthening program with printed copy and pink theraband issued to pt. Pt demos good technique and verbalized good understanding. Pt agreeable to discharge NV with education of post D/C plans.     Plan and Recommendations Review HEP and plan for discharge NV; Bioness as able, L LE strenghtening (proximal hip), dual tasking, core stabilization                         OBJECTIVE DATA TAKEN TODAY:    None taken    Today's Treatment:       PT Neuro Exercises Current Session   Performed Today? (y/n)   THER ACT   CPT 79571 TOTAL TIME FOR SESSION 8-22 Minutes      Pain, vitals, subjective BP monitored throughout session   Provided rest breaks for energy conservation  Diaphragmatic breathing beginning of session due to elevated BP Yes       Skin inspection  Base of incision, no swelling noted    FES  cycle [id number] (Age 28) , PIN [id number]   Bike height: 2 holes showing  Pedal height: 3 holes showing  L LE set up only    FES Xcite Set Up of LLE                 Teodoro Patrick, [facility] present for session.     Discussed options for insurance coverage vs out of pocket costs. Process to proceed. T/o session     [employer] L300 Go set up   - education on LMN signature with therapist and physician        Patient Education Patient educated regarding current impairments per testing completed today and PT POC moving forward.     Patient provided with Welcome Letter, which includes attendance policy. Provided education regarding cancellation and no-show policy. Education regarding the importance of participation and regular attendance to maximize goal attainment. Patient verbalized understanding/agreement.     Clearance for FES received, will be completed NV, start with handheld unit to assess tolerance prior to FES bike. Pt verbalized understanding.    Patient educated regarding progress made thus far, current impairments per re-assessments completed today and PT POC moving forward. Patient verbalized understanding/agreement.     Discussion regarding [employer] scheduling and scheduling with [employer] rep. PT to find out next time [employer] rep is at [facility] to adjust pt schedule. Will provide with forms as well.     Discussion of ordering process for [place].    Patient educated on performance with objective assessments completed during session. Pt verbalized understanding      10/14: Pt plans to get imaging done for brain, cervical spine and lumbar spine on 10/16 prior to following up with Neuro team.     Patient educated on performance with objective assessments completed during session. Discussed possible early discharge due to performance with objective assessments, which pt was understanding of. PT provided patient information for [facility] as an  option to transition to following d/c and advised to contact office for further information. Pt verbalized understanding                                                                             HEP  Verbally reviewed practicing gait over level surfaces with decreased step width, and then incorporating BUE swing and trunk rot. Pt verbalized understanding.     Updated HEP issued with print out and thorough review of program, Discussed progressing volume and frequency as tolerated          Yes   Floor Transfers  CloseS with increased time for LLE management, requires single UE support with review of moving from tall kneel to half kneeling position. Pt demos good understanding, will require continued practice     Patient able to complete floor transfer independently, walked hands down to floor into quadruped. Completed opposite to stand back up.               Subjective Outcomes Measures       ABC Scale Assessed     THER EX  CPT 05368 TOTAL TIME FOR SESSION  38-52 Minutes      STRETCHING      Stretching by patient Incline board stretch: L3 - 1 min x2  L QL stretch with small physioball 10 x 3 sec hold  L Q/L stretch seated in with passive overpressure: 30 seconds x 3         Stretching by therapist/PROM Trialed modified jose stretch - not effective (pt states modified lunge position she has felt more of a stretch in hip flexors in the past)      CARDIOVASCULAR       Nu Step 12 mins, Level: 4, BLE only, SPM: >25     Stationary Bike Recumbent bike 5 min, L1  (Unable to complete revolution on Expresso upright bike)    Elliptical  10 minutes, with BLE/UE, Level: 1  Yes   Ambulation with AD      Treadmill VR: 10 minutes, speed: 2.3 mph, [place]  - VC to increase nancy   - progressing from light RUE > unsupported at 5 min slime   - using Bioness L300     2 mins of side stepping up incline leading with LLE using Bioness L300 Go     Endurance Testing       6mWT Assessed    STRENGTH TRAINING     HEP Review See tx log  "below for all exercises reviewed in session for HEP Yes    Standing Ther-Ex Standing hip abduction 2 x 10 ea  - standing on airex   - Second set, no UE support     Step ups 6\" block x 20   - Added L LE TKE w orange TB    Lateral step down, 4\" block, L LE only w orange band TKE x 10    Hip hikes 2\" block, VC's to discourage R hip hike     Heel raises from L2 on incline board x 15 (on floor today)    Heel raises: 2x15, small weighted ball between heels     Staggered STS w pilates ring 2 x 10, VC's to abd L knee into PT's hand    Squat into BL heel raise x 15 w 10lb     Modified deadlift from 6\" block: 2x10, 20lbs     Split squat w front foot on airex x 10    Step up w contralateral LE march to airex x 10    Heels elevated squats, 4 second decline x 10                                              Side-lying there-ex Clamshells: 2x20 on R, L in supine: 5\"x10      Seated Ther-ex Seated on large physioball:   - alt march: 10\" x 5  - perturbations: 30 seconds   - lateral ball toss with rot: 1 min x1, B/L,    - fwd ball toss with overhead hold and toss: 1 min x1, b/l, 6#   - D2 flexion with BUE with 6#: 30 seconds x 2   - Adductor squeeze with LAQ x 10 ea     Supine Ther-Ex Diaphragmatic breathin mins due to elevated BP  Glut bridge: 3\" x10, orange band  Bridge: x10   Supine hip abduction x 10, LLE, isometrics today  BKFO: x10, orange t-band only for R, active assist on L   Clamshells, x 10 ea LE  Bridge with ball squeeze x 10        Deadbugs with physioball  - isometric holds: 10\" x 10, physioball under B heels   - alt LE heels taps: 2x5   - alt UE: x10 reps       Planks  Full plank on hands: 35 seconds x 2  Plank with lateral weight pull: x5, b/l, 6#  Side plank: 30 sec x 1 ea   Side plank - with hip dip: x10, b/l  Plank with trunk rotation using 6#: 1 min x 1       Slider Fit  Fwd gliding/roll outs with half ROM: x10     Quadruped  hip extensions with towel under L foot: x10, B/L (modified position with BUE on EOM " "and LE on floor)     Quadruped <> bear crawl: 10\" x10   Bear crawl: 5' - fwd/back: x3 rounds     Alternating UE flexion x 10  Alternating LE extension x 10; use of slider L LE    Tall kneel Moderate perturbations all directions  Mini squats 2 x 10 blue TB resistance   Trunk rot: 2x10 w 10lb   D2 chops/lifts: x10  Posterior reach back to cones w cross body reach to PT, 2lb ankle weights on wrists             Akil Kneel  Onto airex pad:   - static hold: 30 seconds x 3   - chest press with beach ball: 2x10 (no)  - D2 flexion: x10, B/L, 4#  - passing 4# from L to R: x10     Prone Ther-Ex     Core exercises PPT x 5  PPT with ball squeeze 5 sec hold, 10 x  PPT with bridge x 10   PPT w single leg bridge x 10  PPT with heel taps from tabletop position BLE x 10 holding 6lb medball, CGA L LE  PPT with hip abduction isometric L LE x 3 sec hold x10, orange band     Heel slides with towel: 2x10  Triple flex over physioball: x10  Windshield wipers BLE over physioball 2 x 10     STS's from low EOM squeezing pilates ring  - As above, with squeeze, in staggered stance  Marching, squeezing small ball 30 ft x 2    Modified dead bugs, alt UE 2 x 10     Supine B shoulder ext with GTB, emphasis on core activation; 2 x 15    Quadruped:  - Rainbows x 10 ea LE, min cueing for L LE   - Bear crawls, forward/backward    Suitcase carry, 12lb x 150 ft ea UE    [place] citizen twists holding small ball, LE extended, heels touching ground 2 x 15                                                       Functional Strength Testing       30 sec STS test (60y +)  Assessed    NEURO RE-ED  CPT 93280 TOTAL TIME FOR SESSION Not performed      FES  To promote neurorecovery of gait:  Distance: 3.01 miles  Resistance: 0.77 Nm  Power: 2.9 W  Symmetry: L 2%  Total time: 21:00  Active time: 19:03    Xcite  NMRE of LLE in function:     - Sit <> stand from elevated EOM using 6 lb med ball: x10, then progressing to lowest height: x10  - then x10 in staggered stance  - " "x10 with dynadisc under sitting surface  - x10 with dynadisc and airex under B feet       - Modified reverse lunging with SliderFit under R foot in parallel bars with RUE support only > unsupported:2 x10 reps  - L toe tap to 4\" block from airex pad: x10, cues for weight shift then hip flexion vs lateral trunk flexion         - Tall kneel <> heel sit: 2x10, 2nd set with chest press   - fwd step up with LLE onto 6\" block: x10, repeated with R knee drive, x10 repeated with 3# on RUE     Bioness L300 Go  - Fwd/rev step over 12\" [place]: x20,   - Fwd step up onto 6\" block with contra LE march: 2x10, B         - RLE step up, LLE march with 8lb db overhead press        - LLE step up, RLE march while holding 8lb db x 10, overhead press with 8lb db x 5  - Fwd step down 4\" block: 2x10, orange theraband around knees for manual cues   - stance on airex with lateral L toe taps to 12\" [place]: x10  - Tandem walk 20'x4, 8# on R UE. Cues to prevent lateral lean  - tandem walk on balance beam  - step up on 4\" block, contralateral heel tap to 10\" block- fwd step over 1/2 bolster x20, cues for L hip stability and   6\" hurdles, navigating reciprocally     - 6 inch step taps 2 X 10  - 6 inch step up with repeater, cues for L hip stability     -weight shifting over 1/2 bolster. Cues for L hip stability and L knee extension    6\" hurdles:  - Fwd, cues for L heel strike, weight shift, and knee extension.  - Reciprocal pattern  - side stepping                       COORDINATION       Target Tapping     Coordination ladder As appropriate for HL balance     Amb with ankle weights     Amb with proprio wrap     Pushing weighted shopping cart     POSTURAL RE-ED     Sit <> Stand  From 22\" mat holding 6# med ball, cues for midline and equal WB      Tall Kneel  Tall kneel <> short sit: x10, x10 with chest press     Seated & standing reaching for trunk strengthening     Abbey-scapular strengthening     PRE-GAIT ACTIVITIES      Tap-ups   " "  Step-ups To 6\" block with contralateral knee drive: x10, repeated with 2 lb DB for UE swing     Standing weight shifting     Step-taps At 6\" main gym steps, step up to 1st step, tap to 3rd with contralateral LE  - w Bioness    BALANCE TRAINING     Standing balance - static/dynamic       HEP Review       Floor       Airex [place]-tandem: 30 seconds x 2, HT: 30\" x 1, EC: 30 seconds x 1  Tandem stance: 30 seconds x 1     Blue tread rockerboard:  - Semi tandem with chops, 6lb                 Rockerboard AP plane: split stance x10, with light UE support, B/L   AP, chest press x 10, OH press x 10, 6lb   Squats 2 x 10      Hurdles 6\" hurdles, focus on dec L hip circumduction through swing and encouraging heel strike. Step forward/back     Repeated with side stepping pattern, requiring BUE support dud to inability for L knee extension through stance     BOSU Ball  Fwd mini lunge onto domed side with pause: x10, CloseS   Modified lunge onto BOSU with palloff press:   - x10, then repeated x10 with perturbations on yellow sports cord     Split Stance      Biodex Balance Training  Weight shifting and motor control training with increased difficulty noted to L anterolateral planes: x5 mins     Dynamic gait       Side stepping With partial squat with orange theraband around ankles: 10'x4     Retro walking Fwd/retro amb 50'x6 with cues for shorter step length and for heel strike + knee extension through stance, mirror anteriorly, added dual task of beach bal    Retro 30ft x 2 ea today with Bioness donned  Cues for decreased lateral trunk lean with retro amb    2 x 30 ft while holding small physioball, w Bioness  - 2 x 30 ft marching with 6lb medball, w Biones                         Tandem Walking 10'x2, CloseS/CGA     Suitcase carry Using small 3 lb B DB on R: 100'x2, noted delayed L knee extension through IC and mid stance     Marching With pilates ring 3 x 30 ft     Amb with hula hoop Using hula hoop to facilitate BUE swing and " trunk rot     Walking with ball toss     Proprioceptive wrap Blue TB around L LE  - Amb along purple line x 3 with cues to keep feet within tiles  - x 250ft with same cues as above  - Retro amb 3 x 20 ft  - Mini squats with heels elevated x 10, focus on minimizing hyperextension    Balance Testing     FGA  Assessed    SLS  Assessed see note     10mWT Assessed  - (6mWT on 8/26)    GAIT TRAINING  CPT 70631 TOTAL TIME FOR SESSION Not performed        Ambulation without AD  Gait with no AD over level indoor surfaces with WBOS and decreased L weight shift, VC for anterolateral weight shift, heel strike, BUE swing and trunk rot, 300'x1  - with bioness L300 lower cuff donned       Dynamic Gait  20'x6 with focus on shorter step lengths, heel strike and decreasing step width with use of 1' floor tiles, repeated with bimanual ball toss with CloseS and noted decreased gait speed and increase in step lengths     With Bioness donned x 250 ft, carpet and tile   - Good clearance of L LE through swing with improved knee flexion through swing    -  50 ft without for comparison     Treadmill, No UE support, with Bioness donned 2.2 mph x 10 min, [place]   - Bioness donned     Following removal of proprioceptive wrap x 250ft, no instances of L knee hyperextension thrust noted.                                     Stair negotiation  Full flight stairs 2 trials with 1 rail support on descent only Tactile cues for trunk midline, verbal and visual cues for eccentric control    6 inch    Curb negotiation      Ramp negotiation  with bioness X 30 ft    Outdoor ambulation  With Bioness:  - x500 ft approx, inclines/declines  - Retro amb up incline 2 x 15 ft   - slow controlled descent: 15'x2     MANUAL  CPT 23205 TOTAL TIME FOR SESSION Not performed      Mobilization        MODALITIES  CPT 87199 TOTAL TIME FOR SESSION      Ice       Heat       ATTENDED E-STIM  CPT 34251 TOTAL TIME FOR SESSION Not performed     Attended E-Stim FES     [id number] (Age 28) , PIN [id number]   Stim to L quad, hamstring, glutes, tib ant, and gastroc. 250-350 pulse width, 40Hz frequency.   Muscle testing testing completed for initial set up (7/8)    Xcite use for LLE:     Stim to L quad, hamstring, glutes, tib ant, and gastroc. 250-350 pulse width, 40Hz frequency.   Muscle testing testing completed for initial set up 7/26)    [employer] L300 go.   L lower leg quick fit pads, L hamstring added 8/2 ( lower leg cuff only today)   30 Hz with 0.2 ramp for loading response   Used tablet with strap   Increased time for set up/ alignment                               Unattended E-stim            HEP 10/31/24:

## 2024-11-04 ENCOUNTER — HOSPITAL ENCOUNTER (OUTPATIENT)
Dept: PHYSICAL THERAPY | Facility: REHABILITATION | Age: 29
Setting detail: THERAPIES SERIES
Discharge: HOME | End: 2024-11-04
Attending: LEGAL MEDICINE
Payer: COMMERCIAL

## 2024-11-04 DIAGNOSIS — Z86.69 HISTORY OF TETHERED SPINAL CORD: Primary | ICD-10-CM

## 2024-11-04 PROCEDURE — 97110 THERAPEUTIC EXERCISES: CPT | Mod: GP

## 2024-11-04 PROCEDURE — 97530 THERAPEUTIC ACTIVITIES: CPT | Mod: GP

## 2024-11-04 NOTE — PROGRESS NOTES
Physical Therapy Discharge      PT DISCHARGE NOTE FOR OUTPATIENT THERAPY    Patient: Melanie Litten MRN: [id number]  : 1995 28 y.o.  Referring Physician: Remedios Whitfield MD  Date of Visit: 2024      Certification Dates: 24 through 24      Diagnosis:   1. History of tethered spinal cord        Chief Complaints:  No chief complaint on file.      Precautions:  Precautions comments: hypermobile - EDS, postural hypotension      TODAY'S VISIT:    Time In Session:  Start Time: 1605  Stop Time: 1700  Time Calculation (min): 55 min   General Information - 24 1556          Session Details    Document Type discharge evaluation        General Information    Referring Physician Remedios Whitfield MD     History of present illness/functional impairment Saranya is a 28 year old female who presents to OPPT with history of tethered cord syndrome. Pt with PMH significant for chiari malformation, hypermobile EDS, left plantar fasciitis, anxiety and postural hypotension. Pt reports her symptoms started slowly in  with L hip pain, followed up with neurosurgeon in [place] with unremarkable results for all testing. Her symptoms then progressed with L leg motor weakness and shakiness (similar to clonus) with movement, chronic constipation, L drop foot, lower back pain, and neck pain.  Pt was finally diagnosed with tethered cord syndrome and underwent a clinical trial surgery at [facility], [facility], and [facility]. Due to her symptoms, she meets criteria for exploration and sectioning of her filum for the functional tethered cord clinical trial. Now s/p laminectomy for occult tethered cord release on 23 by Dr Duckworth. Patient tolerated procedure well. No post-op complications. Transferred stable once PACU criteria met. Patient kept FLAT for 36hr. Placed on an aseptic dex taper and post-op IV abx x24hrs.  She was mobilized on  tolerated well  without any symptoms. Additionally, was instructed not to exercise for 7 weeks post-surgery. Pt reports she recovered well overall with decreased drop foot, clonus and swelling, continues to have increased coordination deficits and foot drag with fatigue. Pt has completed OPPT at [facility] in [place] and intermittent massage therapy. Pt’s functional goals are to work on higher level balance, picking up things from floor, coordination and “Hippo therapy”.     Patient/Family/Caregiver Comments/Observations Pt reports she is waiting for the cervical MRI and the CSF flow study results from 11/1. Pt notes she called the local neuro PT clinic to schedule an evaluation and she is waiting for them to run her insurance. Pt spoke with a [employer] rep who will submit her LMN to determine eligibility.     Precautions comments hypermobile - EDS, postural hypotension                    Daily Falls Screen - 11/04/24 1556          Daily Falls Assessment    Patient reported fall since last visit No                    Pain/Vitals - 11/04/24 1556          Pain Assessment    Currently in pain No/Denies                    PT - 11/04/24 1556          Physical Therapy    Physical Therapy Specialty Spinal Cord PT        PT Plan    Frequency of treatment 2 times/week     PT Duration 3 months     PT Cert From 08/26/24     PT Cert To 11/22/24     Date PT POC was sent to provider 08/26/24     Signed PT Plan of Care received?  Yes                    Assessment and Plan - 11/04/24 1556          Assessment    Plan of Care reviewed and patient/family in agreement Yes     System Pathology/Pathophysiology Noted musculoskeletal;neuromuscular;cardiovascular     Functional Limitations in Following Categories (PT Eval) self-care;work;community/leisure     Rehab Potential/Prognosis good, to achieve stated therapy goals     Problem List abnormal muscle tone;decreased strength;impaired balance;impaired sensation;decreased  endurance;edema;hemiparesis/hemiplegia;impaired motor control;impaired coordination     Clinical Assessment Patient arrives to PT for discharge evaluation. Patient's progress note was completed 10/28 where her FGA improved to a 28/30, which does meet one of her LTG's for this POC. Her 6MWT improved by 3 ft to 1668 ft however her gait speed decreased from 1.47 m/sec to 1.36 m/sec. She completed 10 reps during her 30s STS which remains the same as previous assessments. Pt's motor control and higher level coordination in gait and mobiltily have improved since initial evaluation but pt continues to motor control deficits which impede her higher level balance and functional mobility. Pt was educated on performance and discussed discharge due to performance with objective assessments. Pt is appropriate for discharge at this time and was given an HEP. Patient was in agreement with this plan. Pt will follow up [facility] rep and the local neuro PT clinic to schedule appointment.     Plan and Recommendations PT discharge with HEP                     OBJECTIVE MEASUREMENTS/DATA:    None taken- See 10/28 for Functional Testing scores     ROM and MMT          6/18/2024   PT Cervical/Lumbar/Other ROM Measurements   Other: Girth Measurement/Comments Mild dependent edema of LLE at end of day per pt reports; surgical scar approx 4 inches in length down mid lumbar spine from previous laminectomy, mild hypomobility down lower 50% of scar   Additional ROM  Hypermobile in all jointsm mild L hamstring length restrictions to approx 70 deg due to tone.   PT LE MMT   Right Hip Flexion (5/5) normal   Left Hip Flexion (3-/5) fair minus   Right Hip Extension (4+/5) good plus   Left Hip Extension (3-/5) fair minus   Right Hip ABD (4+/5) good plus   Left Hip ABD (3-/5) fair minus   Left Hip ADD (4+/5) good plus   Left Hip IR (3+/5) fair plus   Left Hip ER (3+/5) fair plus   Right Knee Flexion (5/5) normal   Left Knee Flexion (4/5) good   Right  Knee Extension (5/5) normal   Left Knee Extension (4-/5) good minus   Right Ankle DF (5/5) normal   Left Ankle DF (3+/5) fair plus   Right Ankle PF (5/5) normal   Left Ankle PF (4+/5) good plus   Right Ankle Inversion (5/5) normal   Left Ankle Inversion (3-/5) fair minus   Right Ankle Eversion (5/5) normal   Left Ankle Eversion (4-/5) good minus     Outcome Measures          6/18/2024    10:09 6/24/2024    17:03 7/26/2024    08:58 8/26/2024    08:05 9/27/2024    08:04 10/28/2024    13:58   PT OBJECTIVE Outcome Measures   6 Minute Walk Test 1555 ft  1612 ft 1551ft 1665ft 1668 ft   Gait Speed (m/sec) 1 m/sec  1.22 m/sec       SSV 1.39 m/sec 1.47 m/sec 1.36 m/sec   30 Second Sit to Stand --        TBA NV  10 repetitions 10 repetitions  10 repetitions   FGA 24  25 27 27 28   PT SUBJECTIVE Outcome Measures   ABC  1360/1600 = 85% confidence  89.5%     Other  SLS: R: 30 seconds, L <5 seconds without pelvic drop SLS: R: 30 seconds, L: 23 seconds          Today's Treatment:    Education provided:  Yes: See treatment log for details of education provided       PT Neuro Exercises Current Session   Performed Today? (y/n)   THER ACT   CPT 99358 TOTAL TIME FOR SESSION 8-22 Minutes      Pain, vitals, subjective BP monitored throughout session   Provided rest breaks for energy conservation  Diaphragmatic breathing beginning of session due to elevated BP Yes       Skin inspection Base of incision, no swelling noted    FES  cycle [id number] (Age 28) , PIN [id number]   Bike height: 2 holes showing  Pedal height: 3 holes showing  L LE set up only    FES Xcite Set Up of LLE                 Teodoro Patrick, PT from [facility] present for session.     Discussed options for insurance coverage vs out of pocket costs. Process to proceed. T/o session     [employer] L300 Go set up   - education on LMN signature with therapist and physician    11/4: Pt spoke with [facility] rep who will submit her LMN to determine eligibility.                  Yes     Patient Education Patient educated regarding current impairments per testing completed today and PT POC moving forward.     Patient provided with Welcome Letter, which includes attendance policy. Provided education regarding cancellation and no-show policy. Education regarding the importance of participation and regular attendance to maximize goal attainment. Patient verbalized understanding/agreement.     Clearance for FES received, will be completed NV, start with handheld unit to assess tolerance prior to FES bike. Pt verbalized understanding.    Patient educated regarding progress made thus far, current impairments per re-assessments completed today and PT POC moving forward. Patient verbalized understanding/agreement.     Discussion regarding [employer] scheduling and scheduling with [employer] rep. PT to find out next time [employer] rep is at [facility] to adjust pt schedule. Will provide with forms as well.     Discussion of ordering process for bioness.    Patient educated on performance with objective assessments completed during session. Pt verbalized understanding      10/14: Pt plans to get imaging done for brain, cervical spine and lumbar spine on 10/16 prior to following up with Neuro team.     11/4: Pt educated on performance with objective assessments completed during session. Discussed discharge due to performance with objective assessments, which pt was understanding of. Discussed patient contacting [facility] as an option to transition to following d/c and pt is waiting for a return call from their office. Pt verbalized understanding.                                                                           Yes   HEP  Verbally reviewed practicing gait over level surfaces with decreased step width, and then incorporating BUE swing and trunk rot. Pt verbalized understanding.     Updated HEP issued with print out and thorough review of program, Discussed progressing volume and  "frequency as tolerated          Yes   Floor Transfers  CloseS with increased time for LLE management, requires single UE support with review of moving from tall kneel to half kneeling position. Pt demos good understanding, will require continued practice     Patient able to complete floor transfer independently, walked hands down to floor into quadruped. Completed opposite to stand back up.               Subjective Outcomes Measures       ABC Scale Assessed     THER EX  CPT 61337 TOTAL TIME FOR SESSION  38-52 Minutes      STRETCHING      Stretching by patient Incline board stretch: L3 - 1 min x2  L QL stretch with small physioball 10 x 3 sec hold  L Q/L stretch seated in with passive overpressure: 30 seconds x 3         Stretching by therapist/PROM Trialed modified jose stretch - not effective (pt states modified lunge position she has felt more of a stretch in hip flexors in the past)      CARDIOVASCULAR       Nu Step 12 mins, Level: 4, BLE only, SPM: >25     Stationary Bike Recumbent bike 5 min, L1  (Unable to complete revolution on Expresso upright bike)    Elliptical  10 minutes, with BLE/UE, Level: 1  Yes   Ambulation with AD      Treadmill VR: 10 minutes, speed: 2.3 mph, [place]  - VC to increase nancy   - progressing from light RUE > unsupported at 5 min slime   - using Bioness L300     2 mins of side stepping up incline leading with LLE using [employer] L300 Go     Endurance Testing       6mWT Assessed    STRENGTH TRAINING     HEP Review See tx log below for all exercises reviewed in session for HEP Yes    Standing Ther-Ex Standing hip abduction 2 x 10 ea  - standing on airex   - Second set, no UE support     Step ups 6\" block w contralateral LE march x 20 ea    Lateral step down, 4\" block, L LE only w orange band TKE x 10    Hip hikes 2\" block, VC's to discourage R hip hike     Heel raises from L2 on incline board x 15 (on floor today)    Heel raises: 2x15, small weighted ball between heels " "    Staggered STS w pilates ring 2 x 10, VC's to abd L knee into PT's hand    Squat into BL heel raise x 15 w 10lb     Modified deadlift from 6\" block: 2x10, 20lbs     Split squat w front foot on airex x 10    Step up 8\" block w contralateral LE march to airex with 6lb self ball toss 20x ea    Heels elevated squats, 4 second decline x 10         Yes                                        Yes   Side-lying there-ex Clamshells: 2x20 on R, L in supine: 5\"x10      Seated Ther-ex Seated on large physioball:   - alt march: 10\" x 5  - perturbations: 30 seconds   - lateral ball toss with rot: 1 min x1, B/L,    - fwd ball toss with overhead hold and toss: 1 min x1, b/l, 6#   - D2 flexion with BUE with 6#: 30 seconds x 2   - Adductor squeeze with LAQ x 10 ea     Supine Ther-Ex Diaphragmatic breathin mins due to elevated BP  Glut bridge: 3\" x10, orange band  Bridge: x10   Supine hip abduction x 10, LLE, isometrics today  BKFO: x10, orange t-band only for R, active assist on L   Clamshells, x 10 ea LE  Bridge with ball squeeze x 10    Deadbugs with physioball  - isometric holds: 10\" x 10, physioball under B heels   - alt LE heels taps: 2x5   - alt UE: x10 reps       Planks  Full plank on hands: 35 seconds x 2  Plank with lateral weight pull: x5, b/l, 6#  Side plank: 30 sec x 1 ea   Side plank - with hip dip: x10, b/l  Plank with trunk rotation using 6#: 1 min x 1       Slider Fit  Fwd gliding/roll outs with half ROM: x10     Quadruped  hip extensions with towel under L foot: x10, B/L (modified position with BUE on EOM and LE on floor)     Quadruped <> bear crawl: 10\" x10   Bear crawl: 5' - fwd/back: x3 rounds     Alternating UE flexion x 10  Alternating LE extension x 10; use of slider L LE    Tall kneel Moderate perturbations all directions  Mini squats 2 x 10 blue TB resistance   Trunk rot: 2x10 w 10lb   D2 chops/lifts: x10  Posterior reach back to cones w cross body reach to PT, 2lb ankle weights on wrists           " "  Half Kneel  On mat:   - static hold: 30 seconds x 3   - chest press: 2x10, 6lb medicine ball   - D2 flexion: 2x10, B/L 6lb medicine ball  - ball toss with beach ball 30 sec x 2 Yes to all   Prone Ther-Ex     Core exercises PPT x 5  PPT with ball squeeze 5 sec hold, 10 x  PPT with bridge x 10   PPT w single leg bridge x 10  PPT with heel taps from tabletop position BLE x 10 holding 6lb medball, CGA L LE  PPT with hip abduction isometric L LE x 3 sec hold x10, orange band     Heel slides with towel: 2x10  Triple flex over physioball: x10  Windshield wipers BLE over physioball 2 x 10     STS's from low EOM squeezing pilates ring  - As above, with squeeze, in staggered stance  Marching, squeezing small ball 30 ft x 2    Modified dead bugs, alt UE 2 x 10     Supine B shoulder ext with GTB, emphasis on core activation; 2 x 15    Quadruped:  - Rainbows x 10 ea LE, min cueing for L LE   - Bear crawls, forward/backward    Suitcase carry, 12lb x 150 ft ea UE    Welsh twists holding small ball, LE extended, heels touching ground 2 x 15                                                       Functional Strength Testing       30 sec STS test (60y +)  Assessed    NEURO RE-ED  CPT 05477 TOTAL TIME FOR SESSION Not performed      FES  To promote neurorecovery of gait:  Distance: 3.01 miles  Resistance: 0.77 Nm  Power: 2.9 W  Symmetry: L 2%  Total time: 21:00  Active time: 19:03    Xcite  NMRE of LLE in function:     - Sit <> stand from elevated EOM using 6 lb med ball: x10, then progressing to lowest height: x10  - then x10 in staggered stance  - x10 with dynadisc under sitting surface  - x10 with dynadisc and airex under B feet       - Modified reverse lunging with SliderFit under R foot in parallel bars with RUE support only > unsupported:2 x10 reps  - L toe tap to 4\" block from airex pad: x10, cues for weight shift then hip flexion vs lateral trunk flexion         - Tall kneel <> heel sit: 2x10, 2nd set with chest press " "  - fwd step up with LLE onto 6\" block: x10, repeated with R knee drive, x10 repeated with 3# on RUE     Bioness L300 Go  - Fwd/rev step over 12\" [place]: x20,   - Fwd step up onto 6\" block with contra LE march: 2x10, B         - RLE step up, LLE march with 8lb db overhead press        - LLE step up, RLE march while holding 8lb db x 10, overhead press with 8lb db x 5  - Fwd step down 4\" block: 2x10, orange theraband around knees for manual cues   - stance on airex with lateral L toe taps to 12\" [place]: x10  - Tandem walk 20'x4, 8# on R UE. Cues to prevent lateral lean  - tandem walk on balance beam  - step up on 4\" block, contralateral heel tap to 10\" block- fwd step over 1/2 bolster x20, cues for L hip stability and   6\" hurdles, navigating reciprocally     - 6 inch step taps 2 X 10  - 6 inch step up with repeater, cues for L hip stability     -weight shifting over 1/2 bolster. Cues for L hip stability and L knee extension    6\" hurdles:  - Fwd, cues for L heel strike, weight shift, and knee extension.  - Reciprocal pattern  - side stepping                       COORDINATION       Target Tapping     Coordination ladder As appropriate for HL balance     Amb with ankle weights     Amb with proprio wrap     Pushing weighted shopping cart     POSTURAL RE-ED     Sit <> Stand  From 22\" mat holding 6# med ball, cues for midline and equal WB      Tall Kneel  Tall kneel <> short sit: x10, x10 with chest press     Seated & standing reaching for trunk strengthening     Abbey-scapular strengthening     PRE-GAIT ACTIVITIES      Tap-ups     Step-ups To 6\" block with contralateral knee drive: x10, repeated with 2 lb DB for UE swing     Standing weight shifting     Step-taps At 6\" main gym steps, step up to 1st step, tap to 3rd with contralateral LE  - w Bioness    BALANCE TRAINING     Standing balance - static/dynamic       HEP Review       Floor       Airex Santiago-tandem: 30 seconds x 2, HT: 30\" x 1, EC: 30 seconds x 1  Tandem " "stance: 30 seconds x 1     Blue tread rockerboard:  - Semi tandem with chops, 6lb                 Rockerboard AP plane: split stance x10, with light UE support, B/L   AP, chest press x 10, OH press x 10, 6lb   Squats 2 x 10      Hurdles 6\" hurdles, focus on dec L hip circumduction through swing and encouraging heel strike. Step forward/back     Repeated with side stepping pattern, requiring BUE support dud to inability for L knee extension through stance     BOSU Ball  Fwd mini lunge onto domed side with pause: x10, CloseS   Modified lunge onto BOSU with palloff press:   - x10, then repeated x10 with perturbations on yellow sports cord     Split Stance      [employer] Balance Training  Weight shifting and motor control training with increased difficulty noted to L anterolateral planes: x5 mins     Dynamic gait       Side stepping With partial squat with orange theraband around ankles: 10'x4     Retro walking Fwd/retro amb 50'x6 with cues for shorter step length and for heel strike + knee extension through stance, mirror anteriorly, added dual task of beach bal    Retro 30ft x 2 ea today with Bioness donned  Cues for decreased lateral trunk lean with retro amb    2 x 30 ft while holding small physioball, w Bioness  - 2 x 30 ft marching with 6lb medball, w Biones                         Tandem Walking 10'x2, CloseS/CGA     Suitcase carry Using small 3 lb B DB on R: 100'x2, noted delayed L knee extension through IC and mid stance     Marching With pilates ring 3 x 30 ft     Amb with hula hoop Using hula hoop to facilitate BUE swing and trunk rot     Walking with ball toss     Proprioceptive wrap Blue TB around L LE  - Amb along purple line x 3 with cues to keep feet within tiles  - x 250ft with same cues as above  - Retro amb 3 x 20 ft  - Mini squats with heels elevated x 10, focus on minimizing hyperextension    Balance Testing     FGA  Assessed    SLS  Assessed see note     10mWT Assessed  - (6mWT on 8/26)    GAIT " TRAINING  CPT 55632 TOTAL TIME FOR SESSION Not performed        Ambulation without AD  Gait with no AD over level indoor surfaces with WBOS and decreased L weight shift, VC for anterolateral weight shift, heel strike, BUE swing and trunk rot, 300'x1  - with bioness L300 lower cuff donned       Dynamic Gait  20'x6 with focus on shorter step lengths, heel strike and decreasing step width with use of 1' floor tiles, repeated with bimanual ball toss with CloseS and noted decreased gait speed and increase in step lengths     With Bioness donned x 250 ft, carpet and tile   - Good clearance of L LE through swing with improved knee flexion through swing    -  50 ft without for comparison     Treadmill, No UE support, with Bioness donned 2.2 mph x 10 min, [place]   - Bioness donned     Following removal of proprioceptive wrap x 250ft, no instances of L knee hyperextension thrust noted.                                     Stair negotiation  Full flight stairs 2 trials with 1 rail support on descent only Tactile cues for trunk midline, verbal and visual cues for eccentric control    6 inch    Curb negotiation      Ramp negotiation  with bioness X 30 ft    Outdoor ambulation  With Bioness:  - x500 ft approx, inclines/declines  - Retro amb up incline 2 x 15 ft   - slow controlled descent: 15'x2     MANUAL  CPT 82987 TOTAL TIME FOR SESSION Not performed      Mobilization        MODALITIES  CPT 56711 TOTAL TIME FOR SESSION      Ice       Heat       ATTENDED E-STIM  CPT 78261 TOTAL TIME FOR SESSION Not performed     Attended E-Stim FES    [id number] (Age 28) , PIN [id number]   Stim to L quad, hamstring, glutes, tib ant, and gastroc. 250-350 pulse width, 40Hz frequency.   Muscle testing testing completed for initial set up (7/8)    Xcite use for LLE:     Stim to L quad, hamstring, glutes, tib ant, and gastroc. 250-350 pulse width, 40Hz frequency.   Muscle testing testing completed for initial set up 7/26)    Bioness L300 go.   L  lower leg quick fit pads, L hamstring added 8/2 ( lower leg cuff only today)   30 Hz with 0.2 ramp for loading response   Used tablet with strap   Increased time for set up/ alignment                               Unattended E-stim            HEP 10/31/24:            Goals Addressed                      This Visit's Progress      PT Neuro Goals         Short term goals   Short Term Goals Time Frame Result Comment/Progress   Assess ABC, floor transfers, SLS assessment, 30sSTS  2 weeks Goal met     Improve 6mWT by 191 feet or more to meet the MCID indicating significant improvement in endurance and activity tolerance. 4-6 weeks Ongoing goal     Improve ABC Scale score to 60% or better suggesting improved balance confidence during functional tasks 4-6 weeks Goal met  85%    Improve FGA score by 5 points or more to meet the MCID indicating improving dynamic balance and decreasing falls risk. 4-6 weeks Ongoing goal  Improved by 1 point   Tolerate 10 min of CV activity with VSS 4-6 weeks Goal MET     Progress gait speed by 0.13 m/sec or more to meet the MCID without decline in safety or quality indicating significant improvement in gait speed and increased safety during ambulation in the home and community. 4-6 weeks Goal MET     Pt and caregiver will be mod I with HEP 4 weeks Goal MET       Long term goals   Long Term Goals Time Frame Result Comment/Progress   Pt will complete floor transfer without any external support Independently  8-12 weeks  ongoing    Pt will navigate FF 7 inch steps unsupported with reciprocal pattern  8-12 weeks Met     Improve 6mWT by an additional 191 feet or more to meet the MCID indicating significant improvement in endurance and activity tolerance. 8-12 weeks ongoing 8/26 - 1551ft   Improve 30 sec STS test reps to 15 reps or better to meet the age/gender matched norm and cut-off score indicating adequate LE muscle performance for functional activities. 8-12 weeks ongoing    Improve ABC Scale  score to 81% or better suggesting improved balance confidence during functional tasks and decreased risk for falls. 8-12 weeks Met    Improve FGA score to > 28/30 indicating improved dynamic balance and decreased falls risk. 8-12 weeks ongoing 8/26 - 27/30   Tolerate 12-15 min of CV activity with VSS 8-12 weeks Met    Progress gait speed by an additional 0.13 m/sec or more to meet the MCID without decline in safety or quality indicating significant improvement in gait speed and increased safety during ambulation in the home and community. 8-12 weeks Met 8/26 - 1.39m/s   Pt and caregiver will be mod I with updated HEP 8-12 weeks ongoing        LTG's following re-eval:    Long Term Goals Time Frame Result Comment/Progress   Patient will complete assessment with Bioness rep to determine fit/next steps if appropriate    8-12 weeks  Met      Pt will complete floor transfer without any external support independently    8-12 weeks  Met     Improve 6mWT by an additional 191 feet or more to meet the MCID indicating significant improvement in endurance and activity tolerance.   8-12 weeks Goal not met 11/4 8/26 - 1551ft  9.27: 1665 ft   10/28: 1668   Improve 30 sec STS test reps to 15 reps or better to meet the age/gender matched norm and cut-off score indicating adequate LE muscle performance for functional activities.   8-12 weeks Goal not met 11/4 10/28: 10     Improve FGA score to >/= 28/30 indicating improved dynamic balance and decreased falls risk.   8-12 weeks Met  8/26 - 27/30 9/27: 27   10/28: 28   Progress gait speed by an additional 0.13 m/sec or more to meet the MCID without decline in safety or quality indicating significant improvement in gait speed and increased safety during ambulation in the home and community.   8-12 weeks Goal not met 11/4 8/26 - 1.39m/s  9/27: 1.47 m/sec   10/28: 1.36              Pt Stated Goals (pt-stated)         Pt’s functional goals are to work on higher level balance, picking up  things from floor, coordination and “Hippo therapy”.     11/4: Pt states goal has been met.               Discharge information for [facility]:    Reason for D/C: Goals met  Was care interrupted for medical reason?: No  Did the patient demonstrate increased independence: Yes  Demonstration of independence:: [place] in HEP, Increased functional activity, Increased functional independence  Has the patient returned to productive activity?: Yes  Increased production assessment:: Return to household activities, Increased community independence, Return to work/school/volunteer activities  Skin integrity: Intact - No issues

## 2024-11-04 NOTE — OP PT TREATMENT LOG
PT Neuro Exercises Current Session   Performed Today? (y/n)   THER ACT   CPT 61597 TOTAL TIME FOR SESSION 8-22 Minutes      Pain, vitals, subjective BP monitored throughout session   Provided rest breaks for energy conservation  Diaphragmatic breathing beginning of session due to elevated BP Yes       Skin inspection Base of incision, no swelling noted    FES  cycle [id number] (Age 28) , PIN [id number]   Bike height: 2 holes showing  Pedal height: 3 holes showing  L LE set up only    FES Xcite Set Up of LLE                 [facility] Gabi Patrick, PT from [facility] present for session.     Discussed options for insurance coverage vs out of pocket costs. Process to proceed. T/o session     [employer] L300 Go set up   - education on LMN signature with therapist and physician    11/4: [facility] will [facility] to determine eligibility.                 Yes     Patient Education Patient educated regarding current impairments per testing completed today and PT POC moving forward.     Patient provided with Welcome Letter, which includes attendance policy. Provided education regarding cancellation and no-show policy. Education regarding the importance of participation and regular attendance to maximize goal attainment. Patient verbalized understanding/agreement.     Clearance for FES received, will be completed NV, start with handheld unit to assess tolerance prior to FES bike. Pt verbalized understanding.    Patient educated regarding progress made thus far, current impairments per re-assessments completed today and PT POC moving forward. Patient verbalized understanding/agreement.     Discussion regarding [employer] scheduling and scheduling with [facility] rep. PT to find out next time [facility] is at [facility] to adjust pt schedule. Will provide with forms as well.     Discussion of ordering process for [facility].    Patient educated on performance with objective assessments completed during session. Pt  verbalized understanding      10/14: Pt plans to get imaging done for brain, cervical spine and lumbar spine on 10/16 prior to following up with Neuro team.     11/4: Pt educated on performance with objective assessments completed during session. Discussed discharge due to performance with objective assessments, which pt was understanding of. Discussed patient contacting [facility] as an option to transition to following d/c and pt is waiting for a return call from their office. Pt verbalized understanding.                                                                           Yes   HEP  Verbally reviewed practicing gait over level surfaces with decreased step width, and then incorporating BUE swing and trunk rot. Pt verbalized understanding.     Updated HEP issued with print out and thorough review of program, Discussed progressing volume and frequency as tolerated          Yes   Floor Transfers  CloseS with increased time for LLE management, requires single UE support with review of moving from tall kneel to half kneeling position. Pt demos good understanding, will require continued practice     Patient able to complete floor transfer independently, walked hands down to floor into quadruped. Completed opposite to stand back up.               Subjective Outcomes Measures       ABC Scale Assessed     THER EX  CPT 09565 TOTAL TIME FOR SESSION  38-52 Minutes      STRETCHING      Stretching by patient Incline board stretch: L3 - 1 min x2  L QL stretch with small physioball 10 x 3 sec hold  L Q/L stretch seated in with passive overpressure: 30 seconds x 3         Stretching by therapist/PROM Trialed modified jose stretch - not effective (pt states modified lunge position she has felt more of a stretch in hip flexors in the past)      CARDIOVASCULAR       Nu Step 12 mins, Level: 4, BLE only, SPM: >25     Stationary Bike Recumbent bike 5 min, L1  (Unable to complete revolution on Expresso upright bike)   "  Elliptical  10 minutes, with BLE/UE, Level: 1  Yes   Ambulation with AD      Treadmill VR: 10 minutes, speed: 2.3 mph, [place]  - VC to increase nancy   - progressing from light RUE > unsupported at 5 min slime   - using Bioness L300     2 mins of side stepping up incline leading with LLE using Bioness L300 Go     Endurance Testing       6mWT Assessed    STRENGTH TRAINING     HEP Review See tx log below for all exercises reviewed in session for HEP Yes    Standing Ther-Ex Standing hip abduction 2 x 10 ea  - standing on airex   - Second set, no UE support     Step ups 6\" block w contralateral LE march x 20 ea    Lateral step down, 4\" block, L LE only w orange band TKE x 10    Hip hikes 2\" block, VC's to discourage R hip hike     Heel raises from L2 on incline board x 15 (on floor today)    Heel raises: 2x15, small weighted ball between heels     Staggered STS w pilates ring 2 x 10, VC's to abd L knee into PT's hand    Squat into BL heel raise x 15 w 10lb     Modified deadlift from 6\" block: 2x10, 20lbs     Split squat w front foot on airex x 10    Step up 8\" block w contralateral LE march to airex with 6lb self ball toss 20x ea    Heels elevated squats, 4 second decline x 10         Yes                                        Yes   Side-lying there-ex Clamshells: 2x20 on R, L in supine: 5\"x10      Seated Ther-ex Seated on large physioball:   - alt march: 10\" x 5  - perturbations: 30 seconds   - lateral ball toss with rot: 1 min x1, B/L,    - fwd ball toss with overhead hold and toss: 1 min x1, b/l, 6#   - D2 flexion with BUE with 6#: 30 seconds x 2   - Adductor squeeze with LAQ x 10 ea     Supine Ther-Ex Diaphragmatic breathin mins due to elevated BP  Glut bridge: 3\" x10, orange band  Bridge: x10   Supine hip abduction x 10, LLE, isometrics today  BKFO: x10, orange t-band only for R, active assist on L   Clamshells, x 10 ea LE  Bridge with ball squeeze x 10    Deadbugs with physioball  - isometric holds: " "10\" x 10, physioball under B heels   - alt LE heels taps: 2x5   - alt UE: x10 reps       Planks  Full plank on hands: 35 seconds x 2  Plank with lateral weight pull: x5, b/l, 6#  Side plank: 30 sec x 1 ea   Side plank - with hip dip: x10, b/l  Plank with trunk rotation using 6#: 1 min x 1       Slider Fit  Fwd gliding/roll outs with half ROM: x10     Quadruped  hip extensions with towel under L foot: x10, B/L (modified position with BUE on EOM and LE on floor)     Quadruped <> bear crawl: 10\" x10   Bear crawl: 5' - fwd/back: x3 rounds     Alternating UE flexion x 10  Alternating LE extension x 10; use of slider L LE    Tall kneel Moderate perturbations all directions  Mini squats 2 x 10 blue TB resistance   Trunk rot: 2x10 w 10lb   D2 chops/lifts: x10  Posterior reach back to cones w cross body reach to PT, 2lb ankle weights on wrists             Half Kneel  On mat:   - static hold: 30 seconds x 3   - chest press: 2x10, 6lb medicine ball   - D2 flexion: 2x10, B/L 6lb medicine ball  - ball toss with beach ball 30 sec x 2 Yes to all   Prone Ther-Ex     Core exercises PPT x 5  PPT with ball squeeze 5 sec hold, 10 x  PPT with bridge x 10   PPT w single leg bridge x 10  PPT with heel taps from tabletop position BLE x 10 holding 6lb medball, CGA L LE  PPT with hip abduction isometric L LE x 3 sec hold x10, orange band     Heel slides with towel: 2x10  Triple flex over physioball: x10  [facility] wipers BLE over physioball 2 x 10     STS's from low EOM squeezing pilates ring  - As above, with squeeze, in staggered stance  Marching, squeezing small ball 30 ft x 2    Modified dead bugs, alt UE 2 x 10     Supine B shoulder ext with GTB, emphasis on core activation; 2 x 15    Quadruped:  - Rainbows x 10 ea LE, min cueing for L LE   - Bear crawls, forward/backward    Suitcase carry, 12lb x 150 ft ea UE    [place] twists holding small ball, LE extended, heels touching ground 2 x 15  " "                                                     Functional Strength Testing       30 sec STS test (60y +)  Assessed    NEURO RE-ED  CPT 49945 TOTAL TIME FOR SESSION Not performed      FES  To promote neurorecovery of gait:  Distance: 3.01 miles  Resistance: 0.77 Nm  Power: 2.9 W  Symmetry: L 2%  Total time: 21:00  Active time: 19:03    Xcite  NMRE of LLE in function:     - Sit <> stand from elevated EOM using 6 lb med ball: x10, then progressing to lowest height: x10  - then x10 in staggered stance  - x10 with dynadisc under sitting surface  - x10 with dynadisc and airex under B feet       - Modified reverse lunging with SliderFit under R foot in parallel bars with RUE support only > unsupported:2 x10 reps  - L toe tap to 4\" block from airex pad: x10, cues for weight shift then hip flexion vs lateral trunk flexion         - Tall kneel <> heel sit: 2x10, 2nd set with chest press   - fwd step up with LLE onto 6\" block: x10, repeated with R knee drive, x10 repeated with 3# on RUE     Bioness L300 Go  - Fwd/rev step over 12\" [place]: x20,   - Fwd step up onto 6\" block with contra LE march: 2x10, B         - RLE step up, LLE march with 8lb db overhead press        - LLE step up, RLE march while holding 8lb db x 10, overhead press with 8lb db x 5  - Fwd step down 4\" block: 2x10, orange theraband around knees for manual cues   - stance on airex with lateral L toe taps to 12\" [place]: x10  - Tandem walk 20'x4, 8# on R UE. Cues to prevent lateral lean  - tandem walk on balance beam  - step up on 4\" block, contralateral heel tap to 10\" block- fwd step over 1/2 bolster x20, cues for L hip stability and   6\" hurdles, navigating reciprocally     - 6 inch step taps 2 X 10  - 6 inch step up with repeater, cues for L hip stability     -weight shifting over 1/2 bolster. Cues for L hip stability and L knee extension    6\" hurdles:  - Fwd, cues for L heel strike, weight shift, and knee extension.  - Reciprocal " "pattern  - side stepping                       COORDINATION       Target Tapping     Coordination ladder As appropriate for HL balance     Amb with ankle weights     Amb with proprio wrap     Pushing weighted shopping cart     POSTURAL RE-ED     Sit <> Stand  From 22\" mat holding 6# med ball, cues for midline and equal WB      Tall Kneel  Tall kneel <> short sit: x10, x10 with chest press     Seated & standing reaching for trunk strengthening     [place]-scapular strengthening     PRE-GAIT ACTIVITIES      Tap-ups     Step-ups To 6\" block with contralateral knee drive: x10, repeated with 2 lb DB for UE swing     Standing weight shifting     Step-taps At 6\" main gym steps, step up to 1st step, tap to 3rd with contralateral LE  - w Bioness    BALANCE TRAINING     Standing balance - static/dynamic       HEP Review       Floor       Airex [place]-tandem: 30 seconds x 2, HT: 30\" x 1, EC: 30 seconds x 1  Tandem stance: 30 seconds x 1     Blue tread rockerboard:  - Semi tandem with chops, 6lb                 Rockerboard AP plane: split stance x10, with light UE support, B/L   AP, chest press x 10, OH press x 10, 6lb   Squats 2 x 10      Hurdles 6\" hurdles, focus on dec L hip circumduction through swing and encouraging heel strike. Step forward/back     Repeated with side stepping pattern, requiring BUE support dud to inability for L knee extension through stance     BOSU Ball  Fwd mini lunge onto domed side with pause: x10, CloseS   Modified lunge onto BOSU with palloff press:   - x10, then repeated x10 with perturbations on yellow sports cord     Split Stance      Biodex Balance Training  Weight shifting and motor control training with increased difficulty noted to L anterolateral planes: x5 mins     Dynamic gait       Side stepping With partial squat with orange theraband around ankles: 10'x4     Retro walking Fwd/retro amb 50'x6 with cues for shorter step length and for heel strike + knee extension through stance, mirror " anteriorly, added dual task of beach bal    Retro 30ft x 2 ea today with Bioness donned  Cues for decreased lateral trunk lean with retro amb    2 x 30 ft while holding small physioball, w Bioness  - 2 x 30 ft marching with 6lb medball, w Biones                         Tandem Walking 10'x2, CloseS/CGA     Suitcase carry Using small 3 lb B DB on R: 100'x2, noted delayed L knee extension through IC and mid stance     Marching With pilates ring 3 x 30 ft     Amb with hula hoop Using hula hoop to facilitate BUE swing and trunk rot     Walking with ball toss     Proprioceptive wrap Blue TB around L LE  - Amb along purple line x 3 with cues to keep feet within tiles  - x 250ft with same cues as above  - Retro amb 3 x 20 ft  - Mini squats with heels elevated x 10, focus on minimizing hyperextension    Balance Testing     FGA  Assessed    SLS  Assessed see note     10mWT Assessed  - (6mWT on 8/26)    GAIT TRAINING  CPT 75023 TOTAL TIME FOR SESSION Not performed        Ambulation without AD  Gait with no AD over level indoor surfaces with WBOS and decreased L weight shift, VC for anterolateral weight shift, heel strike, BUE swing and trunk rot, 300'x1  - with bioness L300 lower cuff donned       Dynamic Gait  20'x6 with focus on shorter step lengths, heel strike and decreasing step width with use of 1' floor tiles, repeated with bimanual ball toss with CloseS and noted decreased gait speed and increase in step lengths     With Bioness donned x 250 ft, carpet and tile   - Good clearance of L LE through swing with improved knee flexion through swing    -  50 ft without for comparison     Treadmill, No UE support, with Bioness donned 2.2 mph x 10 min, [place]   - Bioness donned     Following removal of proprioceptive wrap x 250ft, no instances of L knee hyperextension thrust noted.                                     Stair negotiation  Full flight stairs 2 trials with 1 rail support on descent only Tactile cues for trunk  midline, verbal and visual cues for eccentric control    6 inch    Curb negotiation      Ramp negotiation  with bioness X 30 ft    Outdoor ambulation  With Bioness:  - x500 ft approx, inclines/declines  - Retro amb up incline 2 x 15 ft   - slow controlled descent: 15'x2     MANUAL  CPT 44723 TOTAL TIME FOR SESSION Not performed      Mobilization        MODALITIES  CPT 40878 TOTAL TIME FOR SESSION      Ice       Heat       ATTENDED E-STIM  CPT 64650 TOTAL TIME FOR SESSION Not performed     Attended E-Stim FES    [id number] (Age 28) , PIN [phone]   Stim to L quad, hamstring, glutes, tib ant, and gastroc. 250-350 pulse width, 40Hz frequency.   Muscle testing testing completed for initial set up (7/8)    Xcite use for LLE:     Stim to L quad, hamstring, glutes, tib ant, and gastroc. 250-350 pulse width, 40Hz frequency.   Muscle testing testing completed for initial set up 7/26)    Bioness L300 go.   L lower leg quick fit pads, L hamstring added 8/2 ( lower leg cuff only today)   30 Hz with 0.2 ramp for loading response   Used tablet with strap   Increased time for set up/ alignment                               Unattended E-stim            HEP 10/31/24: